# Patient Record
Sex: FEMALE | Race: WHITE | NOT HISPANIC OR LATINO | Employment: OTHER | URBAN - METROPOLITAN AREA
[De-identification: names, ages, dates, MRNs, and addresses within clinical notes are randomized per-mention and may not be internally consistent; named-entity substitution may affect disease eponyms.]

---

## 2017-01-09 ENCOUNTER — OFFICE VISIT (OUTPATIENT)
Dept: MEDICAL GROUP | Facility: MEDICAL CENTER | Age: 72
End: 2017-01-09
Payer: MEDICARE

## 2017-01-09 VITALS
HEIGHT: 62 IN | SYSTOLIC BLOOD PRESSURE: 158 MMHG | BODY MASS INDEX: 38.61 KG/M2 | OXYGEN SATURATION: 92 % | HEART RATE: 77 BPM | TEMPERATURE: 98.4 F | DIASTOLIC BLOOD PRESSURE: 98 MMHG | WEIGHT: 209.8 LBS

## 2017-01-09 DIAGNOSIS — R73.03 PREDIABETES: ICD-10-CM

## 2017-01-09 DIAGNOSIS — J30.1 SEASONAL ALLERGIC RHINITIS DUE TO POLLEN: ICD-10-CM

## 2017-01-09 DIAGNOSIS — R05.3 CHRONIC COUGH: ICD-10-CM

## 2017-01-09 DIAGNOSIS — I10 ESSENTIAL HYPERTENSION: ICD-10-CM

## 2017-01-09 LAB
INT CON NEG: NEGATIVE
INT CON POS: POSITIVE
S PYO AG THROAT QL: NORMAL

## 2017-01-09 PROCEDURE — 99214 OFFICE O/P EST MOD 30 MIN: CPT | Performed by: FAMILY MEDICINE

## 2017-01-09 PROCEDURE — 87880 STREP A ASSAY W/OPTIC: CPT | Performed by: FAMILY MEDICINE

## 2017-01-09 RX ORDER — HYDROCHLOROTHIAZIDE 25 MG/1
25 TABLET ORAL DAILY
Qty: 30 TAB | Refills: 0 | Status: SHIPPED | OUTPATIENT
Start: 2017-01-09 | End: 2017-12-06

## 2017-01-09 RX ORDER — DIPHENHYDRAMINE HCL 25 MG
50 CAPSULE ORAL EVERY 6 HOURS PRN
Qty: 30 CAP | COMMUNITY
Start: 2017-01-09 | End: 2017-11-20

## 2017-01-09 RX ORDER — HYDROCHLOROTHIAZIDE 25 MG/1
25 TABLET ORAL DAILY
Qty: 30 TAB | Refills: 0 | Status: SHIPPED | OUTPATIENT
Start: 2017-01-09 | End: 2017-01-09 | Stop reason: SDUPTHER

## 2017-01-09 NOTE — MR AVS SNAPSHOT
"Nakita Kinney [facility]   2017 10:00 AM   Office Visit   MRN: [id number]    Department:  [place] [address]   Dept Phone:  [phone]    Description:  Female : 1945   Provider:  Sravan Broderick M.D.           Reason for Visit     Cold Symptoms dry cough, nasal congestion, sore throat x2 months      Allergies as of 2017     Allergen Noted Reactions    Morphine 2014   Itching    Penicillins 2010   Hives, Swelling      You were diagnosed with     Chronic cough   [543390]       Essential hypertension   [1914470]       Seasonal allergic rhinitis due to pollen   [6418540]       Prediabetes   [486696]         Vital Signs     Blood Pressure Pulse Temperature Height Weight Body Mass Index    158/98 mmHg 77 36.9 °C (98.4 °F) 1.575 m (5' 2.01\") 95.165 kg (209 lb 12.8 oz) 38.36 kg/m2    Oxygen Saturation Smoking Status                92% Never Smoker           Basic Information     Date Of Birth Sex Race Ethnicity Preferred Language    1945 Female White Non- English      Your appointments     2017 10:00 AM   Established Patient with Sravan Broderick M.D.   [facility])    [address] [facility] [address]  [facility] [id number] [phone]           You will be receiving a confirmation call a few days before your appointment from our automated call confirmation system.            Feb 10, 2017  8:00 AM   Established Patient with Randa Elmore M.D.   [facility])    [address]  [facility] [address] [phone]           You will be receiving a confirmation call a few days before your appointment from our automated call confirmation system.              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   9/29/2015 - Present    Lightheadedness R42   9/28/2016 - Present    BMI 40.0-44.9, adult (HCC) Z68.41   9/28/2016 - Present    Immunization due Z23   9/28/2016 - Present    TIA (transient ischemic attack) G45.9   10/13/2016 - Present    History of CVA (cerebrovascular accident) Z86.73   10/17/2016 - Present    Inactivity Z72.3   10/17/2016 - Present    Hyperlipidemia LDL goal <70 E78.5   10/17/2016 - Present    Cramp of both lower extremities R25.2   11/23/2016 - Present    Chronic cough R05   1/9/2017 - Present    Prediabetes R73.03   1/9/2017 - Present    Seasonal allergic rhinitis due to pollen J30.1   1/9/2017 - Present      Health Maintenance        Date Due Completion Dates    IMM ZOSTER VACCINE 9/20/2006 ---    IMM INFLUENZA (1) 10/1/2017 (Originally 9/1/2016) 10/5/2015, 1/26/2015    IMM PNEUMOCOCCAL 65+ (ADULT) LOW/MEDIUM RISK SERIES (2 of 2 - PPSV23) 4/20/2017 4/20/2016    BONE DENSITY 1/1/2018 1/1/2013 (N/S)    Override on 1/1/2013: (N/S)    COLONOSCOPY 1/1/2021 1/1/2011 (N/S)    Override on 1/1/2011: (N/S)    IMM DTaP/Tdap/Td Vaccine (2 - Td) 4/20/2026 4/20/2016            Results     POCT Rapid Strep A      Component    Rapid Strep Screen    neg    Internal Control Positive    Positive    Internal Control Negative    Negative                        Current Immunizations     13-VALENT PCV PREVNAR 4/20/2016  2:10 PM    Influenza TIV (IM) 10/5/2015, 1/26/2015  3:55 PM    Tdap Vaccine 4/20/2016  2:10 PM      Below and/or attached are the medications your provider expects you to take. Review all of your home medications and newly ordered medications with your provider and/or pharmacist. Follow medication instructions as directed by your provider  and/or pharmacist. Please keep your medication list with you and share with your provider. Update the information when medications are discontinued, doses are changed, or new medications (including over-the-counter products) are added; and carry medication information at all times in the event of emergency situations     Allergies:  MORPHINE - Itching     PENICILLINS - Hives,Swelling               Medications  Valid as of: January 09, 2017 - 10:45 AM    Generic Name Brand Name Tablet Size Instructions for use    Aspirin (Tablet Delayed Response) Aspirin 325 MG Take 1 Tab by mouth every day.        DiphenhydrAMINE HCl (Cap) BENADRYL 25 MG Take 2 Caps by mouth every 6 hours as needed.        HydroCHLOROthiazide (Tab) HYDRODIURIL 25 MG Take 1 Tab by mouth every day.        Lidocaine (Patch) LIDODERM 5 % Apply 1 Patch to skin as directed every 24 hours.        Pravastatin Sodium (Tab) PRAVACHOL 10 MG Take 1 Tab by mouth every Monday, Wednesday, and Friday.        TiZANidine HCl (Tab) ZANAFLEX 4 MG Take 1 Tab by mouth every 6 hours as needed.        .                 Medicines prescribed today were sent to:     [facility] [address] [address]    Phone: [phone] Fax: [phone]    Open 24 Hours?: No    [facility] [address] [place] [address] - [address]    [address] [facility] [address] [facility] [address]    Phone: [phone] Fax: [phone]    Open 24 Hours?: No      Medication refill instructions:       If your prescription bottle indicates you have medication refills left, it is not necessary to call your provider’s office. Please contact your pharmacy and they will refill your medication.    If your prescription bottle indicates you do not have any refills left, you may request refills at any time through one of the following ways: The online Playroom system (except Urgent Care), by calling your provider’s office, or by asking your  pharmacy to contact your provider’s office with a refill request. Medication refills are processed only during regular business hours and may not be available until the next business day. Your provider may request additional information or to have a follow-up visit with you prior to refilling your medication.   *Please Note: Medication refills are assigned a new Rx number when refilled electronically. Your pharmacy may indicate that no refills were authorized even though a new prescription for the same medication is available at the pharmacy. Please request the medicine by name with the pharmacy before contacting your provider for a refill.           Touch-Writerhart Access Code: Activation code not generated  Current [email or web address] Status: Active

## 2017-01-10 NOTE — PROGRESS NOTES
Subjective:     Chief Complaint   Patient presents with   • Cold Symptoms     dry cough, nasal congestion, sore throat x2 months       History of Present Illness:  Nakita Sheppard is a 71 y.o. female established patient who presents today to discuss chronic cough:    Chronic cough  Patient reports 2 month history of dry cough associated at times with nasal congestion as well as sore throat. However, while patient has been ill over the last 2 months, she does admit that symptoms are not consistent.    Of note, patient has started ACE inhibitor within the last 2 months, lisinopril which was initially at a dose of 20 mg by mouth daily, then increase to 40 mg by mouth daily. However, patient cannot recall when her cough started.    ROS is positive for sneezing, rhinorrhea, and is otherwise negative for fevers, chills, chest congestion, wheezing/rhonchi/rales, throat closure, mucosal swelling of lips or oral mucosa, fatigue.    Hypertension  Patient does report taking her lisinopril 40 mg by mouth daily.    ROS is NEGATIVE for vision/hearing changes, jaw pain/paresthesias, BUE pain/paresthesias/numbness/weakness, chest pain/pressure, palpitations, dyspnea, RUQ abdominal pain, oliguria/anuria, BLE edema.    Seasonal allergic rhinitis due to pollen  Please see notes from [facility] service 01/09/2017 Re: Chronic cough.    Prediabetes  Patient with diagnosis of prediabetes with A1c at 6.1% on 10/14/2016. Patient is not taking any diabetic medication.    ROS is NEGATIVE for blurred vision, polydipsia, polyuria, diaphoresis, palpitations, fatigue, irritability, flank pain, BLE paresthesias.      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 30 Tab 0   • diphenhydrAMINE (BENADRYL) 25 MG capsule Take 2 Caps by mouth every 6 hours as needed. 30 Cap    • pravastatin (PRAVACHOL) 10 MG Tab Take 1 Tab by mouth every Monday, Wednesday, and Friday. 12 Tab 2   • tizanidine (ZANAFLEX) 4 MG Tab Take 1 Tab by mouth every 6 hours as needed. 130 Tab 0   • aspirin  MG Tablet Delayed Response Take 1 Tab by mouth every day. 60 Tab 3   • lidocaine (LIDODERM) 5 % Patch Apply 1 Patch to skin as directed every 24 hours.       No current facility-administered medications for this visit.        Social History:     Social History   Substance Use Topics   • Smoking status: Never Smoker    • Smokeless tobacco: Never Used   • Alcohol Use: No       ROS:     - NOTE: All other systems reviewed and are negative, except as in HPI.     Objective:   Physical Exam:    Vitals: Blood pressure 158/98, pulse 77, temperature 36.9 °C (98.4 °F), height 1.575 m (5' 2.01\"), weight 95.165 kg (209 lb 12.8 oz), SpO2 92 %.   BMI: Body mass index is 38.36 kg/(m^2).   General/Constitutional: Vitals as above, Well nourished, well developed female in no acute distress   Head/Eyes: Head is grossly normal & atraumatic, bilateral conjunctivae not injected, bilateral EOMI, bilateral PERRL   ENT: " Bilateral external ears grossly normal in appearance, Hearing grossly intact, bilateral external canals without cerumen, bilateral TMs are able to the visualized without erythema/exudates/bulging, External nares normal in appearance and without discharge/bleeding, bilateral frontal/maxillary sinuses mildly tender to palpation (maxillary>frontal), posterior oropharynx with erythema and somewhat demonstrative of cobblestoning but no exudates and without airway obstruction   Respiratory: No respiratory distress, bilateral lungs are clear to ausculation in all lung fields (anterior/lateral/posterior), no wheezing/rhonchi/rales   Cardiovascular: Regular rate and rhythm without murmur/gallops/rubs, distal pulses are intact and equal bilaterally (radial, posterior tibial), no bilateral lower extremity edema   MSK: Gait grossly normal & not antalgic   Integumentary: No apparent rashes   Psych: Judgment grossly appropriate, no apparent depression/anxiety    Imaging/Labs: POC Rapid Strep NEGATIVE    Assessment and Plan:   1. Chronic cough  Differential diagnosis is medication side effect from statin vs. Chronic cough from infectious source.  Patient not have discussed possible injection etiologies in detail (tuberculosis versus pertussis versus diphtheria(fungal infection). Low suspicion for fungal infection due to patient not being on systemic steroids nor inhaled corticosteroids. Patient agrees with the plan to proceed with medication change will (D/C lisinopril, start hydrochlorothiazide).   - POCT Rapid Strep A    2. Essential hypertension  Stable, but uncontrolled.  Patient to be started on HCTZ d/t possible medication side effect.  Patient to RTC in 1 week for BP check, and verbalizes understanding that she may need to start a second anti-hypertensive.  - hydrochlorothiazide (HYDRODIURIL) 25 MG Tab; Take 1 Tab by mouth every day.  Dispense: 30 Tab; Refill: 0    3. Seasonal allergic rhinitis due to pollen  Patient states  that she has failed OTC antihistamines such as Claritin, Allegra and Zyrtec.  Benadryl somewhat effective.  Patient to take this as needed for symptomatic control.   - diphenhydrAMINE (BENADRYL) 25 MG capsule; Take 2 Caps by mouth every 6 hours as needed.  Dispense: 30 Cap    4. Prediabetes  Patient apparently unaware of diagnosis.  We briefly discussed that lifestyle changes can help control this condition. Patient verbalizes understanding.      PLEASE NOTE: This dictation was created using voice recognition software. I have made every reasonable attempt to correct obvious errors, but I expect that there are errors of grammar and possibly content that I did not discover before finalizing the note.

## 2017-01-10 NOTE — ASSESSMENT & PLAN NOTE
Patient reports 2 month history of dry cough associated at times with nasal congestion as well as sore throat. However, while patient has been ill over the last 2 months, she does admit that symptoms are not consistent.    Of note, patient has started ACE inhibitor within the last 2 months, lisinopril which was initially at a dose of 20 mg by mouth daily, then increase to 40 mg by mouth daily. However, patient cannot recall when her cough started.    ROS is positive for sneezing, rhinorrhea, and is otherwise negative for fevers, chills, chest congestion, wheezing/rhonchi/rales, throat closure, mucosal swelling of lips or oral mucosa, fatigue.

## 2017-01-10 NOTE — ASSESSMENT & PLAN NOTE
Patient does report taking her lisinopril 40 mg by mouth daily.    ROS is NEGATIVE for vision/hearing changes, jaw pain/paresthesias, BUE pain/paresthesias/numbness/weakness, chest pain/pressure, palpitations, dyspnea, RUQ abdominal pain, oliguria/anuria, BLE edema.

## 2017-01-19 ENCOUNTER — APPOINTMENT (OUTPATIENT)
Dept: MEDICAL GROUP | Facility: MEDICAL CENTER | Age: 72
End: 2017-01-19
Payer: MEDICARE

## 2017-02-10 ENCOUNTER — APPOINTMENT (OUTPATIENT)
Dept: MEDICAL GROUP | Facility: MEDICAL CENTER | Age: 72
End: 2017-02-10
Payer: MEDICARE

## 2017-04-26 ENCOUNTER — OFFICE VISIT (OUTPATIENT)
Dept: MEDICAL GROUP | Facility: MEDICAL CENTER | Age: 72
End: 2017-04-26
Payer: MEDICARE

## 2017-04-26 VITALS
HEART RATE: 60 BPM | RESPIRATION RATE: 18 BRPM | HEIGHT: 62 IN | DIASTOLIC BLOOD PRESSURE: 70 MMHG | TEMPERATURE: 98.1 F | BODY MASS INDEX: 37.84 KG/M2 | WEIGHT: 205.6 LBS | SYSTOLIC BLOOD PRESSURE: 120 MMHG | OXYGEN SATURATION: 94 %

## 2017-04-26 DIAGNOSIS — G89.29 CHRONIC MIDLINE LOW BACK PAIN WITHOUT SCIATICA: ICD-10-CM

## 2017-04-26 DIAGNOSIS — M25.475 BILATERAL SWELLING OF FEET AND ANKLES: ICD-10-CM

## 2017-04-26 DIAGNOSIS — Z00.00 ROUTINE GENERAL MEDICAL EXAMINATION AT A HEALTH CARE FACILITY: ICD-10-CM

## 2017-04-26 DIAGNOSIS — R25.2 CRAMP OF BOTH LOWER EXTREMITIES: ICD-10-CM

## 2017-04-26 DIAGNOSIS — M25.472 BILATERAL SWELLING OF FEET AND ANKLES: ICD-10-CM

## 2017-04-26 DIAGNOSIS — M54.50 CHRONIC MIDLINE LOW BACK PAIN WITHOUT SCIATICA: ICD-10-CM

## 2017-04-26 DIAGNOSIS — M25.474 BILATERAL SWELLING OF FEET AND ANKLES: ICD-10-CM

## 2017-04-26 DIAGNOSIS — M25.471 BILATERAL SWELLING OF FEET AND ANKLES: ICD-10-CM

## 2017-04-26 DIAGNOSIS — Z13.6 ENCOUNTER FOR LIPID SCREENING FOR CARDIOVASCULAR DISEASE: ICD-10-CM

## 2017-04-26 DIAGNOSIS — R51.9 NONINTRACTABLE EPISODIC HEADACHE, UNSPECIFIED HEADACHE TYPE: ICD-10-CM

## 2017-04-26 DIAGNOSIS — Z13.220 ENCOUNTER FOR LIPID SCREENING FOR CARDIOVASCULAR DISEASE: ICD-10-CM

## 2017-04-26 PROBLEM — R05.3 CHRONIC COUGH: Status: RESOLVED | Noted: 2017-01-09 | Resolved: 2017-04-26

## 2017-04-26 PROCEDURE — 99214 OFFICE O/P EST MOD 30 MIN: CPT | Performed by: FAMILY MEDICINE

## 2017-04-26 RX ORDER — PSEUDOEPHEDRINE HCL 30 MG
30 TABLET ORAL EVERY 4 HOURS PRN
Qty: 30 TAB | Refills: 0 | Status: SHIPPED | OUTPATIENT
Start: 2017-04-26 | End: 2017-11-20

## 2017-04-26 RX ORDER — TIZANIDINE 4 MG/1
4 TABLET ORAL EVERY 6 HOURS PRN
Qty: 360 TAB | Refills: 1 | Status: SHIPPED | OUTPATIENT
Start: 2017-04-26 | End: 2017-12-06 | Stop reason: SDUPTHER

## 2017-04-26 RX ORDER — CETIRIZINE HYDROCHLORIDE 10 MG/1
10 TABLET ORAL DAILY
Qty: 30 TAB | Refills: 0 | Status: SHIPPED | OUTPATIENT
Start: 2017-04-26 | End: 2018-01-03

## 2017-04-26 ASSESSMENT — PATIENT HEALTH QUESTIONNAIRE - PHQ9: CLINICAL INTERPRETATION OF PHQ2 SCORE: 0

## 2017-04-26 NOTE — ASSESSMENT & PLAN NOTE
Patient is mildly concerning for bilateral ankle swelling. Patient states that this started approximately 2 and half weeks ago with her trip to [place]. Patient notes that she and her  were eating out frequently while on the trip, where normally she eats mainly at home with home-cooked meals.    ROS is negative for dyspnea on exertion, orthopnea, dyspnea, hand swelling

## 2017-04-26 NOTE — ASSESSMENT & PLAN NOTE
Patient has had chronic, but intermittent headaches for many years. Patient states that these headaches seem to be mainly affected by barometric pressure drops, particularly when clots come in, or when she is living at higher elevations. Of note, patient states headaches were somewhat better when she lived in [place] during her 's deployment in the [employer].    Patient states that headaches also partially respond/improve with use of Excedrin, but was unresponsive or insufficiently responsive to ibuprofen and naproxen.    Of note, patient was formerly evaluated by an ENT specialist--whom she does not remember which one she saw here in [place]--and was apparently tested by scoping of her sinuses as well as caloric testing of bilateral ears. Patient believes that all this testing was negative.    Patient has not been using any antihistamines, nasal saline rinses, Flonase.    ROS is NEGATIVE for fevers, chills, rigors, flank pain, dysuria, hematuria, pyuria, polyuria, increased frequency of urination, diarrhea, constipation.    ROS is NEGATIVE for increased lacrimation, b/l itchy eyes,  rhinorrhea, post-nasal drip, sneezing, wheezing, dyspnea, respiratory distress, palpitations, tachycardia, rash, generalized pruritis, rash/hives.

## 2017-04-26 NOTE — MR AVS SNAPSHOT
"        Nakita Kinney [facility]   2017 9:00 AM   Office Visit   MRN: [id number]    Department:  [place] [address]   Dept Phone:  [phone]    Description:  Female : 1945   Provider:  Sravan Broderick M.D.           Reason for Visit     Medication Refill TIZANIDINE    Leg Swelling     Headache           Allergies as of 2017     Allergen Noted Reactions    Morphine 2014   Itching    Penicillins 2010   Hives, Swelling      You were diagnosed with     Nonintractable episodic headache, unspecified headache type   [9430002]       Bilateral swelling of feet and ankles   [1867479]       Chronic midline low back pain without sciatica   [3304589]       Routine general medical examination at a health care facility   [V70.0.ICD-9-CM]       Encounter for lipid screening for cardiovascular disease   [2355090]         Vital Signs     Blood Pressure Pulse Temperature Respirations Height Weight    120/70 mmHg 60 36.7 °C (98.1 °F) 18 1.575 m (5' 2.01\") 93.26 kg (205 lb 9.6 oz)    Body Mass Index Oxygen Saturation Smoking Status             37.60 kg/m2 94% Never Smoker          Basic Information     Date Of Birth Sex Race Ethnicity Preferred Language    1945 Female White Non- English      Problem List              ICD-10-CM Priority Class Noted - Resolved    Hypertension I10   2013 - Present    Cervical neuralgia M54.12   2013 - Present    Peripheral neuropathy (HCC) G62.9   2013 - Present    Degenerative disk disease [id number]   2013 - Present    Spinal stenosis, lumbar region, without neurogenic claudication M48.06   10/6/2014 - Present    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   2015 - Present    BMI 40.0-44.9, adult (HCC) Z68.41   2016 - Present    TIA (transient ischemic attack) G45.9   10/13/2016 - Present    History of CVA (cerebrovascular accident) Z86.73   10/17/2016 - Present    Inactivity Z72.3   10/17/2016 - Present    Hyperlipidemia LDL goal <70 " E78.5   10/17/2016 - Present    Cramp of both lower extremities R25.2   11/23/2016 - Present    Prediabetes R73.03   1/9/2017 - Present    Seasonal allergic rhinitis due to pollen J30.1   1/9/2017 - Present    Nonintractable episodic headache R51   4/26/2017 - Present    Bilateral swelling of feet and ankles M25.473, M25.476   4/26/2017 - Present      Health Maintenance        Date Due Completion Dates    IMM ZOSTER VACCINE 9/20/2006 ---    IMM PNEUMOCOCCAL 65+ (ADULT) LOW/MEDIUM RISK SERIES (2 of 2 - PPSV23) 4/20/2017 4/20/2016    BONE DENSITY 1/1/2018 1/1/2013 (N/S)    Override on 1/1/2013: (N/S)    COLONOSCOPY 1/1/2021 1/1/2011 (N/S)    Override on 1/1/2011: (N/S)    IMM DTaP/Tdap/Td Vaccine (2 - Td) 4/20/2026 4/20/2016            Current Immunizations     13-VALENT PCV PREVNAR 4/20/2016  2:10 PM    Influenza TIV (IM) 10/5/2015, 1/26/2015  3:55 PM    Tdap Vaccine 4/20/2016  2:10 PM      Below and/or attached are the medications your provider expects you to take. Review all of your home medications and newly ordered medications with your provider and/or pharmacist. Follow medication instructions as directed by your provider and/or pharmacist. Please keep your medication list with you and share with your provider. Update the information when medications are discontinued, doses are changed, or new medications (including over-the-counter products) are added; and carry medication information at all times in the event of emergency situations     Allergies:  MORPHINE - Itching     PENICILLINS - Hives,Swelling               Medications  Valid as of: April 26, 2017 -  9:53 AM    Generic Name Brand Name Tablet Size Instructions for use    Aspirin (Tablet Delayed Response) Aspirin 325 MG Take 1 Tab by mouth every day.        Cetirizine HCl (Tab) ZYRTEC 10 MG Take 1 Tab by mouth every day.        DiphenhydrAMINE HCl (Cap) BENADRYL 25 MG Take 2 Caps by mouth every 6 hours as needed.        HydroCHLOROthiazide (Tab)  HYDRODIURIL 25 MG Take 1 Tab by mouth every day.        Lidocaine (Patch) LIDODERM 5 % Apply 1 Patch to skin as directed every 24 hours.        Pravastatin Sodium (Tab) PRAVACHOL 10 MG Take 1 Tab by mouth every Monday, Wednesday, and Friday.        Pseudoephedrine HCl (Tab) SUDAFED 30 MG Take 1 Tab by mouth every four hours as needed for Congestion.        TiZANidine HCl (Tab) ZANAFLEX 4 MG Take 1 Tab by mouth every 6 hours as needed.        .                 Medicines prescribed today were sent to:     [facility] [place], [place] - [address] [facility]    [address] [facility] [address]    Phone: [phone] Fax: [phone]    Open 24 Hours?: No    [facility] [address] [place] [address], [place] - [address]    [address] [place] [address]    Phone: [phone] Fax: [phone]    Open 24 Hours?: No      Medication refill instructions:       If your prescription bottle indicates you have medication refills left, it is not necessary to call your provider’s office. Please contact your pharmacy and they will refill your medication.    If your prescription bottle indicates you do not have any refills left, you may request refills at any time through one of the following ways: The online Entrisphere system (except Urgent Care), by calling your provider’s office, or by asking your pharmacy to contact your provider’s office with a refill request. Medication refills are processed only during regular business hours and may not be available until the next business day. Your provider may request additional information or to have a follow-up visit with you prior to refilling your medication.   *Please Note: Medication refills are assigned a new Rx number when refilled electronically. Your pharmacy may indicate that no refills were authorized even though a new prescription for the same medication is available at the pharmacy. Please request the medicine by name with the pharmacy before  contacting your provider for a refill.        Your To Do List     Future Labs/Procedures Complete By Expires    COMP METABOLIC PANEL  As directed 4/26/2018    LIPID PROFILE  As directed 4/26/2018    Comments:    Fasting at least 8hours      Instructions    Get records for your vaccinations          MyChart Access Code: Activation code not generated  Current MyChart Status: Active

## 2017-04-26 NOTE — PROGRESS NOTES
Subjective:     Chief Complaint   Patient presents with   • Medication Refill     TIZANIDINE   • Leg Swelling   • Headache       History of Present Illness:  Nakita Sheppard is a 71 y.o. female established patient who presents today to have evaluation for bilateral ankle swelling, headaches, as well as medication refills for her Tizanidine:    Nonintractable episodic headache  Patient has had chronic, but intermittent headaches for many years. Patient states that these headaches seem to be mainly affected by barometric pressure drops, particularly when clots come in, or when she is living at higher elevations. Of note, patient states headaches were somewhat better when she lived in [place] during her 's deployment in the [employer].    Patient states that headaches also partially respond/improve with use of Excedrin, but was unresponsive or insufficiently responsive to ibuprofen and naproxen.    Of note, patient was formerly evaluated by an ENT specialist--whom she does not remember which one she saw here in [place]--and was apparently tested by scoping of her sinuses as well as caloric testing of bilateral ears. Patient believes that all this testing was negative.    Patient has not been using any antihistamines, nasal saline rinses, Flonase.    ROS is NEGATIVE for fevers, chills, rigors, flank pain, dysuria, hematuria, pyuria, polyuria, increased frequency of urination, diarrhea, constipation.    ROS is NEGATIVE for increased lacrimation, b/l itchy eyes,  rhinorrhea, post-nasal drip, sneezing, wheezing, dyspnea, respiratory distress, palpitations, tachycardia, rash, generalized pruritis, rash/hives.    Bilateral swelling of feet and ankles  Patient is mildly concerning for bilateral ankle swelling. Patient states that this started approximately 2 and half weeks ago with her trip to [place]. Patient notes that she and her  were eating out frequently while on the trip, where normally she eats  mainly at home with home-cooked meals.    ROS is negative for dyspnea on exertion, orthopnea, dyspnea, hand swelling    Cramp of both lower extremities  Patient admits to having cramping of bilateral lower extremities from calves down at night only. Patient states that she sleeps on her back or on her side, that this pain is seems to be worsened when she is sleeping on her side. Patient tried sleeping with a pillow between her legs, but this has provided insufficient relief. Patient does not have cramping when she is walking on flat ground, doesn't have cramping during the day. Patient does admit to diagnosis of osteoarthritis of bilateral knees.    ROS is negative for bilateral lower extremity radicular pain/weakness.      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 30 Tab 0   • pseudoephedrine (SUDAFED) 30 MG Tab Take 1 Tab by mouth every four hours as needed for Congestion. 30 Tab 0   • tizanidine (ZANAFLEX) 4 MG Tab Take 1 Tab by mouth every 6 hours as needed. 360 Tab 1  "  • hydrochlorothiazide (HYDRODIURIL) 25 MG Tab Take 1 Tab by mouth every day. 30 Tab 0   • aspirin  MG Tablet Delayed Response Take 1 Tab by mouth every day. 60 Tab 3   • lidocaine (LIDODERM) 5 % Patch Apply 1 Patch to skin as directed every 24 hours.     • diphenhydrAMINE (BENADRYL) 25 MG capsule Take 2 Caps by mouth every 6 hours as needed. 30 Cap    • pravastatin (PRAVACHOL) 10 MG Tab Take 1 Tab by mouth every Monday, Wednesday, and Friday. 12 Tab 2     No current facility-administered medications for this visit.        Social History:     Social History   Substance Use Topics   • Smoking status: Never Smoker    • Smokeless tobacco: Never Used   • Alcohol Use: No       ROS:     - ROS is NEGATIVE for dizziness, generalized weakness/fatigue, vision/hearing changes, jaw pain/paresthesias, BUE pain/paresthesias/numbness/weakness, chest pain/pressure, palpitations, dyspnea, RUQ abdominal pain, oliguria/anuria    - NOTE: All other systems reviewed and are negative, except as in HPI.     Objective:   Physical Exam:    Vitals: Blood pressure 120/70, pulse 60, temperature 36.7 °C (98.1 °F), resp. rate 18, height 1.575 m (5' 2.01\"), weight 93.26 kg (205 lb 9.6 oz), SpO2 94 %.   BMI: Body mass index is 37.6 kg/(m^2).   General/Constitutional: Vitals as above, Well nourished, well developed female in no acute distress   Head/Eyes: Head is grossly normal & atraumatic, bilateral conjunctivae not injected, bilateral EOMI, bilateral PERRL   ENT: Bilateral external ears grossly normal in appearance, Hearing grossly intact, bilateral external canals without cerumen impaction, bilateral TMs able to the visualized without erythema/bulging/exudate, External nares normal in appearance and without discharge/bleeding, bilateral frontal/maxillary sinuses mildly tender to palpation, posterior oropharynx without erythema/exudates and with airway obstruction   Respiratory: No respiratory distress, bilateral lungs are clear to " ausculation in all lung fields (anterior/lateral/posterior), no wheezing/rhonchi/rales   Cardiovascular: Regular rate and rhythm without murmur/gallops/rubs, distal pulses are intact and equal bilaterally (radial, posterior tibial), no bilateral lower extremity edema   MSK: Gait grossly normal & not antalgic   Integumentary: Trace pitting edema in bilateral ankles, No apparent rashes   Psych: Judgment grossly appropriate, no apparent depression/anxiety    Assessment and Plan:   1. Nonintractable episodic headache, unspecified headache type  Uncontrolled. We will try conservative therapy with medications as below for symptomatic care. Patient discussed that we may have to refer her to an ENT specialist. Patient to find out what  ENT specialist she saw previously.   - cetirizine (ZYRTEC) 10 MG Tab; Take 1 Tab by mouth every day.  Dispense: 30 Tab; Refill: 0   - pseudoephedrine (SUDAFED) 30 MG Tab; Take 1 Tab by mouth every four hours as needed for Congestion.  Dispense: 30 Tab; Refill: 0    2. Bilateral swelling of feet and ankles  Uncontrolled, however improving. Patient and I discussed that this likely due to abnormally high salt intake during her vacation approximately 2-1/2 weeks ago. Patient encouraged to continue on a low-salt diet for now, until this fully resolves. Patient instructed to return to clinic if this worsens. This is not concerning for congestive heart failure at this time, as patient does not have symptoms suggestive of that.    3. Chronic midline low back pain without sciatica  Stable, well-controlled, however patient needs refill on Zanaflex. Patient was given refill as below.   - tizanidine (ZANAFLEX) 4 MG Tab; Take 1 Tab by mouth every 6 hours as needed.  Dispense: 360 Tab; Refill: 1    4. Routine general medical examination at a health care facility  5. Encounter for lipid screening for cardiovascular disease   - COMP METABOLIC PANEL; Future    - LIPID PROFILE; Future    6. Cramp of both lower  extremities  Uncontrolled, patient instructed to use a thicker body pillow. Patient also instructed that she can sleep on her back. Patient to return to clinic in the future for further re-evaluation.      RTC in: September 2017 for her annual Medicare exam.    PLEASE NOTE: This dictation was created using voice recognition software. I have made every reasonable attempt to correct obvious errors, but I expect that there are errors of grammar and possibly content that I did not discover before finalizing the note.

## 2017-04-26 NOTE — ASSESSMENT & PLAN NOTE
Patient admits to having cramping of bilateral lower extremities from calves down at night only. Patient states that she sleeps on her back or on her side, that this pain is seems to be worsened when she is sleeping on her side. Patient tried sleeping with a pillow between her legs, but this has provided insufficient relief. Patient does not have cramping when she is walking on flat ground, doesn't have cramping during the day. Patient does admit to diagnosis of osteoarthritis of bilateral knees.    ROS is negative for bilateral lower extremity radicular pain/weakness.

## 2017-07-25 ENCOUNTER — TELEPHONE (OUTPATIENT)
Dept: MEDICAL GROUP | Facility: MEDICAL CENTER | Age: 72
End: 2017-07-25

## 2017-07-25 NOTE — TELEPHONE ENCOUNTER
ESTABLISHED PATIENT PRE-VISIT PLANNING     Note: Patient will not be contacted if there is no indication to call.     1.  Reviewed notes from the last few office visits within the medical group: Yes 04/26/2017  2.  If any orders were placed at last visit or intended to be done for this visit (i.e. 6 mos follow-up), do we have Results/Consult Notes?        •  Labs - Labs ordered, NOT completed. Patient advised to complete prior to next appointment.       •  Imaging - Imaging was not ordered at last office visit.       •  Referrals - No referrals were ordered at last office visit.    3. Is this appointment scheduled as a Hospital Follow-Up? No    4.  Immunizations were updated in Epic using WebIZ?: No WebIZ record       •  Web Iz Recommendations:     5.  Patient is due for the following Health Maintenance Topics:   Health Maintenance Due   Topic Date Due   • IMM ZOSTER VACCINE  09/20/2006   • IMM PNEUMOCOCCAL 65+ (ADULT) LOW/MEDIUM RISK SERIES (2 of 2 - PPSV23) 04/20/2017   • Annual Wellness Visit  04/21/2017       6.  Patient was NOT informed to arrive 15 min prior to their scheduled appointment and bring in their medication bottles.

## 2017-07-26 ENCOUNTER — OFFICE VISIT (OUTPATIENT)
Dept: MEDICAL GROUP | Facility: MEDICAL CENTER | Age: 72
End: 2017-07-26
Payer: MEDICARE

## 2017-07-26 VITALS
HEART RATE: 95 BPM | WEIGHT: 209 LBS | TEMPERATURE: 99 F | OXYGEN SATURATION: 93 % | BODY MASS INDEX: 38.46 KG/M2 | DIASTOLIC BLOOD PRESSURE: 96 MMHG | HEIGHT: 62 IN | SYSTOLIC BLOOD PRESSURE: 158 MMHG

## 2017-07-26 DIAGNOSIS — M79.604 BILATERAL LOWER EXTREMITY PAIN: ICD-10-CM

## 2017-07-26 DIAGNOSIS — M79.605 BILATERAL LOWER EXTREMITY PAIN: ICD-10-CM

## 2017-07-26 PROCEDURE — 99214 OFFICE O/P EST MOD 30 MIN: CPT | Performed by: FAMILY MEDICINE

## 2017-07-26 RX ORDER — DULOXETIN HYDROCHLORIDE 30 MG/1
30 CAPSULE, DELAYED RELEASE ORAL DAILY
Qty: 30 CAP | Refills: 0 | Status: SHIPPED | OUTPATIENT
Start: 2017-07-26 | End: 2017-08-23 | Stop reason: SDUPTHER

## 2017-07-26 NOTE — ASSESSMENT & PLAN NOTE
"Patient has had right lateral lower extremity leg pain from knee to feet rated as 8.5 out of 10.  Patient states that she has been trialed on multiple medications in the past to try to address his pain. Patient has been trialed on gabapentin, which caused her to gain 30 pounds within a month. Patient has also been trialed on Lyrica, which was not effective. Patient does not like taking narcotics, as they \"knocked me on my ass.\"    Of note, patient has not had formal neurological evaluation. Therefore this is inconclusive at this is restless leg syndrome versus peripheral neuropathy, or if this is due to lumbar back radicular neuropathy. Please see review of systems as below.    ROS is NEGATIVE for BLE radicular pain, saddle paresthesias, bowel or bladder incontinence, bilateral knee pain, gait instability    ROS is NEGATIVE for respiratory depression, cognitive slowing, constipation, cyanotic skin changes.    "

## 2017-07-26 NOTE — MR AVS SNAPSHOT
"Nakita [place]   2017 1:40 PM   Office Visit   MRN: [id number]    Department:  [place] [address]   Dept Phone:  [phone]    Description:  Female : 1945   Provider:  Sravan Broderick M.D.           Reason for Visit     Pain bilateral feet >> 8.5 shooting pain/sore      Allergies as of 2017     Allergen Noted Reactions    Morphine 2014   Itching    Penicillins 2010   Hives, Swelling      You were diagnosed with     Bilateral lower extremity pain   [[id number]]         Vital Signs     Blood Pressure Pulse Temperature Height Weight Body Mass Index    158/96 mmHg 95 37.2 °C (99 °F) 1.575 m (5' 2\") 94.802 kg (209 lb) 38.22 kg/m2    Oxygen Saturation Breastfeeding? Smoking Status             93% No Never Smoker          Basic Information     Date Of Birth Sex Race Ethnicity Preferred Language    1945 Female White Non- English      Your appointments     Sep 27, 2017  9:00 AM   ANNUAL EXAM PREVENTATIVE with Sravan Broderick M.D.   [facility]    [address]  [address]  [place] [address]   [phone]              Problem List              ICD-10-CM Priority Class Noted - Resolved    Hypertension I10   2013 - Present    Cervical neuralgia M54.12   2013 - Present    Peripheral neuropathy (HCC) G62.9   2013 - Present    Degenerative disk disease [id number]   2013 - Present    Spinal stenosis, lumbar region, without neurogenic claudication M48.06   10/6/2014 - Present    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   2015 - Present    BMI 40.0-44.9, adult (HCC) Z68.41   2016 - Present    TIA (transient ischemic attack) G45.9   10/13/2016 - Present    History of CVA (cerebrovascular accident) Z86.73   10/17/2016 - Present    Inactivity Z72.3   10/17/2016 - Present    Hyperlipidemia LDL goal <70 E78.5   10/17/2016 - Present    Cramp of both lower extremities R25.2   2016 - Present   " Prediabetes R73.03   1/9/2017 - Present    Seasonal allergic rhinitis due to pollen J30.1   1/9/2017 - Present    Nonintractable episodic headache R51   4/26/2017 - Present    Bilateral swelling of feet and ankles M25.473, M25.476   4/26/2017 - Present    Bilateral lower extremity pain M79.604, M79.605   7/26/2017 - Present      Health Maintenance        Date Due Completion Dates    IMM ZOSTER VACCINE 9/20/2006 ---    IMM PNEUMOCOCCAL 65+ (ADULT) LOW/MEDIUM RISK SERIES (2 of 2 - PPSV23) 4/20/2017 4/20/2016    IMM INFLUENZA (1) 10/1/2017 (Originally 9/1/2017) 10/5/2015, 1/26/2015    BONE DENSITY 1/1/2018 1/1/2013 (N/S)    Override on 1/1/2013: (N/S)    COLONOSCOPY 1/1/2021 1/1/2011 (N/S)    Override on 1/1/2011: (N/S)    IMM DTaP/Tdap/Td Vaccine (2 - Td) 4/20/2026 4/20/2016            Current Immunizations     13-VALENT PCV PREVNAR 4/20/2016  2:10 PM    Influenza TIV (IM) 10/5/2015, 1/26/2015  3:55 PM    Tdap Vaccine 4/20/2016  2:10 PM      Below and/or attached are the medications your provider expects you to take. Review all of your home medications and newly ordered medications with your provider and/or pharmacist. Follow medication instructions as directed by your provider and/or pharmacist. Please keep your medication list with you and share with your provider. Update the information when medications are discontinued, doses are changed, or new medications (including over-the-counter products) are added; and carry medication information at all times in the event of emergency situations     Allergies:  MORPHINE - Itching     PENICILLINS - Hives,Swelling               Medications  Valid as of: July 26, 2017 -  2:02 PM    Generic Name Brand Name Tablet Size Instructions for use    Aspirin (Tablet Delayed Response) Aspirin 325 MG Take 1 Tab by mouth every day.        Cetirizine HCl (Tab) ZYRTEC 10 MG Take 1 Tab by mouth every day.        DiphenhydrAMINE HCl (Cap) BENADRYL 25 MG Take 2 Caps by mouth every 6 hours as  needed.        DULoxetine HCl (Cap DR Particles) CYMBALTA 30 MG Take 1 Cap by mouth every day. Start at 1 cap per day, can increase to 2 caps per day after 1-2weeks.        HydroCHLOROthiazide (Tab) HYDRODIURIL 25 MG Take 1 Tab by mouth every day.        Lidocaine (Patch) LIDODERM 5 % Apply 1 Patch to skin as directed every 24 hours.        Pravastatin Sodium (Tab) PRAVACHOL 10 MG Take 1 Tab by mouth every Monday, Wednesday, and Friday.        Pseudoephedrine HCl (Tab) SUDAFED 30 MG Take 1 Tab by mouth every four hours as needed for Congestion.        TiZANidine HCl (Tab) ZANAFLEX 4 MG Take 1 Tab by mouth every 6 hours as needed.        .                 Medicines prescribed today were sent to:     [facility] [address] [address]    Phone: [phone] Fax: [phone]    Open 24 Hours?: No    [facility] [address] [place] [address], [place] - [address]    [address] [facility] [address]    Phone: [phone] Fax: [phone]    Open 24 Hours?: No      Medication refill instructions:       If your prescription bottle indicates you have medication refills left, it is not necessary to call your provider’s office. Please contact your pharmacy and they will refill your medication.    If your prescription bottle indicates you do not have any refills left, you may request refills at any time through one of the following ways: The online Sprig system (except Urgent Care), by calling your provider’s office, or by asking your pharmacy to contact your provider’s office with a refill request. Medication refills are processed only during regular business hours and may not be available until the next business day. Your provider may request additional information or to have a follow-up visit with you prior to refilling your medication.   *Please Note: Medication refills are assigned a new Rx number when refilled electronically. Your pharmacy  may indicate that no refills were authorized even though a new prescription for the same medication is available at the pharmacy. Please request the medicine by name with the pharmacy before contacting your provider for a refill.        Referral     A referral request has been sent to our patient care coordination department. Please allow 3-5 business days for us to process this request and contact you either by phone or mail. If you do not hear from us by the 5th business day, please call us at [phone].           Kahuna Access Code: Activation code not generated  Current Kahuna Status: Active

## 2017-07-26 NOTE — PROGRESS NOTES
"Subjective:     Chief Complaint   Patient presents with   • Pain     bilateral feet >> 8.5 shooting pain/sore       History of Present Illness:  Nakita Sheppard is a 71 y.o. female established patient who presents today to have evaluation of bilateral lower extremity pain:    Bilateral lower extremity pain  Patient has had right lateral lower extremity leg pain from knee to feet rated as 8.5 out of 10.  Patient states that she has been trialed on multiple medications in the past to try to address his pain. Patient has been trialed on gabapentin, which caused her to gain 30 pounds within a month. Patient has also been trialed on Lyrica, which was not effective. Patient does not like taking narcotics, as they \"knocked me on my ass.\"    Of note, patient has not had formal neurological evaluation. Therefore this is inconclusive at this is restless leg syndrome versus peripheral neuropathy, or if this is due to lumbar back radicular neuropathy. Please see review of systems as below.    ROS is NEGATIVE for BLE radicular pain, saddle paresthesias, bowel or bladder incontinence, bilateral knee pain, gait instability    ROS is NEGATIVE for respiratory depression, cognitive slowing, constipation, cyanotic skin changes.        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 Start at 1 cap per day, can increase to 2 caps per day after 1-2weeks. 30 Cap 0   • cetirizine (ZYRTEC) 10 MG Tab Take 1 Tab by mouth every day. 30 Tab 0   • pseudoephedrine (SUDAFED) 30 MG Tab Take 1 Tab by mouth every four hours as needed for Congestion. 30 Tab 0   • tizanidine (ZANAFLEX) 4 MG Tab Take 1 Tab by mouth every 6 hours as needed. 360 Tab 1   • hydrochlorothiazide (HYDRODIURIL) 25 MG Tab Take 1 Tab by mouth every day. 30 Tab 0   • diphenhydrAMINE (BENADRYL) 25 MG capsule Take 2 Caps by mouth every 6 hours as needed. 30 Cap    • pravastatin (PRAVACHOL) 10 MG Tab Take 1 Tab by mouth every Monday, Wednesday, and Friday. 12 Tab 2   • aspirin  MG Tablet Delayed Response Take 1 Tab by mouth every day. 60 Tab 3   • lidocaine (LIDODERM) 5 % Patch Apply 1 Patch to skin as directed every 24 hours.       No current facility-administered medications for this visit.        Social History:     Social History   Substance Use Topics   • Smoking status: Never Smoker    • Smokeless tobacco: Never Used   • Alcohol Use: No       ROS:     - NOTE: All other systems reviewed and are negative, except as in HPI.     Objective:   Physical Exam:    Vitals: Blood pressure 158/96, pulse 95, temperature 37.2 °C (99 °F), height 1.575 m (5' 2\"), weight 94.802 kg (209 lb), SpO2 93 %, not currently breastfeeding.   BMI: Body mass index is 38.22 kg/(m^2).   General/Constitutional: Vitals as above, Well nourished, well developed female in no acute distress   Head/Eyes: Head is grossly normal & atraumatic, bilateral conjunctivae clear and not injected, bilateral EOMI, bilateral " PERRL   ENT: Bilateral external ears grossly normal in appearance, Hearing grossly intact, External nares normal in appearance and without discharge/bleeding   Respiratory: No respiratory distress, bilateral lungs are clear to ausculation in all lung fields (anterior/lateral/posterior), no wheezing/rhonchi/rales   Cardiovascular: Regular rate and rhythm without murmur/gallops/rubs, distal pulses are intact and equal bilaterally (radial, posterior tibial), no bilateral lower extremity edema   MSK: Gait grossly normal & mildly antalgic, bilateral knee exam positive for mild to moderate crepitus on passive range of motion   Integumentary: No apparent rashes   Neuro: Bilateral lower extremity sensation to soft touch as well as sharp sensation intact from upper quadriceps to knee, bilateral lower extremity sensation intact pressure only but not to sharp sensation or soft sensation, described as mild paresthesia/burning sensation   Psych: Judgment grossly appropriate, no apparent depression/anxiety    Assessment and Plan:   1. Bilateral lower extremity pain  Uncontrolled. Patient had discussed differential diagnosis which includes peripheral neuropathy secondary to mild to moderate bilateral knee osteoarthritis, versus lumbar radicular neuropathy, versus restless leg syndrome.    A) Medication Management: Patient and I discussed her treatment options, and we're electing to proceed with Cymbalta first. Patient can self increase after 1-2 weeks, and patient to notify me by either my chart or voicemail how she is proceeding.     - duloxetine (CYMBALTA) 30 MG Cap DR Particles; Take 1 Cap by mouth every day. Start at 1 cap per day, can increase to 2 caps per day after 1-2weeks.  Dispense: 30 Cap; Refill: 0   B) Referral: I'm also placing neurosurgery referral the patient can reestablish care with Dr. horne, in case medication therapy isn't sufficient to relieve her pain and she would like to have evaluation of possible lumbar  radiculopathy. Lastly, patient had discussed that she has other oral medication options such as ropinirole.    - REFERRAL TO NEUROSURGERY      RTC: in 2 months as scheduled.    PLEASE NOTE: This dictation was created using voice recognition software. I have made every reasonable attempt to correct obvious errors, but I expect that there are errors of grammar and possibly content that I did not discover before finalizing the note.

## 2017-08-14 PROBLEM — C44.319 BASAL CELL CARCINOMA OF SKIN OF OTHER PARTS OF FACE: Status: ACTIVE | Noted: 2017-08-14

## 2017-08-14 PROBLEM — C44.329 SQUAMOUS CELL CARCINOMA OF SKIN OF OTHER PARTS OF FACE: Status: ACTIVE | Noted: 2017-08-14

## 2017-08-18 RX ORDER — DULOXETIN HYDROCHLORIDE 30 MG/1
CAPSULE, DELAYED RELEASE ORAL
Qty: 60 CAP | Refills: 0 | OUTPATIENT
Start: 2017-08-18

## 2017-08-18 NOTE — TELEPHONE ENCOUNTER
Is patient taking this as one pill or two pills daily?  Also, is this medication helping with her leg pain?

## 2017-08-18 NOTE — TELEPHONE ENCOUNTER
Was the patient seen in the last year in this department? Yes     Does patient have an active prescription for medications requested? No     Received Request Via: Pharmacy     Last Visit: 7/26/17    Last Labs: 10/14/16

## 2017-08-23 DIAGNOSIS — M79.605 BILATERAL LOWER EXTREMITY PAIN: ICD-10-CM

## 2017-08-23 DIAGNOSIS — M79.604 BILATERAL LOWER EXTREMITY PAIN: ICD-10-CM

## 2017-08-23 RX ORDER — DULOXETIN HYDROCHLORIDE 60 MG/1
60 CAPSULE, DELAYED RELEASE ORAL DAILY
Qty: 90 CAP | Refills: 1 | Status: SHIPPED | OUTPATIENT
Start: 2017-08-23 | End: 2017-11-20

## 2017-08-23 NOTE — TELEPHONE ENCOUNTER
Was the patient seen in the last year in this department? Yes     Does patient have an active prescription for medications requested? Yes     Received Request Via: Pharmacy       Last visit: 07/26/17  Last labs:10/13/16      PS: Please send Rx to [facility] in [facility].

## 2017-08-28 PROBLEM — D49.2 NEOPLASM OF UNSPECIFIED BEHAVIOR OF BONE, SOFT TISSUE, AND SKIN: Status: RESOLVED | Noted: 2017-08-14 | Resolved: 2017-08-28

## 2017-09-18 ENCOUNTER — RX ONLY (OUTPATIENT)
Age: 72
Setting detail: RX ONLY
End: 2017-09-18

## 2017-09-18 PROBLEM — C44.329 SQUAMOUS CELL CARCINOMA OF SKIN OF OTHER PARTS OF FACE: Status: ACTIVE | Noted: 2017-09-18

## 2017-09-27 ENCOUNTER — APPOINTMENT (OUTPATIENT)
Dept: MEDICAL GROUP | Facility: MEDICAL CENTER | Age: 72
End: 2017-09-27
Payer: MEDICARE

## 2017-10-02 ENCOUNTER — APPOINTMENT (RX ONLY)
Dept: URBAN - METROPOLITAN AREA CLINIC 31 | Facility: CLINIC | Age: 72
Setting detail: DERMATOLOGY
End: 2017-10-02

## 2017-10-02 DIAGNOSIS — Z48.02 ENCOUNTER FOR REMOVAL OF SUTURES: ICD-10-CM

## 2017-10-02 PROCEDURE — ? SUTURE REMOVAL (GLOBAL PERIOD)

## 2017-10-02 PROCEDURE — 99024 POSTOP FOLLOW-UP VISIT: CPT

## 2017-10-02 PROCEDURE — ? COUNSELING

## 2017-10-02 ASSESSMENT — LOCATION DETAILED DESCRIPTION DERM
LOCATION DETAILED: RIGHT INFERIOR CENTRAL MALAR CHEEK
LOCATION DETAILED: RIGHT INFERIOR CENTRAL MALAR CHEEK

## 2017-10-02 ASSESSMENT — LOCATION ZONE DERM
LOCATION ZONE: FACE
LOCATION ZONE: FACE

## 2017-10-02 ASSESSMENT — LOCATION SIMPLE DESCRIPTION DERM
LOCATION SIMPLE: RIGHT CHEEK
LOCATION SIMPLE: RIGHT CHEEK

## 2017-10-02 NOTE — PROCEDURE: SUTURE REMOVAL (GLOBAL PERIOD)
Detail Level: Detailed
Add 58137 Cpt? (Important Note: In 2017 The Use Of 30185 Is Being Tracked By Cms To Determine Future Global Period Reimbursement For Global Periods): yes

## 2017-10-16 ENCOUNTER — APPOINTMENT (RX ONLY)
Dept: URBAN - METROPOLITAN AREA CLINIC 31 | Facility: CLINIC | Age: 72
Setting detail: DERMATOLOGY
End: 2017-10-16

## 2017-10-16 DIAGNOSIS — Z48.02 ENCOUNTER FOR REMOVAL OF SUTURES: ICD-10-CM

## 2017-10-16 PROCEDURE — ? COUNSELING

## 2017-10-16 PROCEDURE — 99024 POSTOP FOLLOW-UP VISIT: CPT

## 2017-10-16 PROCEDURE — ? SUTURE REMOVAL (GLOBAL PERIOD)

## 2017-10-16 ASSESSMENT — LOCATION DETAILED DESCRIPTION DERM
LOCATION DETAILED: RIGHT MEDIAL MALAR CHEEK
LOCATION DETAILED: RIGHT CENTRAL MALAR CHEEK

## 2017-10-16 ASSESSMENT — LOCATION SIMPLE DESCRIPTION DERM
LOCATION SIMPLE: RIGHT CHEEK
LOCATION SIMPLE: RIGHT CHEEK

## 2017-10-16 ASSESSMENT — LOCATION ZONE DERM
LOCATION ZONE: FACE
LOCATION ZONE: FACE

## 2017-10-16 NOTE — PROCEDURE: SUTURE REMOVAL (GLOBAL PERIOD)
Detail Level: Detailed
Add 19974 Cpt? (Important Note: In 2017 The Use Of 89998 Is Being Tracked By Cms To Determine Future Global Period Reimbursement For Global Periods): yes

## 2017-12-05 ENCOUNTER — APPOINTMENT (RX ONLY)
Dept: URBAN - METROPOLITAN AREA CLINIC 31 | Facility: CLINIC | Age: 72
Setting detail: DERMATOLOGY
End: 2017-12-05

## 2017-12-05 DIAGNOSIS — Z48.817 ENCOUNTER FOR SURGICAL AFTERCARE FOLLOWING SURGERY ON THE SKIN AND SUBCUTANEOUS TISSUE: ICD-10-CM

## 2017-12-05 PROCEDURE — ? COUNSELING

## 2017-12-05 PROCEDURE — ? POST-OP WOUND CHECK

## 2017-12-05 ASSESSMENT — LOCATION DETAILED DESCRIPTION DERM
LOCATION DETAILED: RIGHT CENTRAL MALAR CHEEK
LOCATION DETAILED: RIGHT CENTRAL MALAR CHEEK

## 2017-12-05 ASSESSMENT — LOCATION ZONE DERM
LOCATION ZONE: FACE
LOCATION ZONE: FACE

## 2017-12-05 ASSESSMENT — LOCATION SIMPLE DESCRIPTION DERM
LOCATION SIMPLE: RIGHT CHEEK
LOCATION SIMPLE: RIGHT CHEEK

## 2017-12-05 NOTE — PROCEDURE: POST-OP WOUND CHECK
Wound Assessment Override (Optional): disposable Suture
Add 35409 Cpt? (Important Note: In 2017 The Use Of 13344 Is Being Tracked By Cms To Determine Future Global Period Reimbursement For Global Periods): no
Detail Level: Detailed

## 2017-12-06 PROBLEM — E66.9 OBESITY (BMI 35.0-39.9 WITHOUT COMORBIDITY): Status: ACTIVE | Noted: 2017-12-06

## 2017-12-07 ENCOUNTER — APPOINTMENT (RX ONLY)
Dept: URBAN - METROPOLITAN AREA CLINIC 31 | Facility: CLINIC | Age: 72
Setting detail: DERMATOLOGY
End: 2017-12-07

## 2017-12-07 DIAGNOSIS — Z48.817 ENCOUNTER FOR SURGICAL AFTERCARE FOLLOWING SURGERY ON THE SKIN AND SUBCUTANEOUS TISSUE: ICD-10-CM

## 2017-12-07 PROCEDURE — ? POST-OP WOUND EVALUATION

## 2017-12-07 PROCEDURE — 99024 POSTOP FOLLOW-UP VISIT: CPT

## 2017-12-07 ASSESSMENT — LOCATION SIMPLE DESCRIPTION DERM: LOCATION SIMPLE: RIGHT CHEEK

## 2017-12-07 ASSESSMENT — LOCATION ZONE DERM: LOCATION ZONE: FACE

## 2017-12-07 ASSESSMENT — LOCATION DETAILED DESCRIPTION DERM: LOCATION DETAILED: RIGHT CENTRAL MALAR CHEEK

## 2017-12-07 NOTE — PROCEDURE: POST-OP WOUND EVALUATION
Wound Evaluated By (Optional): Gerard Light MD
Patient To Follow-Up With?: their primary dermatologist
Detail Level: Detailed
Wound Diameter In Cm(Optional): 0
Wound Crusting?: clean
Add 83045 Cpt? (Important Note: In 2017 The Use Of 76659 Is Being Tracked By Cms To Determine Future Global Period Reimbursement For Global Periods): yes

## 2021-03-18 ENCOUNTER — OFFICE VISIT (OUTPATIENT)
Dept: NEUROLOGY | Facility: MEDICAL CENTER | Age: 76
End: 2021-03-18
Attending: PSYCHIATRY & NEUROLOGY
Payer: MEDICARE

## 2021-03-18 VITALS
TEMPERATURE: 98.7 F | WEIGHT: 185.85 LBS | BODY MASS INDEX: 34.2 KG/M2 | HEIGHT: 62 IN | OXYGEN SATURATION: 94 % | RESPIRATION RATE: 14 BRPM | DIASTOLIC BLOOD PRESSURE: 78 MMHG | HEART RATE: 111 BPM | SYSTOLIC BLOOD PRESSURE: 130 MMHG

## 2021-03-18 DIAGNOSIS — Z86.69 HISTORY OF PARAPLEGIA: ICD-10-CM

## 2021-03-18 PROCEDURE — 99204 OFFICE O/P NEW MOD 45 MIN: CPT | Performed by: PSYCHIATRY & NEUROLOGY

## 2021-03-18 PROCEDURE — 99212 OFFICE O/P EST SF 10 MIN: CPT | Performed by: PSYCHIATRY & NEUROLOGY

## 2021-03-18 RX ORDER — CARBAMAZEPINE 200 MG/1
200 TABLET ORAL 2 TIMES DAILY
Status: ON HOLD | COMMUNITY
Start: 2021-02-18 | End: 2021-12-21

## 2021-03-18 RX ORDER — PANTOPRAZOLE SODIUM 40 MG/1
40 TABLET, DELAYED RELEASE ORAL
COMMUNITY
Start: 2021-01-22 | End: 2021-11-30

## 2021-03-18 RX ORDER — NIFEDIPINE 60 MG/1
60 TABLET, EXTENDED RELEASE ORAL
COMMUNITY
Start: 2020-10-13 | End: 2021-11-30

## 2021-03-18 NOTE — PROGRESS NOTES
Chief Complaint   Patient presents with   • New Patient     Weakness        History of present illness:  Nakita Kinney South 75 y.o. female with chronic low back pain, ED visit in 12/2020 for acute bilateral lower extremity weakness and loss of sensation from the waist down. She was found to have elevated ESR/CRP of 45/21.48 and was discharged with prednisone 50mg. She took this for 2 days but stopped due to hallucinations.     She described the symptoms that led to the ED visit:   She woke up and had bilateral lower extremity weakness severe enough where she was unable to stand up or walk. Her legs gave out under her when she tried to walk. She states that had loss of sensation from the waist down.   On arrival to the ED she describes that she was able to walk with a walker. She was discharged home but on attempted transfer from the car had recurrent weakness that led to a fall.      Her daughter did some research and decided to give her mother bananas and potassium supplement after the ED doctor informed her that her potassium was low. She started K+ supplements and eating bananas and after a few days she felt steadier on her feet. For 4 days prior to starting K+ she was not able to walk and needed help from her .     Currently she is able to walk with a cane. Previously she did not use a cane. She remains on K+ supplements.   It has been about 3 months since symptom onset and she is improved but not back to normal.     Past medical history:   Past Medical History:   Diagnosis Date   • Allergy    • Arthritis    • Chronic headaches 5/29/2012   • Chronic pain    • Dental disorder    • Heart burn    • History of chickenpox    • History of measles    • History of mumps    • Hypertension    • Indigestion    • Low back pain 9/22/14    8/10   • Migraine    • Peripheral neuropathy     BLE   • Rotator cuff tear    • Seasonal allergies    • TIA (transient ischemic attack)    • Tuberculosis 1979    had treatment  for 2 years       Past surgical history:   Past Surgical History:   Procedure Laterality Date   • LUMBAR FUSION POSTERIOR  10/6/2014    Performed by Rishabh Bhatia M.D. at SURGERY [facility] ORS   • LUMBAR LAMINECTOMY DISKECTOMY  10/6/2014    Performed by Rishabh Bhatia M.D. at SURGERY [facility]S   • LUMBAR EXPLORATION  10/6/2014    Performed by Rishabh Bhatia M.D. at SURGERY [facility] OR[facility]   • OTHER  2013    back   • SPINAL CORD STIMULATOR  11/4/2011    Performed by AUBREE PALACIO at [facility]   • OPEN REDUCTION  2008    re-broke foot   • OTHER  2000    right foot   • SHOULDER SURGERY  1998   • CARPAL TUNNEL ENDOSCOPIC  1990   • ARTHROSCOPY, KNEE  1987    bilateral   • ABDOMINAL HYSTERECTOMY TOTAL  1983   • APPENDECTOMY     • CHOLECYSTECTOMY     • LAMINOTOMY  02/2002 and 9/2002       Family history:   Family History   Problem Relation Age of Onset   • Diabetes Mother    • Hypertension Mother    • Arthritis Mother    • Stroke Mother    • Heart Disease Father    • Cancer Brother    • Leukemia Brother    • Stroke Brother        Social history:   Social History     Socioeconomic History   • Marital status:      Spouse name: Not on file   • Number of children: Not on file   • Years of education: Not on file   • Highest education level: Not on file   Occupational History   • Occupation: disability- customer service in past     Employer: OTHER   Tobacco Use   • Smoking status: Never Smoker   • Smokeless tobacco: Never Used   Substance and Sexual Activity   • Alcohol use: No     Alcohol/week: 0.0 oz   • Drug use: No   • Sexual activity: Yes     Partners: Male     Birth control/protection: Post-Menopausal   Other Topics Concern   • Not on file   Social History Narrative   • Not on file     Social Determinants of Health     Financial Resource Strain:    • Difficulty of Paying Living Expenses:    Food Insecurity:    • Worried About Running Out of Food in the Last Year:    • Ran Out of Food  "in the Last Year:    Transportation Needs:    • Lack of Transportation (Medical):    • Lack of Transportation (Non-Medical):    Physical Activity:    • Days of Exercise per Week:    • Minutes of Exercise per Session:    Stress:    • Feeling of Stress :    Social Connections:    • Frequency of Communication with Friends and Family:    • Frequency of Social Gatherings with Friends and Family:    • Attends Confucianism Services:    • Active Member of Clubs or Organizations:    • Attends Club or Organization Meetings:    • Marital Status:    Intimate Partner Violence:    • Fear of Current or Ex-Partner:    • Emotionally Abused:    • Physically Abused:    • Sexually Abused:        Current medications:   Current Outpatient Medications   Medication   • NIFEdipine SR (PROCARDIA-XL) 60 MG CR tablet   • vitamin D (VITAMIND D3) 1000 UNIT Tab   • pantoprazole (PROTONIX) 40 MG Tablet Delayed Response   • carBAMazepine (TEGRETOL) 200 MG Tab   • Coenzyme Q10 (COQ10) 400 MG Cap   • atorvastatin (LIPITOR) 40 MG Tab   • omeprazole (PRILOSEC) 20 MG delayed-release capsule   • metoprolol SR (TOPROL XL) 50 MG TABLET SR 24 HR   • losartan-hydrochlorothiazide (HYZAAR) 100-25 MG per tablet   • amLODIPine (NORVASC) 5 MG Tab     No current facility-administered medications for this visit.       Medication Allergy:  Allergies   Allergen Reactions   • Morphine Itching   • Penicillins Hives and Swelling       Review of systems:   ROS     Physical examination:   Vitals:    03/18/21 1030   BP: 130/78   BP Location: Left arm   Patient Position: Sitting   BP Cuff Size: Adult   Pulse: (!) 111   Resp: 14   Temp: 37.1 °C (98.7 °F)   TempSrc: Temporal   SpO2: 94%   Weight: 84.3 kg (185 lb 13.6 oz)   Height: 1.585 m (5' 2.4\")     Neurological Exam  Mental Status  Awake and alert. Speech is normal. Language is fluent with no aphasia.    Cranial Nerves  CN III, IV, VI: Extraocular movements intact bilaterally. No nystagmus. Normal smooth pursuit. Normal lids " and orbits bilaterally.    Motor                                               Right                     Left   Shoulder abduction               5                          5  Elbow flexion                         5                          5  Elbow extension                    5                          5  Wrist extension                      5                          5  Hip abduction                         5                          5  Hip adduction                         5                          5  Knee flexion                           4                          4  Knee extension                      4                          4  Plantarflexion                         5                          5  Dorsiflexion                            5                          5    Sensory  Pinprick is normal in upper and lower extremities. There is no posterior sensory level to pinprick.   There is normal pinprick in the lower extremities and no stocking distribution numbness.  .     Reflexes                                           Right                      Left  Brachioradialis                    2+                         2+  Biceps                                 2+                         2+  Patellar                                2+                         0  Achilles                                0                         0  Plantar                           Downgoing                Downgoing    Right pathological reflexes: Dario's absent.  Left pathological reflexes: Dario's absent.    Gait  Casual gait: Antalgic gait.  Requires a cane to ambulate. .      Labs:  I reviewed the following labs personally:  12/18/20 K+: 3.5    Imaging:   None     ASSESSMENT AND PLAN:  Problem List Items Addressed This Visit     None      Visit Diagnoses     History of paraplegia        Relevant Orders    REFERRAL TO PHYSICAL THERAPY    MR-CERVICAL SPINE-WITH & W/O    MR-THORACIC SPINE-WITH & W/O    REFERRAL TO  NEURODIAGNOSTICS (EEG,EP,EMG/NCS/DBS)          1. History of paraplegia  - REFERRAL TO PHYSICAL THERAPY  - MR-CERVICAL SPINE-WITH & W/O; Future  - MR-THORACIC SPINE-WITH & W/O; Future  - REFERRAL TO NEURODIAGNOSTICS (EEG,EP,EMG/NCS/DBS)    This is a 75 year old female with a Dec 2020 hospitalization for acute paraplegia and lower extremity sensory loss. This gradually recovered over the course of the next few weeks/months and she has mild residual lower extremity weakness without sensory loss on exam today. She was wondering if she may have hypokalemic periodic paralysis given that symptoms gradually improved after starting K+ supplementation however her K+ was only mildly reduced in the ED at 3.5 and this is a rare genetic disorder that I doubt she has.   I have counseled her on the possible causes of her weakness, including AIDP, transverse myelitis, or a spinal cord infarction. I have ordered MRI cervical/thoracic spine as well as a EMG/NCS to evaluate further. Since she has markedly improved, this workup is mainly important for diagnosis and prevention.     FOLLOW-UP:   Return in about 3 months (around 6/18/2021).    Total time spent for the day for this patient is: 51 minutes. I spent 36 minutes in face to face time and I spent 11 minutes pre-charting and 4 minutes in post-visit documentation.      Dr. Juan Manuel Parks D.O.  [facility] Neurology   Movement Disorders Specialist

## 2021-03-18 NOTE — PATIENT INSTRUCTIONS
I have ordered the following studies:   EMG/Nerve conduction study   MRI cervical spine under sedation   MRI thoracic spine under sedation     I have ordered a PT referral.

## 2021-04-02 ENCOUNTER — NON-PROVIDER VISIT (OUTPATIENT)
Dept: NEUROLOGY | Facility: MEDICAL CENTER | Age: 76
End: 2021-04-02
Attending: PSYCHIATRY & NEUROLOGY
Payer: MEDICARE

## 2021-04-02 DIAGNOSIS — Z86.69 HISTORY OF PARAPLEGIA: ICD-10-CM

## 2021-04-02 DIAGNOSIS — R53.1 WEAKNESS: ICD-10-CM

## 2021-04-02 PROCEDURE — 95908 NRV CNDJ TST 3-4 STUDIES: CPT | Performed by: SPECIALIST

## 2021-04-02 PROCEDURE — 95908 NRV CNDJ TST 3-4 STUDIES: CPT | Mod: 26 | Performed by: SPECIALIST

## 2021-04-02 PROCEDURE — 95886 MUSC TEST DONE W/N TEST COMP: CPT | Mod: 26,LT | Performed by: SPECIALIST

## 2021-04-02 NOTE — PROCEDURES
NERVE CONDUCTION STUDIES AND ELECTROMYOGRAPHY REPORT        04/02/21      Referring provider: Tracy Cartagena M.D.      SUMMARY OF PATIENT'S CLINICAL HISTORY,PHYSICAL EXAM, AND RATIONALE FOR TESTING:    Ms. Nakita Kinney South 75 y.o. presenting with onset of lower extremity weakness and numbness from the waist down in December of last year.  The patient has had a history of 4 lumbar surgeries.  She has had multiple surgeries to her right foot and ankle as well.    Past Medical History is significant for :   Past Medical History:   Diagnosis Date   • Allergy    • Arthritis    • Chronic headaches 5/29/2012   • Chronic pain    • Dental disorder    • Heart burn    • History of chickenpox    • History of measles    • History of mumps    • Hypertension    • Indigestion    • Low back pain 9/22/14    8/10   • Migraine    • Peripheral neuropathy     BLE   • Rotator cuff tear    • Seasonal allergies    • TIA (transient ischemic attack)    • Tuberculosis 1979    had treatment for 2 years       The electrodiagnostic studies were performed to evaluate for possible demyelinating neuropathy versus radiculopathy.      ELECTRODIAGNOSTIC EXAMINATION:  Nerve conduction studies (NCS) and electromyography (EMG) are utilized to evaluate direct or indirect damage to the peripheral nervous system. NCS are performed to measure the nerve(s) response(s) to electrostimulation across a given nerve segment. EMG evaluates the passive and active electrical activity of the muscle(s) in question.  Muscles are innervated by specific peripheral nerves and roots. Often times, several nerves the muscle to be examined in order to determine the presence or absence of the disease process. Furthermore, nerves and muscles may need to be tested in a [id number] comparison, as well as in additional extremities, as this may be crucial in characterizing the extent of the disease process, which may be diffuse or isolated and of varying degree of  severity. The extent of the neurodiagnostic exam is justified as it may help arrive to a proper diagnosis, which ultimately may contribute to better management of the patient. Therefore, the nerves to muscles examined during the study were medically necessary.    Unless otherwise noted, temperature of the extremity(s) study was monitored before and during the examination and remained between 32 and 36 degrees C for the upper extremities, and between 30 and 36 degrees C for the lower extremities.      NERVE CONDUCTION STUDIES:  Sensory nerves:   -Left sural nerve was tested. The response was within normal limits.    Motor nerves:     -Left tibial nerve was examined. Recording electrodes placed at the Abductor Hallucis muscles. The response was within normal limits.  -Left deep Peroneal motor nerve was examined. Recording electrodes were placed at the Extensor Digitorum Brevis muscles.The response was within normal limits.     No temporal dispersion or conduction block observed in any of the motor nerves examined.    LATE RESPONSES:  F waves were obtained and the following nerves: Left tibial.  The responses within normal limits for the patient's age.        ELECTROMYOGRAPHY:  The study was performed the concentric needle electrode. Fibrillation and fasciculation activity is graded by convention from none (0) to continuous (4+).  Needle electrode examination was performed in the following muscles: Left vastus lateralis, tibialis anterior, gastrocnemius, extensor hallucis longus, abductor pollicis brevis.  The muscles tested demonstrated normal inserctional activity, normal motor unit morphology and recruitment. There were no elements suggestive of active denervation.      Nerve Conduction Studies     Stim Site NR Onset (ms) Norm Onset (ms) O-P Amp (µV) Norm O-P Amp Site1 Site2 Delta-P (ms) Dist (cm) Nathaniel (m/s) Norm Nathaniel (m/s)   Left Sural Anti Sensory (Lat Mall)   Calf    2.3 <4.6 5.9 >3 Calf Lat Mall 3.3 14.0 42 >40         Stim Site NR Onset (ms) Norm Onset (ms) O-P Amp (mV) Norm O-P Amp Site1 Site2 Delta-0 (ms) Dist (cm) Nathaniel (m/s) Norm Nathaniel (m/s)   Left Peroneal EDB Motor (Ext Dig Brev)   Ankle    3.2 <6 4.9 >2.5 B Fib Ankle 6.3 31.5 50 >40   B Fib    9.5  4.3          Left Tibial Motor (Abd Sequeira Brev)   Ankle    4.8 <6 6.3 >4 Knee Ankle 7.2 35.0 49 >40   Knee    12.0  6.4            F Wave Studies     NR F-Lat (ms) Lat Norm (ms)   Left Tibial (Abd Hallucis)      46.97 <57                 Electromyography     Side Muscle Nerve Root Ins Act Fibs Psw Amp Dur Poly Recrt Int Pat Comment   Left VastusLat Femoral L2-4 Nml Nml Nml Nml Nml 0 Nml Nml    Left AntTibialis Dp Br Fibular L4-5 Nml Nml Nml Nml Nml 0 Nml Nml    Left Gastroc Tibial S1-2 Nml Nml Nml Nml Nml 0 Nml Nml    Left Ext Dig Brev Dp Br Fibular L5, S1 Nml Nml Nml Nml Nml 0 Nml Nml    Left ExtHallLong Dp Br Fibular L5, S1 Nml Nml Nml Nml Nml 0 Nml Nml          DIAGNOSTIC INTERPRETATION:   Extensive electrodiagnostic studies were performed to the left lower extremity.  The results are as follows:    1.  Normal EMG and nerve conduction study left lower extremity.  Patient had difficulty tolerating the study and a proximal peroneal response was not completed as she was unable to tolerate stimulation.    CLINICAL DISCUSSION:   This was a technically difficult study.  The patient was unable to tolerate proximal peroneal nerve stimulation.  Bilateral lower extremity study was ordered and the patient was unable to tolerate nerve conduction studies in her right lower extremity.  She has had surgery to her foot and leg and has extensive scarring over her distal right lower extremity.  This study did however show normal motor nerve conduction studies in the left lower extremity and there is no evidence of a demyelinating neuropathy.    LENNY Stephen M.D.

## 2021-05-04 ENCOUNTER — OFFICE VISIT (OUTPATIENT)
Dept: NEUROLOGY | Facility: MEDICAL CENTER | Age: 76
End: 2021-05-04
Attending: PSYCHIATRY & NEUROLOGY
Payer: MEDICARE

## 2021-05-04 VITALS
WEIGHT: 187.61 LBS | HEART RATE: 101 BPM | BODY MASS INDEX: 34.52 KG/M2 | OXYGEN SATURATION: 94 % | TEMPERATURE: 98.8 F | HEIGHT: 62 IN | SYSTOLIC BLOOD PRESSURE: 138 MMHG | RESPIRATION RATE: 14 BRPM | DIASTOLIC BLOOD PRESSURE: 84 MMHG

## 2021-05-04 DIAGNOSIS — Z86.69 HISTORY OF PARAPLEGIA: ICD-10-CM

## 2021-05-04 DIAGNOSIS — M79.2 NEUROPATHIC PAIN: ICD-10-CM

## 2021-05-04 PROCEDURE — 99214 OFFICE O/P EST MOD 30 MIN: CPT | Performed by: PSYCHIATRY & NEUROLOGY

## 2021-05-04 RX ORDER — TIZANIDINE HYDROCHLORIDE 6 MG/1
6 CAPSULE, GELATIN COATED ORAL 3 TIMES DAILY
Status: ON HOLD | COMMUNITY
End: 2021-12-21

## 2021-05-04 ASSESSMENT — PATIENT HEALTH QUESTIONNAIRE - PHQ9: CLINICAL INTERPRETATION OF PHQ2 SCORE: 0
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
## 2021-05-04 NOTE — PATIENT INSTRUCTIONS
Please call to schedule the MRI of the cervical and thoracic spine.   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

## 2021-06-11 ENCOUNTER — PRE-ADMISSION TESTING (OUTPATIENT)
Dept: ADMISSIONS | Facility: MEDICAL CENTER | Age: 76
End: 2021-06-11
Attending: PSYCHIATRY & NEUROLOGY
Payer: MEDICARE

## 2021-06-11 DIAGNOSIS — Z01.812 PRE-PROCEDURAL LABORATORY EXAMINATION: ICD-10-CM

## 2021-06-11 LAB — COVID ORDER STATUS COVID19: NORMAL

## 2021-06-11 PROCEDURE — U0005 INFEC AGEN DETEC AMPLI PROBE: HCPCS

## 2021-06-11 PROCEDURE — C9803 HOPD COVID-19 SPEC COLLECT: HCPCS

## 2021-06-11 PROCEDURE — U0003 INFECTIOUS AGENT DETECTION BY NUCLEIC ACID (DNA OR RNA); SEVERE ACUTE RESPIRATORY SYNDROME CORONAVIRUS 2 (SARS-COV-2) (CORONAVIRUS DISEASE [COVID-19]), AMPLIFIED PROBE TECHNIQUE, MAKING USE OF HIGH THROUGHPUT TECHNOLOGIES AS DESCRIBED BY CMS-2020-01-R: HCPCS

## 2021-06-12 LAB
SARS-COV-2 RNA RESP QL NAA+PROBE: NOTDETECTED
SPECIMEN SOURCE: NORMAL

## 2021-06-14 ENCOUNTER — HOSPITAL ENCOUNTER (OUTPATIENT)
Dept: RADIOLOGY | Facility: MEDICAL CENTER | Age: 76
End: 2021-06-14
Attending: PSYCHIATRY & NEUROLOGY
Payer: MEDICARE

## 2021-06-14 ENCOUNTER — ANESTHESIA (OUTPATIENT)
Dept: RADIOLOGY | Facility: MEDICAL CENTER | Age: 76
End: 2021-06-14
Payer: MEDICARE

## 2021-06-14 ENCOUNTER — ANESTHESIA EVENT (OUTPATIENT)
Dept: RADIOLOGY | Facility: MEDICAL CENTER | Age: 76
End: 2021-06-14
Payer: MEDICARE

## 2021-06-14 VITALS
DIASTOLIC BLOOD PRESSURE: 70 MMHG | TEMPERATURE: 97.5 F | BODY MASS INDEX: 34.37 KG/M2 | HEIGHT: 62 IN | SYSTOLIC BLOOD PRESSURE: 165 MMHG | HEART RATE: 64 BPM | OXYGEN SATURATION: 97 % | WEIGHT: 186.8 LBS | RESPIRATION RATE: 18 BRPM

## 2021-06-14 DIAGNOSIS — Z86.69 HISTORY OF PARAPLEGIA: ICD-10-CM

## 2021-06-14 LAB
BUN BLD-MCNC: 27 MG/DL (ref 8–22)
BUN BLD-MCNC: 28 MG/DL (ref 8–22)
CA-I BLD ISE-SCNC: 1.15 MMOL/L (ref 1.1–1.3)
CA-I BLD ISE-SCNC: 1.19 MMOL/L (ref 1.1–1.3)
CHLORIDE BLD-SCNC: 103 MMOL/L (ref 96–112)
CHLORIDE BLD-SCNC: 103 MMOL/L (ref 96–112)
CO2 BLD-SCNC: 29 MMOL/L (ref 20–33)
CO2 BLD-SCNC: 29 MMOL/L (ref 20–33)
CREAT BLD-MCNC: 1.2 MG/DL (ref 0.5–1.4)
CREAT BLD-MCNC: 1.3 MG/DL (ref 0.5–1.4)
EKG IMPRESSION: NORMAL
GLUCOSE BLD-MCNC: 86 MG/DL (ref 65–99)
GLUCOSE BLD-MCNC: 90 MG/DL (ref 65–99)
HCT VFR BLD CALC: 38 % (ref 37–47)
HCT VFR BLD CALC: 41 % (ref 37–47)
HGB BLD-MCNC: 12.9 G/DL (ref 12–16)
HGB BLD-MCNC: 13.9 G/DL (ref 12–16)
POTASSIUM BLD-SCNC: 5.5 MMOL/L (ref 3.6–5.5)
POTASSIUM BLD-SCNC: 6.2 MMOL/L (ref 3.6–5.5)
POTASSIUM SERPL-SCNC: 4.4 MMOL/L (ref 3.6–5.5)
SODIUM BLD-SCNC: 138 MMOL/L (ref 135–145)
SODIUM BLD-SCNC: 139 MMOL/L (ref 135–145)

## 2021-06-14 PROCEDURE — 84132 ASSAY OF SERUM POTASSIUM: CPT | Mod: XU

## 2021-06-14 PROCEDURE — 93005 ELECTROCARDIOGRAM TRACING: CPT | Performed by: ANESTHESIOLOGY

## 2021-06-14 PROCEDURE — 700101 HCHG RX REV CODE 250: Performed by: ANESTHESIOLOGY

## 2021-06-14 PROCEDURE — 93010 ELECTROCARDIOGRAM REPORT: CPT | Performed by: INTERNAL MEDICINE

## 2021-06-14 PROCEDURE — 4410588 MR-CERVICAL SPINE-WITH & W/O

## 2021-06-14 PROCEDURE — 700117 HCHG RX CONTRAST REV CODE 255: Performed by: PSYCHIATRY & NEUROLOGY

## 2021-06-14 PROCEDURE — 85014 HEMATOCRIT: CPT

## 2021-06-14 PROCEDURE — 72157 MRI CHEST SPINE W/O & W/DYE: CPT

## 2021-06-14 PROCEDURE — A9576 INJ PROHANCE MULTIPACK: HCPCS | Performed by: PSYCHIATRY & NEUROLOGY

## 2021-06-14 PROCEDURE — 80047 BASIC METABLC PNL IONIZED CA: CPT | Mod: XU

## 2021-06-14 PROCEDURE — 700111 HCHG RX REV CODE 636 W/ 250 OVERRIDE (IP): Performed by: ANESTHESIOLOGY

## 2021-06-14 PROCEDURE — 700105 HCHG RX REV CODE 258: Performed by: ANESTHESIOLOGY

## 2021-06-14 RX ORDER — DIPHENHYDRAMINE HYDROCHLORIDE 50 MG/ML
12.5 INJECTION INTRAMUSCULAR; INTRAVENOUS
Status: DISCONTINUED | OUTPATIENT
Start: 2021-06-14 | End: 2021-06-15 | Stop reason: HOSPADM

## 2021-06-14 RX ORDER — ONDANSETRON 2 MG/ML
4 INJECTION INTRAMUSCULAR; INTRAVENOUS
Status: DISCONTINUED | OUTPATIENT
Start: 2021-06-14 | End: 2021-06-15 | Stop reason: HOSPADM

## 2021-06-14 RX ORDER — ONDANSETRON 2 MG/ML
INJECTION INTRAMUSCULAR; INTRAVENOUS PRN
Status: DISCONTINUED | OUTPATIENT
Start: 2021-06-14 | End: 2021-06-14 | Stop reason: SURG

## 2021-06-14 RX ORDER — SODIUM CHLORIDE, SODIUM LACTATE, POTASSIUM CHLORIDE, CALCIUM CHLORIDE 600; 310; 30; 20 MG/100ML; MG/100ML; MG/100ML; MG/100ML
INJECTION, SOLUTION INTRAVENOUS
Status: DISCONTINUED | OUTPATIENT
Start: 2021-06-14 | End: 2021-06-14 | Stop reason: SURG

## 2021-06-14 RX ORDER — LIDOCAINE HYDROCHLORIDE 20 MG/ML
INJECTION, SOLUTION EPIDURAL; INFILTRATION; INTRACAUDAL; PERINEURAL PRN
Status: DISCONTINUED | OUTPATIENT
Start: 2021-06-14 | End: 2021-06-14 | Stop reason: SURG

## 2021-06-14 RX ORDER — HALOPERIDOL 5 MG/ML
1 INJECTION INTRAMUSCULAR
Status: DISCONTINUED | OUTPATIENT
Start: 2021-06-14 | End: 2021-06-15 | Stop reason: HOSPADM

## 2021-06-14 RX ADMIN — PROPOFOL 150 MG: 10 INJECTION, EMULSION INTRAVENOUS at 15:10

## 2021-06-14 RX ADMIN — SODIUM CHLORIDE, POTASSIUM CHLORIDE, SODIUM LACTATE AND CALCIUM CHLORIDE: 600; 310; 30; 20 INJECTION, SOLUTION INTRAVENOUS at 15:05

## 2021-06-14 RX ADMIN — GADOTERIDOL 17 ML: 279.3 INJECTION, SOLUTION INTRAVENOUS at 15:52

## 2021-06-14 RX ADMIN — ONDANSETRON 4 MG: 2 INJECTION INTRAMUSCULAR; INTRAVENOUS at 15:10

## 2021-06-14 RX ADMIN — LIDOCAINE HYDROCHLORIDE 50 MG: 20 INJECTION, SOLUTION EPIDURAL; INFILTRATION; INTRACAUDAL at 15:10

## 2021-06-14 ASSESSMENT — PAIN SCALES - GENERAL: PAIN_LEVEL: 0

## 2021-06-14 NOTE — ANESTHESIA PREPROCEDURE EVALUATION
Relevant Problems   ANESTHESIA (within normal limits)      PULMONARY (within normal limits)      NEURO   (positive) History of CVA (cerebrovascular accident)   (positive) History of paraplegia   (positive) Nonintractable episodic headache   (positive) TIA (transient ischemic attack)      CARDIAC   (positive) Hypertension      GI (within normal limits)       (within normal limits)      ENDO (within normal limits)   no residual neuro or cardiac issues since 2016/17 hospitalization  Drawing labs and checking EKG now    Physical Exam    Airway   Mallampati: II  TM distance: >3 FB  Neck ROM: full       Cardiovascular - normal exam  Rhythm: regular  Rate: normal  (-) murmur     Dental - normal exam           Pulmonary - normal exam  Breath sounds clear to auscultation     Abdominal    Neurological - normal exam                 Anesthesia Plan    ASA 3   ASA physical status 3 criteria: CVA or TIA - history (> 3 months)    Plan - general       Airway plan will be LMA          Induction: intravenous    Postoperative Plan: Postoperative administration of opioids is intended.    Pertinent diagnostic labs and testing reviewed    Informed Consent:    Anesthetic plan and risks discussed with patient.

## 2021-06-14 NOTE — ANESTHESIA TIME REPORT
Anesthesia Start and Stop Event Times     Date Time Event    6/14/2021 1317 Ready for Procedure     1505 Anesthesia Start     1609 Anesthesia Stop        Responsible Staff  06/14/21    Name Role Begin End    Sidney Braxton M.D. Anesth 1505 1609        Preop Diagnosis (Free Text):  Pre-op Diagnosis             Preop Diagnosis (Codes):    Post op Diagnosis  History of paraplegia      Premium Reason  A. 3PM - 7AM    Comments:

## 2021-06-14 NOTE — ANESTHESIA POSTPROCEDURE EVALUATION
Patient: Nakita Sheppard    Procedure Summary     Date: 06/14/21 Room / Location: [facility] - MRI - [address]    Anesthesia Start: 1505 Anesthesia Stop: 1609    Procedure: MR-CERVICAL SPINE-WITH & W/O Diagnosis: History of paraplegia    Scheduled Providers:  Responsible Provider: Sidney Braxton M.D.    Anesthesia Type: general ASA Status: 3          Final Anesthesia Type: general  Last vitals  BP   Blood Pressure : (!) 173/71    Temp   37.1 °C (98.7 °F)    Pulse   64   Resp   18    SpO2   98 %      Anesthesia Post Evaluation    Patient location during evaluation: PACU  Patient participation: complete - patient participated  Level of consciousness: awake and alert  Pain score: 0    Airway patency: patent  Anesthetic complications: no  Cardiovascular status: hemodynamically stable  Respiratory status: acceptable  Hydration status: euvolemic    PONV: none          No complications documented.     Nurse Pain Score: 0 (NPRS)

## 2021-06-14 NOTE — ANESTHESIA PROCEDURE NOTES
Airway    Date/Time: 6/14/2021 3:10 PM  Performed by: Sidney Braxton M.D.  Authorized by: Sidney Braxton M.D.     Location:  OR  Urgency:  Elective  Indications for Airway Management:  Anesthesia      Spontaneous Ventilation: absent    Sedation Level:  Deep  Preoxygenated: Yes    Final Airway Type:  Supraglottic airway  Final Supraglottic Airway:  Standard LMA    SGA Size:  4  Number of Attempts at Approach:  1

## 2021-06-14 NOTE — PROGRESS NOTES
Patient to MRI outpatient dept. Patient informed process and plan of care during this visit.  Anesthesiologist Irais spoke with Patient and discussed provider's plan of care.  Consent obtained.  MRI completed without incident.  Patient tolerated diet and activities once awake, alert, and appropriate.  DC instructions discussed with Patient and .  Questions encouraged and answered.  Defer to Provider for further instruction.  Patient discharged in stable condition to responsible adult ( Natan)

## 2021-06-14 NOTE — DISCHARGE INSTRUCTIONS
MRI ADULT DISCHARGE INSTRUCTIONS    You have been medicated today for your scan. Please follow the instructions below to ensure your safe recovery. If you have any questions or problems, feel free to call us at [phone] or [phone].     1.   Have someone stay with you to assist you as needed.    2.   Do not drive or operate any mechanical devices.    3.   Do not perform any activity that requires concentration. Make no major decisions over the next 24 hours.     4.   Be careful changing positions from laying down to sitting or standing, as you may become dizzy.     5.   Do not drink alcohol for 48 hours.    6.   There are no restrictions for eating your normal meals. Drink fluids.    7.   You may continue your usual medications for pain, tranquilizers, muscle relaxants or sedatives when awake.     8.   Tomorrow, you may continue your normal daily activities.     9.   Pressure dressing on 10 - 15 minutes. If swelling or bleeding occurs when removed, continue placing direct pressure on injection site for another 5 minutes, or until bleeding stops.     I have been informed of and understand the above discharge instructions.    62

## 2021-06-17 ENCOUNTER — PATIENT MESSAGE (OUTPATIENT)
Dept: NEUROLOGY | Facility: MEDICAL CENTER | Age: 76
End: 2021-06-17

## 2021-11-23 ENCOUNTER — OFFICE VISIT (OUTPATIENT)
Dept: NEUROLOGY | Facility: MEDICAL CENTER | Age: 76
End: 2021-11-23
Attending: PSYCHIATRY & NEUROLOGY
Payer: MEDICARE

## 2021-11-23 VITALS
HEART RATE: 60 BPM | HEIGHT: 62 IN | RESPIRATION RATE: 14 BRPM | DIASTOLIC BLOOD PRESSURE: 74 MMHG | BODY MASS INDEX: 34.89 KG/M2 | WEIGHT: 189.6 LBS | TEMPERATURE: 96.8 F | OXYGEN SATURATION: 96 % | SYSTOLIC BLOOD PRESSURE: 138 MMHG

## 2021-11-23 DIAGNOSIS — I63.9 ISCHEMIC STROKE OF FRONTAL LOBE (HCC): ICD-10-CM

## 2021-11-23 PROCEDURE — 99212 OFFICE O/P EST SF 10 MIN: CPT | Performed by: PSYCHIATRY & NEUROLOGY

## 2021-11-23 PROCEDURE — 99214 OFFICE O/P EST MOD 30 MIN: CPT | Performed by: PSYCHIATRY & NEUROLOGY

## 2021-11-23 RX ORDER — ASPIRIN 81 MG/1
81 TABLET ORAL DAILY
Status: ON HOLD | COMMUNITY
Start: 2021-11-23 | End: 2021-12-21 | Stop reason: SDUPTHER

## 2021-11-23 RX ORDER — IPRATROPIUM BROMIDE 21 UG/1
2 SPRAY, METERED NASAL
COMMUNITY
Start: 2021-11-17 | End: 2021-11-30

## 2021-11-23 RX ORDER — LABETALOL 200 MG/1
200 TABLET, FILM COATED ORAL EVERY 12 HOURS
Status: ON HOLD | COMMUNITY
Start: 2021-11-22 | End: 2021-12-21

## 2021-11-23 RX ORDER — CLOPIDOGREL BISULFATE 75 MG/1
75 TABLET ORAL DAILY
COMMUNITY
Start: 2021-11-23 | End: 2021-11-30

## 2021-11-23 NOTE — PATIENT INSTRUCTIONS
I have ordered a cardiac monitor. This will be a 1-month cardiac monitor to evaluate for atrial fibrillation.     Stop plavix and continue aspirin only.     Continue with atorvastatin 40mg.

## 2021-11-23 NOTE — PROGRESS NOTES
Chief Complaint   Patient presents with   • Follow-Up     Stroke       History of present illness:  Nakita Kinney South 76 y.o. female with HTN, DM2 presented 11/19 with acute left arm weakness s/p IV tPA.   She had a hospitalization in 12/2020 for acute bilateral lower extremity weakness and loss of sensation from the waist down.     3/18/21:  This is a 75 year old female with a Dec 2020 hospitalization for acute paraplegia and lower extremity sensory loss. This gradually recovered over the course of the next few weeks/months and she has mild residual lower extremity weakness without sensory loss on exam today. She was wondering if she may have hypokalemic periodic paralysis given that symptoms gradually improved after starting K+ supplementation however her K+ was only mildly reduced in the ED at 3.5 and this is a rare genetic disorder that I doubt she has.   I have counseled her on the possible causes of her weakness, including AIDP, transverse myelitis, or a spinal cord infarction. I have ordered MRI cervical/thoracic spine as well as a EMG/NCS to evaluate further. Since she has markedly improved, this workup is mainly important for diagnosis and prevention.      5/4/21:  The MRI of the cervical and thoracic spine ordered at the previous visit were not completed.  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 The rubbing of bed sheets against her legs is painful. She uses a scooter at home to get around.   She has history of low back surgery.     11/23/21:   On 11/19, she had a headache, was unable to speak, was brought to the hospital and a brain MRI was noted to have a posterior right frontal infarct.   She recovered completely from the stroke after being given IV tPA. When she went home she was back to normal.   Last night, her left hand went numb and she lost her speech again. She continues to feel that her left hand is numb and weak. Last night plavix was  added.     Past medical history:   Past Medical History:   Diagnosis Date   • Allergy    • Arthritis    • Chronic headaches 5/29/2012   • Chronic pain    • Dental disorder    • Heart burn    • History of chickenpox    • History of measles    • History of mumps    • Hypertension    • Indigestion    • Low back pain 9/22/14    8/10   • Migraine    • Peripheral neuropathy     BLE   • Rotator cuff tear    • Seasonal allergies    • TIA (transient ischemic attack)    • Tuberculosis 1979    had treatment for 2 years       Past surgical history:   Past Surgical History:   Procedure Laterality Date   • LUMBAR FUSION POSTERIOR  10/6/2014    Performed by Rishabh Bhatia M.D. at SURGERY [facility] ORS   • LUMBAR LAMINECTOMY DISKECTOMY  10/6/2014    Performed by Rishabh Bhatia M.D. at SURGERY [facility] ORS   • LUMBAR EXPLORATION  10/6/2014    Performed by Rishabh Bhatia M.D. at SURGERY [facility] ORS   • OTHER  2013    back   • SPINAL CORD STIMULATOR  11/4/2011    Performed by AUBREE PALACIO at SURGERY [facility]S   • OPEN REDUCTION  2008    re-broke foot   • OTHER  2000    right foot   • SHOULDER SURGERY  1998   • CARPAL TUNNEL ENDOSCOPIC  1990   • ARTHROSCOPY, KNEE  1987    bilateral   • ABDOMINAL HYSTERECTOMY TOTAL  1983   • APPENDECTOMY     • CHOLECYSTECTOMY     • LAMINOTOMY  02/2002 and 9/2002       Family history:   Family History   Problem Relation Age of Onset   • Diabetes Mother    • Hypertension Mother    • Arthritis Mother    • Stroke Mother    • Heart Disease Father    • Cancer Brother    • Leukemia Brother    • Stroke Brother        Social history:   Social History     Socioeconomic History   • Marital status:      Spouse name: Not on file   • Number of children: Not on file   • Years of education: Not on file   • Highest education level: Not on file   Occupational History   • Occupation: disability- customer service in past     Employer: OTHER   Tobacco Use   • Smoking status: Never Smoker   •  Smokeless tobacco: Never Used   Vaping Use   • Vaping Use: Never used   Substance and Sexual Activity   • Alcohol use: No     Alcohol/week: 0.0 oz   • Drug use: No   • Sexual activity: Yes     Partners: Male     Birth control/protection: Post-Menopausal   Other Topics Concern   • Not on file   Social History Narrative   • Not on file     Social Determinants of Health     Financial Resource Strain:    • Difficulty of Paying Living Expenses: Not on file   Food Insecurity:    • Worried About Running Out of Food in the Last Year: Not on file   • Ran Out of Food in the Last Year: Not on file   Transportation Needs:    • Lack of Transportation (Medical): Not on file   • Lack of Transportation (Non-Medical): Not on file   Physical Activity:    • Days of Exercise per Week: Not on file   • Minutes of Exercise per Session: Not on file   Stress:    • Feeling of Stress : Not on file   Social Connections:    • Frequency of Communication with Friends and Family: Not on file   • Frequency of Social Gatherings with Friends and Family: Not on file   • Attends Congregational Services: Not on file   • Active Member of Clubs or Organizations: Not on file   • Attends Club or Organization Meetings: Not on file   • Marital Status: Not on file   Intimate Partner Violence:    • Fear of Current or Ex-Partner: Not on file   • Emotionally Abused: Not on file   • Physically Abused: Not on file   • Sexually Abused: Not on file   Housing Stability:    • Unable to Pay for Housing in the Last Year: Not on file   • Number of Places Lived in the Last Year: Not on file   • Unstable Housing in the Last Year: Not on file       Current medications:   Current Outpatient Medications   Medication   • aspirin 81 MG EC tablet   • clopidogrel (PLAVIX) 75 MG Tab   • ipratropium (ATROVENT) 0.03 % Solution   • labetalol (NORMODYNE) 200 MG Tab   • tizanidine (ZANAFLEX) 6 MG capsule   • vitamin D (VITAMIND D3) 1000 UNIT Tab   • pantoprazole (PROTONIX) 40 MG Tablet  "Delayed Response   • Coenzyme Q10 (COQ10) 400 MG Cap   • atorvastatin (LIPITOR) 40 MG Tab   • NIFEdipine SR (PROCARDIA-XL) 60 MG CR tablet   • carBAMazepine (TEGRETOL) 200 MG Tab   • omeprazole (PRILOSEC) 20 MG delayed-release capsule   • metoprolol SR (TOPROL XL) 50 MG TABLET SR 24 HR   • losartan-hydrochlorothiazide (HYZAAR) 100-25 MG per tablet   • amLODIPine (NORVASC) 5 MG Tab     No current facility-administered medications for this visit.       Medication Allergy:  Allergies   Allergen Reactions   • Fentanyl Anxiety   • Morphine Itching   • Penicillins Hives and Swelling       Physical examination:   Vitals:    11/23/21 0851   BP: 138/74   BP Location: Left arm   Patient Position: Sitting   BP Cuff Size: Adult   Pulse: 60   Resp: 14   Temp: 36 °C (96.8 °F)   TempSrc: Temporal   SpO2: 96%   Weight: 86 kg (189 lb 9.5 oz)   Height: 1.575 m (5' 2\")     Neurological Exam  Mental Status  Awake and alert. Speech is normal. Language is fluent with no aphasia.    Cranial Nerves  CN II: Right normal visual field. Left normal visual field.  CN III, IV, VI: Extraocular movements intact bilaterally. No nystagmus. Normal smooth pursuit.  CN V:  Right: Facial sensation is normal.  Left: Facial sensation is normal on the left.  CN VII:  Right: There is no facial weakness.  Left: There is central facial weakness.    Motor                                               Right                     Left  Elbow flexion                         5                          5  Elbow extension                    5                          5  Wrist extension                      5                          5    Sensory  Light touch is normal in upper and lower extremities.     Coordination  Right: Finger-to-nose normal. Rapid alternating movement normal.  Left: Finger-to-nose normal. Rapid alternating movement normal.    Gait  Casual gait: Spastic gait.       Labs:  I reviewed the following labs personally:  LDL: 143    Imaging: "   TRANSTHORACIC ECHO   Interpretation Summary   The Ejection Fraction estimate is 65-70%   The right ventricular systolic function is normal.   The left atrium is mildly dilated.   There is trace mitral regurgitation     MRI BRAIN W/O   IMPRESSION:   Acute cerebrovascular infarct of the posterior right frontal lobe.      11/22/21  CTA NECK   IMPRESSION:       1.  No large vessel occlusion of the major arteries of the head and neck.     2.  Mild bilateral carotid bulb atherosclerosis without stenosis.     CTA HEAD   CTA head:       Anterior circulation: Heavy clinoid and supraclinoid ICA atherosclerosis. The   bilateral internal carotid, middle cerebral and anterior cerebral arteries are   patent without significant stenosis or occlusion. The anterior communicating   artery is visualized. There is no aneurysm.     Posterior circulation: The distal vertebral, basilar and bilateral posterior   cerebral arteries are patent without significant stenosis or occlusion. The   posterior communicating arteries are visualized. There is no aneurysm.     Other: Moderate periventricular and deep white matter hypoattenuation related to   chronic small vessel ischemic changes.. There is no intracranial mass,   hemorrhage, extra-axial fluid collection, abnormal enhancement, hydrocephalus,   or midline shift.      EKG: normal    ASSESSMENT AND PLAN:  Problem List Items Addressed This Visit     None      Visit Diagnoses     Ischemic stroke of frontal lobe (HCC)        Relevant Medications    labetalol (NORMODYNE) 200 MG Tab    Other Relevant Orders    Cardiac Event Monitor          1. Ischemic stroke of frontal lobe (HCC)  - Cardiac Event Monitor; Future    Other orders  - aspirin 81 MG EC tablet; Take 81 mg by mouth every day.  - clopidogrel (PLAVIX) 75 MG Tab; Take 75 mg by mouth every day.  - ipratropium (ATROVENT) 0.03 % Solution; Administer 2 Sprays into affected nostril(S).  - labetalol (NORMODYNE) 200 MG Tab; Take 200 mg by  mouth every 12 hours.    76-year-old female with recent hospitalization for right frontal stroke. MRI showed right frontal infarct. She received IV TPA with resolution of her initial symptoms. On discharge she was back to normal.   Her CT angiogram of the head and neck were unremarkable and an echocardiogram was normal.  EKG was normal.  She was discharged with aspirin however yesterday experienced fluctuating return of her initial stroke symptoms.  I have counseled the patient that her symptoms are due to re-expression and does not represent new ischemic event.  She does not need to be on dual antiplatelet therapy and I have discontinued Plavix.  I have ordered a 30-day event monitor to evaluate for atrial fibrillation.  Thus far her stroke is cryptogenic.  However given her age atrial fibrillation is the most likely etiology.    FOLLOW-UP:   Return in about 3 months (around 2/23/2022).    Total time spent for the day for this patient unrelated to procedure time is: 32 minutes. I spent 25 minutes in face to face time and I spent 4 minutes pre-charting and 3 minutes in post-visit documentation.      Dr. Juan Manuel Parks D.O.  [facility] Neurology   Movement Disorders Specialist

## 2021-11-30 ENCOUNTER — APPOINTMENT (OUTPATIENT)
Dept: RADIOLOGY | Facility: MEDICAL CENTER | Age: 76
DRG: 064 | End: 2021-11-30
Attending: EMERGENCY MEDICINE
Payer: MEDICARE

## 2021-11-30 ENCOUNTER — HOSPITAL ENCOUNTER (INPATIENT)
Facility: MEDICAL CENTER | Age: 76
LOS: 3 days | DRG: 064 | End: 2021-12-03
Attending: EMERGENCY MEDICINE | Admitting: STUDENT IN AN ORGANIZED HEALTH CARE EDUCATION/TRAINING PROGRAM
Payer: MEDICARE

## 2021-11-30 DIAGNOSIS — E78.5 HYPERLIPIDEMIA LDL GOAL <70: ICD-10-CM

## 2021-11-30 DIAGNOSIS — G93.6 CEREBRAL EDEMA (HCC): ICD-10-CM

## 2021-11-30 DIAGNOSIS — R53.1 LEFT-SIDED WEAKNESS: ICD-10-CM

## 2021-11-30 DIAGNOSIS — I63.9 ISCHEMIC STROKE OF FRONTAL LOBE (HCC): ICD-10-CM

## 2021-11-30 DIAGNOSIS — I63.9 ACUTE CVA (CEREBROVASCULAR ACCIDENT) (HCC): ICD-10-CM

## 2021-11-30 LAB
ABO GROUP BLD: NORMAL
ALBUMIN SERPL BCP-MCNC: 4.3 G/DL (ref 3.2–4.9)
ALBUMIN/GLOB SERPL: 1.4 G/DL
ALP SERPL-CCNC: 139 U/L (ref 30–99)
ALT SERPL-CCNC: 11 U/L (ref 2–50)
ANION GAP SERPL CALC-SCNC: 12 MMOL/L (ref 7–16)
APTT PPP: 26.7 SEC (ref 24.7–36)
AST SERPL-CCNC: 16 U/L (ref 12–45)
BASOPHILS # BLD AUTO: 1 % (ref 0–1.8)
BASOPHILS # BLD: 0.12 K/UL (ref 0–0.12)
BILIRUB SERPL-MCNC: 0.4 MG/DL (ref 0.1–1.5)
BLD GP AB SCN SERPL QL: NORMAL
BUN SERPL-MCNC: 16 MG/DL (ref 8–22)
CALCIUM SERPL-MCNC: 9.6 MG/DL (ref 8.5–10.5)
CHLORIDE SERPL-SCNC: 104 MMOL/L (ref 96–112)
CO2 SERPL-SCNC: 27 MMOL/L (ref 20–33)
CREAT SERPL-MCNC: 0.93 MG/DL (ref 0.5–1.4)
EKG IMPRESSION: NORMAL
EOSINOPHIL # BLD AUTO: 0.16 K/UL (ref 0–0.51)
EOSINOPHIL NFR BLD: 1.3 % (ref 0–6.9)
ERYTHROCYTE [DISTWIDTH] IN BLOOD BY AUTOMATED COUNT: 44.7 FL (ref 35.9–50)
GLOBULIN SER CALC-MCNC: 3.1 G/DL (ref 1.9–3.5)
GLUCOSE SERPL-MCNC: 118 MG/DL (ref 65–99)
HCT VFR BLD AUTO: 41.8 % (ref 37–47)
HGB BLD-MCNC: 13.4 G/DL (ref 12–16)
IMM GRANULOCYTES # BLD AUTO: 0.05 K/UL (ref 0–0.11)
IMM GRANULOCYTES NFR BLD AUTO: 0.4 % (ref 0–0.9)
INR PPP: 1.08 (ref 0.87–1.13)
LYMPHOCYTES # BLD AUTO: 2.77 K/UL (ref 1–4.8)
LYMPHOCYTES NFR BLD: 23.2 % (ref 22–41)
MAGNESIUM SERPL-MCNC: 2.1 MG/DL (ref 1.5–2.5)
MCH RBC QN AUTO: 31.2 PG (ref 27–33)
MCHC RBC AUTO-ENTMCNC: 32.1 G/DL (ref 33.6–35)
MCV RBC AUTO: 97.2 FL (ref 81.4–97.8)
MONOCYTES # BLD AUTO: 1.02 K/UL (ref 0–0.85)
MONOCYTES NFR BLD AUTO: 8.5 % (ref 0–13.4)
NEUTROPHILS # BLD AUTO: 7.82 K/UL (ref 2–7.15)
NEUTROPHILS NFR BLD: 65.6 % (ref 44–72)
NRBC # BLD AUTO: 0 K/UL
NRBC BLD-RTO: 0 /100 WBC
PHOSPHATE SERPL-MCNC: 3.7 MG/DL (ref 2.5–4.5)
PLATELET # BLD AUTO: 414 K/UL (ref 164–446)
PMV BLD AUTO: 9.1 FL (ref 9–12.9)
POTASSIUM SERPL-SCNC: 4.7 MMOL/L (ref 3.6–5.5)
PROT SERPL-MCNC: 7.4 G/DL (ref 6–8.2)
PROTHROMBIN TIME: 13.7 SEC (ref 12–14.6)
RBC # BLD AUTO: 4.3 M/UL (ref 4.2–5.4)
RH BLD: NORMAL
SODIUM SERPL-SCNC: 143 MMOL/L (ref 135–145)
TROPONIN T SERPL-MCNC: 9 NG/L (ref 6–19)
WBC # BLD AUTO: 11.9 K/UL (ref 4.8–10.8)

## 2021-11-30 PROCEDURE — 700105 HCHG RX REV CODE 258: Performed by: STUDENT IN AN ORGANIZED HEALTH CARE EDUCATION/TRAINING PROGRAM

## 2021-11-30 PROCEDURE — 84484 ASSAY OF TROPONIN QUANT: CPT

## 2021-11-30 PROCEDURE — 770020 HCHG ROOM/CARE - TELE (206)

## 2021-11-30 PROCEDURE — 85610 PROTHROMBIN TIME: CPT

## 2021-11-30 PROCEDURE — 96374 THER/PROPH/DIAG INJ IV PUSH: CPT

## 2021-11-30 PROCEDURE — 86901 BLOOD TYPING SEROLOGIC RH(D): CPT

## 2021-11-30 PROCEDURE — 84100 ASSAY OF PHOSPHORUS: CPT

## 2021-11-30 PROCEDURE — 93005 ELECTROCARDIOGRAM TRACING: CPT | Performed by: EMERGENCY MEDICINE

## 2021-11-30 PROCEDURE — 99285 EMERGENCY DEPT VISIT HI MDM: CPT

## 2021-11-30 PROCEDURE — 99223 1ST HOSP IP/OBS HIGH 75: CPT | Performed by: STUDENT IN AN ORGANIZED HEALTH CARE EDUCATION/TRAINING PROGRAM

## 2021-11-30 PROCEDURE — 87077 CULTURE AEROBIC IDENTIFY: CPT

## 2021-11-30 PROCEDURE — 71045 X-RAY EXAM CHEST 1 VIEW: CPT

## 2021-11-30 PROCEDURE — 87086 URINE CULTURE/COLONY COUNT: CPT

## 2021-11-30 PROCEDURE — 700111 HCHG RX REV CODE 636 W/ 250 OVERRIDE (IP): Performed by: EMERGENCY MEDICINE

## 2021-11-30 PROCEDURE — 87186 SC STD MICRODIL/AGAR DIL: CPT

## 2021-11-30 PROCEDURE — 86900 BLOOD TYPING SEROLOGIC ABO: CPT

## 2021-11-30 PROCEDURE — 85730 THROMBOPLASTIN TIME PARTIAL: CPT

## 2021-11-30 PROCEDURE — 70450 CT HEAD/BRAIN W/O DYE: CPT | Mod: MG

## 2021-11-30 PROCEDURE — 96375 TX/PRO/DX INJ NEW DRUG ADDON: CPT

## 2021-11-30 PROCEDURE — 700111 HCHG RX REV CODE 636 W/ 250 OVERRIDE (IP): Performed by: STUDENT IN AN ORGANIZED HEALTH CARE EDUCATION/TRAINING PROGRAM

## 2021-11-30 PROCEDURE — 80053 COMPREHEN METABOLIC PANEL: CPT

## 2021-11-30 PROCEDURE — 99223 1ST HOSP IP/OBS HIGH 75: CPT | Mod: AI | Performed by: STUDENT IN AN ORGANIZED HEALTH CARE EDUCATION/TRAINING PROGRAM

## 2021-11-30 PROCEDURE — 86850 RBC ANTIBODY SCREEN: CPT

## 2021-11-30 PROCEDURE — 83735 ASSAY OF MAGNESIUM: CPT

## 2021-11-30 PROCEDURE — 85025 COMPLETE CBC W/AUTO DIFF WBC: CPT

## 2021-11-30 PROCEDURE — 94760 N-INVAS EAR/PLS OXIMETRY 1: CPT

## 2021-11-30 PROCEDURE — 700101 HCHG RX REV CODE 250: Performed by: EMERGENCY MEDICINE

## 2021-11-30 RX ORDER — ACETAMINOPHEN/DIPHENHYDRAMINE 500MG-25MG
3 TABLET ORAL
COMMUNITY
End: 2022-01-25

## 2021-11-30 RX ORDER — PROCHLORPERAZINE EDISYLATE 5 MG/ML
10 INJECTION INTRAMUSCULAR; INTRAVENOUS ONCE
Status: COMPLETED | OUTPATIENT
Start: 2021-11-30 | End: 2021-11-30

## 2021-11-30 RX ORDER — POLYETHYLENE GLYCOL 3350 17 G/17G
1 POWDER, FOR SOLUTION ORAL
Status: DISCONTINUED | OUTPATIENT
Start: 2021-11-30 | End: 2021-12-03 | Stop reason: HOSPADM

## 2021-11-30 RX ORDER — LABETALOL HYDROCHLORIDE 5 MG/ML
10 INJECTION, SOLUTION INTRAVENOUS ONCE
Status: COMPLETED | OUTPATIENT
Start: 2021-11-30 | End: 2021-11-30

## 2021-11-30 RX ORDER — AMOXICILLIN 250 MG
2 CAPSULE ORAL 2 TIMES DAILY
Status: DISCONTINUED | OUTPATIENT
Start: 2021-11-30 | End: 2021-12-03 | Stop reason: HOSPADM

## 2021-11-30 RX ORDER — AMPICILLIN TRIHYDRATE 250 MG
200 CAPSULE ORAL DAILY
Status: ON HOLD | COMMUNITY
End: 2021-12-21

## 2021-11-30 RX ORDER — BISACODYL 10 MG
10 SUPPOSITORY, RECTAL RECTAL
Status: DISCONTINUED | OUTPATIENT
Start: 2021-11-30 | End: 2021-12-03 | Stop reason: HOSPADM

## 2021-11-30 RX ORDER — ATORVASTATIN CALCIUM 20 MG/1
40 TABLET, FILM COATED ORAL DAILY
Status: DISCONTINUED | OUTPATIENT
Start: 2021-12-01 | End: 2021-12-01

## 2021-11-30 RX ORDER — SODIUM CHLORIDE, SODIUM LACTATE, POTASSIUM CHLORIDE, CALCIUM CHLORIDE 600; 310; 30; 20 MG/100ML; MG/100ML; MG/100ML; MG/100ML
INJECTION, SOLUTION INTRAVENOUS CONTINUOUS
Status: ACTIVE | OUTPATIENT
Start: 2021-11-30 | End: 2021-12-01

## 2021-11-30 RX ORDER — ONDANSETRON 4 MG/1
4 TABLET, ORALLY DISINTEGRATING ORAL EVERY 4 HOURS PRN
Status: DISCONTINUED | OUTPATIENT
Start: 2021-11-30 | End: 2021-12-03 | Stop reason: HOSPADM

## 2021-11-30 RX ORDER — CARBAMAZEPINE 200 MG/1
200 TABLET ORAL 2 TIMES DAILY
Status: DISCONTINUED | OUTPATIENT
Start: 2021-11-30 | End: 2021-12-03 | Stop reason: HOSPADM

## 2021-11-30 RX ORDER — ENALAPRILAT 1.25 MG/ML
1.25 INJECTION INTRAVENOUS EVERY 6 HOURS PRN
Status: DISCONTINUED | OUTPATIENT
Start: 2021-11-30 | End: 2021-12-03 | Stop reason: HOSPADM

## 2021-11-30 RX ORDER — ACETAMINOPHEN 325 MG/1
650 TABLET ORAL EVERY 6 HOURS PRN
Status: DISCONTINUED | OUTPATIENT
Start: 2021-11-30 | End: 2021-12-03 | Stop reason: HOSPADM

## 2021-11-30 RX ORDER — LABETALOL HYDROCHLORIDE 5 MG/ML
10 INJECTION, SOLUTION INTRAVENOUS EVERY 4 HOURS PRN
Status: DISCONTINUED | OUTPATIENT
Start: 2021-11-30 | End: 2021-12-03 | Stop reason: HOSPADM

## 2021-11-30 RX ORDER — LABETALOL 200 MG/1
200 TABLET, FILM COATED ORAL EVERY 12 HOURS
Status: DISCONTINUED | OUTPATIENT
Start: 2021-11-30 | End: 2021-12-03 | Stop reason: HOSPADM

## 2021-11-30 RX ORDER — ONDANSETRON 2 MG/ML
4 INJECTION INTRAMUSCULAR; INTRAVENOUS EVERY 4 HOURS PRN
Status: DISCONTINUED | OUTPATIENT
Start: 2021-11-30 | End: 2021-12-03 | Stop reason: HOSPADM

## 2021-11-30 RX ORDER — KETOROLAC TROMETHAMINE 30 MG/ML
15 INJECTION, SOLUTION INTRAMUSCULAR; INTRAVENOUS ONCE
Status: COMPLETED | OUTPATIENT
Start: 2021-12-01 | End: 2021-12-01

## 2021-11-30 RX ADMIN — LABETALOL HYDROCHLORIDE 10 MG: 5 INJECTION, SOLUTION INTRAVENOUS at 20:56

## 2021-11-30 RX ADMIN — SODIUM CHLORIDE, POTASSIUM CHLORIDE, SODIUM LACTATE AND CALCIUM CHLORIDE: 600; 310; 30; 20 INJECTION, SOLUTION INTRAVENOUS at 23:11

## 2021-11-30 RX ADMIN — ENALAPRILAT 1.25 MG: 1.25 INJECTION INTRAVENOUS at 23:10

## 2021-11-30 RX ADMIN — PROCHLORPERAZINE EDISYLATE 10 MG: 5 INJECTION INTRAMUSCULAR; INTRAVENOUS at 20:01

## 2021-12-01 ENCOUNTER — HOSPITAL ENCOUNTER (OUTPATIENT)
Dept: RADIOLOGY | Facility: MEDICAL CENTER | Age: 76
End: 2021-12-01

## 2021-12-01 ENCOUNTER — NON-PROVIDER VISIT (OUTPATIENT)
Dept: CARDIOLOGY | Facility: MEDICAL CENTER | Age: 76
End: 2021-12-01
Payer: MEDICARE

## 2021-12-01 DIAGNOSIS — I47.10 SVT (SUPRAVENTRICULAR TACHYCARDIA) (HCC): ICD-10-CM

## 2021-12-01 PROBLEM — N30.00 ACUTE CYSTITIS WITHOUT HEMATURIA: Status: ACTIVE | Noted: 2021-12-01

## 2021-12-01 PROBLEM — G93.40 ENCEPHALOPATHY: Status: ACTIVE | Noted: 2021-12-01

## 2021-12-01 PROBLEM — J96.01 ACUTE RESPIRATORY FAILURE WITH HYPOXIA (HCC): Status: ACTIVE | Noted: 2021-12-01

## 2021-12-01 LAB
APPEARANCE UR: CLEAR
BACTERIA #/AREA URNS HPF: ABNORMAL /HPF
BASOPHILS # BLD AUTO: 0.7 % (ref 0–1.8)
BASOPHILS # BLD: 0.08 K/UL (ref 0–0.12)
BILIRUB UR QL STRIP.AUTO: NEGATIVE
COLOR UR: YELLOW
EKG IMPRESSION: NORMAL
EOSINOPHIL # BLD AUTO: 0.19 K/UL (ref 0–0.51)
EOSINOPHIL NFR BLD: 1.7 % (ref 0–6.9)
EPI CELLS #/AREA URNS HPF: NEGATIVE /HPF
ERYTHROCYTE [DISTWIDTH] IN BLOOD BY AUTOMATED COUNT: 45.1 FL (ref 35.9–50)
GLUCOSE UR STRIP.AUTO-MCNC: NEGATIVE MG/DL
HCT VFR BLD AUTO: 38.1 % (ref 37–47)
HGB BLD-MCNC: 12.3 G/DL (ref 12–16)
HYALINE CASTS #/AREA URNS LPF: ABNORMAL /LPF
IMM GRANULOCYTES # BLD AUTO: 0.05 K/UL (ref 0–0.11)
IMM GRANULOCYTES NFR BLD AUTO: 0.4 % (ref 0–0.9)
KETONES UR STRIP.AUTO-MCNC: 15 MG/DL
LEUKOCYTE ESTERASE UR QL STRIP.AUTO: ABNORMAL
LYMPHOCYTES # BLD AUTO: 2.54 K/UL (ref 1–4.8)
LYMPHOCYTES NFR BLD: 22.8 % (ref 22–41)
MCH RBC QN AUTO: 31.5 PG (ref 27–33)
MCHC RBC AUTO-ENTMCNC: 32.3 G/DL (ref 33.6–35)
MCV RBC AUTO: 97.7 FL (ref 81.4–97.8)
MICRO URNS: ABNORMAL
MONOCYTES # BLD AUTO: 1 K/UL (ref 0–0.85)
MONOCYTES NFR BLD AUTO: 9 % (ref 0–13.4)
NEUTROPHILS # BLD AUTO: 7.29 K/UL (ref 2–7.15)
NEUTROPHILS NFR BLD: 65.4 % (ref 44–72)
NITRITE UR QL STRIP.AUTO: POSITIVE
NRBC # BLD AUTO: 0 K/UL
NRBC BLD-RTO: 0 /100 WBC
PH UR STRIP.AUTO: 5 [PH] (ref 5–8)
PLATELET # BLD AUTO: 355 K/UL (ref 164–446)
PMV BLD AUTO: 9.4 FL (ref 9–12.9)
PROT UR QL STRIP: NEGATIVE MG/DL
RBC # BLD AUTO: 3.9 M/UL (ref 4.2–5.4)
RBC # URNS HPF: ABNORMAL /HPF
RBC UR QL AUTO: NEGATIVE
SP GR UR STRIP.AUTO: 1.02
UROBILINOGEN UR STRIP.AUTO-MCNC: 0.2 MG/DL
WBC # BLD AUTO: 11.2 K/UL (ref 4.8–10.8)
WBC #/AREA URNS HPF: ABNORMAL /HPF

## 2021-12-01 PROCEDURE — 81001 URINALYSIS AUTO W/SCOPE: CPT

## 2021-12-01 PROCEDURE — 700111 HCHG RX REV CODE 636 W/ 250 OVERRIDE (IP): Performed by: STUDENT IN AN ORGANIZED HEALTH CARE EDUCATION/TRAINING PROGRAM

## 2021-12-01 PROCEDURE — A9270 NON-COVERED ITEM OR SERVICE: HCPCS | Performed by: STUDENT IN AN ORGANIZED HEALTH CARE EDUCATION/TRAINING PROGRAM

## 2021-12-01 PROCEDURE — 700102 HCHG RX REV CODE 250 W/ 637 OVERRIDE(OP): Performed by: STUDENT IN AN ORGANIZED HEALTH CARE EDUCATION/TRAINING PROGRAM

## 2021-12-01 PROCEDURE — 96376 TX/PRO/DX INJ SAME DRUG ADON: CPT

## 2021-12-01 PROCEDURE — 36415 COLL VENOUS BLD VENIPUNCTURE: CPT

## 2021-12-01 PROCEDURE — 87040 BLOOD CULTURE FOR BACTERIA: CPT

## 2021-12-01 PROCEDURE — 99233 SBSQ HOSP IP/OBS HIGH 50: CPT | Mod: GC | Performed by: PSYCHIATRY & NEUROLOGY

## 2021-12-01 PROCEDURE — 92610 EVALUATE SWALLOWING FUNCTION: CPT

## 2021-12-01 PROCEDURE — 97535 SELF CARE MNGMENT TRAINING: CPT

## 2021-12-01 PROCEDURE — 770020 HCHG ROOM/CARE - TELE (206)

## 2021-12-01 PROCEDURE — 97166 OT EVAL MOD COMPLEX 45 MIN: CPT

## 2021-12-01 PROCEDURE — 93005 ELECTROCARDIOGRAM TRACING: CPT | Performed by: STUDENT IN AN ORGANIZED HEALTH CARE EDUCATION/TRAINING PROGRAM

## 2021-12-01 PROCEDURE — 96375 TX/PRO/DX INJ NEW DRUG ADDON: CPT

## 2021-12-01 PROCEDURE — 700101 HCHG RX REV CODE 250: Performed by: STUDENT IN AN ORGANIZED HEALTH CARE EDUCATION/TRAINING PROGRAM

## 2021-12-01 PROCEDURE — 99233 SBSQ HOSP IP/OBS HIGH 50: CPT | Performed by: INTERNAL MEDICINE

## 2021-12-01 PROCEDURE — 85025 COMPLETE CBC W/AUTO DIFF WBC: CPT

## 2021-12-01 PROCEDURE — 97162 PT EVAL MOD COMPLEX 30 MIN: CPT

## 2021-12-01 PROCEDURE — 700111 HCHG RX REV CODE 636 W/ 250 OVERRIDE (IP): Performed by: EMERGENCY MEDICINE

## 2021-12-01 PROCEDURE — 92612 ENDOSCOPY SWALLOW (FEES) VID: CPT

## 2021-12-01 RX ORDER — IPRATROPIUM BROMIDE AND ALBUTEROL SULFATE 2.5; .5 MG/3ML; MG/3ML
3 SOLUTION RESPIRATORY (INHALATION)
Status: DISCONTINUED | OUTPATIENT
Start: 2021-12-01 | End: 2021-12-03 | Stop reason: HOSPADM

## 2021-12-01 RX ORDER — CEFTRIAXONE 1 G/1
1 INJECTION, POWDER, FOR SOLUTION INTRAMUSCULAR; INTRAVENOUS ONCE
Status: COMPLETED | OUTPATIENT
Start: 2021-12-01 | End: 2021-12-01

## 2021-12-01 RX ORDER — CLOPIDOGREL BISULFATE 75 MG/1
75 TABLET ORAL DAILY
Status: DISCONTINUED | OUTPATIENT
Start: 2021-12-01 | End: 2021-12-03 | Stop reason: HOSPADM

## 2021-12-01 RX ORDER — LISINOPRIL 20 MG/1
40 TABLET ORAL DAILY
Status: ON HOLD | COMMUNITY
End: 2021-12-21

## 2021-12-01 RX ORDER — ROPINIROLE 2 MG/1
2 TABLET, FILM COATED ORAL 3 TIMES DAILY
Status: ON HOLD | COMMUNITY
End: 2021-12-21

## 2021-12-01 RX ORDER — TRIHEXYPHENIDYL HYDROCHLORIDE 2 MG/1
2 TABLET ORAL 3 TIMES DAILY
Status: ON HOLD | COMMUNITY
End: 2021-12-21

## 2021-12-01 RX ORDER — ATORVASTATIN CALCIUM 80 MG/1
80 TABLET, FILM COATED ORAL DAILY
Status: DISCONTINUED | OUTPATIENT
Start: 2021-12-02 | End: 2021-12-03 | Stop reason: HOSPADM

## 2021-12-01 RX ADMIN — KETOROLAC TROMETHAMINE 15 MG: 30 INJECTION, SOLUTION INTRAMUSCULAR; INTRAVENOUS at 00:03

## 2021-12-01 RX ADMIN — ENALAPRILAT 1.25 MG: 1.25 INJECTION INTRAVENOUS at 05:32

## 2021-12-01 RX ADMIN — CEFTRIAXONE SODIUM 1 G: 1 INJECTION, POWDER, FOR SOLUTION INTRAMUSCULAR; INTRAVENOUS at 01:53

## 2021-12-01 RX ADMIN — LABETALOL HYDROCHLORIDE 200 MG: 200 TABLET, FILM COATED ORAL at 18:15

## 2021-12-01 RX ADMIN — CLOPIDOGREL BISULFATE 75 MG: 75 TABLET ORAL at 18:15

## 2021-12-01 RX ADMIN — LABETALOL HYDROCHLORIDE 10 MG: 5 INJECTION, SOLUTION INTRAVENOUS at 09:23

## 2021-12-01 RX ADMIN — CARBAMAZEPINE 200 MG: 200 TABLET ORAL at 19:44

## 2021-12-01 ASSESSMENT — COGNITIVE AND FUNCTIONAL STATUS - GENERAL
DRESSING REGULAR UPPER BODY CLOTHING: A LITTLE
TOILETING: A LITTLE
STANDING UP FROM CHAIR USING ARMS: A LOT
MOVING FROM LYING ON BACK TO SITTING ON SIDE OF FLAT BED: UNABLE
MOBILITY SCORE: 8
DRESSING REGULAR LOWER BODY CLOTHING: A LOT
MOVING TO AND FROM BED TO CHAIR: UNABLE
WALKING IN HOSPITAL ROOM: TOTAL
DAILY ACTIVITIY SCORE: 16
HELP NEEDED FOR BATHING: A LOT
SUGGESTED CMS G CODE MODIFIER MOBILITY: CM
TURNING FROM BACK TO SIDE WHILE IN FLAT BAD: A LOT
PERSONAL GROOMING: A LITTLE
EATING MEALS: A LITTLE
SUGGESTED CMS G CODE MODIFIER DAILY ACTIVITY: CK
CLIMB 3 TO 5 STEPS WITH RAILING: TOTAL

## 2021-12-01 ASSESSMENT — LIFESTYLE VARIABLES
TOTAL SCORE: 0
EVER FELT BAD OR GUILTY ABOUT YOUR DRINKING: NO
AVERAGE NUMBER OF DAYS PER WEEK YOU HAVE A DRINK CONTAINING ALCOHOL: 0
HOW MANY TIMES IN THE PAST YEAR HAVE YOU HAD 5 OR MORE DRINKS IN A DAY: 0
SUBSTANCE_ABUSE: 0
ON A TYPICAL DAY WHEN YOU DRINK ALCOHOL HOW MANY DRINKS DO YOU HAVE: 0
HAVE PEOPLE ANNOYED YOU BY CRITICIZING YOUR DRINKING: NO
ALCOHOL_USE: NO
TOTAL SCORE: 0
EVER HAD A DRINK FIRST THING IN THE MORNING TO STEADY YOUR NERVES TO GET RID OF A HANGOVER: NO
HAVE YOU EVER FELT YOU SHOULD CUT DOWN ON YOUR DRINKING: NO
CONSUMPTION TOTAL: NEGATIVE
TOTAL SCORE: 0

## 2021-12-01 ASSESSMENT — ENCOUNTER SYMPTOMS
SPEECH CHANGE: 1
SINUS PAIN: 0
PHOTOPHOBIA: 0
CHILLS: 0
EYE PAIN: 0
NAUSEA: 0
DIARRHEA: 0
ABDOMINAL PAIN: 0
HEADACHES: 1
BACK PAIN: 1
WEAKNESS: 1
SHORTNESS OF BREATH: 0
NERVOUS/ANXIOUS: 0
VOMITING: 0
COUGH: 0
FEVER: 0
FOCAL WEAKNESS: 1
FALLS: 1
PALPITATIONS: 0

## 2021-12-01 ASSESSMENT — PATIENT HEALTH QUESTIONNAIRE - PHQ9
2. FEELING DOWN, DEPRESSED, IRRITABLE, OR HOPELESS: NOT AT ALL
1. LITTLE INTEREST OR PLEASURE IN DOING THINGS: NOT AT ALL
SUM OF ALL RESPONSES TO PHQ9 QUESTIONS 1 AND 2: 0

## 2021-12-01 ASSESSMENT — PAIN DESCRIPTION - PAIN TYPE
TYPE: CHRONIC PAIN

## 2021-12-01 ASSESSMENT — ACTIVITIES OF DAILY LIVING (ADL): TOILETING: INDEPENDENT

## 2021-12-01 ASSESSMENT — GAIT ASSESSMENTS: GAIT LEVEL OF ASSIST: UNABLE TO PARTICIPATE

## 2021-12-01 ASSESSMENT — FIBROSIS 4 INDEX: FIB4 SCORE: 1.02

## 2021-12-01 NOTE — H&P
Hospital Medicine History & Physical Note    Date of Service  11/30/21    Primary Care Physician  Tracy Cartagena M.D.    Consultants  neurology    Specialist Names: Sunday Dela Cruz D.O.    Code Status  DNAR/DNI    Chief Complaint  Chief Complaint   Patient presents with   • Possible Stroke     pt previosuly had a stroke on 11/19 and had TPA admistered and left hospital with no deficits. Pt was experience stroke like symptoms after TPA and was sent home by [facility]. Pt followed up with neurology and was told not to come to hospital unless symptoms last longer than an hour.        History of Presenting Illness  Nakita Sheppard is a 76 y.o. female recently received TPA on 11/19/2021 for concerns of CVA with left face and left arm weakness discharged to rehab and subsequently home 9/22/2021 with complete resolution of her symptoms who has been having mild recurrence of left-sided weakness since discharge home that has been waxing and waning who presented 11/30/2021 with ongoing left upper extremity weakness, facial drooping, dysarthria and dysphagia.  No aggravating or alleviating factors. She has been to the outside ED for these without finding an answer and has also followed-up with Dr. Parks in neurology clinic. She continues on baby aspirin.  She was told she may have atrial fibrillation and is to be set up for cardiac monitoring.  She has had multiple falls and is high risk for bleeding so has not been started on anticoagulation per family at bedside.  Patient symptoms have been persisting longer and more frequently so they presented for evaluation.  She was noted to have blood pressure as high as 224 systolic in the ED, she was hypoxic, she has mild leukocytosis, normal renal and liver function, INR normal.  UA shows infection.  CT head shows localized brain swelling and edema likely indicating subacute infarct in the right cerebral hemisphere.  Neurology was consulted recommend admission to  hospital service with every 4 hours neuro checks, control of blood pressure, will obtain prior MRI and compared to CT as patient requires anesthesia to obtain a new MRI.    I discussed the plan of care with patient, family, bedside RN, charge RN and ERP.    Review of Systems  Review of Systems   Constitutional: Positive for malaise/fatigue. Negative for chills and fever.   HENT: Negative for congestion and sinus pain.    Eyes: Negative for photophobia and pain.   Respiratory: Negative for cough and shortness of breath.    Cardiovascular: Negative for chest pain and palpitations.   Gastrointestinal: Negative for abdominal pain, diarrhea, nausea and vomiting.   Genitourinary: Negative for dysuria and urgency.   Musculoskeletal: Positive for back pain and falls.   Neurological: Positive for speech change, focal weakness, weakness and headaches.   Psychiatric/Behavioral: Negative for substance abuse. The patient is not nervous/anxious.        Past Medical History   has a past medical history of Allergy, Arthritis, Chronic headaches (5/29/2012), Chronic pain, Dental disorder, Heart burn, History of chickenpox, History of measles, History of mumps, Hypertension, Indigestion, Low back pain (9/22/14), Migraine, Peripheral neuropathy, Rotator cuff tear, Seasonal allergies, TIA (transient ischemic attack), and Tuberculosis (1979).    Surgical History   has a past surgical history that includes laminotomy (02/2002 and 9/2002); open reduction (2008); shoulder surgery (1998); abdominal hysterectomy total (1983); arthroscopy, knee (1987); appendectomy; cholecystectomy; spinal cord stimulator (11/4/2011); carpal tunnel endoscopic (1990); other (2013); other (2000); lumbar fusion posterior (10/6/2014); lumbar laminectomy diskectomy (10/6/2014); and lumbar exploration (10/6/2014).     Family History  family history includes Arthritis in her mother; Cancer in her brother; Diabetes in her mother; Heart Disease in her father;  Hypertension in her mother; Leukemia in her brother; Stroke in her brother and mother.        Social History   reports that she has never smoked. She has never used smokeless tobacco. She reports that she does not drink alcohol and does not use drugs.    Allergies  Allergies   Allergen Reactions   • Fentanyl Anxiety   • Morphine Itching   • Penicillins Hives and Swelling       Medications  Prior to Admission Medications   Prescriptions Last Dose Informant Patient Reported? Taking?   Coenzyme Q10 (COQ10) 200 MG Cap 11/30/2021 at 0900 Patient Yes Yes   Sig: Take 200 mg by mouth every day.   aspirin 81 MG EC tablet 11/30/2021 at 0830 Patient Yes No   Sig: Take 81 mg by mouth every day.   atorvastatin (LIPITOR) 40 MG Tab 11/29/2021 at 2000 Patient No No   Sig: Take 1 Tab by mouth every day.   carBAMazepine (TEGRETOL) 200 MG Tab 11/27/2021 at 2000 Patient Yes No   Sig: Take 200 mg by mouth 2 times a day.   diphenhydrAMINE-APAP, sleep, (TYLENOL PM EXTRA STRENGTH)  MG Tab 11/29/2021 at 2000 Patient Yes Yes   Sig: Take 3 Tablets by mouth at bedtime.   labetalol (NORMODYNE) 200 MG Tab 11/30/2021 at 0830 Patient Yes No   Sig: Take 200 mg by mouth every 12 hours.   tizanidine (ZANAFLEX) 6 MG capsule 11/29/2021 at 2000 Patient Yes No   Sig: Take 6 mg by mouth 3 times a day.   vitamin D (VITAMIND D3) 1000 UNIT Tab  Patient Yes No   Sig: Take 1,000 Units by mouth every day.      Facility-Administered Medications: None       Physical Exam  Temp:  [36.7 °C (98 °F)] 36.7 °C (98 °F)  Pulse:  [62-82] 62  Resp:  [15-23] 15  BP: (128-224)/() 163/70  SpO2:  [87 %-98 %] 97 %  Blood Pressure : (!) 176/77   Temperature: 36.7 °C (98 °F)   Pulse: 82   Respiration: 20   Pulse Oximetry: 95 %       Physical Exam  Vitals and nursing note reviewed. Exam conducted with a chaperone present.   Constitutional:       Appearance: She is obese. She is ill-appearing.      Comments: 76-year-old female appears older than stated age, appears  chronically ill, drowsy but awakens easily, slurred speech   HENT:      Head: Normocephalic and atraumatic.      Nose: Nose normal. No rhinorrhea.      Mouth/Throat:      Mouth: Mucous membranes are dry.      Pharynx: Oropharynx is clear.   Eyes:      Extraocular Movements: Extraocular movements intact.      Conjunctiva/sclera: Conjunctivae normal.      Pupils: Pupils are equal, round, and reactive to light.   Cardiovascular:      Rate and Rhythm: Normal rate and regular rhythm.      Pulses: Normal pulses.   Pulmonary:      Breath sounds: Normal breath sounds. No wheezing, rhonchi or rales.      Comments: Poor inspiratory effort  Abdominal:      General: Bowel sounds are normal.      Palpations: Abdomen is soft.      Tenderness: There is no abdominal tenderness. There is no guarding or rebound.   Musculoskeletal:         General: No tenderness.      Cervical back: Normal range of motion and neck supple.      Right lower leg: Edema present.      Left lower leg: Edema present.   Skin:     General: Skin is warm and dry.      Capillary Refill: Capillary refill takes less than 2 seconds.      Findings: No rash.   Neurological:      Comments: Left-sided facial droop, tongue midline, left upper extremity motor 0 out of 5, left lower extremity motor 1 out of 5, right upper and lower extremity 5 out of 5, sensation light touch appears intact throughout, no hemineglect, right upper extremity finger-to-nose normal         Laboratory:  Recent Labs     11/30/21 1858 12/01/21  0324   WBC 11.9* 11.2*   RBC 4.30 3.90*   HEMOGLOBIN 13.4 12.3   HEMATOCRIT 41.8 38.1   MCV 97.2 97.7   MCH 31.2 31.5   MCHC 32.1* 32.3*   RDW 44.7 45.1   PLATELETCT 414 355   MPV 9.1 9.4     Recent Labs     11/30/21 1858   SODIUM 143   POTASSIUM 4.7   CHLORIDE 104   CO2 27   GLUCOSE 118*   BUN 16   CREATININE 0.93   CALCIUM 9.6     Recent Labs     11/30/21 1858   ALTSGPT 11   ASTSGOT 16   ALKPHOSPHAT 139*   TBILIRUBIN 0.4   GLUCOSE 118*     Recent Labs      11/30/21 1858   APTT 26.7   INR 1.08     No results for input(s): NTPROBNP in the last 72 hours.      Recent Labs     11/30/21 1858   TROPONINT 9       Imaging:  DX-CHEST-PORTABLE (1 VIEW)   Final Result      No evidence of acute cardiopulmonary process.      CT-HEAD W/O   Final Result         1.  Localized brain swelling and edema likely indicating subacute infarct is identified in the right cerebral hemisphere involving a small portion of the temporal and parietal lobes.      2.  No intracranial hemorrhage is identified.      3.  Decreased attenuation in the periventricular white matter is noted which could indicate microvascular ischemic disease.                   X-Ray:  I have personally reviewed the images and compared with prior images. and My impression is: No acute cardiopulmonary process, no consolidation or pneumothorax,  EKG: Ordered by me and pending    Assessment/Plan:  I anticipate this patient will require at least two midnights for appropriate medical management, necessitating inpatient admission.    * Encephalopathy- (present on admission)  Assessment & Plan  Stroke recrudescence versus acute on subacute right MCA territory ischemic infarcts  Neurology consulted in the ED, reviewing MRI and CT and will determine if needs a new MRI as patient is severely claustrophobic and will require anesthesia to undergo new MRI.  Blood pressure goal  for the first 12 hours with a goal range of normotension.  Per neurology can defer echocardiogram, A1c, lipid panel for now.  SLP consultation with bedside swallow study.  PT OT.  Continue aspirin and atorvastatin.  Receiving ceftriaxone for UTI.    Acute respiratory failure with hypoxia (HCC)  Assessment & Plan  Likely secondary to encephalopathy.  Requiring 3 L nasal cannula to maintain oxygen saturations greater than 90%.  Chest x-ray without any evidence of edema or consolidation.  Oxygen per guidelines, wean as able.  Incentive spirometry.  Alfredo  as needed    Acute cystitis without hematuria- (present on admission)  Assessment & Plan  Continue ceftriaxone.  Follow-up blood and urine culture    Hypertension- (present on admission)  Assessment & Plan  SBP as high as 215, reduction of the patient's blood pressure by 25% with goal range of normotension.  Continue patient's home labetalol.  IV antihypertensives ordered with parameters.      VTE prophylaxis: SCDs/TEDs, Lovenox

## 2021-12-01 NOTE — PROGRESS NOTES
"Neurology Progress Note  Neurohospitalist Service, [facility]    Referring Physician: Carlos Harry M.D.    Chief Complaint   Patient presents with   • Possible Stroke     pt previosuly had a stroke on 11/19 and had TPA admistered and left hospital with no deficits. Pt was experience stroke like symptoms after TPA and was sent home by [facility]. Pt followed up with neurology and was told not to come to hospital unless symptoms last longer than an hour.        HPI: Per Dr. Dela Cruz Consult:  Nakita Sheppard is a 76 y.o. woman who was recently evaluated at [facility] for symptoms of left face and arm weakness on 11/19/2021.  She was given TPA at the time, and reportedly her symptoms resolved.  According to her family at bedside, she was discharged from the hospital sometime later with a diagnosis of a right-sided stroke confirmed on MRI, but at that time she had no symptoms.  On the night that she was discharged she began to have some mild recurrence of her left-sided weakness.  This fluctuated on and off for the following days.  She did follow-up with Dr. Parks in neurology clinic and was continued on a baby aspirin.     Her family states that her symptoms continued to intermittently recur until Thursday.  At that time the symptoms seemed more significant than ever and they did not go away. The family was unsure how to proceed, but they continue to observe her over the weekend, and her symptoms have progressed to the point that she is experiencing limited mobility due to her left-sided weakness and starting to have difficulty swallowing.    Interval History: No acute events overnight. Patient continues to have left arm weakness and dysarthria, facial droop, but overall states she feels \"good.\"    Review of systems: In addition to what is detailed in the HPI and/or updated in the interval history, all other systems reviewed and are negative.    Past Medical History:    has a past medical " history of Allergy, Arthritis, Chronic headaches (5/29/2012), Chronic pain, Dental disorder, Heart burn, History of chickenpox, History of measles, History of mumps, Hypertension, Indigestion, Low back pain (9/22/14), Migraine, Peripheral neuropathy, Rotator cuff tear, Seasonal allergies, TIA (transient ischemic attack), and Tuberculosis (1979).    FHx:  family history includes Arthritis in her mother; Cancer in her brother; Diabetes in her mother; Heart Disease in her father; Hypertension in her mother; Leukemia in her brother; Stroke in her brother and mother.    SHx:   reports that she has never smoked. She has never used smokeless tobacco. She reports that she does not drink alcohol and does not use drugs.    Medications:    Current Facility-Administered Medications:   •  [START ON 12/2/2021] cefTRIAXone (Rocephin) 2 g in  mL IVPB, 2 g, Intravenous, Q24HRS, Rashaad Garcia M.D.  •  ipratropium-albuterol (DUONEB) nebulizer solution, 3 mL, Nebulization, Q4H PRN (RT), Rashaad Garcia M.D.  •  [START ON 12/2/2021] atorvastatin (LIPITOR) tablet 80 mg, 80 mg, Oral, DAILY, Carlos Harry M.D.  •  aspirin EC (ECOTRIN) tablet 81 mg, 81 mg, Oral, DAILY, Rashaad Garcia M.D.  •  carBAMazepine (TEGRETOL) tablet 200 mg, 200 mg, Oral, BID, Rashaad Garcia M.D.  •  labetalol (NORMODYNE) tablet 200 mg, 200 mg, Oral, Q12HRS, Rashaad Garcia M.D.  •  senna-docusate (PERICOLACE or SENOKOT S) 8.6-50 MG per tablet 2 Tablet, 2 Tablet, Oral, BID **AND** polyethylene glycol/lytes (MIRALAX) PACKET 1 Packet, 1 Packet, Oral, QDAY PRN **AND** magnesium hydroxide (MILK OF MAGNESIA) suspension 30 mL, 30 mL, Oral, QDAY PRN **AND** bisacodyl (DULCOLAX) suppository 10 mg, 10 mg, Rectal, QDAY PRN, Rashaad M Cannone, M.D.  •  Respiratory Therapy Consult, , Nebulization, Continuous RT, Rashaad Garcia M.D.  •  lactated ringers infusion, , Intravenous, Continuous, Rashaad Garcia M.D., Last Rate: 100 mL/hr at 11/30/21 2581, New Bag  at 11/30/21 2311  •  enoxaparin (LOVENOX) inj 40 mg, 40 mg, Subcutaneous, DAILY, Rashaad Garcia M.D.  •  acetaminophen (Tylenol) tablet 650 mg, 650 mg, Oral, Q6HRS PRN, Rashaad Garcia M.D.  •  enalaprilat (Vasotec) injection 1.25 mg 1 mL, 1.25 mg, Intravenous, Q6HRS PRN, Rashaad Garcia M.D., 1.25 mg at 12/01/21 0532  •  labetalol (NORMODYNE/TRANDATE) injection 10 mg, 10 mg, Intravenous, Q4HRS PRN, Rashaad Garcia M.D.  •  ondansetron (ZOFRAN) syringe/vial injection 4 mg, 4 mg, Intravenous, Q4HRS PRN, Rashaad Garcia M.D.  •  ondansetron (ZOFRAN ODT) dispertab 4 mg, 4 mg, Oral, Q4HRS PRN, Rashaad Garcia M.D.    Current Outpatient Medications:   •  carbidopa-levodopa (SINEMET)  MG Tab, Take 2 Tablets by mouth 3 times a day. Indications: Parkinson's Disease, Disp: , Rfl:   •  ROPINIRole (REQUIP) 2 MG tablet, Take 2 mg by mouth 3 times a day., Disp: , Rfl:   •  lisinopril (PRINIVIL) 20 MG Tab, Take 40 mg by mouth every day., Disp: , Rfl:   •  trihexyphenidyl (ARTANE) 2 MG Tab, Take 2 mg by mouth 3 times a day. Indications: Parkinsonian-Like Syndrome, Disp: , Rfl:   •  Coenzyme Q10 (COQ10) 200 MG Cap, Take 200 mg by mouth every day., Disp: , Rfl:   •  diphenhydrAMINE-APAP, sleep, (TYLENOL PM EXTRA STRENGTH)  MG Tab, Take 3 Tablets by mouth at bedtime., Disp: , Rfl:   •  aspirin 81 MG EC tablet, Take 81 mg by mouth every day., Disp: , Rfl:   •  labetalol (NORMODYNE) 200 MG Tab, Take 200 mg by mouth every 12 hours., Disp: , Rfl:   •  tizanidine (ZANAFLEX) 6 MG capsule, Take 6 mg by mouth 3 times a day., Disp: , Rfl:   •  vitamin D (VITAMIND D3) 1000 UNIT Tab, Take 1,000 Units by mouth every day., Disp: , Rfl:   •  carBAMazepine (TEGRETOL) 200 MG Tab, Take 200 mg by mouth 2 times a day., Disp: , Rfl:   •  atorvastatin (LIPITOR) 40 MG Tab, Take 1 Tab by mouth every day., Disp: 90 Tab, Rfl: 1    Physical Examination:     Vitals:    12/01/21 0632 12/01/21 0701 12/01/21 0804 12/01/21 0904   BP: (!)  201/77 (!) 166/77 160/71 (!) 196/86   Pulse: 65 63 68 82   Resp: 20 19 19 (!) 31   Temp:       TempSrc:       SpO2: 99% 90% 95% 97%   Weight:       Height:           General: Patient is awake and in no acute distress    NEUROLOGICAL EXAM:     Mental status: Awake, alert and fully oriented, follows commands  Speech and language: no aphasia, is dysarthric. The patient is able to name and repeat.  Cranial nerve exam: Pupils are equal, round and reactive to light bilaterally. Visual fields are full. Extraocular muscles are intact. Sensation in the face is intact to light touch. Left sided facial droop. Hearing to finger rub equal. Palate elevates symmetrically. Shoulder shrug is full. Tongue is midline.  Motor exam: Strength is 3/5 in LUE, 5/5 in remaining extremities. Tone is normal. No abnormal movements were seen on exam.  Coordination: deferred  Gait: deferred     Objective Data:    Labs:  Lab Results   Component Value Date/Time    PROTHROMBTM 13.7 11/30/2021 06:58 PM    INR 1.08 11/30/2021 06:58 PM      Lab Results   Component Value Date/Time    WBC 11.2 (H) 12/01/2021 03:24 AM    RBC 3.90 (L) 12/01/2021 03:24 AM    HEMOGLOBIN 12.3 12/01/2021 03:24 AM    HEMATOCRIT 38.1 12/01/2021 03:24 AM    MCV 97.7 12/01/2021 03:24 AM    MCH 31.5 12/01/2021 03:24 AM    MCHC 32.3 (L) 12/01/2021 03:24 AM    MPV 9.4 12/01/2021 03:24 AM    NEUTSPOLYS 65.40 12/01/2021 03:24 AM    LYMPHOCYTES 22.80 12/01/2021 03:24 AM    MONOCYTES 9.00 12/01/2021 03:24 AM    EOSINOPHILS 1.70 12/01/2021 03:24 AM    BASOPHILS 0.70 12/01/2021 03:24 AM      Lab Results   Component Value Date/Time    SODIUM 143 11/30/2021 06:58 PM    POTASSIUM 4.7 11/30/2021 06:58 PM    CHLORIDE 104 11/30/2021 06:58 PM    CO2 27 11/30/2021 06:58 PM    GLUCOSE 118 (H) 11/30/2021 06:58 PM    BUN 16 11/30/2021 06:58 PM    CREATININE 0.93 11/30/2021 06:58 PM    BUNCREATRAT 16 12/13/2017 10:22 AM      Lab Results   Component Value Date/Time    CHOLSTRLTOT 245 (H) 12/13/2017  10:22 AM    CHOLSTRLTOT 219 (H) 10/14/2016 04:45 AM     (H) 12/13/2017 10:22 AM     (H) 10/14/2016 04:45 AM    HDL 68 12/13/2017 10:22 AM    HDL 60 10/14/2016 04:45 AM    TRIGLYCERIDE 150 (H) 12/13/2017 10:22 AM    TRIGLYCERIDE 141 10/14/2016 04:45 AM       Lab Results   Component Value Date/Time    ALKPHOSPHAT 139 (H) 11/30/2021 06:58 PM    ASTSGOT 16 11/30/2021 06:58 PM    ALTSGPT 11 11/30/2021 06:58 PM    TBILIRUBIN 0.4 11/30/2021 06:58 PM        Imaging/Testing:    I interpreted and/or reviewed the patient's neuroimaging    DX-CHEST-PORTABLE (1 VIEW)   Final Result      No evidence of acute cardiopulmonary process.      CT-HEAD W/O   Final Result         1.  Localized brain swelling and edema likely indicating subacute infarct is identified in the right cerebral hemisphere involving a small portion of the temporal and parietal lobes.      2.  No intracranial hemorrhage is identified.      3.  Decreased attenuation in the periventricular white matter is noted which could indicate microvascular ischemic disease.                   Assessment and Plan:    Nakita Sheppard is a 76 y.o. female presenting for whom neurology has been consulted for worsening stroke symptoms. Given her previous L MCA stroke on noncontrast CT, her current left sided symptoms, and her ICA stenosis the current suspected source is atheroembolic.    Plan:  -start daily plavix for further plaque stabilization and future stroke prevention, was started at [place] but discontinued at office visit  -continue ASA and statin  -BP control, currently reasonable to keep systolic BP in 120-160 range acutely with goal of normotension 110-130. Avoid hypotension  -film room contacted, awaiting upload of previous CTA and MRI from [facility], may not require repeat MR currently if imaging can be compared  -event monitor ordered outpatient, but should get ziopatch here before discharge  -PT/OT/SLP

## 2021-12-01 NOTE — CONSULTS
Neurology Initial Consult H&P  Neurohospitalist Service, [facility]    Referring Physician: Rashaad Garcia M.D.    Chief Complaint   Patient presents with   • Possible Stroke     pt previosuly had a stroke on 11/19 and had TPA admistered and left hospital with no deficits. Pt was experience stroke like symptoms after TPA and was sent home by [facility]. Pt followed up with neurology and was told not to come to hospital unless symptoms last longer than an hour.        HPI: Nakita Sheppard is a 76 y.o. woman who was recently evaluated at [facility] for symptoms of left face and arm weakness on 11/19/2021.  She was given TPA at the time, and reportedly her symptoms resolved.  According to her family at bedside, she was discharged from the hospital sometime later with a diagnosis of a right-sided stroke confirmed on MRI, but at that time she had no symptoms.  On the night that she was discharged she began to have some mild recurrence of her left-sided weakness.  This fluctuated on and off for the following days.  She did follow-up with Dr. Parks in neurology clinic and was continued on a baby aspirin.    Her family states that her symptoms continued to intermittently recur until 5 days ago on Thursday.  At that time the symptoms seemed more significant than ever and they did not go away.  The family was unsure how to proceed, but they continue to observe her over the weekend, and her symptoms have progressed to the point that she is experiencing limited mobility due to her left-sided weakness and starting to have difficulty swallowing.    Review of systems: In addition to what is detailed in the HPI above, all other systems reviewed and are negative.    Past Medical History:    has a past medical history of Allergy, Arthritis, Chronic headaches (5/29/2012), Chronic pain, Dental disorder, Heart burn, History of chickenpox, History of measles, History of mumps, Hypertension,  "Indigestion, Low back pain (9/22/14), Migraine, Peripheral neuropathy, Rotator cuff tear, Seasonal allergies, TIA (transient ischemic attack), and Tuberculosis (1979).    FHx:  family history includes Arthritis in her mother; Cancer in her brother; Diabetes in her mother; Heart Disease in her father; Hypertension in her mother; Leukemia in her brother; Stroke in her brother and mother.    SHx:   reports that she has never smoked. She has never used smokeless tobacco. She reports that she does not drink alcohol and does not use drugs.    Allergies:  Allergies   Allergen Reactions   • Fentanyl Anxiety   • Morphine Itching   • Penicillins Hives and Swelling       Medications:  No current facility-administered medications for this encounter.    Current Outpatient Medications:   •  Coenzyme Q10 (COQ10) 200 MG Cap, Take 200 mg by mouth every day., Disp: , Rfl:   •  diphenhydrAMINE-APAP, sleep, (TYLENOL PM EXTRA STRENGTH)  MG Tab, Take 3 Tablets by mouth at bedtime., Disp: , Rfl:   •  aspirin 81 MG EC tablet, Take 81 mg by mouth every day., Disp: , Rfl:   •  labetalol (NORMODYNE) 200 MG Tab, Take 200 mg by mouth every 12 hours., Disp: , Rfl:   •  tizanidine (ZANAFLEX) 6 MG capsule, Take 6 mg by mouth 3 times a day., Disp: , Rfl:   •  vitamin D (VITAMIND D3) 1000 UNIT Tab, Take 1,000 Units by mouth every day., Disp: , Rfl:   •  carBAMazepine (TEGRETOL) 200 MG Tab, Take 200 mg by mouth 2 times a day., Disp: , Rfl:   •  atorvastatin (LIPITOR) 40 MG Tab, Take 1 Tab by mouth every day., Disp: 90 Tab, Rfl: 1    Physical Examination:     General: Patient is awake and in no acute distress  Eye: Examination of optic disks not indicated at this time given acuity of consult  Neck: There is normal range of motion  CV: regular rate   Extremities:  clear, dry, intact, without peripheral edema    NEUROLOGICAL EXAM:     BP (!) 216/89   Pulse 77   Temp 36.7 °C (98 °F) (Temporal)   Resp 19   Ht 1.575 m (5' 2\")   Wt 83.9 kg (185 " lb)   SpO2 96%   BMI 33.84 kg/m²       Mental status: Awake, alert and fully oriented  Speech and language: Speech is severely dysarthric. The patient is able to name and repeat, and follow commands  Cranial nerve exam: Pupils are equal, round and reactive to light bilaterally. Visual fields are full. There is no nystagmus. Extraocular muscles are intact.  Left facial droop c. Sensation in the face is intact to light touch. Palate elevates symmetrically. Tongue is midline.  Motor exam: Power 0/5 in the left upper extremity and 4/5 in the left lower extremity.  Otherwise 5/5 in right arm and leg.  Tone is flaccid in the right upper extremity.  No abnormal movements were seen on exam.  Sensory exam: Reacts to tactile and pinprick in all 4 extremities.  There is neglect to double-sided simultaneous stimulation on the left.  Deep tendon reflexes:  2+ throughout. Toes down-going bilaterally.  Coordination: No ataxia on bilateral finger-to-nose testing  Gait: Deferred due to patient preference    NIHSS: National Institutes of Health Stroke Scale    [0] 1a:Level of Consciousness    0-alert 1-drowsy   2-stupor   3-coma  [0] 1b:LOC Questions                  0-both  1-one      2-neither  [0] 1c:LOC Commands                   0-both  1-one      2-neither  [0] 2: Best Gaze                     0-nl    1-partial  2-forced  [0] 3: Visual Fields                   0-nl    1-partial  2-complete 3-bilat  [1] 4: Facial Paresis                0-nl    1-minor    2-partial  3-full  MOTOR                       0-nl  [0] 5: Right Arm           1-drift  [4] 6: Left Arm             2-some effort vs gravity  [0] 7: Right Leg           3-no effort vs gravity  [1] 8: Left Leg             4-no movement                             x-untestable  [0] 9: Limb Ataxia                    0-abs   1-1_limb   2-2+_limbs       x-untestable  [0] 10:Sensory                        0-nl    1-partial  2-dense  [0] 11:Best Language/Aphasia         0-nl     1-mild/mod 2-severe   3-mute  [2] 12:Dysarthria                     0-nl    1-mild/mod 2-severe       x-untestable  [1] 13:Neglect/Inattention            0-none  1-partial  2-complete  [9] TOTAL      Objective Data:    Labs:  Lab Results   Component Value Date/Time    PROTHROMBTM 13.7 11/30/2021 06:58 PM    INR 1.08 11/30/2021 06:58 PM      Lab Results   Component Value Date/Time    WBC 11.9 (H) 11/30/2021 06:58 PM    RBC 4.30 11/30/2021 06:58 PM    HEMOGLOBIN 13.4 11/30/2021 06:58 PM    HEMATOCRIT 41.8 11/30/2021 06:58 PM    MCV 97.2 11/30/2021 06:58 PM    MCH 31.2 11/30/2021 06:58 PM    MCHC 32.1 (L) 11/30/2021 06:58 PM    MPV 9.1 11/30/2021 06:58 PM    NEUTSPOLYS 65.60 11/30/2021 06:58 PM    LYMPHOCYTES 23.20 11/30/2021 06:58 PM    MONOCYTES 8.50 11/30/2021 06:58 PM    EOSINOPHILS 1.30 11/30/2021 06:58 PM    BASOPHILS 1.00 11/30/2021 06:58 PM      Lab Results   Component Value Date/Time    SODIUM 143 11/30/2021 06:58 PM    POTASSIUM 4.7 11/30/2021 06:58 PM    CHLORIDE 104 11/30/2021 06:58 PM    CO2 27 11/30/2021 06:58 PM    GLUCOSE 118 (H) 11/30/2021 06:58 PM    BUN 16 11/30/2021 06:58 PM    CREATININE 0.93 11/30/2021 06:58 PM    BUNCREATRAT 16 12/13/2017 10:22 AM      Lab Results   Component Value Date/Time    CHOLSTRLTOT 245 (H) 12/13/2017 10:22 AM    CHOLSTRLTOT 219 (H) 10/14/2016 04:45 AM     (H) 12/13/2017 10:22 AM     (H) 10/14/2016 04:45 AM    HDL 68 12/13/2017 10:22 AM    HDL 60 10/14/2016 04:45 AM    TRIGLYCERIDE 150 (H) 12/13/2017 10:22 AM    TRIGLYCERIDE 141 10/14/2016 04:45 AM       Lab Results   Component Value Date/Time    ALKPHOSPHAT 139 (H) 11/30/2021 06:58 PM    ASTSGOT 16 11/30/2021 06:58 PM    ALTSGPT 11 11/30/2021 06:58 PM    TBILIRUBIN 0.4 11/30/2021 06:58 PM        Imaging/Testing:    I interpreted and/or reviewed the patient's neuroimaging    DX-CHEST-PORTABLE (1 VIEW)   Final Result      No evidence of acute cardiopulmonary process.      CT-HEAD W/O   Final Result         1.   Localized brain swelling and edema likely indicating subacute infarct is identified in the right cerebral hemisphere involving a small portion of the temporal and parietal lobes.      2.  No intracranial hemorrhage is identified.      3.  Decreased attenuation in the periventricular white matter is noted which could indicate microvascular ischemic disease.                   Assessment and Plan:    Nakita Sheppard is a 76 y.o. woman with recent right parietal stroke confirmed on MRI at [facility] presenting with worsening left-sided weakness since discharge from the hospital 2 weeks ago.  CT scan of the head confirms right parietal subacute ischemic infarct.  Unfortunately, I am not able to see images from [facility] at this time, so I cannot compare the stroke seen on CT today with the MRI 2 weeks ago.  I am not clear if the patient's stroke symptoms are simply progressing on a delayed scale or if there is new ischemic injury that has been occurring since her discharge home.  I also do not see any vessel imaging nor have any comment on vessel imaging from her previous admission.  According to outpatient neurology note, her previous stroke was categorized as cryptogenic and there is a plan for long-term cardiac monitoring.    Problem list:  1.  Stroke recrudescence versus acute on subacute right MCA territory ischemic infarcts  2.  Hypertension  3. Dysphagia    Plan:  -Admit to the hospitalist service with every 4 hour neurochecks.  -Patient's blood pressure was greater than 190 on admission.  Given that whatever is going on appears at least subacute, I recommend gradual reduction of the patient's blood pressure by about 25% with a goal range of normotension.  -I will call the transfer center to attempt to obtain previous MRI scan and vessel imaging to compare to tonight's CT scan.  This may help understand whether there are new strokes occurring or other unexplained worsening of her previous stroke  symptoms.      -The patient is severely claustrophobic and will need anesthesia to undergo a new MRI.  At this time I think it reasonable to attempt to compare her previous MRI from 2 weeks ago to the CT scan.  If it seems that there is new ischemic injury further investigation will be warranted.      -If the MRI is in line with tonight CT and the suspicion is for no new ischemic injury, can defer much of the stroke work-up including echocardiogram, A1c, FLP.  -There is a mild leukocytosis, but otherwise I do not see any obvious explanation for a profound stroke recrudescence.  -SLP consultation with bedside swallow study.  I suspect the patient will need at minimum a core track for her dysphagia.  -PT/OT  -Continue aspirin 81mg and atorvastatin 80mg for now.    The evaluation of the patient, and recommended management, was discussed with the resident staff.     Sunday Dela Cruz D.O.  Neurohospitalist, Acute Care Services

## 2021-12-01 NOTE — ED TRIAGE NOTES
"Chief Complaint   Patient presents with   • Possible Stroke     pt previosuly had a stroke on 11/19 and had TPA admistered and left hospital with no deficits. Pt was experience stroke like symptoms after TPA and was sent home by [facility]. Pt followed up with neurology and was told not to come to hospital unless symptoms last longer than an hour.          Pt wheeled to triage for above complaint with family members.  Pt is AO x 4, follows commands, and responds appropriately to questions. Patient's breathing is unlabored and pain is currently 8/10 on the 0-10 pain scale.  Pt placed in lobby. Patient educated on triage process and encouraged to alert staff for any changes.    BP (!) 199/97   Pulse 81   Temp 36.7 °C (98 °F) (Temporal)   Resp 16   Ht 1.575 m (5' 2\")   Wt 83.9 kg (185 lb)   SpO2 92%     "

## 2021-12-01 NOTE — PROGRESS NOTES
Hospital Medicine Daily Progress Note    Date of Service  12/1/2021    Chief Complaint  Nakita Sheppard is a 76 y.o. female admitted 11/30/2021 with possible stroke     Hospital Course  This is a 76 y.o. female recently received TPA on 11/19/2021 for concerns of CVA with left face and left arm weakness discharged to rehab and subsequently home 9/22/2021 with complete resolution of her symptoms who has been having mild recurrence of left-sided weakness since discharge home that has been waxing and waning who presented 11/30/2021 with ongoing left upper extremity weakness, facial drooping, dysarthria and dysphagia. She has been to the outside ED for these without finding an answer and has also followed-up with Dr. Parks in neurology clinic. She continues on baby aspirin.  She was told she may have atrial fibrillation and is to be set up for cardiac monitoring.  She has had multiple falls and is high risk for bleeding so has not been started on anticoagulation per family at bedside.  Patient symptoms have been persisting longer and more frequently so they presented for evaluation.  She was noted to have blood pressure as high as 224 systolic in the ED, she was hypoxic, she has mild leukocytosis, normal renal and liver function, INR normal.  UA shows infection.  CT head shows localized brain swelling and edema likely indicating subacute infarct in the right cerebral hemisphere.  Neurology was consulted     Interval Problem Update  Patient seen and examined, afebrile, no overnight events, with left side weakness facail drooping  Neurology following appreciate rec.     I have personally seen and examined the patient at bedside. I discussed the plan of care with patient.    Consultants/Specialty  neurology    Code Status  DNAR/DNI    Disposition  Patient is not medically cleared.   Anticipate discharge to TBD .  I have placed the appropriate orders for post-discharge needs.    Review of Systems  Review of Systems    Constitutional: Positive for malaise/fatigue. Negative for chills and fever.   HENT: Negative for congestion and sinus pain.    Eyes: Negative for photophobia and pain.   Respiratory: Negative for cough and shortness of breath.    Cardiovascular: Negative for chest pain and palpitations.   Gastrointestinal: Negative for abdominal pain, diarrhea, nausea and vomiting.   Genitourinary: Negative for dysuria and urgency.   Musculoskeletal: Positive for back pain and falls.   Neurological: Positive for speech change, focal weakness, weakness and headaches.   Psychiatric/Behavioral: Negative for substance abuse. The patient is not nervous/anxious.         Physical Exam  Temp:  [36.7 °C (98 °F)] 36.7 °C (98 °F)  Pulse:  [62-82] 79  Resp:  [13-31] 21  BP: (128-224)/() 165/109  SpO2:  [87 %-99 %] 94 %    Physical Exam  Vitals and nursing note reviewed. Exam conducted with a chaperone present.   Constitutional:       Appearance: She is obese. She is ill-appearing.      Comments: 76-year-old female appears older than stated age, appears chronically ill, drowsy but awakens easily, slurred speech   HENT:      Head: Normocephalic and atraumatic.      Nose: Nose normal. No rhinorrhea.      Mouth/Throat:      Mouth: Mucous membranes are dry.      Pharynx: Oropharynx is clear.   Eyes:      Extraocular Movements: Extraocular movements intact.      Conjunctiva/sclera: Conjunctivae normal.      Pupils: Pupils are equal, round, and reactive to light.   Cardiovascular:      Rate and Rhythm: Normal rate and regular rhythm.      Pulses: Normal pulses.   Pulmonary:      Breath sounds: Normal breath sounds. No wheezing, rhonchi or rales.      Comments: Poor inspiratory effort  Abdominal:      General: Bowel sounds are normal.      Palpations: Abdomen is soft.      Tenderness: There is no abdominal tenderness. There is no guarding or rebound.   Musculoskeletal:         General: No tenderness.      Cervical back: Normal range of motion  and neck supple.      Right lower leg: Edema present.      Left lower leg: Edema present.   Skin:     General: Skin is warm and dry.      Capillary Refill: Capillary refill takes less than 2 seconds.      Findings: No rash.   Neurological:      Comments: Left-sided facial droop, tongue midline, left upper extremity motor 0 out of 5, left lower extremity motor 1 out of 5, right upper and lower extremity 5 out of 5, sensation light touch appears intact throughout, no hemineglect, right upper extremity finger-to-nose normal         Fluids    Intake/Output Summary (Last 24 hours) at 12/1/2021 1218  Last data filed at 12/1/2021 1127  Gross per 24 hour   Intake 1226.67 ml   Output --   Net 1226.67 ml       Laboratory  Recent Labs     11/30/21  1858 12/01/21  0324   WBC 11.9* 11.2*   RBC 4.30 3.90*   HEMOGLOBIN 13.4 12.3   HEMATOCRIT 41.8 38.1   MCV 97.2 97.7   MCH 31.2 31.5   MCHC 32.1* 32.3*   RDW 44.7 45.1   PLATELETCT 414 355   MPV 9.1 9.4     Recent Labs     11/30/21  1858   SODIUM 143   POTASSIUM 4.7   CHLORIDE 104   CO2 27   GLUCOSE 118*   BUN 16   CREATININE 0.93   CALCIUM 9.6     Recent Labs     11/30/21  1858   APTT 26.7   INR 1.08               Imaging  CT-FOREIGN FILM CAT SCAN   Final Result      DX-CHEST-PORTABLE (1 VIEW)   Final Result      No evidence of acute cardiopulmonary process.      CT-HEAD W/O   Final Result         1.  Localized brain swelling and edema likely indicating subacute infarct is identified in the right cerebral hemisphere involving a small portion of the temporal and parietal lobes.      2.  No intracranial hemorrhage is identified.      3.  Decreased attenuation in the periventricular white matter is noted which could indicate microvascular ischemic disease.                    Assessment/Plan  * Encephalopathy- (present on admission)  Assessment & Plan  Stroke recrudescence versus acute on subacute right MCA territory ischemic infarcts  Neurology consulted in the ED, reviewing MRI and  CT and will determine if needs a new MRI as patient is severely claustrophobic and will require anesthesia to undergo new MRI.  Blood pressure goal  for the first 12 hours with a goal range of normotension.  Per neurology can defer echocardiogram, A1c, lipid panel for now.  SLP consultation with bedside swallow study.  PT OT.  Continue aspirin . plavix and atorvastatin.  Receiving ceftriaxone for UTI.    Acute respiratory failure with hypoxia (HCC)  Assessment & Plan  Likely secondary to encephalopathy.  Requiring 3 L nasal cannula to maintain oxygen saturations greater than 90%.  Chest x-ray without any evidence of edema or consolidation.  Oxygen per guidelines, wean as able.  Incentive spirometry.  DuoNebs as needed    Acute cystitis without hematuria- (present on admission)  Assessment & Plan  Continue ceftriaxone.  Follow-up blood and urine culture    Hypertension- (present on admission)  Assessment & Plan  SBP as high as 215, reduction of the patient's blood pressure by 25% with goal range of normotension.  Continue patient's home labetalol.  IV antihypertensives ordered with parameters.       VTE prophylaxis: enoxaparin ppx    I have performed a physical exam and reviewed and updated ROS and Plan today (12/1/2021). In review of yesterday's note (11/30/2021), there are no changes except as documented above.

## 2021-12-01 NOTE — ED NOTES
Notified Hospitalist of patient C/O neuropathy bilaterally in her legs. New orders placed. Discussed with patient and family, both verbalized understanding.

## 2021-12-01 NOTE — ED NOTES
Pt medicated for pain in her legs bilaterally with Toradol IV per MAR. Patient placed on a hospital bed and placed back on monitor. Pt's family left for the night. Discussed plan of care.

## 2021-12-01 NOTE — PROGRESS NOTES
Home enrollment complete in the [employer] [facility] 30 day Heart monitoring program per Sravan Parks MD.  Monitor to be shipped to patient by [employer].    >Pending Baseline.  >Pending EOS.

## 2021-12-01 NOTE — THERAPY
"Speech Language Pathology   Clinical Swallow Evaluation     Patient Name: Nakita Sheppard  AGE:  76 y.o., SEX:  female  Medical Record #: [id number]  Today's Date: 12/1/2021     Precautions  Precautions: (P) Fall Risk,Swallow Precautions ( See Comments)  Comments: (P) left-sided deficits    Assessment    Per neurology note, \"Patient is 76 y.o. female who was recently evaluated at [facility] for symptoms of left face and arm weakness on 11/19/2021.  She was given TPA at the time, and reportedly her symptoms resolved.  According to her family at bedside, she was discharged from the hospital sometime later with a diagnosis of a right-sided stroke confirmed on MRI, but at that time she had no symptoms.  On the night that she was discharged she began to have some mild recurrence of her left-sided weakness.  This fluctuated on and off for the following days.  Her family continued to observe her over the weekend, and her symptoms have progressed to the point that she is experiencing limited mobility due to her left-sided weakness and starting to have difficulty swallowing.\"    Head CT:   1.  Localized brain swelling and edema likely indicating subacute infarct is identified in the right cerebral hemisphere involving a small portion of the temporal and parietal lobes.  2.  No intracranial hemorrhage is identified.  3.  Decreased attenuation in the periventricular white matter is noted which could indicate microvascular ischemic disease.    CXR: Negative for acute cardiopulmonary process    Patient seen for a clinical swallow evaluation on this date.  Patient pleasant and agreeable.  Speech was moderately dysarthric and vocal quality strained at times, with reduced intensity.  She reported no dysphagia following stroke 11/19/21 but began to have globus sensation with saliva over the last few days.  Patient followed directives to the oral [facility] exam with left-sided asymmetry noted as well as jaw, lingual and labial " weakness, reduced coordination and reduced ROM.  AMRs and SMRs were imprecise, reduced in rate and patient unable to produce strong labial seal for plosive sounds (/p/).      Presentation of PO included ice chips, mildly thickened liquids, purees, and thin liquids.  Patient presented with left-sided anterior bolus loss, prolonged oral prep phase with suspected delayed A-P transit and oral holding of thin liquids.  Patient required cues to trigger a swallow for thins and initiation of swallow trigger with all other consistencies was delayed up to 5-7 seconds.  She demonstrated impaired sensation in which she nodded that she had cleared the oral bolus, though liquids remained in the oral cavity after the first swallow.  Subtle reflexive throat clearing and wet vocal quality noted with all consistencies, which is concerning for possible airway invasion.      Clinical impression  Clinical signs of oropharyngeal dysphagia, likely acute versus subacute related to recent stroke (11/19/21), possible new stroke, left-sided deficits impacting mobility.  An instrumental swallow study is recommended prior to initiation of a PO diet.      Recommendations:   1. Continue NPO pending FEES   2. Non-oral source for medications  3. SLP following with FEES planned for later this morning    Plan    Recommend Speech Therapy 5 times per week until therapy goals are met for the following treatments:  Dysphagia Training and Patient / Family / Caregiver Education.    Discharge Recommendations: (P) Recommend post-acute placement for additional speech therapy services prior to discharge home     Objective       12/01/21 0908   Oral Motor Eval    Is Patient Able to Complete Oral Motor Eval Yes but Impaired   Labial Function   Labial Structure At Rest Moderate  (left-sided asymmetry)   Labial Vowel Production / I /, / U / Moderate  (left-sided asymmetry)   Labial Sequence / I /, / U / Moderate  (left-sided asymmetry)   Frown, Pucker Moderate  "  Lingual Function   Lingual Structure At Rest Minimal  (xerostomia)   Lingual Protrude Minimal   Elevate Outside Mouth Moderate   Lateralization Minimal Right;Minimal Left   Lick Lips (Circular) Within Functional Limits   / Pa / 5X's Minimal   / Ta / 5X's Minimal   / Ka / 5X's Minimal   / [place] / 5X's Minimal   Jaw   Jaw Structure At Rest Within Functional Limits   Bite ([place]) Within Functional Limits   Jaw Open / Resist Within Functional Limits   Jaw Close / Resist Minimal   Velar Function   Velar Structure At Rest Within Functional Limits   / A / Prolonged Within Functional Limits   Laryngeal Function   Voice Quality Moderate  (reduced intensity, strained at times)   Volutional Cough Minimal   Excursion Upon Swallow Weak ;Complete   Oral Food Presentation   Ice Chips Within Functional Limits   Single Swallow Mildly Thick (2) - (Nectar Thick)  Minimal   Single Swallow Thin (0) Moderate   Serial Swallow Thin (0) Moderate   Pureed (4) Minimal   Self Feeding Needs Assistance   Tracheostomy   Tracheostomy  No   Dysphagia Strategies / Recommendations   Strategies / Interventions Recommended (Yes / No) Yes   Compensatory Strategies To Be Assessed   Diet / Liquid Recommendation NPO;Pre-Feeding Trials with SLP Only   Medication Administration    (non-oral source)   Therapy Interventions Dysphagia Therapy By Speech Language Pathologist   Dysphagia Rating   Nutritional Liquid Intake Rating Scale Nothing by mouth   Nutritional Food Intake Rating Scale Nothing by mouth   Patient / Family Goals   Patient / Family Goal #1 \"something to drink\"   Short Term Goals   Short Term Goal # 1 Pt will consume prefeeding trials with SLP only, given no overt s/sx of aspiration         "

## 2021-12-01 NOTE — PROGRESS NOTES
Pt arrived to unit from ER. NIHSS completed. Pt AAOx4, left facial droop, left sided weakness and dysarthria noted on exam. Tele monitor applied. Fall, aspiration and seizure precautions in place.

## 2021-12-01 NOTE — ASSESSMENT & PLAN NOTE
Stroke recrudescence versus acute on subacute right MCA territory ischemic infarcts  Neurology consulted.  Blood pressure goal  for the first 12 hours with a goal range of normotension.  Per neurology can defer echocardiogram, A1c, lipid panel for now.  SLP consultation with bedside swallow study.  PT/OT.  PM&R consulted.  Zio patch at discharge.

## 2021-12-01 NOTE — ASSESSMENT & PLAN NOTE
Likely secondary to encephalopathy.  Requiring 3 L nasal cannula to maintain oxygen saturations greater than 90%.  Chest x-ray without any evidence of edema or consolidation.  Oxygen per guidelines, wean as able.  Incentive spirometry.  DuoNebs as needed    Resolved.

## 2021-12-01 NOTE — ED NOTES
Med rec updated and complete. Allergies reviewed.  Per interview with family daughter at bedside.    Pt confirmed name and date of birth.    Family reports that pt has been taking 3 tablets of TYLENOL PM at night because   She is unable to sleep.  Family stated that the doctor dose not want the pt to take  Carbamazepine or tizanidine because of the side affects.        [facility] ( [facility] [phone]    [facility] [phone]

## 2021-12-01 NOTE — PROGRESS NOTES
4 Eyes Skin Assessment Completed by LORENA Estrada and LORENA Gandhi.    Head WDL  Ears WDL  Nose WDL  Mouth WDL  Neck WDL  Breast/Chest WDL  Shoulder Blades WDL  Spine WDL  (R) Arm/Elbow/Hand WDL  (L) Arm/Elbow/Hand WDL  Abdomen WDL  Groin WDL  Scrotum/Coccyx/Buttocks WDL  (R) Leg WDL  (L) Leg WDL  (R) Heel/Foot/Toe WDL  (L) Heel/Foot/Toe WDL          Devices In Places Tele Box, Pulse Ox, SCD's and Nasal Cannula      Interventions In Place Gray Ear Foams, TAP System, Pillows, Q2 Turns, Barrier Cream, Dri-Catrachito Pads, Heels Loaded W/Pillows and Pressure Redistribution Mattress    Possible Skin Injury No    Pictures Uploaded Into Epic N/A  Wound Consult Placed N/A  RN Wound Prevention Protocol Ordered No

## 2021-12-01 NOTE — ED PROVIDER NOTES
"ER Provider Note     Scribed for Jorge Wang M.D. by Irvin Jones. 11/30/2021, 6:45 PM.    Primary Care Provider: Tracy Cartagena M.D.  Means of Arrival: Walk in    History obtained from: Patient  History limited by: None     CHIEF COMPLAINT  Chief Complaint   Patient presents with   • Possible Stroke     pt previosuly had a stroke on 11/19 and had TPA admistered and left hospital with no deficits. Pt was experience stroke like symptoms after TPA and was sent home by [facility]. Pt followed up with neurology and was told not to come to hospital unless symptoms last longer than an hour.        HPI  Nakita Sheppard is a 76 y.o. female who presents to the Emergency Department for evaluation of possible stroke onset today. Patient reports she had a stroke on 11/19 and was seen in the ED. She had TPA administered and was discharged with no neural or musculoskeletal deficits. Today, patient reports she had \"stroke like symptoms\" and was seen by [facility]. In her visit by [facility], they found no significant issues and discharged patient with a follow up with neurology. In the ED, patient reports she has been having left sided pain and weakness onset 5 days ago which has not resolved. She describes her pain as 8/10  in severity. She presents associated symptoms of left arm weakness and left sided facial droop. Denies fevers, chills, dysuria, abdominal pain, trouble walking, or headache. History of hypertension.     REVIEW OF SYSTEMS  See HPI for further details. All other systems are negative.     PAST MEDICAL HISTORY   has a past medical history of Allergy, Arthritis, Chronic headaches (5/29/2012), Chronic pain, Dental disorder, Heart burn, History of chickenpox, History of measles, History of mumps, Hypertension, Indigestion, Low back pain (9/22/14), Migraine, Peripheral neuropathy, Rotator cuff tear, Seasonal allergies, TIA (transient ischemic attack), and Tuberculosis (1979).    SURGICAL " HISTORY   has a past surgical history that includes laminotomy (02/2002 and 9/2002); open reduction (2008); shoulder surgery (1998); abdominal hysterectomy total (1983); arthroscopy, knee (1987); appendectomy; cholecystectomy; spinal cord stimulator (11/4/2011); carpal tunnel endoscopic (1990); other (2013); other (2000); lumbar fusion posterior (10/6/2014); lumbar laminectomy diskectomy (10/6/2014); and lumbar exploration (10/6/2014).    SOCIAL HISTORY  Social History     Tobacco Use   • Smoking status: Never Smoker   • Smokeless tobacco: Never Used   Vaping Use   • Vaping Use: Never used   Substance Use Topics   • Alcohol use: No     Alcohol/week: 0.0 oz   • Drug use: No      Social History     Substance and Sexual Activity   Drug Use No       FAMILY HISTORY  Family History   Problem Relation Age of Onset   • Diabetes Mother    • Hypertension Mother    • Arthritis Mother    • Stroke Mother    • Heart Disease Father    • Cancer Brother    • Leukemia Brother    • Stroke Brother        CURRENT MEDICATIONS  Current Outpatient Medications   Medication Instructions   • amLODIPine (NORVASC) 5 mg, Oral, DAILY   • aspirin 81 mg, Oral, DAILY   • atorvastatin (LIPITOR) 40 mg, Oral, DAILY   • carBAMazepine (TEGRETOL) 200 mg   • clopidogrel (PLAVIX) 75 mg, Oral, DAILY   • Coenzyme Q10 (COQ10) 400 MG Cap Oral   • ipratropium (ATROVENT) 0.03 % Solution 2 Sprays, Nasal   • labetalol (NORMODYNE) 200 mg, Oral, EVERY 12 HOURS   • losartan-hydrochlorothiazide (HYZAAR) 100-25 MG per tablet 1 Tablet, Oral, DAILY   • metoprolol SR (TOPROL XL) 50 mg, Oral, DAILY   • NIFEdipine SR (PROCARDIA-XL) 60 mg, 2 Times Daily (BID)   • omeprazole (PRILOSEC) 20 mg, Oral, 2 TIMES DAILY   • pantoprazole (PROTONIX) 40 mg, Oral   • tizanidine (ZANAFLEX) 6 mg   • vitamin D (VITAMIND D3) 1,000 Units, Oral, DAILY        ALLERGIES  Allergies   Allergen Reactions   • Fentanyl Anxiety   • Morphine Itching   • Penicillins Hives and Swelling       PHYSICAL  "EXAM  VITAL SIGNS: BP (!) 199/97   Pulse 81   Temp 36.7 °C (98 °F) (Temporal)   Resp 16   Ht 1.575 m (5' 2\")   Wt 83.9 kg (185 lb)   SpO2 92%   BMI 33.84 kg/m²      Constitutional: Alert in mild distress   HENT: No signs of trauma, Bilateral external ears normal, Nose normal.   Eyes: Pupils are equal and reactive, Conjunctiva normal, Non-icteric.   Neck: Normal range of motion, No tenderness, Supple, No stridor.   Lymphatic: No lymphadenopathy noted.   Cardiovascular: Regular rate and rhythm, no palpable thrill  Thorax & Lungs: No respiratory distress,  No chest tenderness.  CTAB  Abdomen: Bowel sounds normal, Soft, No tenderness, No masses, No pulsatile masses. No peritoneal signs.  Skin: Warm, Dry, No erythema, No rash.   Back: No bony tenderness, No CVA tenderness.   Extremities: Intact distal pulses, No edema, No tenderness, No cyanosis.  Musculoskeletal: No tenderness to palpation or major deformities noted.   Neurologic: Profound left sided facial droop. Sensation intact throughout. Left  weak. Left sided weakness. Lift both legs off the bed for ten seconds.   Psychiatric: Affect normal, Judgment normal, Mood normal.     DIAGNOSTIC STUDIES / PROCEDURES    EKG Interpretation:  Interpreted by me    12 Lead EKG interpreted by me to show:  Normal sinus rhythm  Rate 78  Axis: Normal  Intervals: Normal  Normal T waves  Normal ST segments  No ST elevation or depression   No Q wave inversion   My impression of this EKG: Does not indicate ischemia or arrhythmia at this time.   No changes from prior EKG      LABS  Labs Reviewed   CBC WITH DIFFERENTIAL - Abnormal; Notable for the following components:       Result Value    WBC 11.9 (*)     MCHC 32.1 (*)     Neutrophils (Absolute) 7.82 (*)     Monos (Absolute) 1.02 (*)     All other components within normal limits    Narrative:     Indicate which anticoagulants the patient is on:->UNKNOWN   COMP METABOLIC PANEL - Abnormal; Notable for the following components: "    Glucose 118 (*)     Alkaline Phosphatase 139 (*)     All other components within normal limits    Narrative:     Indicate which anticoagulants the patient is on:->UNKNOWN   URINALYSIS,CULTURE IF INDICATED - Abnormal; Notable for the following components:    Ketones 15 (*)     Nitrite Positive (*)     Leukocyte Esterase Small (*)     All other components within normal limits    Narrative:     Indication for culture:->Patient WITHOUT an indwelling Gabriel  catheter in place with new onset of Dysuria, Frequency,  Urgency, and/or Suprapubic pain   ESTIMATED GFR - Abnormal; Notable for the following components:    GFR If Non  59 (*)     All other components within normal limits    Narrative:     Indicate which anticoagulants the patient is on:->UNKNOWN   URINE MICROSCOPIC (W/UA) - Abnormal; Notable for the following components:    WBC 20-50 (*)     Bacteria Many (*)     All other components within normal limits    Narrative:     Indication for culture:->Patient WITHOUT an indwelling Gabriel  catheter in place with new onset of Dysuria, Frequency,  Urgency, and/or Suprapubic pain   PROTHROMBIN TIME    Narrative:     Indicate which anticoagulants the patient is on:->UNKNOWN   APTT    Narrative:     Indicate which anticoagulants the patient is on:->UNKNOWN   COD (ADULT)   TROPONIN    Narrative:     Indicate which anticoagulants the patient is on:->UNKNOWN   MAGNESIUM    Narrative:     Indicate which anticoagulants the patient is on:->UNKNOWN   PHOSPHORUS    Narrative:     Indicate which anticoagulants the patient is on:->UNKNOWN   URINE CULTURE(NEW)    Narrative:     Indication for culture:->Patient WITHOUT an indwelling Gabriel  catheter in place with new onset of Dysuria, Frequency,  Urgency, and/or Suprapubic pain   CBC WITH DIFFERENTIAL     All labs reviewed by me.    RADIOLOGY  DX-CHEST-PORTABLE (1 VIEW)   Final Result      No evidence of acute cardiopulmonary process.      CT-HEAD W/O   Final Result          1.  Localized brain swelling and edema likely indicating subacute infarct is identified in the right cerebral hemisphere involving a small portion of the temporal and parietal lobes.      2.  No intracranial hemorrhage is identified.      3.  Decreased attenuation in the periventricular white matter is noted which could indicate microvascular ischemic disease.                  The radiologist's interpretation of all radiological studies have been reviewed by me.    COURSE & MEDICAL DECISION MAKING  Pertinent Labs & Imaging studies reviewed. (See chart for details)    This is a 76 y.o. female that presents with recurrent left-sided weakness.  I am concerned about potentially a urine infection versus a new stroke versus a subacute stroke.  Symptoms have been going on for almost 6 days therefore this is not an acute stroke code.  We will get a CT scan of the patient's head as well as stroke labs and then reassess.    NIHSS 4    6:45 PM - Patient seen and examined at bedside. Ordered DX-chest, CT-head, CBC with diff., CMP, INR, APTT, COD, Troponin, Urinalysis, GFR, and EKG.  Discussed with patient about administering basic labs and radiology. Patient verbalizes understanding and agreement to this plan of care.     7:57 PM Patient will be treated with Compazine 10 mg.     8:23 PM Paged Hospitalist and Neurology     8:31 PM - I discussed the patient's case and the above findings with Neurology who advised I continue giving patient Labetalol and see how she reacts to it.     9:05 PM - I discussed the patient's case and the above findings with Dr. Garcia (Hospitalist) who agreed to hospitalize the patient. Patient's care was transferred at this time.     Patient was found to have a negative chest x-ray.  The CT scan shows localized brain swelling and a subacute infarct.  I consulted the neurosurgeon neurology team who recommends admission and MRI.  The patient was found to be nitrate positive and will defer this  treatment to the hospitalist.  We will admit the patient in guarded condition.    CRITICAL CARE  The very real possibilty of a deterioration of this patient's condition required the highest level of my preparedness for sudden, emergent intervention.  I provided critical care services, which included medication orders, frequent reevaluations of the patient's condition and response to treatment, ordering and reviewing test results, and discussing the case with various consultants.  The critical care time associated with the care of the patient was 35 minutes. Review chart for interventions. This time is exclusive of any other billable procedures.      DISPOSITION:  Patient will be hospitalized by Dr. Garcia in guarded condition.     FINAL IMPRESSION  1. Cerebral edema (HCC)    2. Left-sided weakness      The critical care time associated with the care of the patient was 35 minutes.      Irvin HANNA (Scribe), am scribing for, and in the presence of, Jorge Wang M.D..    Electronically signed by: Irvin Jones (Marisibe), 11/30/2021    IJorge M.D. personally performed the services described in this documentation, as scribed by Irvin Jones in my presence, and it is both accurate and complete.    C     The note accurately reflects work and decisions made by me.  Jorge Wang M.D.  12/1/2021  1:24 AM

## 2021-12-01 NOTE — THERAPY
"Occupational Therapy   Initial Evaluation     Patient Name: Nakita Sheppard  Age:  76 y.o., Sex:  female  Medical Record #: [id number]  Today's Date: 12/1/2021     Precautions  Precautions: (P) Fall Risk,Swallow Precautions ( See Comments)  Comments: (P) L sided deficits    Assessment  Patient is 76 y.o. female admitted for recurring left sided weakness and slurred speech, pt had original R MCA CVA 11/19 given tPA then no symptoms, on again off again symptoms since then but now symptoms persistent. Pt normally independent prior to original admission with all ADLs and mobility using a 4WW living in a SL[facility] with spouse, adult dtrs at bedside from out of town and able to provide information. Pt required modA for bed mobility, maxA for transfer to [facility] and back, maxA for lower body ADLs, supervision for toileting. Will continue to see for skilled therapy while admitted as well as recommend post-acute placement, consider PMR consult as patient independent prior and seems to have good family support.    Plan    Recommend Occupational Therapy 5 times per week until therapy goals are met for the following treatments:  Adaptive Equipment, Neuro Re-Education / Balance, Self Care/Activities of Daily Living, Therapeutic Activities and Therapeutic Exercises.    DC Equipment Recommendations: (P) Unable to determine at this time  Discharge Recommendations: (P) Recommend post-acute placement for additional occupational therapy services prior to discharge home     Subjective    \"I have to pee\"     Objective       12/01/21 1452   Prior Living Situation   Prior Services Home-Independent   Housing / Facility 1 Story House   Steps Into Home   (ramp)   Bathroom Set up Walk In Shower   Equipment Owned 4-Wheel Walker   Lives with - Patient's Self Care Capacity Spouse   Prior Level of ADL Function   Self Feeding Independent   Grooming / Hygiene Independent   Bathing Independent   Dressing Independent   Toileting Independent   Prior Level " of IADL Function   Medication Management Independent   Laundry Independent   Kitchen Mobility Independent   Finances Independent   Home Management Independent   Shopping Independent   Prior Level Of Mobility Independent With Device in Community   Occupation (Pre-Hospital Vocational) Retired Due To Age   Precautions   Precautions Fall Risk;Swallow Precautions ( See Comments)   Comments L sided deficits   Cognition    Cognition / Consciousness X   Speech/ Communication Delayed Responses;Dysarthric   Level of Consciousness Alert   Attention Impaired   Sequencing Impaired   Passive ROM Upper Body   Passive ROM Upper Body WDL   Active ROM Upper Body   Active ROM Upper Body  X   Dominant Hand Right   Comments LUE inhibited by weakness   Strength Upper Body   Upper Body Strength  X   Comments LUE 3-/5, RUE WFL   Sensation Upper Body   Upper Extremity Sensation  WDL   Upper Body Muscle Tone   Upper Body Muscle Tone  X   Lt Upper Extremity Muscle Tone Hypotonic   Neurological Concerns   Neurological Concerns Yes   Lt Upper Extremity Gross Motor Control Ataxic   Lt Upper Extremity Fine Motor Control Impaired   Lt Upper Extremity Functional Use Impaired   Coordination Upper Body   Coordination X   Gross Motor Coordination LUE impaired, RUE WFL   Balance Assessment   Sitting Balance (Static) Fair   Sitting Balance (Dynamic) Fair -   Standing Balance (Static) Poor   Standing Balance (Dynamic) Poor -   Weight Shift Sitting Fair   Weight Shift Standing Poor   Comments w/ HHA x2   Bed Mobility    Supine to Sit Moderate Assist   Sit to Supine Minimal Assist   Scooting Minimal Assist   ADL Assessment   Grooming Minimal Assist;Seated   Upper Body Dressing Minimal Assist   Lower Body Dressing Maximal Assist   Toileting Supervision   How much help from another person does the patient currently need...   Putting on and taking off regular lower body clothing? 2   Bathing (including washing, rinsing, and drying)? 2   Toileting, which  includes using a toilet, bedpan, or urinal? 3   Putting on and taking off regular upper body clothing? 3   Taking care of personal grooming such as brushing teeth? 3   Eating meals? 3   6 Clicks Daily Activity Score 16   Modified Kaila (mRS)   Modified Pinal Score 4   Functional Mobility   Sit to Stand Moderate Assist   Bed, Chair, Wheelchair Transfer Maximal Assist   Toilet Transfers Maximal Assist   Transfer Method Stand Pivot   Mobility bed mobility, pivot to BSC, pivot back to bed   Comments w/ HHA x2   Visual Perception   Visual Perception  X   Neglect Mild Left   Activity Tolerance   Sitting in Chair 10 min  (BSC)   Sitting Edge of Bed 2 min   Standing 2 min   Patient / Family Goals   Patient / Family Goal #1 To get better   Short Term Goals   Short Term Goal # 1 Pt will complete ADL transfers with supervision   Short Term Goal # 2 Pt will complete LB dressing with supervision   Short Term Goal # 3 Pt will complete standing G/H with supervision   Education Group   Education Provided Role of Occupational Therapist;Stroke   Role of Occupational Therapist Patient Response Patient;Family;Acceptance;Explanation;Reinforcement Needed   Stroke Patient Response Patient;Family;Acceptance;Explanation;Reinforcement Needed   Problem List   Problem List Decreased Active Daily Living Skills;Decreased Homemaking Skills;Decreased Upper Extremity Strength Left;Decreased Upper Extremity AROM Left;Decreased Activity Tolerance;Decreased Functional Mobility;Impaired Coordination Left Upper Extremity;Impaired Upper Extremity Tone Left;Impaired Postural Control / Balance;Impaired Vision   Interdisciplinary Plan of Care Collaboration   IDT Collaboration with  Nursing;Physical Therapist   Patient Position at End of Therapy In Bed;Bed Alarm On;Call Light within Reach;Tray Table within Reach;Phone within Reach;Family / Friend in Room   Collaboration Comments RN updated

## 2021-12-01 NOTE — THERAPY
Speech Language Pathology   Fiberoptic Endoscopic Evaluation of Swallow     Patient Name: Nakita Sheppard  AGE:  76 y.o., SEX:  female  Medical Record #: [id number]  Today's Date: 12/1/2021     Precautions  Precautions: (P) Fall Risk,Swallow Precautions ( See Comments)  Comments: (P) L-sided deficits with inattention    Assessment    Please see initial SLP evaluation, completed earlier today, for details of H&P.      Discussed with the risks, benefits, and alternatives of the FEES procedure. Patient acknowledged and agreed to proceed with the procedure.  She tolerated the procedure well.  Upon insertion of the scope, vocal folds were symmetrical and complete vocal fold adduction achieved during breath hold and throat clear.  Presentation of PO included ice chips, mildly thickened liquids, applesauce, pudding, soft solids, and thin liquids.     Oral phase  Moderate oral dysphagia as seen by slowed lingual manipulation, delayed oral transit, and pocketing to the left lateral sulcus.  Patient was unable to masticate soft solids and expectorated the bolus. Impaired bolus control resulted in premature spillage.     Pharyngeal phase  Mild pharyngeal dysphagia marked by delayed pharyngeal onset, reduced sensation, impaired tongue base retraction, mistimed laryngeal vestibular closure, and weak pharyngeal constriction.  Resulting in:     1.  Pharyngeal onset of the swallow occurred at the pyriform sinuses across consistencies.   2.  Mild pharyngeal residue noted in the valleculae, pyriform sinuses and posterior pharyngeal wall with all consistencies consumed.   3.  High penetration with thin liquids noted before the swallow.  Penetration was suspected during the swallow with thin liquids as seen by blue residue in the laryngeal vestibule and at the anterior commissure.  Cues to cough/clear were unsuccessful in clearing laryngeal residue which resulted in aspiration.  Patient was not sensate to penetration or aspiration.       Penetration Aspiration Scale:    Consistency PAS Score Timing   Thin Liquid 8 Post swallow   Mildly Thick Liquid 1 N/A   Pudding 1 N/A   Solid N/A N/A     Clinical Impressions:    Mild pharyngeal and moderate oral dysphagia, likely subacute versus acute related to recent stroke on 11/19/2021 and new acute onset of stroke-like symptoms, impaired mobility, and weakness of the oral musculature. Swallow safety is preserved; swallow efficiency is impaired. Pt appears to be at low risk for aspiration PNA, and low for malnutrition/dehydration. Diet modification is indicated. Swallow prognosis is good given ability to follow directives, mild pharyngeal deficits but dependent upon staff for oral care and debility; patient appears to be a good candidate for behavioral swallow rehabilitation.    Recommendations:   1. Initiate a puree diet with mildly thickened liquids   - Reposition the patient upright in bed and sitting midline   - Direct supervision and feeding assistance as needed   - Check left lateral sulcus for pocketing   - Oral care after meals to clear residue  2. Crush pills in puree  3. SLP following for dysphagia management. Speech/languge evaluation to be compelted    Plan    Recommend Speech Therapy 5 times per week until therapy goals are met for the following treatments:  Dysphagia Training and Patient / Family / Caregiver Education.    Discharge Recommendations: (P) Recommend post-acute placement for additional speech therapy services prior to discharge home    Objective       12/01/21 1138   FEES Evaluation Prior To Procedure   Onset Date Of Dysphagia 12/01/21   Dysphagia Symptoms Warranting Video Swallow recent stroke, ?restroke   Procedure Performed While Patient Sitting In Bed   Seated at (Degrees) 90   A Flexible Endoscope Was Introduced Transnasally In The Right Nare   FEES  Anatomy Assessment   Anatomy For A Functional Swallow: Other (Comments)  (WFL)   FEES Laryngeal Adduction Assessment   Breath  Holding Adequate   Clearing Throat Adequate   Onset Of Swallow Adequate   FEES Velopharyngeal Assessment    Velopharyngeal Closure: Adequate   FEES Voice Assessment    Voice:   (strained)   FEES Sensation Assessment    Presence of Scope Adequate   Presence of Residue Inadequate   Presence of Penetration Absent Response   Presence of Aspiration Absent Response   FEES Swallow Function Assessment   Swallow Trigger Impaired;Level of the Valleculae;Level of the Pyriform Sinuses   Base of Tongue Retraction Impaired   Pharyngeal Constrictor Movement Impaired   White Out Phase Complete White Out   Epiglottic Movement Functional   Cricopharyngeal Function Functional   Compensatory Strategies Attempted   Compensatory Strategies Attempted Yes   Multiple Swallows Reduced but did not clear pharyngeal residue   Cough / Clear After Swallow Cough weak, did not clear aspirate   Liquid Wash After Swallow Cleared pudding and applesauce residue    Patient Ability To Protect Airway   Patient Ability To Protect Airway  Adequate   Penetration Aspiration Scale   Penetration Aspiration Scale 8 - Material passes glottis and is not ejected, visible subglottic stasis, absent patient response   Salma Pharyngeal Residue Severity   Vallecular Residue Mild, epiglottic ligament visable   Pyriform Sinus Residue  Mild, up wall to ¼ full   Dysphagia Rating   Nutritional Liquid Intake Rating Scale Thickened beverages (mildly thick unless otherwise specified)   Nutritional Food Intake Rating Scale Total oral diet of a single consistency   Problem List   Problem List Dysphagia;Dysarthia   Evaluation Recommendations   Swallow Evaluations Recommendations (Yes / No) Yes   Diet / Liquid Recommendation Puree (4);Mildly Thick (2) - (Nectar Thick)   Medication Administration  Crush all Medications in Puree   Recommended Supervision Direct   Evaluation Recommended Techniques   Swallow Techniques Yes   Techniques Oral Stage Range Of Motion Exercises For Tongue;Lip  "Closure / Buccal / Jaw Exercises;Chew Each Bite Thoroughly;Check For Pocketing   Techniques Pharyngeal Phase Thicken Liquids For Better Control Of Bolus To Consistency Of [place];Laryngeal Adduction Exercises To Improve Vocal Cord Closure;No Straw Drinking;Alternate Liquids / Solids Consistency To Wash Foods Through Pharynx   Patient / Family Goals   Patient / Family Goal #1 \"something to drink\"   Short Term Goals   Short Term Goal # 1 Pt will consume prefeeding trials with SLP only, given no overt s/sx of aspiration   Goal Outcome # 1 Goal met, new goal added   Short Term Goal # 1 B  Pt will consume PU4/MT2 diet with no overt s/sx of aspiration, given min cues to swallowing strategies   Short Term Goal # 2 Pt will complete oral motor and pharyneal dysphagia exercises 10x each with good accuracy, given min cues         "

## 2021-12-01 NOTE — ASSESSMENT & PLAN NOTE
SBP as high as 215, reduction of the patient's blood pressure by 25% with goal range of normotension.  Continue patient's home labetalol.  IV antihypertensives ordered with parameters.

## 2021-12-02 ENCOUNTER — APPOINTMENT (OUTPATIENT)
Dept: RADIOLOGY | Facility: MEDICAL CENTER | Age: 76
DRG: 064 | End: 2021-12-02
Attending: STUDENT IN AN ORGANIZED HEALTH CARE EDUCATION/TRAINING PROGRAM
Payer: MEDICARE

## 2021-12-02 ENCOUNTER — APPOINTMENT (OUTPATIENT)
Dept: RADIOLOGY | Facility: MEDICAL CENTER | Age: 76
DRG: 064 | End: 2021-12-02
Attending: INTERNAL MEDICINE
Payer: MEDICARE

## 2021-12-02 PROBLEM — I63.9 ACUTE CVA (CEREBROVASCULAR ACCIDENT) (HCC): Status: ACTIVE | Noted: 2021-12-01

## 2021-12-02 LAB
ANION GAP SERPL CALC-SCNC: 10 MMOL/L (ref 7–16)
BUN SERPL-MCNC: 18 MG/DL (ref 8–22)
CALCIUM SERPL-MCNC: 8.9 MG/DL (ref 8.5–10.5)
CHLORIDE SERPL-SCNC: 108 MMOL/L (ref 96–112)
CO2 SERPL-SCNC: 27 MMOL/L (ref 20–33)
CREAT SERPL-MCNC: 0.86 MG/DL (ref 0.5–1.4)
ERYTHROCYTE [DISTWIDTH] IN BLOOD BY AUTOMATED COUNT: 47.3 FL (ref 35.9–50)
GLUCOSE SERPL-MCNC: 98 MG/DL (ref 65–99)
HCT VFR BLD AUTO: 37.6 % (ref 37–47)
HGB BLD-MCNC: 11.9 G/DL (ref 12–16)
MCH RBC QN AUTO: 32.2 PG (ref 27–33)
MCHC RBC AUTO-ENTMCNC: 31.6 G/DL (ref 33.6–35)
MCV RBC AUTO: 101.6 FL (ref 81.4–97.8)
PLATELET # BLD AUTO: 320 K/UL (ref 164–446)
PMV BLD AUTO: 9.2 FL (ref 9–12.9)
POTASSIUM SERPL-SCNC: 4.1 MMOL/L (ref 3.6–5.5)
RBC # BLD AUTO: 3.7 M/UL (ref 4.2–5.4)
SODIUM SERPL-SCNC: 145 MMOL/L (ref 135–145)
WBC # BLD AUTO: 9.7 K/UL (ref 4.8–10.8)

## 2021-12-02 PROCEDURE — 36415 COLL VENOUS BLD VENIPUNCTURE: CPT

## 2021-12-02 PROCEDURE — A9270 NON-COVERED ITEM OR SERVICE: HCPCS | Performed by: STUDENT IN AN ORGANIZED HEALTH CARE EDUCATION/TRAINING PROGRAM

## 2021-12-02 PROCEDURE — 99233 SBSQ HOSP IP/OBS HIGH 50: CPT | Performed by: STUDENT IN AN ORGANIZED HEALTH CARE EDUCATION/TRAINING PROGRAM

## 2021-12-02 PROCEDURE — 700105 HCHG RX REV CODE 258: Performed by: STUDENT IN AN ORGANIZED HEALTH CARE EDUCATION/TRAINING PROGRAM

## 2021-12-02 PROCEDURE — 73110 X-RAY EXAM OF WRIST: CPT | Mod: LT

## 2021-12-02 PROCEDURE — 80048 BASIC METABOLIC PNL TOTAL CA: CPT

## 2021-12-02 PROCEDURE — 92526 ORAL FUNCTION THERAPY: CPT

## 2021-12-02 PROCEDURE — 94760 N-INVAS EAR/PLS OXIMETRY 1: CPT

## 2021-12-02 PROCEDURE — 99232 SBSQ HOSP IP/OBS MODERATE 35: CPT | Mod: GC | Performed by: PSYCHIATRY & NEUROLOGY

## 2021-12-02 PROCEDURE — 99223 1ST HOSP IP/OBS HIGH 75: CPT | Performed by: PHYSICAL MEDICINE & REHABILITATION

## 2021-12-02 PROCEDURE — 700111 HCHG RX REV CODE 636 W/ 250 OVERRIDE (IP): Performed by: STUDENT IN AN ORGANIZED HEALTH CARE EDUCATION/TRAINING PROGRAM

## 2021-12-02 PROCEDURE — 700102 HCHG RX REV CODE 250 W/ 637 OVERRIDE(OP): Performed by: STUDENT IN AN ORGANIZED HEALTH CARE EDUCATION/TRAINING PROGRAM

## 2021-12-02 PROCEDURE — A9270 NON-COVERED ITEM OR SERVICE: HCPCS | Performed by: INTERNAL MEDICINE

## 2021-12-02 PROCEDURE — 85027 COMPLETE CBC AUTOMATED: CPT

## 2021-12-02 PROCEDURE — 770020 HCHG ROOM/CARE - TELE (206)

## 2021-12-02 PROCEDURE — 700102 HCHG RX REV CODE 250 W/ 637 OVERRIDE(OP): Performed by: INTERNAL MEDICINE

## 2021-12-02 RX ADMIN — CEFTRIAXONE SODIUM 2 G: 2 INJECTION, POWDER, FOR SOLUTION INTRAMUSCULAR; INTRAVENOUS at 05:56

## 2021-12-02 RX ADMIN — LABETALOL HYDROCHLORIDE 200 MG: 200 TABLET, FILM COATED ORAL at 18:17

## 2021-12-02 RX ADMIN — DOCUSATE SODIUM 50 MG AND SENNOSIDES 8.6 MG 2 TABLET: 8.6; 5 TABLET, FILM COATED ORAL at 18:17

## 2021-12-02 RX ADMIN — DOCUSATE SODIUM 50 MG AND SENNOSIDES 8.6 MG 2 TABLET: 8.6; 5 TABLET, FILM COATED ORAL at 05:55

## 2021-12-02 RX ADMIN — CLOPIDOGREL BISULFATE 75 MG: 75 TABLET ORAL at 05:55

## 2021-12-02 RX ADMIN — CARBAMAZEPINE 200 MG: 200 TABLET ORAL at 05:54

## 2021-12-02 RX ADMIN — ASPIRIN 81 MG: 81 TABLET, COATED ORAL at 05:55

## 2021-12-02 RX ADMIN — LABETALOL HYDROCHLORIDE 200 MG: 200 TABLET, FILM COATED ORAL at 05:55

## 2021-12-02 RX ADMIN — ENOXAPARIN SODIUM 40 MG: 40 INJECTION SUBCUTANEOUS at 05:55

## 2021-12-02 RX ADMIN — CARBAMAZEPINE 200 MG: 200 TABLET ORAL at 18:17

## 2021-12-02 RX ADMIN — ATORVASTATIN CALCIUM 80 MG: 80 TABLET, FILM COATED ORAL at 05:55

## 2021-12-02 ASSESSMENT — ENCOUNTER SYMPTOMS
HEADACHES: 1
ABDOMINAL PAIN: 0
CHILLS: 0
FALLS: 1
BACK PAIN: 1
NERVOUS/ANXIOUS: 0
NAUSEA: 0
VOMITING: 0
EYE PAIN: 0
WEAKNESS: 1
SPEECH CHANGE: 1
FOCAL WEAKNESS: 1
PALPITATIONS: 0
SINUS PAIN: 0
COUGH: 0
FEVER: 0
DIARRHEA: 0
SHORTNESS OF BREATH: 0
PHOTOPHOBIA: 0

## 2021-12-02 ASSESSMENT — LIFESTYLE VARIABLES: SUBSTANCE_ABUSE: 0

## 2021-12-02 NOTE — DISCHARGE PLANNING
[facility] Transitional [facility] Coordination    Referral from:  RADHA Aleman      Insurance Provider on Facesheet: Medicare   Potential Rehab Diagnosis: Stroke protocol    Chart review indicates patient potential on going medical management and  therapy needs to possibly meet inpatient rehab facility criteria with the goal of returning to community.    D/C support: spouse/daughters      Physiatry consultation  per protocol.     Last Covid test:  None      Thank you for the referral.

## 2021-12-02 NOTE — THERAPY
Speech Language Pathology  Daily Treatment     Patient Name: Nakita Sheppard  Age:  76 y.o., Sex:  female  Medical Record #: [id number]  Today's Date: 12/2/2021     Precautions  Precautions: (P) Fall Risk,Swallow Precautions ( See Comments)  Comments: (P) L sided deficits    Assessment    Patient seen for dysphagia management for on this date with her puree breakfast tray and mildly thickened liquids.  Patient fed herself with min cues to reduce bolus size.  She demonstrated left-sided anterior bolus loss with purees x2 without sensation.  Residue noted in the left lateral sulcus which the patient cleared with cues to lingual and finger sweeps.  She had strong reflexive coughing x1 with a large gulp of thickened liquids but no further overt s/sx of aspiration at any other time during the session.  Patient asking for ice chips.  She consumed single ice chips without difficulty.  Oral care completed at the conclusion of the session to clear the oral cavity of any remaining residue.      Recommendations:   1. Continue PU4/MT2 diet with direct supervision and cues to clear pocketing from the left lateral sulcus intermittently throughout the meal.    - Alternate liquids and solids to help reduce oral residue  2. Crush pills in puree  3. SLP following for dysphagia management. Requested speech-language evaluation.     Plan    Continue current treatment plan.    Discharge Recommendations: (P) Recommend post-acute placement for additional speech therapy services prior to discharge home    Objective       12/02/21 1035   Dysphagia    Dysphagia X   Positioning / Behavior Modification Self Monitoring;Modulate Rate or Bite Size;Alternate Solids and Liquids;Other (see Comments)  (Clear pocketing in L lateral sulcus throughout the meal)   Diet / Liquid Recommendation Puree (4);Mildly Thick (2) - (Nectar Thick)   Nutritional Liquid Intake Rating Scale Thickened beverages (mildly thick unless otherwise specified)   Nutritional  "Food Intake Rating Scale Total oral diet of a single consistency   Nursing Communication Swallow Precaution Sign Posted at Head of Bed   Skilled Intervention Compensatory Strategies;Gestural Cueing;Verbal Cueing   Recommended Route of Medication Administration   Medication Administration  Crush all Medications in Puree   Patient / Family Goals   Patient / Family Goal #1 \"something to drink\"   Goal #1 Outcome Progressing as expected   Short Term Goals   Short Term Goal # 1 B  Pt will consume PU4/MT2 diet with no overt s/sx of aspiration, given min cues to swallowing strategies   Goal Outcome  # 1 B Progressing slower than expected         "

## 2021-12-02 NOTE — PROGRESS NOTES
Monitor summary: SB/SR 59-89, OR 0.15, QRS 0.08, QT 0.39, with rare PVCs per strip from monitor room.

## 2021-12-02 NOTE — THERAPY
Physical Therapy   Initial Evaluation     Patient Name: Nakita Sheppard  Age:  76 y.o., Sex:  female  Medical Record #: [id number]  Today's Date: 12/1/2021     Precautions  Precautions: Fall Risk;Swallow Precautions ( See Comments)  Comments: L sided deficits    Assessment  Patient is 76 y.o. female admitted for recurring L sided weakness and slurred speech. Pt originally found to have R MCA CVA 11/19 given tPA and discharged without symptoms. Prior to initial CVA, pt was independent with mobility using 4WW. Pt now presenting with L sided deficits UE>LE and L neglect. Pt required mod assist for bed mobility and max assist for pivot transfer to and from commode. PT will cont while in acute to address strength, balance, activity tolerance, safety, and compensatory strategies.      Plan    Recommend Physical Therapy 4 times per week until therapy goals are met for the following treatments:  Bed Mobility, Gait Training, Neuro Re-Education / Balance, Self Care/Home Evaluation, Therapeutic Activities and Therapeutic Exercises    DC Equipment Recommendations: Unable to determine at this time  Discharge Recommendations: Recommend post-acute placement for additional physical therapy services prior to discharge home          12/01/21 1427   Vitals   O2 (LPM) 4   O2 Delivery Device Silicone Nasal Cannula   Prior Living Situation   Prior Services Home-Independent   Housing / Facility 1 Story House   Steps Into Home   (ramp)   Steps In Home 0   Bathroom Set up Walk In Shower   Equipment Owned 4-Wheel Walker   Lives with - Patient's Self Care Capacity Spouse   Comments 2 daughters visiting, son plans to come as well    Prior Level of Functional Mobility   Bed Mobility Independent   Transfer Status Independent   Ambulation Independent   Distance Ambulation (Feet)   (household)   Assistive Devices Used 4-Wheel Walker   Comments decline in mobility since thursday with increased assistance needed from family   Cognition     Cognition / Consciousness X   Speech/ Communication Delayed Responses;Dysarthric   Level of Consciousness Alert   Attention Impaired   Sequencing Impaired   Active ROM Lower Body    Active ROM Lower Body  WDL   Strength Lower Body   Lower Body Strength  X   Gross Strength Generalized Weakness, Equal Bilaterally   Sensation Lower Body   Lower Extremity Sensation   WDL   Neurological Concerns   Neurological Concerns Yes   Comments L sided deficits and neglect   Vision   Vision Comments L neglect but able to cross midline   Balance Assessment   Sitting Balance (Static) Fair   Sitting Balance (Dynamic) Fair -   Standing Balance (Static) Poor   Standing Balance (Dynamic) Poor -   Weight Shift Sitting Fair   Weight Shift Standing Poor   Comments HHA x2   Gait Analysis   Gait Level Of Assist Unable to Participate   Bed Mobility    Supine to Sit Moderate Assist   Sit to Supine Minimal Assist   Scooting Minimal Assist   Functional Mobility   Sit to Stand Moderate Assist   Bed, Chair, Wheelchair Transfer Maximal Assist   Toilet Transfers Maximal Assist   Transfer Method Stand Pivot   Mobility bed mob, transfer to and from [facility]   Short Term Goals    Short Term Goal # 1 pt will be able to complete supine<>sitting with min assist in 6tx in order to progress   Short Term Goal # 2 pt will be able to compelte functional transfers with min assist in 6tx in order to progress with therapy   Short Term Goal # 3 pt will be able to ambulate 15ft with mod assist and hemiwalker in 6tx in order to progress with therapy   Education Group   Education Provided Role of Physical Therapist   Role of Physical Therapist Patient Response Patient;Family;Acceptance;Explanation;Verbal Demonstration   Additional Comments family educated on L neglect   Anticipated Discharge Equipment and Recommendations   DC Equipment Recommendations Unable to determine at this time   Discharge Recommendations Recommend post-acute placement for additional physical  therapy services prior to discharge home

## 2021-12-02 NOTE — CARE PLAN
The patient is Stable - Low risk of patient condition declining or worsening    Shift Goals  Clinical Goals: pain control, safety, stable neuros  Patient Goals: sleep, help arm pain  Family Goals: safety, comfort    Progress made toward(s) clinical / shift goals:  pt educated on use of call light, bed locked and in lowest position, Q2 turns in place, pt ambulated to commode, pt remaining free from injury    Patient is not progressing towards the following goals:

## 2021-12-02 NOTE — PROGRESS NOTES
Hospital Medicine Daily Progress Note    Date of Service  12/2/2021    Chief Complaint  Nakita Sheppard is a 76 y.o. female admitted 11/30/2021 with possible stroke     Hospital Course  This is a 76 y.o. female recently received TPA on 11/19/2021 for concerns of CVA with left face and left arm weakness discharged to rehab and subsequently home 9/22/2021 with complete resolution of her symptoms who has been having mild recurrence of left-sided weakness since discharge home that has been waxing and waning who presented 11/30/2021 with ongoing left upper extremity weakness, facial drooping, dysarthria and dysphagia. She has been to the outside ED for these without finding an answer and has also followed-up with Dr. Parks in neurology clinic. She continues on baby aspirin.  She was told she may have atrial fibrillation and is to be set up for cardiac monitoring.  She has had multiple falls and is high risk for bleeding so has not been started on anticoagulation per family at bedside.  Patient symptoms have been persisting longer and more frequently so they presented for evaluation.  She was noted to have blood pressure as high as 224 systolic in the ED, she was hypoxic, she has mild leukocytosis, normal renal and liver function, INR normal.  UA shows infection.  CT head shows localized brain swelling and edema likely indicating subacute infarct in the right cerebral hemisphere.  Neurology was consulted     Interval Problem Update  Patient seen and examined, afebrile, no overnight events, with left side weakness facail drooping  Neurology following, started on DAPT at this time.  Awaiting PT/OT evaluation, will likely need postacute placement at discharge. IPR consult placed.    I have personally seen and examined the patient at bedside. I discussed the plan of care with patient.    Consultants/Specialty  neurology    Code Status  DNAR/DNI    Disposition  Patient is not medically cleared.   Anticipate discharge to  TBD .  I have placed the appropriate orders for post-discharge needs.    Review of Systems  Review of Systems   Constitutional: Positive for malaise/fatigue. Negative for chills and fever.   HENT: Negative for congestion and sinus pain.    Eyes: Negative for photophobia and pain.   Respiratory: Negative for cough and shortness of breath.    Cardiovascular: Negative for chest pain and palpitations.   Gastrointestinal: Negative for abdominal pain, diarrhea, nausea and vomiting.   Genitourinary: Negative for dysuria and urgency.   Musculoskeletal: Positive for back pain and falls.   Neurological: Positive for speech change, focal weakness, weakness and headaches.   Psychiatric/Behavioral: Negative for substance abuse. The patient is not nervous/anxious.         Physical Exam  Temp:  [36.2 °C (97.1 °F)-36.8 °C (98.2 °F)] 36.6 °C (97.8 °F)  Pulse:  [68-81] 77  Resp:  [17-20] 18  BP: (116-195)/(50-87) 154/65  SpO2:  [95 %-97 %] 95 %    Physical Exam  Vitals and nursing note reviewed. Exam conducted with a chaperone present.   Constitutional:       Appearance: She is obese. She is ill-appearing.      Comments: 76-year-old female appears older than stated age, appears chronically ill, drowsy but awakens easily, slurred speech   HENT:      Head: Normocephalic and atraumatic.      Nose: Nose normal. No rhinorrhea.      Mouth/Throat:      Mouth: Mucous membranes are dry.      Pharynx: Oropharynx is clear.   Eyes:      Extraocular Movements: Extraocular movements intact.      Conjunctiva/sclera: Conjunctivae normal.      Pupils: Pupils are equal, round, and reactive to light.   Cardiovascular:      Rate and Rhythm: Normal rate and regular rhythm.      Pulses: Normal pulses.   Pulmonary:      Breath sounds: Normal breath sounds. No wheezing, rhonchi or rales.      Comments: Poor inspiratory effort  Abdominal:      General: Bowel sounds are normal.      Palpations: Abdomen is soft.      Tenderness: There is no abdominal  tenderness. There is no guarding or rebound.   Musculoskeletal:         General: No tenderness.      Cervical back: Normal range of motion and neck supple.      Right lower leg: Edema present.      Left lower leg: Edema present.   Skin:     General: Skin is warm and dry.      Capillary Refill: Capillary refill takes less than 2 seconds.      Findings: No rash.   Neurological:      Comments: Left-sided facial droop, tongue midline, left upper extremity motor 0 out of 5, left lower extremity motor 1 out of 5, right upper and lower extremity 5 out of 5, sensation light touch appears intact throughout, no hemineglect, right upper extremity finger-to-nose normal         Fluids    Intake/Output Summary (Last 24 hours) at 12/2/2021 1305  Last data filed at 12/1/2021 1600  Gross per 24 hour   Intake 100 ml   Output --   Net 100 ml       Laboratory  Recent Labs     11/30/21  1858 12/01/21  0324 12/02/21  0404   WBC 11.9* 11.2* 9.7   RBC 4.30 3.90* 3.70*   HEMOGLOBIN 13.4 12.3 11.9*   HEMATOCRIT 41.8 38.1 37.6   MCV 97.2 97.7 101.6*   MCH 31.2 31.5 32.2   MCHC 32.1* 32.3* 31.6*   RDW 44.7 45.1 47.3   PLATELETCT 414 355 320   MPV 9.1 9.4 9.2     Recent Labs     11/30/21  1858 12/02/21  0404   SODIUM 143 145   POTASSIUM 4.7 4.1   CHLORIDE 104 108   CO2 27 27   GLUCOSE 118* 98   BUN 16 18   CREATININE 0.93 0.86   CALCIUM 9.6 8.9     Recent Labs     11/30/21 1858   APTT 26.7   INR 1.08               Imaging  OUTSIDE IMAGES-MR BRAIN   Final Result      CT-FOREIGN FILM CAT SCAN   Final Result      DX-CHEST-PORTABLE (1 VIEW)   Final Result      No evidence of acute cardiopulmonary process.      CT-HEAD W/O   Final Result         1.  Localized brain swelling and edema likely indicating subacute infarct is identified in the right cerebral hemisphere involving a small portion of the temporal and parietal lobes.      2.  No intracranial hemorrhage is identified.      3.  Decreased attenuation in the periventricular white matter is  noted which could indicate microvascular ischemic disease.                    Assessment/Plan  * Acute CVA (cerebrovascular accident) (HCC)- (present on admission)  Assessment & Plan  Stroke recrudescence versus acute on subacute right MCA territory ischemic infarcts  Neurology consulted.  Blood pressure goal  for the first 12 hours with a goal range of normotension.  Per neurology can defer echocardiogram, A1c, lipid panel for now.  SLP consultation with bedside swallow study.  PT/OT.  PM&R consulted.  Zio patch at discharge.    Acute respiratory failure with hypoxia (HCC)  Assessment & Plan  Likely secondary to encephalopathy.  Requiring 3 L nasal cannula to maintain oxygen saturations greater than 90%.  Chest x-ray without any evidence of edema or consolidation.  Oxygen per guidelines, wean as able.  Incentive spirometry.  DuoNebs as needed    Resolved.    Acute cystitis without hematuria- (present on admission)  Assessment & Plan  Continue ceftriaxone.  Follow-up blood and urine culture    Treated.    Hypertension- (present on admission)  Assessment & Plan  SBP as high as 215, reduction of the patient's blood pressure by 25% with goal range of normotension.  Continue patient's home labetalol.  IV antihypertensives ordered with parameters.       VTE prophylaxis: enoxaparin ppx    I have performed a physical exam and reviewed and updated ROS and Plan today (12/2/2021). In review of yesterday's note (12/1/2021), there are no changes except as documented above.    Patient is has a high medical complexity and is at high risk for complication, morbidity, and mortality.    Case discussed with patient, nurse staff and during multidisciplinary rounds.

## 2021-12-02 NOTE — CONSULTS
Physical Medicine and Rehabilitation Consultation              Date of initial consultation: 12/2/2021  Requesting provider: Aaron Aleman DO   Consulting provider: Ileana Phillips D.O.  Reason for consultation: assess for acute inpatient rehab appropriateness  LOS: 2 Day(s)    Chief complaint: Left sided weakness     HPI: The patient is a 76 y.o. right hand dominant female with a past medical history of arthritis, hypertension, history of TIAs, and peripheral neuropathy;  who presented on 11/30/2021  6:14 PM with left-sided weakness.  Per documentation, patient was evaluated at [facility] with symptoms of left-sided arm weakness on 11/19/2021.  During that hospitalization patient was given TPA at that time and reportedly her symptoms resolved.  Patient was discharged from the hospital sometime later and reportedly had done some rehab (chart review indicates that patient went to rehab however patient tells her that she is rehab at home).  However on the night that she was discharged home she began to have some mild recurrence of her left-sided weakness which fluctuated on and off for following days.  Patient did not follow-up with neurology and was continued on baby aspirin.  Reportedly, patient's left-sided weakness had intermittently recurred own for 5 days until she presented to the hospital on 11/30.  At that time symptoms seem more significant and that they did not go away so patient was brought to the emergency room.  A CT scan of the head was obtained at evaluation emergency department at [facility] which confirms a right parietal subacute ischemic infarct.  Neurology was consulted, given the patient's time course of symptoms patient was not deemed to be a candidate for interventions involving TPA.  MRI from [facility] was able to be reviewed and the outside images revealed a minimal right MCA stroke burden which comparatively was far less burden that was revealed on the CT head  on admission to [facility].  Based on neurology's review of images from [facility] and comparing them to [facility] it was determined that the patient had recurrent embolic events to her right MCA territory, given that this was a recurrent embolic event to the same vascular territory and their recommendations for short-term DAPT, high intensity statin and completion of embolic work-up   Including cardiac monitoring.  Additionally, patient's hospital course has been complicated by the evidence of a positive UTI, patient has been started on ceftriaxone for treatment of her UTI.    Functionally patient has been willing to participate with therapy, she is functioning at a max assist for lower body dressing, mod assist for bed mobility, mod assist for sit to stand however patient has not been able to participate in functional gait due to impaired balance.    Patient seen and examined at bedside.  Patient has dysarthria as well as left-sided weakness.  However patient is able to tell me that she feels okay.  She denies headache, chest pain, shortness of breath, abdominal pain or any new numbness tingling or weakness.    Social Hx:  Patient lives with her  in a one-story home with ramp access, has 2 adult daughters and a son who planned to come to assist.  No stairs to enter, has ramp access [facility]  At prior level of function patient was mod I with a 4 wheeled walker mostly in the community, patient tells me she did not use an assist device to ambulate at home prior to her onset of left-sided weakness earlier in November.      Tobacco: Denies  Alcohol: Denies  Drugs: Denies    THERAPY:  Restrictions: Fall risk  PT: Functional mobility   12/1 PT note: Mod assist sit to stand, max assist toilet transfers with stand pivot mobility, patient has been available to participate in gait due to poor weight shifting and balance.    OT: ADLs  12/1 OT note: Mod assist/min assist bed mobility, max assist lower body  dressing    SLP:   12/2 SLP note: Currently on puréed with mildly thickened liquids    IMAGING:  MRI has not been completed, patient is severely claustrophobic and will need to be sedated for MRI    11/30 CT head  FINDINGS:     There is no evidence of intra-axial hemorrhage. No extra-axial hemorrhage is identified. Localized brain swelling and edema is noted in the cortex and subcortical white matter in the mid right cerebral hemisphere at the border of the temporal and   parietal lobes extending into the posterior frontal lobe. No other localized brain swelling or edema is noted. There is decreased attenuation in periventricular white matter. Evidence of old left-sided lacunar infarct in the basal ganglia is again noted.        IMPRESSION:        1.  Localized brain swelling and edema likely indicating subacute infarct is identified in the right cerebral hemisphere involving a small portion of the temporal and parietal lobes.     2.  No intracranial hemorrhage is identified.     3.  Decreased attenuation in the periventricular white matter is noted which could indicate microvascular ischemic disease.    PROCEDURES:  None    PMH:  Past Medical History:   Diagnosis Date   • Allergy    • Arthritis    • Chronic headaches 5/29/2012   • Chronic pain    • Dental disorder    • Heart burn    • History of chickenpox    • History of measles    • History of mumps    • Hypertension    • Indigestion    • Low back pain 9/22/14    8/10   • Migraine    • Peripheral neuropathy     BLE   • Rotator cuff tear    • Seasonal allergies    • TIA (transient ischemic attack)    • Tuberculosis 1979    had treatment for 2 years       PSH:  Past Surgical History:   Procedure Laterality Date   • LUMBAR FUSION POSTERIOR  10/6/2014    Performed by Rishabh Bhatia M.D. at SURGERY [facility]   • LUMBAR LAMINECTOMY DISKECTOMY  10/6/2014    Performed by Rishabh Bhatia M.D. at SURGERY [facility]   • LUMBAR EXPLORATION  10/6/2014    Performed  by Rishabh Bhatia M.D. at SURGERY [facility]   • OTHER  2013    back   • SPINAL CORD STIMULATOR  11/4/2011    Performed by AUBREE PALACIO at SURGERY [facility]   • OPEN REDUCTION  2008    re-broke foot   • OTHER  2000    right foot   • SHOULDER SURGERY  1998   • CARPAL TUNNEL ENDOSCOPIC  1990   • ARTHROSCOPY, KNEE  1987    bilateral   • ABDOMINAL HYSTERECTOMY TOTAL  1983   • APPENDECTOMY     • CHOLECYSTECTOMY     • LAMINOTOMY  02/2002 and 9/2002       FHX:  Family History   Problem Relation Age of Onset   • Diabetes Mother    • Hypertension Mother    • Arthritis Mother    • Stroke Mother    • Heart Disease Father    • Cancer Brother    • Leukemia Brother    • Stroke Brother        Medications:  Current Facility-Administered Medications   Medication Dose   • cefTRIAXone (Rocephin) 2 g in  mL IVPB  2 g   • ipratropium-albuterol (DUONEB) nebulizer solution  3 mL   • atorvastatin (LIPITOR) tablet 80 mg  80 mg   • clopidogrel (PLAVIX) tablet 75 mg  75 mg   • aspirin EC (ECOTRIN) tablet 81 mg  81 mg   • carBAMazepine (TEGRETOL) tablet 200 mg  200 mg   • labetalol (NORMODYNE) tablet 200 mg  200 mg   • senna-docusate (PERICOLACE or SENOKOT S) 8.6-50 MG per tablet 2 Tablet  2 Tablet    And   • polyethylene glycol/lytes (MIRALAX) PACKET 1 Packet  1 Packet    And   • magnesium hydroxide (MILK OF MAGNESIA) suspension 30 mL  30 mL    And   • bisacodyl (DULCOLAX) suppository 10 mg  10 mg   • Respiratory Therapy Consult     • enoxaparin (LOVENOX) inj 40 mg  40 mg   • acetaminophen (Tylenol) tablet 650 mg  650 mg   • enalaprilat (Vasotec) injection 1.25 mg 1 mL  1.25 mg   • labetalol (NORMODYNE/TRANDATE) injection 10 mg  10 mg   • ondansetron (ZOFRAN) syringe/vial injection 4 mg  4 mg   • ondansetron (ZOFRAN ODT) dispertab 4 mg  4 mg       Allergies:  Allergies   Allergen Reactions   • Fentanyl Anxiety   • Morphine Itching   • Penicillins Hives and Swelling       Physical Exam:  Vitals: BP (!) 169/97   Pulse  "86   Temp 36.6 °C (97.9 °F) (Temporal)   Resp 17   Ht 1.575 m (5' 2\")   Wt 82.2 kg (181 lb 3.5 oz)   SpO2 97%   Gen: NAD, lying comfortably in bed, no family at side  Head:  NC/AT  Eyes/ Nose/ Mouth: PERRLA, moist mucous membranes  Cardio: RRR, good distal perfusion, warm extremities  Pulm: normal respiratory effort, no cyanosis no witnessed wheezes or coughing  Abd: Soft NTND, negative borborygmi   Ext: No peripheral edema. No calf tenderness. No clubbing.    Mental status:  A&Ox4 (person, place, date, situation) answers questions appropriately follows commands  Speech: Fluent,  however significant dysarthria, no word finding difficulty    CRANIAL NERVES:  2,3: visual acuity grossly intact, PERRL  3,4,6: EOMI bilaterally, no nystagmus or diplopia  5: sensation intact to light touch bilaterally and symmetric  7: No obvious facial droop at rest  8: hearing grossly intact      Motor:      Upper Extremity  Myotome R L   Shoulder flexion C5 5/5 3/5   Elbow flexion C5 5/5 3/5   Wrist extension C6 5/5 3/5   Elbow extension C7 5/5 3/5   Finger flexion C8 5/5 1/5   Finger abduction T1 5/5 1/5     Lower Extremity Myotome R L   Hip flexion L2 5/5 4/5   Knee extension L3 5/5 4/5   Ankle dorsiflexion L4 5/5 5/5   Toe extension L5 5/5 5/5   Ankle plantarflexion S1 5/5 5/5       Sensory:   intact to light touch through out  intact to proprioception at bilateral great toes    DTRs: 2+ in bilateral  biceps, 21 in bilateral patellar tendons  No clonus at bilateral ankles  Negative babinski b/l  Negative Nye b/l     Tone: no spasticity noted, no cogwheeling noted    Coordination:   intact finger to nose RUE, impaired motor planning unable to perform LUE  intact fine motor with fingers RUE, unable to perform fine motor tasks LUE    Labs: Reviewed and significant for   Recent Labs     11/30/21  1858 12/01/21  0324 12/02/21  0404   RBC 4.30 3.90* 3.70*   HEMOGLOBIN 13.4 12.3 11.9*   HEMATOCRIT 41.8 38.1 37.6   PLATELETCT 414 " 355 320   PROTHROMBTM 13.7  --   --    APTT 26.7  --   --    INR 1.08  --   --      Recent Labs     11/30/21  1858 12/02/21  0404   SODIUM 143 145   POTASSIUM 4.7 4.1   CHLORIDE 104 108   CO2 27 27   GLUCOSE 118* 98   BUN 16 18   CREATININE 0.93 0.86   CALCIUM 9.6 8.9     Recent Results (from the past 24 hour(s))   CBC WITHOUT DIFFERENTIAL    Collection Time: 12/02/21  4:04 AM   Result Value Ref Range    WBC 9.7 4.8 - 10.8 K/uL    RBC 3.70 (L) 4.20 - 5.40 M/uL    Hemoglobin 11.9 (L) 12.0 - 16.0 g/dL    Hematocrit 37.6 37.0 - 47.0 %    .6 (H) 81.4 - 97.8 fL    MCH 32.2 27.0 - 33.0 pg    MCHC 31.6 (L) 33.6 - 35.0 g/dL    RDW 47.3 35.9 - 50.0 fL    Platelet Count 320 164 - 446 K/uL    MPV 9.2 9.0 - 12.9 fL   Basic Metabolic Panel    Collection Time: 12/02/21  4:04 AM   Result Value Ref Range    Sodium 145 135 - 145 mmol/L    Potassium 4.1 3.6 - 5.5 mmol/L    Chloride 108 96 - 112 mmol/L    Co2 27 20 - 33 mmol/L    Glucose 98 65 - 99 mg/dL    Bun 18 8 - 22 mg/dL    Creatinine 0.86 0.50 - 1.40 mg/dL    Calcium 8.9 8.5 - 10.5 mg/dL    Anion Gap 10.0 7.0 - 16.0   ESTIMATED GFR    Collection Time: 12/02/21  4:04 AM   Result Value Ref Range    GFR If African American >60 >60 mL/min/1.73 m 2    GFR If Non African American >60 >60 mL/min/1.73 m 2         ASSESSMENT:  Patient is a 76 y.o. female admitted with left-sided weakness found to have a right MCA CVA    [facility] Code / Diagnosis to Support: 0001.1 - Stroke: Left Body Involvement (Right Brain)    Rehabilitation: Impaired ADLs and mobility  Patient is a good candidate for inpatient rehab based on PT/OT and SLP needs however will need confirmation of family's ability to provide care prior to acceptance.  See disposition details below.    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  -Secondary stroke prophylaxis initiated with aspirin, statin, Plavix  -Patient will need Zio patch on [facility] from [facility]  -Patient's last echocardiogram in our system is from 2016, carotid Dopplers from 2016 as well will need confirmation of no further plans for work-up for CVA etiology  -Continue with SLP, PT, OT    UTI  -Patient with positive UA at time of admission to [facility], has been started on Rocephin    Disposition  -Patient is currently functioning below her level of baseline and unfortunately has continued to decline status post her previous CVA discovered in November 2021 at [facility]  -Given patient's diagnosis and tolerance for therapy she is appropriate for an IRF level therapy stay for ischemic CVA which would require 3 hours of therapy 5 days a week for approximately 10 to 14 days  -Prior to acceptance start level therapy will need confirmation of patient's ability to have family help her at home and confirmation of no further images planned such as getting a CTA of her head and neck  -PM&R to follow peripherally      Medical Complexity:  Right MCA CVA  Left-sided weakness  Dysarthria  UTI  Hypertension      DVT PPX: Lovenox      Thank you for allowing us to participate in the care of this patient.     Patient was seen for 93 minutes on unit/floor of which > 50% of time was spent on counseling and coordination of care regarding the above, including prognosis, risk reduction, benefits of treatment, and options for next stage of care.    Ileana Phillips D.O.   Physical Medicine and  Rehabilitation     Please note that this dictation was created using voice recognition software. I have made every reasonable attempt to correct obvious errors, but there may be errors of grammar and possibly content that I did not discover before finalizing the note.

## 2021-12-02 NOTE — PROGRESS NOTES
Neurology Progress Note  Neurohospitalist Service, [facility]    Referring Physician: Carlos Harry M.D.    Chief Complaint   Patient presents with   • Possible Stroke     pt previosuly had a stroke on 11/19 and had TPA admistered and left hospital with no deficits. Pt was experience stroke like symptoms after TPA and was sent home by [facility]. Pt followed up with neurology and was told not to come to hospital unless symptoms last longer than an hour.        HPI: Please refer to initial consult note.    Interval History: No acute events overnight. Exam slightly improved in terms of L extremity proximal strength. God-daughter Dominick at bedside explained recurrent stroke and prevention plan with DAPT, and rehab.    Review of systems: In addition to what is detailed in the HPI and/or updated in the interval history, all other systems reviewed and are negative.    Past Medical History:    has a past medical history of Allergy, Arthritis, Chronic headaches (5/29/2012), Chronic pain, Dental disorder, Heart burn, History of chickenpox, History of measles, History of mumps, Hypertension, Indigestion, Low back pain (9/22/14), Migraine, Peripheral neuropathy, Rotator cuff tear, Seasonal allergies, TIA (transient ischemic attack), and Tuberculosis (1979).    FHx:  family history includes Arthritis in her mother; Cancer in her brother; Diabetes in her mother; Heart Disease in her father; Hypertension in her mother; Leukemia in her brother; Stroke in her brother and mother.    SHx:   reports that she has never smoked. She has never used smokeless tobacco. She reports that she does not drink alcohol and does not use drugs.    Medications:    Current Facility-Administered Medications:   •  cefTRIAXone (Rocephin) 2 g in  mL IVPB, 2 g, Intravenous, Q24HRS, Rashaad Garcia M.D., Infusion Ended Prior to Shift at 12/02/21 0626  •  ipratropium-albuterol (DUONEB) nebulizer solution, 3 mL, Nebulization, Q4H  PRN (RT), Rashaad Garcia M.D.  •  atorvastatin (LIPITOR) tablet 80 mg, 80 mg, Oral, DAILY, Carlos Harry M.D., 80 mg at 12/02/21 0555  •  clopidogrel (PLAVIX) tablet 75 mg, 75 mg, Oral, DAILY, Justin Gaines M.D., 75 mg at 12/02/21 0555  •  aspirin EC (ECOTRIN) tablet 81 mg, 81 mg, Oral, DAILY, Rashaad Garcia M.D., 81 mg at 12/02/21 0555  •  carBAMazepine (TEGRETOL) tablet 200 mg, 200 mg, Oral, BID, Rashaad Garcia M.D., 200 mg at 12/02/21 0554  •  labetalol (NORMODYNE) tablet 200 mg, 200 mg, Oral, Q12HRS, Rashaad Garcia M.D., 200 mg at 12/02/21 0555  •  senna-docusate (PERICOLACE or SENOKOT S) 8.6-50 MG per tablet 2 Tablet, 2 Tablet, Oral, BID, 2 Tablet at 12/02/21 0555 **AND** polyethylene glycol/lytes (MIRALAX) PACKET 1 Packet, 1 Packet, Oral, QDAY PRN **AND** magnesium hydroxide (MILK OF MAGNESIA) suspension 30 mL, 30 mL, Oral, QDAY PRN **AND** bisacodyl (DULCOLAX) suppository 10 mg, 10 mg, Rectal, QDAY PRN, Rashaad Garcia M.D.  •  Respiratory Therapy Consult, , Nebulization, Continuous RT, Rashaad Garcia M.D.  •  enoxaparin (LOVENOX) inj 40 mg, 40 mg, Subcutaneous, DAILY, Rashaad Garcia M.D., 40 mg at 12/02/21 0555  •  acetaminophen (Tylenol) tablet 650 mg, 650 mg, Oral, Q6HRS PRN, Rashaad Garcia M.D.  •  enalaprilat (Vasotec) injection 1.25 mg 1 mL, 1.25 mg, Intravenous, Q6HRS PRN, Rashaad Garcia M.D., 1.25 mg at 12/01/21 0532  •  labetalol (NORMODYNE/TRANDATE) injection 10 mg, 10 mg, Intravenous, Q4HRS PRN, Rashaad Garcia M.D., 10 mg at 12/01/21 0923  •  ondansetron (ZOFRAN) syringe/vial injection 4 mg, 4 mg, Intravenous, Q4HRS PRN, Rashaad Garcia M.D.  •  ondansetron (ZOFRAN ODT) dispertab 4 mg, 4 mg, Oral, Q4HRS PRN, Rashaad Garcia M.D.    Physical Examination:     Vitals:    12/01/21 1814 12/01/21 1927 12/02/21 0013 12/02/21 0408   BP: (!) 195/74 116/50 159/61 146/61   Pulse: 81 73 75 72   Resp: 18 18 20 20   Temp:  36.8 °C (98.2 °F) 36.6 °C (97.9 °F) 36.2 °C (97.1 °F)    TempSrc:  Temporal Temporal Temporal   SpO2:  96% 95% 96%   Weight:       Height:           General: Patient is awake and in no acute distress    NEUROLOGICAL EXAM:     Mental status: Awake, alert and fully oriented, follows commands  Speech and language: no aphasia, is dysarthric. The patient is able to name and repeat.  Cranial nerve exam: Pupils are equal, round and reactive to light bilaterally. Visual fields are full. Extraocular muscles are intact. Sensation in the face is intact to light touch. Left sided facial droop. Hearing to finger rub equal. Palate elevates symmetrically. Shoulder shrug is full. Tongue is midline.  Motor exam: Strength is 3/5 in LUE (distal weakness greater than proximal), 5/5 in remaining extremities. Tone is normal. No abnormal movements were seen on exam.  Coordination: deferred  Gait: deferred     Objective Data:    Labs:  Lab Results   Component Value Date/Time    PROTHROMBTM 13.7 11/30/2021 06:58 PM    INR 1.08 11/30/2021 06:58 PM      Lab Results   Component Value Date/Time    WBC 9.7 12/02/2021 04:04 AM    RBC 3.70 (L) 12/02/2021 04:04 AM    HEMOGLOBIN 11.9 (L) 12/02/2021 04:04 AM    HEMATOCRIT 37.6 12/02/2021 04:04 AM    .6 (H) 12/02/2021 04:04 AM    MCH 32.2 12/02/2021 04:04 AM    MCHC 31.6 (L) 12/02/2021 04:04 AM    MPV 9.2 12/02/2021 04:04 AM    NEUTSPOLYS 65.40 12/01/2021 03:24 AM    LYMPHOCYTES 22.80 12/01/2021 03:24 AM    MONOCYTES 9.00 12/01/2021 03:24 AM    EOSINOPHILS 1.70 12/01/2021 03:24 AM    BASOPHILS 0.70 12/01/2021 03:24 AM      Lab Results   Component Value Date/Time    SODIUM 145 12/02/2021 04:04 AM    POTASSIUM 4.1 12/02/2021 04:04 AM    CHLORIDE 108 12/02/2021 04:04 AM    CO2 27 12/02/2021 04:04 AM    GLUCOSE 98 12/02/2021 04:04 AM    BUN 18 12/02/2021 04:04 AM    CREATININE 0.86 12/02/2021 04:04 AM    BUNCREATRAT 16 12/13/2017 10:22 AM      Lab Results   Component Value Date/Time    CHOLSTRLTOT 245 (H) 12/13/2017 10:22 AM    CHOLSTRLTOT 219 (H)  10/14/2016 04:45 AM     (H) 12/13/2017 10:22 AM     (H) 10/14/2016 04:45 AM    HDL 68 12/13/2017 10:22 AM    HDL 60 10/14/2016 04:45 AM    TRIGLYCERIDE 150 (H) 12/13/2017 10:22 AM    TRIGLYCERIDE 141 10/14/2016 04:45 AM       Lab Results   Component Value Date/Time    ALKPHOSPHAT 139 (H) 11/30/2021 06:58 PM    ASTSGOT 16 11/30/2021 06:58 PM    ALTSGPT 11 11/30/2021 06:58 PM    TBILIRUBIN 0.4 11/30/2021 06:58 PM        Imaging/Testing:    I interpreted and/or reviewed the patient's neuroimaging    OUTSIDE IMAGES-MR BRAIN   Final Result      CT-FOREIGN FILM CAT SCAN   Final Result      DX-CHEST-PORTABLE (1 VIEW)   Final Result      No evidence of acute cardiopulmonary process.      CT-HEAD W/O   Final Result         1.  Localized brain swelling and edema likely indicating subacute infarct is identified in the right cerebral hemisphere involving a small portion of the temporal and parietal lobes.      2.  No intracranial hemorrhage is identified.      3.  Decreased attenuation in the periventricular white matter is noted which could indicate microvascular ischemic disease.               MR-BRAIN-W/O    (Results Pending)       Assessment and Plan:    Nakita Sheppard is a 76 y.o. female presenting for whom neurology has been consulted for worsening stroke symptoms. Given her previous L MCA stroke seen on noncontrast CT, her current left sided symptoms, and her ICA stenosis the current suspected source is atheroembolic.    Team reviewed MRI from [facility] 11/20/21 showing prior frontal infarct, given we now see CT evidence of temporal and parietal lesions likely from recurrent infarct there is no need for repeat MRI.    Plan:  -continue daily plavix for further plaque stabilization and future stroke prevention  -BP control, goal of normotension 110-130. Avoid hypotension  -ziopatch here before discharge  -PT/OT/SLP  -Please contact neurology with any further questions.    Plan of care discussed  with Dr. Arroyo and patient and family at bedside.  All questions were answered.    Justin Gaines M.D.  PGY3 Internal Medicine

## 2021-12-03 ENCOUNTER — HOSPITAL ENCOUNTER (INPATIENT)
Facility: REHABILITATION | Age: 76
LOS: 19 days | DRG: 057 | End: 2021-12-22
Attending: PHYSICAL MEDICINE & REHABILITATION | Admitting: PHYSICAL MEDICINE & REHABILITATION
Payer: MEDICARE

## 2021-12-03 ENCOUNTER — NON-PROVIDER VISIT (OUTPATIENT)
Dept: CARDIOLOGY | Facility: MEDICAL CENTER | Age: 76
End: 2021-12-03
Payer: MEDICARE

## 2021-12-03 VITALS
HEART RATE: 68 BPM | BODY MASS INDEX: 33.35 KG/M2 | DIASTOLIC BLOOD PRESSURE: 59 MMHG | HEIGHT: 62 IN | WEIGHT: 181.22 LBS | TEMPERATURE: 97.8 F | SYSTOLIC BLOOD PRESSURE: 149 MMHG | OXYGEN SATURATION: 92 % | RESPIRATION RATE: 18 BRPM

## 2021-12-03 DIAGNOSIS — M54.50 CHRONIC MIDLINE LOW BACK PAIN WITHOUT SCIATICA: ICD-10-CM

## 2021-12-03 DIAGNOSIS — R25.2 SPASTICITY: ICD-10-CM

## 2021-12-03 DIAGNOSIS — G89.29 CHRONIC MIDLINE LOW BACK PAIN WITHOUT SCIATICA: ICD-10-CM

## 2021-12-03 DIAGNOSIS — G47.09 OTHER INSOMNIA: ICD-10-CM

## 2021-12-03 DIAGNOSIS — G62.89 OTHER POLYNEUROPATHY: ICD-10-CM

## 2021-12-03 DIAGNOSIS — I63.9 ACUTE CVA (CEREBROVASCULAR ACCIDENT) (HCC): ICD-10-CM

## 2021-12-03 DIAGNOSIS — I47.10 SVT (SUPRAVENTRICULAR TACHYCARDIA) (HCC): ICD-10-CM

## 2021-12-03 DIAGNOSIS — E78.5 HYPERLIPIDEMIA LDL GOAL <70: ICD-10-CM

## 2021-12-03 PROBLEM — Z78.9 IMPAIRED MOBILITY AND ADLS: Status: ACTIVE | Noted: 2021-12-03

## 2021-12-03 PROBLEM — Z74.09 IMPAIRED MOBILITY AND ADLS: Status: ACTIVE | Noted: 2021-12-03

## 2021-12-03 PROBLEM — E66.9 CLASS 1 OBESITY WITHOUT SERIOUS COMORBIDITY IN ADULT: Status: ACTIVE | Noted: 2021-12-03

## 2021-12-03 PROBLEM — R41.4 LEFT-SIDED NEGLECT: Status: ACTIVE | Noted: 2021-12-03

## 2021-12-03 PROBLEM — R13.19 OTHER DYSPHAGIA: Status: ACTIVE | Noted: 2021-12-03

## 2021-12-03 LAB
BACTERIA UR CULT: ABNORMAL
BACTERIA UR CULT: ABNORMAL
SIGNIFICANT IND 70042: ABNORMAL
SITE SITE: ABNORMAL
SOURCE SOURCE: ABNORMAL

## 2021-12-03 PROCEDURE — U0005 INFEC AGEN DETEC AMPLI PROBE: HCPCS

## 2021-12-03 PROCEDURE — 770010 HCHG ROOM/CARE - REHAB SEMI PRIVAT*

## 2021-12-03 PROCEDURE — 700111 HCHG RX REV CODE 636 W/ 250 OVERRIDE (IP): Performed by: STUDENT IN AN ORGANIZED HEALTH CARE EDUCATION/TRAINING PROGRAM

## 2021-12-03 PROCEDURE — 700105 HCHG RX REV CODE 258: Performed by: STUDENT IN AN ORGANIZED HEALTH CARE EDUCATION/TRAINING PROGRAM

## 2021-12-03 PROCEDURE — 92526 ORAL FUNCTION THERAPY: CPT

## 2021-12-03 PROCEDURE — A9270 NON-COVERED ITEM OR SERVICE: HCPCS | Performed by: STUDENT IN AN ORGANIZED HEALTH CARE EDUCATION/TRAINING PROGRAM

## 2021-12-03 PROCEDURE — 700102 HCHG RX REV CODE 250 W/ 637 OVERRIDE(OP): Performed by: STUDENT IN AN ORGANIZED HEALTH CARE EDUCATION/TRAINING PROGRAM

## 2021-12-03 PROCEDURE — A9270 NON-COVERED ITEM OR SERVICE: HCPCS | Performed by: PHYSICAL MEDICINE & REHABILITATION

## 2021-12-03 PROCEDURE — 99239 HOSP IP/OBS DSCHRG MGMT >30: CPT | Performed by: STUDENT IN AN ORGANIZED HEALTH CARE EDUCATION/TRAINING PROGRAM

## 2021-12-03 PROCEDURE — U0003 INFECTIOUS AGENT DETECTION BY NUCLEIC ACID (DNA OR RNA); SEVERE ACUTE RESPIRATORY SYNDROME CORONAVIRUS 2 (SARS-COV-2) (CORONAVIRUS DISEASE [COVID-19]), AMPLIFIED PROBE TECHNIQUE, MAKING USE OF HIGH THROUGHPUT TECHNOLOGIES AS DESCRIBED BY CMS-2020-01-R: HCPCS

## 2021-12-03 PROCEDURE — 99223 1ST HOSP IP/OBS HIGH 75: CPT | Mod: AI | Performed by: PHYSICAL MEDICINE & REHABILITATION

## 2021-12-03 PROCEDURE — A9270 NON-COVERED ITEM OR SERVICE: HCPCS | Performed by: INTERNAL MEDICINE

## 2021-12-03 PROCEDURE — 700102 HCHG RX REV CODE 250 W/ 637 OVERRIDE(OP): Performed by: PHYSICAL MEDICINE & REHABILITATION

## 2021-12-03 PROCEDURE — 94760 N-INVAS EAR/PLS OXIMETRY 1: CPT

## 2021-12-03 PROCEDURE — 700102 HCHG RX REV CODE 250 W/ 637 OVERRIDE(OP): Performed by: INTERNAL MEDICINE

## 2021-12-03 RX ORDER — ATORVASTATIN CALCIUM 40 MG/1
80 TABLET, FILM COATED ORAL DAILY
Status: DISCONTINUED | OUTPATIENT
Start: 2021-12-04 | End: 2021-12-22 | Stop reason: HOSPADM

## 2021-12-03 RX ORDER — LISINOPRIL 20 MG/1
40 TABLET ORAL DAILY
Status: DISCONTINUED | OUTPATIENT
Start: 2021-12-03 | End: 2021-12-03 | Stop reason: HOSPADM

## 2021-12-03 RX ORDER — LACTULOSE 20 G/30ML
30 SOLUTION ORAL
Status: DISCONTINUED | OUTPATIENT
Start: 2021-12-03 | End: 2021-12-22 | Stop reason: HOSPADM

## 2021-12-03 RX ORDER — CARBAMAZEPINE 100 MG/1
200 TABLET, CHEWABLE ORAL 2 TIMES DAILY
Status: DISCONTINUED | OUTPATIENT
Start: 2021-12-03 | End: 2021-12-22 | Stop reason: HOSPADM

## 2021-12-03 RX ORDER — VITAMIN B COMPLEX
1000 TABLET ORAL DAILY
Status: DISCONTINUED | OUTPATIENT
Start: 2021-12-03 | End: 2021-12-03 | Stop reason: HOSPADM

## 2021-12-03 RX ORDER — ONDANSETRON 4 MG/1
4 TABLET, ORALLY DISINTEGRATING ORAL 4 TIMES DAILY PRN
Status: DISCONTINUED | OUTPATIENT
Start: 2021-12-03 | End: 2021-12-22 | Stop reason: HOSPADM

## 2021-12-03 RX ORDER — MIDAZOLAM HYDROCHLORIDE 5 MG/ML
5 INJECTION INTRAMUSCULAR; INTRAVENOUS PRN
Status: DISCONTINUED | OUTPATIENT
Start: 2021-12-03 | End: 2021-12-22 | Stop reason: HOSPADM

## 2021-12-03 RX ORDER — OMEPRAZOLE 20 MG/1
20 CAPSULE, DELAYED RELEASE ORAL DAILY
Status: DISCONTINUED | OUTPATIENT
Start: 2021-12-03 | End: 2021-12-06

## 2021-12-03 RX ORDER — ENEMA 19; 7 G/133ML; G/133ML
1 ENEMA RECTAL
Status: DISCONTINUED | OUTPATIENT
Start: 2021-12-03 | End: 2021-12-22 | Stop reason: HOSPADM

## 2021-12-03 RX ORDER — CARBAMAZEPINE 200 MG/1
200 TABLET ORAL 2 TIMES DAILY
Status: CANCELLED | OUTPATIENT
Start: 2021-12-03

## 2021-12-03 RX ORDER — ATORVASTATIN CALCIUM 80 MG/1
80 TABLET, FILM COATED ORAL DAILY
Qty: 30 TABLET | Refills: 3 | Status: ON HOLD
Start: 2021-12-04 | End: 2021-12-21

## 2021-12-03 RX ORDER — LISINOPRIL 20 MG/1
40 TABLET ORAL DAILY
Status: CANCELLED | OUTPATIENT
Start: 2021-12-04

## 2021-12-03 RX ORDER — BISACODYL 10 MG
10 SUPPOSITORY, RECTAL RECTAL
Status: CANCELLED | OUTPATIENT
Start: 2021-12-03

## 2021-12-03 RX ORDER — LISINOPRIL 20 MG/1
40 TABLET ORAL DAILY
Status: DISCONTINUED | OUTPATIENT
Start: 2021-12-04 | End: 2021-12-22 | Stop reason: HOSPADM

## 2021-12-03 RX ORDER — POLYETHYLENE GLYCOL 3350 17 G/17G
1 POWDER, FOR SOLUTION ORAL
Status: CANCELLED | OUTPATIENT
Start: 2021-12-03

## 2021-12-03 RX ORDER — HYDRALAZINE HYDROCHLORIDE 10 MG/1
10 TABLET, FILM COATED ORAL EVERY 8 HOURS PRN
Status: DISCONTINUED | OUTPATIENT
Start: 2021-12-03 | End: 2021-12-22 | Stop reason: HOSPADM

## 2021-12-03 RX ORDER — AMOXICILLIN 250 MG
2 CAPSULE ORAL 2 TIMES DAILY
Status: DISCONTINUED | OUTPATIENT
Start: 2021-12-03 | End: 2021-12-14

## 2021-12-03 RX ORDER — POLYVINYL ALCOHOL 14 MG/ML
1 SOLUTION/ DROPS OPHTHALMIC PRN
Status: DISCONTINUED | OUTPATIENT
Start: 2021-12-03 | End: 2021-12-22 | Stop reason: HOSPADM

## 2021-12-03 RX ORDER — ECHINACEA PURPUREA EXTRACT 125 MG
2 TABLET ORAL PRN
Status: DISCONTINUED | OUTPATIENT
Start: 2021-12-03 | End: 2021-12-22 | Stop reason: HOSPADM

## 2021-12-03 RX ORDER — BISACODYL 10 MG
10 SUPPOSITORY, RECTAL RECTAL
Status: DISCONTINUED | OUTPATIENT
Start: 2021-12-03 | End: 2021-12-14

## 2021-12-03 RX ORDER — CLOPIDOGREL BISULFATE 75 MG/1
75 TABLET ORAL DAILY
Qty: 30 TABLET | Refills: 3 | Status: ON HOLD
Start: 2021-12-04 | End: 2021-12-21 | Stop reason: SDUPTHER

## 2021-12-03 RX ORDER — LANOLIN ALCOHOL/MO/W.PET/CERES
3 CREAM (GRAM) TOPICAL NIGHTLY PRN
Status: DISCONTINUED | OUTPATIENT
Start: 2021-12-03 | End: 2021-12-07

## 2021-12-03 RX ORDER — LABETALOL 200 MG/1
200 TABLET, FILM COATED ORAL EVERY 12 HOURS
Status: CANCELLED | OUTPATIENT
Start: 2021-12-03

## 2021-12-03 RX ORDER — TRAZODONE HYDROCHLORIDE 50 MG/1
50 TABLET ORAL
Status: DISCONTINUED | OUTPATIENT
Start: 2021-12-03 | End: 2021-12-07

## 2021-12-03 RX ORDER — POLYETHYLENE GLYCOL 3350 17 G/17G
1 POWDER, FOR SOLUTION ORAL
Status: DISCONTINUED | OUTPATIENT
Start: 2021-12-03 | End: 2021-12-14

## 2021-12-03 RX ORDER — ONDANSETRON 2 MG/ML
4 INJECTION INTRAMUSCULAR; INTRAVENOUS 4 TIMES DAILY PRN
Status: DISCONTINUED | OUTPATIENT
Start: 2021-12-03 | End: 2021-12-22 | Stop reason: HOSPADM

## 2021-12-03 RX ORDER — CLOPIDOGREL BISULFATE 75 MG/1
75 TABLET ORAL DAILY
Status: DISCONTINUED | OUTPATIENT
Start: 2021-12-04 | End: 2021-12-22 | Stop reason: HOSPADM

## 2021-12-03 RX ORDER — ACETAMINOPHEN 325 MG/1
650 TABLET ORAL EVERY 4 HOURS PRN
Status: DISCONTINUED | OUTPATIENT
Start: 2021-12-03 | End: 2021-12-22 | Stop reason: HOSPADM

## 2021-12-03 RX ORDER — AMOXICILLIN 250 MG
2 CAPSULE ORAL 2 TIMES DAILY
Status: CANCELLED | OUTPATIENT
Start: 2021-12-03

## 2021-12-03 RX ORDER — ATORVASTATIN CALCIUM 80 MG/1
80 TABLET, FILM COATED ORAL DAILY
Status: CANCELLED | OUTPATIENT
Start: 2021-12-03

## 2021-12-03 RX ORDER — LABETALOL 100 MG/1
200 TABLET, FILM COATED ORAL EVERY 12 HOURS
Status: DISCONTINUED | OUTPATIENT
Start: 2021-12-03 | End: 2021-12-22 | Stop reason: HOSPADM

## 2021-12-03 RX ORDER — ALUMINA, MAGNESIA, AND SIMETHICONE 2400; 2400; 240 MG/30ML; MG/30ML; MG/30ML
20 SUSPENSION ORAL
Status: DISCONTINUED | OUTPATIENT
Start: 2021-12-03 | End: 2021-12-22 | Stop reason: HOSPADM

## 2021-12-03 RX ORDER — VITAMIN B COMPLEX
1000 TABLET ORAL DAILY
Status: DISCONTINUED | OUTPATIENT
Start: 2021-12-04 | End: 2021-12-22 | Stop reason: HOSPADM

## 2021-12-03 RX ORDER — CLOPIDOGREL BISULFATE 75 MG/1
75 TABLET ORAL DAILY
Status: CANCELLED | OUTPATIENT
Start: 2021-12-03

## 2021-12-03 RX ORDER — VITAMIN B COMPLEX
1000 TABLET ORAL DAILY
Status: CANCELLED | OUTPATIENT
Start: 2021-12-04

## 2021-12-03 RX ORDER — HYDROXYZINE HYDROCHLORIDE 25 MG/1
50 TABLET, FILM COATED ORAL EVERY 6 HOURS PRN
Status: DISCONTINUED | OUTPATIENT
Start: 2021-12-03 | End: 2021-12-22 | Stop reason: HOSPADM

## 2021-12-03 RX ADMIN — CARBAMAZEPINE 200 MG: 200 TABLET ORAL at 05:02

## 2021-12-03 RX ADMIN — SENNOSIDES AND DOCUSATE SODIUM 2 TABLET: 50; 8.6 TABLET ORAL at 20:54

## 2021-12-03 RX ADMIN — POLYETHYLENE GLYCOL 3350 1 PACKET: 17 POWDER, FOR SOLUTION ORAL at 20:54

## 2021-12-03 RX ADMIN — CEFTRIAXONE SODIUM 2 G: 2 INJECTION, POWDER, FOR SOLUTION INTRAMUSCULAR; INTRAVENOUS at 05:03

## 2021-12-03 RX ADMIN — LABETALOL HYDROCHLORIDE 200 MG: 200 TABLET, FILM COATED ORAL at 05:03

## 2021-12-03 RX ADMIN — CARBAMAZEPINE 200 MG: 100 TABLET, CHEWABLE ORAL at 20:54

## 2021-12-03 RX ADMIN — CLOPIDOGREL BISULFATE 75 MG: 75 TABLET ORAL at 05:02

## 2021-12-03 RX ADMIN — ACETAMINOPHEN 650 MG: 325 TABLET ORAL at 16:15

## 2021-12-03 RX ADMIN — Medication 1000 UNITS: at 09:33

## 2021-12-03 RX ADMIN — HYDRALAZINE HYDROCHLORIDE 10 MG: 10 TABLET, FILM COATED ORAL at 16:15

## 2021-12-03 RX ADMIN — ASPIRIN 81 MG: 81 TABLET, COATED ORAL at 05:02

## 2021-12-03 RX ADMIN — LABETALOL HYDROCHLORIDE 200 MG: 100 TABLET, FILM COATED ORAL at 20:53

## 2021-12-03 RX ADMIN — OMEPRAZOLE 20 MG: 20 CAPSULE, DELAYED RELEASE ORAL at 16:15

## 2021-12-03 RX ADMIN — ATORVASTATIN CALCIUM 80 MG: 80 TABLET, FILM COATED ORAL at 05:02

## 2021-12-03 RX ADMIN — DOCUSATE SODIUM 50 MG AND SENNOSIDES 8.6 MG 2 TABLET: 8.6; 5 TABLET, FILM COATED ORAL at 05:02

## 2021-12-03 RX ADMIN — ENOXAPARIN SODIUM 40 MG: 40 INJECTION SUBCUTANEOUS at 05:03

## 2021-12-03 RX ADMIN — LISINOPRIL 40 MG: 20 TABLET ORAL at 09:33

## 2021-12-03 ASSESSMENT — PAIN DESCRIPTION - PAIN TYPE
TYPE: ACUTE PAIN
TYPE: ACUTE PAIN

## 2021-12-03 ASSESSMENT — LIFESTYLE VARIABLES: EVER_SMOKED: NEVER

## 2021-12-03 ASSESSMENT — FIBROSIS 4 INDEX: FIB4 SCORE: 1.130667542166613585

## 2021-12-03 NOTE — DISCHARGE PLANNING
Dr. Phillips is recommending [facility] pending D/C resources/support.  Msg placed to Natan, spouse to look into D/C resources/support.  Spoke with John DELVALLE and he will notify me if Natan comes in to visit.  Dr. Aleman has medically cleared.     1011-Spoke with Natan, spouse regarding [facility] and D/C resources/support.  He is agreeable with an admission.  They live in a 1LV home that is ramped.  He works as a  and has friends/family to assist him to provide 24/7 supervision/assistance. He also in contact with Mechelle CLEARY.  Case is under review by Dr. Alva.     1121-Dr. Alva has accepted.  Transport has been arranged via [facility] @ 4520.  Dr. Aleman.  Ross DELVALLE & Natan spouse are aware. Msg placed to Arleth GARCIA.

## 2021-12-03 NOTE — PREADMISSION SCREENING NOTE
Pre-Admission Screening Form    Patient Information:   Name: Nakita Sheppard     MRN: [id number]       : 1945      Age: 76 y.o.   Gender: female      Race: White [7]       Marital Status:  [2]  Family Contact: Silver,Natan COHN, JEFFREY  TODD, MAGDALENO MERA        Relationship: Spouse [17]  Daughter [2]  Daughter [2]  Daughter [2]  Home Phone:                  Cell Phone: [phone]  Advanced Directives: None  Code Status:  DNAR/DNI  Current Attending Provider: Aaron Aleman D.O.  Referring Physician: Dr. Aleman  Physiatrist Consult: Dr. Phillips   Referral Date: 21  Primary Payor Source:  MEDICARE  Secondary Payor Source:      Medical Information:   Date of Admission to Acute Care [facility]  Room Number: 92/02  Rehabilitation Diagnosis: 0001.1 - Stroke: Left Body Involvement (Right Brain)  Immunization History   Administered Date(s) Administered   • INFLUENZA TIV (IM) 2015, 10/05/2015   • Influenza Seasonal Injectable - Historical Data 2011, 2012, 2013, 2015, 10/05/2015   • Influenza Vaccine Adult HD 2017   • Moderna SARS-CoV-2 Vaccine 2021, 2021   • Pneumococcal Conjugate Vaccine (Prevnar/PCV-13) 2016   • Pneumococcal polysaccharide vaccine (PPSV-23) 2017   • Tdap Vaccine 2016     Allergies   Allergen Reactions   • Fentanyl Anxiety   • Morphine Itching   • Penicillins Hives and Swelling     Past Medical History:   Diagnosis Date   • Allergy    • Arthritis    • Chronic headaches 2012   • Chronic pain    • Dental disorder    • Heart burn    • History of chickenpox    • History of measles    • History of mumps    • Hypertension    • Indigestion    • Low back pain 9/22/14    8/10   • Migraine    • Peripheral neuropathy     BLE   • Rotator cuff tear    • Seasonal allergies    • TIA (transient ischemic attack)    • Tuberculosis     had  treatment for 2 years     Past Surgical History:   Procedure Laterality Date   • LUMBAR FUSION POSTERIOR  10/6/2014    Performed by Rishabh Bhatia M.D. at SURGERY [facility] ORS   • LUMBAR LAMINECTOMY DISKECTOMY  10/6/2014    Performed by Rishabh Bhatia M.D. at SURGERY [facility] ORS   • LUMBAR EXPLORATION  10/6/2014    Performed by Rishabh Bhatia M.D. at SURGERY [facility] ORS   • OTHER  2013    back   • SPINAL CORD STIMULATOR  11/4/2011    Performed by AUBREE PALACIO at SURGERY [facility] ORS   • OPEN REDUCTION  2008    re-broke foot   • OTHER  2000    right foot   • SHOULDER SURGERY  1998   • CARPAL TUNNEL ENDOSCOPIC  1990   • ARTHROSCOPY, KNEE  1987    bilateral   • ABDOMINAL HYSTERECTOMY TOTAL  1983   • APPENDECTOMY     • CHOLECYSTECTOMY     • LAMINOTOMY  02/2002 and 9/2002       History Leading to Admission, Conditions that Caused the Need for Rehab (CMS):     Dr. Garcia H&P:  Chief Complaint   Patient presents with   • Possible Stroke       pt previosuly had a stroke on 11/19 and had TPA admistered and left hospital with no deficits. Pt was experience stroke like symptoms after TPA and was sent home by [facility]. Pt followed up with neurology and was told not to come to hospital unless symptoms last longer than an hour.          History of Presenting Illness  Nakita Sheppard is a 76 y.o. female recently received TPA on 11/19/2021 for concerns of CVA with left face and left arm weakness discharged to rehab and subsequently home 9/22/2021 with complete resolution of her symptoms who has been having mild recurrence of left-sided weakness since discharge home that has been waxing and waning who presented 11/30/2021 with ongoing left upper extremity weakness, facial drooping, dysarthria and dysphagia.  No aggravating or alleviating factors. She has been to the outside ED for these without finding an answer and has also followed-up with Dr. Parks in neurology clinic. She continues on baby  aspirin.  She was told she may have atrial fibrillation and is to be set up for cardiac monitoring.  She has had multiple falls and is high risk for bleeding so has not been started on anticoagulation per family at bedside.  Patient symptoms have been persisting longer and more frequently so they presented for evaluation.  She was noted to have blood pressure as high as 224 systolic in the ED, she was hypoxic, she has mild leukocytosis, normal renal and liver function, INR normal.  UA shows infection.  CT head shows localized brain swelling and edema likely indicating subacute infarct in the right cerebral hemisphere.  Neurology was consulted recommend admission to hospital service with every 4 hours neuro checks, control of blood pressure, will obtain prior MRI and compared to CT as patient requires anesthesia to obtain a new MRI.  Assessment/Plan:  I anticipate this patient will require at least two midnights for appropriate medical management, necessitating inpatient admission.     * Encephalopathy- (present on admission)  Assessment & Plan  Stroke recrudescence versus acute on subacute right MCA territory ischemic infarcts  Neurology consulted in the ED, reviewing MRI and CT and will determine if needs a new MRI as patient is severely claustrophobic and will require anesthesia to undergo new MRI.  Blood pressure goal  for the first 12 hours with a goal range of normotension.  Per neurology can defer echocardiogram, A1c, lipid panel for now.  SLP consultation with bedside swallow study.  PT OT.  Continue aspirin and atorvastatin.  Receiving ceftriaxone for UTI.     Acute respiratory failure with hypoxia (HCC)  Assessment & Plan  Likely secondary to encephalopathy.  Requiring 3 L nasal cannula to maintain oxygen saturations greater than 90%.  Chest x-ray without any evidence of edema or consolidation.  Oxygen per guidelines, wean as able.  Incentive spirometry.  DuoNebs as needed     Acute cystitis without  hematuria- (present on admission)  Assessment & Plan  Continue ceftriaxone.  Follow-up blood and urine culture     Hypertension- (present on admission)  Assessment & Plan  SBP as high as 215, reduction of the patient's blood pressure by 25% with goal range of normotension.  Continue patient's home labetalol.  IV antihypertensives ordered with parameters.        VTE prophylaxis: SCDs/TEDs, Lovenox     Dr. Phillips (Physiatry) recommendations:  ASSESSMENT:  Patient is a 76 y.o. female admitted with left-sided weakness found to have a right MCA CVA     [facility] Code / Diagnosis to Support: 0001.1 - Stroke: Left Body Involvement (Right Brain)     Rehabilitation: Impaired ADLs and mobility  Patient is a good candidate for inpatient rehab based on PT/OT and SLP needs however will need confirmation of family's ability to provide care prior to acceptance.  See disposition details below.     Barriers to transfer include: Insurance authorization, TCCs to verify disposition, medical clearance and bed availability      Additional Recommendations:  Right MCA CVA  -Greatest deficits at this time are left upper extremity weakness positive dysarthria, dysphagia and impaired mobility and ADLs  -MRI brain not completed patient is severely claustrophobic and would need to be sedated  -Neurology consulted, images reviewed [facility] MRI showed evidence of minimal right MCA stroke burden in November 2021, and evidence of repeat embolic events  -Etiology of CVA suspected to be atheroembolic disease referable to her right ICA  -Secondary stroke prophylaxis initiated with aspirin, statin, Plavix  -Patient will need Zio patch on discharge from [facility]  -Patient's last echocardiogram in our system is from 2016, carotid Dopplers from 2016 as well will need confirmation of no further plans for work-up for CVA etiology  -Continue with SLP, PT, OT     UTI  -Patient with positive UA at time of admission to [facility], has been started on  Rocephin     Disposition  -Patient is currently functioning below her level of baseline and unfortunately has continued to decline status post her previous CVA discovered in November 2021 at [facility]  -Given patient's diagnosis and tolerance for therapy she is appropriate for an IRF level therapy stay for ischemic CVA which would require 3 hours of therapy 5 days a week for approximately 10 to 14 days  -Prior to acceptance start level therapy will need confirmation of patient's ability to have family help her at home and confirmation of no further images planned such as getting a CTA of her head and neck  -PM&R to follow peripherally        Medical Complexity:  Right MCA CVA  Left-sided weakness  Dysarthria  UTI  Hypertension        DVT PPX: Lovenox    Dr. Dela Cruz (Neurology) recommendations:  Assessment and Plan:     Nakita Sheppard is a 76 y.o. woman with recent right parietal stroke confirmed on MRI at [facility] presenting with worsening left-sided weakness since discharge from the hospital 2 weeks ago.  CT scan of the head confirms right parietal subacute ischemic infarct.  Unfortunately, I am not able to see images from [facility] at this time, so I cannot compare the stroke seen on CT today with the MRI 2 weeks ago.  I am not clear if the patient's stroke symptoms are simply progressing on a delayed scale or if there is new ischemic injury that has been occurring since her discharge home.  I also do not see any vessel imaging nor have any comment on vessel imaging from her previous admission.  According to outpatient neurology note, her previous stroke was categorized as cryptogenic and there is a plan for long-term cardiac monitoring.     Problem list:  1.  Stroke recrudescence versus acute on subacute right MCA territory ischemic infarcts  2.  Hypertension  3. Dysphagia     Plan:  -Admit to the hospitalist service with every 4 hour neurochecks.  -Patient's blood pressure was greater  than 190 on admission.  Given that whatever is going on appears at least subacute, I recommend gradual reduction of the patient's blood pressure by about 25% with a goal range of normotension.  -I will call the transfer center to attempt to obtain previous MRI scan and vessel imaging to compare to tonight's CT scan.  This may help understand whether there are new strokes occurring or other unexplained worsening of her previous stroke symptoms.      -The patient is severely claustrophobic and will need anesthesia to undergo a new MRI.  At this time I think it reasonable to attempt to compare her previous MRI from 2 weeks ago to the CT scan.  If it seems that there is new ischemic injury further investigation will be warranted.      -If the MRI is in line with tonight CT and the suspicion is for no new ischemic injury, can defer much of the stroke work-up including echocardiogram, A1c, FLP.  -There is a mild leukocytosis, but otherwise I do not see any obvious explanation for a profound stroke recrudescence.  -SLP consultation with bedside swallow study.  I suspect the patient will need at minimum a core track for her dysphagia.  -PT/OT  -Continue aspirin 81mg and atorvastatin 80mg for now.    Head CT 11-30-21:  1.  Localized brain swelling and edema likely indicating subacute infarct is identified in the right cerebral hemisphere involving a small portion of the temporal and parietal lobes.     2.  No intracranial hemorrhage is identified.     3.  Decreased attenuation in the periventricular white matter is noted which could indicate microvascular ischemic disease.    Co-morbidities: See PMH  Potential Risk - Complications: Aphasia, Cognitive Impairment, Contractures, Deep Vein Thrombosis, Dysphagia, Incontinence, Malnutrition, Pain, Perceptual Impairment, Pneumonia, Pressure Ulcer and Urinary Tract Infection  Level of Risk: High    Ongoing Medical Management Needed (Medical/Nursing Needs):   Patient Active Problem  "List    Diagnosis Date Noted   • Acute CVA (cerebrovascular accident) ([facility]) 12/01/2021   • Acute cystitis without hematuria 12/01/2021   • Acute respiratory failure with hypoxia ([facility]) 12/01/2021   • Ischemic stroke of frontal lobe ([facility]) 11/23/2021   • History of paraplegia 03/18/2021   • Obesity (BMI 35.0-39.9 without comorbidity) ([facility]) 12/06/2017   • Bilateral lower extremity pain 07/26/2017   • Nonintractable episodic headache 04/26/2017   • Bilateral swelling of feet and ankles 04/26/2017   • Prediabetes 01/09/2017   • Seasonal allergic rhinitis due to pollen 01/09/2017   • Cramp of both lower extremities 11/23/2016   • Inactivity 10/17/2016   • Hyperlipidemia LDL goal <70 10/17/2016   • BMI 40.0-44.9, adult ([facility]) 09/28/2016   • 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    Current Vital Signs:   Temperature: 36.6 °C (97.8 °F) Pulse: 68 Respiration: 18 Blood Pressure : 149/59  Weight: 82.2 kg (181 lb 3.5 oz) Height: 157.5 cm (5' 2\")  Pulse Oximetry: 92 % O2 (LPM): 4      Completed Laboratory Reports:  Recent Labs     11/30/21  1858 12/01/21  0324 12/02/21  0404   WBC 11.9* 11.2* 9.7   HEMOGLOBIN 13.4 12.3 11.9*   HEMATOCRIT 41.8 38.1 37.6   PLATELETCT 414 355 320   SODIUM 143  --  145   POTASSIUM 4.7  --  4.1   BUN 16  --  18   CREATININE 0.93  --  0.86   ALBUMIN 4.3  --   --    GLUCOSE 118*  --  98   INR 1.08  --   --      Additional Labs: Not Applicable    Prior Living Situation:   Housing / Facility: 1 Story House  Steps Into Home:  (ramp)  Steps In Home: 0  Lives with - Patient's Self Care Capacity: Spouse  Equipment Owned: 4-Wheel Walker    Prior Level of Function / Living Situation:   Physical " Therapy: Prior Services: Home-Independent  Housing / Facility: [address] [facility]  Steps Into Home:  (ramp)  Steps In Home: 0  Bathroom Set up: Walk In Shower  Equipment Owned: 4-Wheel Walker  Lives with - Patient's Self Care Capacity: Spouse  Bed Mobility: Independent  Transfer Status: Independent  Ambulation: Independent  Distance Ambulation (Feet):  (household)  Assistive Devices Used: 4-Wheel Walker  Current Level of Function:   Gait Level Of Assist: Unable to Participate  Supine to Sit: Moderate Assist  Sit to Supine: Minimal Assist  Scooting: Minimal Assist  Sit to Stand: Moderate Assist  Bed, Chair, Wheelchair Transfer: Maximal Assist  Toilet Transfers: Maximal Assist  Transfer Method: Stand Pivot  Sitting in Chair: 10 min (BSC)  Sitting Edge of Bed: 2 min  Standin min  Occupational Therapy:   Self Feeding: Independent  Grooming / Hygiene: Independent  Bathing: Independent  Dressing: Independent  Toileting: Independent  Medication Management: Independent  Laundry: Independent  Kitchen Mobility: Independent  Finances: Independent  Home Management: Independent  Shopping: Independent  Prior Level Of Mobility: Independent With Device in Community  Prior Services: Home-Independent  Housing / Facility: [address] [facility]  Occupation (Pre-Hospital Vocational): Retired Due To Age  Current Level of Function:   Upper Body Dressing: Minimal Assist  Lower Body Dressing: Maximal Assist  Toileting: Supervision  Speech Language Pathology:   Problem List: Dysphagia,Dysarthia  Diet / Liquid Recommendation: Puree (4),Mildly Thick (2) - (Nectar Thick)  Rehabilitation Prognosis/Potential: Good  Estimated Length of Stay: 14-21 days    Nursing:      Continent    Scope/Intensity of Services Recommended:  Physical Therapy: 1 hr / day  5 days / week. Therapeutic Interventions Required: Maximize Endurance, Mobility, Strength and Safety  Occupational Therapy: 1 hr / day 5 days / week. Therapeutic Interventions Required: Maximize Self Care,  ADLs, IADLs and Energy Conservation  Speech & Language Pathology: 1 hr / day 5 days / week. Therapeutic Interventions Required: Maximize Cognition, Swallowing and Safety  Rehabilitation Nursin. Therapeutic Interventions Required: Monitor Pain, Skin, Vital Signs, Intake and Output, Labs, Safety, Aspiration Risk and Family Training  Rehabilitation Physician: 3 - 5 days / week. Therapeutic Interventions Required: Medical Management  Respiratory Care: Pulmonary Toileting. Therapeutic Interventions Required: Pulmonary Toileting, O2 Weaning and Aspiration Risk    She requires 24-hour rehabilitation nursing to manage skin care, nutrition and fluid intake, pulmonary hygiene, pain control, safety, medication management and patient/family goals. In addition, rehabilitation nursing will reiterate and reinforce therapy skills and equipment use, including ADLs, as well as provide education to the patient and family. Nakita Sheppard is willing to participate in and is able to tolerate the proposed plan of care.    Rehabilitation Goals and Plan (Expected frequency & duration of treatment in the IRF):   Return to the Community, Modified Independent Level of Care and Family Able to Provide  Assistance  Anticipated Date of Rehabilitation Admission: 21  Patient/Family oriented IRF level of care/facility/plan: Yes  Patient/Family willing to participate in IRF care/facility/plan: Yes  Patient able to tolerate IRF level of care proposed: Yes  Patient has potential to benefit IRF level of care proposed: Yes  Comments: Not Applicable    Special Needs or Precautions - Medical Necessity:  Safety Concerns/Precautions:  Fall Risk / High Risk for Falls, Balance, Cognition and Bed / Chair Alarm  Pain Management  IV Site: Peripheral  Requires Oxygen  Current Medications:    Current Facility-Administered Medications Ordered in Epic   Medication Dose Route Frequency Provider Last Rate Last Admin   • vitamin D3  (cholecalciferol) tablet 1,000 Units  1,000 Units Oral DAILY Fortune Aig-Ojeanor, D.O.   1,000 Units at 12/03/21 0933   • lisinopril (PRINIVIL) tablet 40 mg  40 mg Oral DAILY Fortune Aig-Ojeanor, D.O.   40 mg at 12/03/21 0933   • ipratropium-albuterol (DUONEB) nebulizer solution  3 mL Nebulization Q4H PRN (RT) Rashaad Garcia M.D.       • atorvastatin (LIPITOR) tablet 80 mg  80 mg Oral DAILY Carlos Harry M.D.   80 mg at 12/03/21 0502   • clopidogrel (PLAVIX) tablet 75 mg  75 mg Oral DAILY Justin Gaines M.D.   75 mg at 12/03/21 0502   • aspirin EC (ECOTRIN) tablet 81 mg  81 mg Oral DAILY Rashaad Garcia M.D.   81 mg at 12/03/21 0502   • carBAMazepine (TEGRETOL) tablet 200 mg  200 mg Oral BID Rashaad Garcia M.D.   200 mg at 12/03/21 0502   • labetalol (NORMODYNE) tablet 200 mg  200 mg Oral Q12HRS Rashaad Garcia M.D.   200 mg at 12/03/21 0503   • senna-docusate (PERICOLACE or SENOKOT S) 8.6-50 MG per tablet 2 Tablet  2 Tablet Oral BID Rashaad Garcia M.D.   2 Tablet at 12/03/21 0502    And   • polyethylene glycol/lytes (MIRALAX) PACKET 1 Packet  1 Packet Oral QDAY PRN Rashaad Garcia M.D.        And   • magnesium hydroxide (MILK OF MAGNESIA) suspension 30 mL  30 mL Oral QDAY PRN Rashaad Garcia M.D.        And   • bisacodyl (DULCOLAX) suppository 10 mg  10 mg Rectal QDAY PRN Rashaad Garcia M.D.       • Respiratory Therapy Consult   Nebulization Continuous RT Rashaad Garcia M.D.       • enoxaparin (LOVENOX) inj 40 mg  40 mg Subcutaneous DAILY Rashaad Garcia M.D.   40 mg at 12/03/21 0503   • acetaminophen (Tylenol) tablet 650 mg  650 mg Oral Q6HRS PRN Rashaad Garcia M.D.       • enalaprilat (Vasotec) injection 1.25 mg 1 mL  1.25 mg Intravenous Q6HRS PRN Rashaad Garcia M.D.   1.25 mg at 12/01/21 0532   • labetalol (NORMODYNE/TRANDATE) injection 10 mg  10 mg Intravenous Q4HRS PRN Rashaad Garcia M.D.   10 mg at 12/01/21 0923   • ondansetron (ZOFRAN) syringe/vial injection 4 mg  4 mg  Intravenous Q4HRS PRN Rashaad Garcia M.D.       • ondansetron (ZOFRAN ODT) dispertab 4 mg  4 mg Oral Q4HRS PRN Rashaad Garcia M.D.         No current Epic-ordered outpatient medications on file.     Diet:   DIET ORDERS (From admission to next 24h)     Start     Ordered    12/01/21 1143  Diet Order Diet: Level 4 - Pureed; Liquid level: Level 2 - Mildly Thick; Tray Modifications (optional): No Straws, SLP - 1:1 Supervision by Nursing, SLP - Deliver to Nursing Station  ALL MEALS        Question Answer Comment   Diet: Level 4 - Pureed    Liquid level Level 2 - Mildly Thick    Tray Modifications (optional) No Straws    Tray Modifications (optional) SLP - 1:1 Supervision by Nursing    Tray Modifications (optional) SLP - Deliver to Nursing Station        12/01/21 1142                Anticipated Discharge Destination / Patient/Family Goal:  Destination: Home with Assistance Support System: Spouse, Family  and Friends  Anticipated home health services: OT, PT, SLP, Nursing, Social Work and Aide  Previously used  service/ provider: Not Applicable  Anticipated DME Needs: Oxygen, Walker, Wheelchair, Hospital Bed and Life Line  Outpatient Services: OT, PT and SLP  Alternative resources to address additional identified needs:     Pre-Screen Completed: 12/3/2021 10:14 AM Alfie Wong L.P.N.

## 2021-12-03 NOTE — DISCHARGE SUMMARY
Discharge Summary    CHIEF COMPLAINT ON ADMISSION  Chief Complaint   Patient presents with   • Possible Stroke     pt previosuly had a stroke on 11/19 and had TPA admistered and left hospital with no deficits. Pt was experience stroke like symptoms after TPA and was sent home by [facility]. Pt followed up with neurology and was told not to come to hospital unless symptoms last longer than an hour.        Reason for Admission  Sent by MD     Admission Date  11/30/2021    CODE STATUS  DNAR/DNI    HPI & HOSPITAL COURSE  This is a 76 y.o. female recently received TPA on 11/19/2021 for concerns of CVA with left face and left arm weakness discharged to rehab and subsequently home 9/22/2021 with complete resolution of her symptoms who has been having mild recurrence of left-sided weakness since discharge home that has been waxing and waning who presented 11/30/2021 with ongoing left upper extremity weakness, facial drooping, dysarthria and dysphagia.   Patient symptoms have been persisting longer and more frequently so they presented for evaluation.  She was noted to have blood pressure as high as 224 systolic in the ED, she was hypoxic, she has mild leukocytosis, normal renal and liver function, INR normal.  UA shows infection. CT head shows localized brain swelling and edema likely indicating subacute infarct in the right cerebral hemisphere.   She was seen by neurologist and her symptoms were suggestive of new ischemic event within the same vascular territory.  She was started on DAPT.   She was seen by PT/OT on them appropriate with discharge to inpatient rehab.  Prior to discharge, a Zio patch was placed and she will follow-up outpatient with the [facility].    Therefore, she is discharged in fair and stable condition to an inpatient rehabilitation hospital.    The patient met 2-midnight criteria for an inpatient stay at the time of discharge.    Discharge Date  12/3/2021    FOLLOW UP ITEMS POST  DISCHARGE  Neurology stroke clinic    DISCHARGE DIAGNOSES  Principal Problem:    Acute CVA (cerebrovascular accident) (HCC) POA: Yes  Active Problems:    Hypertension POA: Yes    Acute cystitis without hematuria POA: Yes    Acute respiratory failure with hypoxia (HCC) POA: Unknown  Resolved Problems:    * No resolved hospital problems. *      FOLLOW UP  Future Appointments   Date Time Provider Department Center   2/16/2022  8:40 AM RAJESH Almanza None     No follow-up provider specified.    MEDICATIONS ON DISCHARGE     Medication List      START taking these medications      Instructions   clopidogrel 75 MG Tabs  Start taking on: December 4, 2021  Commonly known as: PLAVIX   Take 1 Tablet by mouth every day.  Dose: 75 mg        CHANGE how you take these medications      Instructions   atorvastatin 80 MG tablet  Start taking on: December 4, 2021  What changed:   · medication strength  · how much to take  Commonly known as: LIPITOR   Take 1 Tablet by mouth every day.  Dose: 80 mg        CONTINUE taking these medications      Instructions   aspirin 81 MG EC tablet   Take 81 mg by mouth every day.  Dose: 81 mg     carBAMazepine 200 MG Tabs  Commonly known as: TEGRETOL   Take 200 mg by mouth 2 times a day.  Dose: 200 mg     carbidopa-levodopa  MG Tabs  Commonly known as: SINEMET   Take 2 Tablets by mouth 3 times a day. Indications: Parkinson's Disease  Dose: 2 Tablet     CoQ10 200 MG Caps   Take 200 mg by mouth every day.  Dose: 200 mg     labetalol 200 MG Tabs  Commonly known as: NORMODYNE   Take 200 mg by mouth every 12 hours.  Dose: 200 mg     lisinopril 20 MG Tabs  Commonly known as: PRINIVIL   Take 40 mg by mouth every day.  Dose: 40 mg     ROPINIRole 2 MG tablet  Commonly known as: REQUIP   Take 2 mg by mouth 3 times a day.  Dose: 2 mg     tizanidine 6 MG capsule  Commonly known as: ZANAFLEX   Take 6 mg by mouth 3 times a day.  Dose: 6 mg     trihexyphenidyl 2 MG Tabs  Commonly known as:  ARTANE   Take 2 mg by mouth 3 times a day. Indications: Parkinsonian-Like Syndrome  Dose: 2 mg     Tylenol PM Extra Strength  MG Tabs  Generic drug: diphenhydrAMINE-APAP (sleep)   Take 3 Tablets by mouth at bedtime.  Dose: 3 Tablet     vitamin D 1000 UNIT Tabs  Commonly known as: VITAMIND D3   Take 1,000 Units by mouth every day.  Dose: 1,000 Units            Allergies  Allergies   Allergen Reactions   • Fentanyl Anxiety   • Morphine Itching   • Penicillins Hives and Swelling       DIET  Orders Placed This Encounter   Procedures   • Diet Order Diet: Level 4 - Pureed; Liquid level: Level 2 - Mildly Thick; Tray Modifications (optional): No Straws, SLP - 1:1 Supervision by Nursing, SLP - Deliver to Nursing Station     Standing Status:   Standing     Number of Occurrences:   1     Order Specific Question:   Diet:     Answer:   Level 4 - Pureed [25]     Order Specific Question:   Liquid level     Answer:   Level 2 - Mildly Thick     Order Specific Question:   Tray Modifications (optional)     Answer:   No Straws     Order Specific Question:   Tray Modifications (optional)     Answer:   SLP - 1:1 Supervision by Nursing     Order Specific Question:   Tray Modifications (optional)     Answer:   SLP - Deliver to Nursing Station       ACTIVITY  As tolerated.  Weight bearing as tolerated    CONSULTATIONS  Neurology    PROCEDURES  None    LABORATORY  Lab Results   Component Value Date    SODIUM 145 12/02/2021    POTASSIUM 4.1 12/02/2021    CHLORIDE 108 12/02/2021    CO2 27 12/02/2021    GLUCOSE 98 12/02/2021    BUN 18 12/02/2021    CREATININE 0.86 12/02/2021        Lab Results   Component Value Date    WBC 9.7 12/02/2021    HEMOGLOBIN 11.9 (L) 12/02/2021    HEMATOCRIT 37.6 12/02/2021    PLATELETCT 320 12/02/2021        Total time of the discharge process exceeds 37 minutes.

## 2021-12-03 NOTE — CARE PLAN
The patient is Stable - Low risk of patient condition declining or worsening    Shift Goals  Clinical Goals: stable neuro checks  Patient Goals: sleep, ice chips  Family Goals: safety    Progress made toward(s) clinical / shift goals:  pt up to commode, Q2 turns in place, bed locked and in lowest position, call light within reach    Patient is not progressing towards the following goals:

## 2021-12-03 NOTE — PROGRESS NOTES
Monitor summary: SR 63-78, MA .14, QRS .08, QT .35, with rare PVCs per strip from monitor room.

## 2021-12-03 NOTE — DISCHARGE INSTRUCTIONS
Discharge Instructions    Discharged to other by [facility] with escort. Discharged via wheelchair, hospital escort: Yes.  Special equipment needed: Wheelchair    Be sure to schedule a follow-up appointment with your primary care doctor or any specialists as instructed.     Discharge Plan:   Diet Plan: Discussed  Activity Level: Discussed  Confirmed Follow up Appointment: Patient to Call and Schedule Appointment  Confirmed Symptoms Management: Discussed  Medication Reconciliation Updated: Yes  Influenza Vaccine Indication: Patient Refuses    I understand that a diet low in cholesterol, fat, and sodium is recommended for good health. Unless I have been given specific instructions below for another diet, I accept this instruction as my diet prescription.   Other diet: Pureed    Special Instructions: None    · Is patient discharged on Warfarin / Coumadin?   No     Depression / Suicide Risk    As you are discharged from this Critical access hospital facility, it is important to learn how to keep safe from harming yourself.    Recognize the warning signs:  · Abrupt changes in personality, positive or negative- including increase in energy   · Giving away possessions  · Change in eating patterns- significant weight changes-  positive or negative  · Change in sleeping patterns- unable to sleep or sleeping all the time   · Unwillingness or inability to communicate  · Depression  · Unusual sadness, discouragement and loneliness  · Talk of wanting to die  · Neglect of personal appearance   · Rebelliousness- reckless behavior  · Withdrawal from people/activities they love  · Confusion- inability to concentrate     If you or a loved one observes any of these behaviors or has concerns about self-harm, here's what you can do:  · Talk about it- your feelings and reasons for harming yourself  · Remove any means that you might use to hurt yourself (examples: pills, rope, extension cords, firearm)  · Get professional help from the community  (Mental Health, Substance Abuse, psychological counseling)  · Do not be alone:Call your Safe Contact- someone whom you trust who will be there for you.  · Call your local CRISIS HOTLINE 254-1519 or 245-808-4539  · Call your local Children's Mobile Crisis Response Team [place] (417) 322-8595 or www.CRV  · Call the toll free National Suicide Prevention Hotlines   · National Suicide Prevention Lifeline 346-917-UTNK (0838)  · [facility] 800-SUICIDE (260-9070)      Urinary Tract Infection, Adult  A urinary tract infection (UTI) is an infection of any part of the urinary tract. The urinary tract includes:  · The kidneys.  · The ureters.  · The bladder.  · The urethra.  These organs make, store, and get rid of pee (urine) in the body.  What are the causes?  This is caused by germs (bacteria) in your genital area. These germs grow and cause swelling (inflammation) of your urinary tract.  What increases the risk?  You are more likely to develop this condition if:  · You have a small, thin tube (catheter) to drain pee.  · You cannot control when you pee or poop (incontinence).  · You are female, and:  ? You use these methods to prevent pregnancy:  ? A medicine that kills sperm (spermicide).  ? A device that blocks sperm (diaphragm).  ? You have low levels of a female hormone (estrogen).  ? You are pregnant.  · You have genes that add to your risk.  · You are sexually active.  · You take antibiotic medicines.  · You have trouble peeing because of:  ? A prostate that is bigger than normal, if you are male.  ? A blockage in the part of your body that drains pee from the bladder (urethra).  ? A kidney stone.  ? A nerve condition that affects your bladder (neurogenic bladder).  ? Not getting enough to drink.  ? Not peeing often enough.  · You have other conditions, such as:  ? Diabetes.  ? A weak disease-fighting system (immune system).  ? Sickle cell disease.  ? Gout.  ? Injury of the  spine.  What are the signs or symptoms?  Symptoms of this condition include:  · Needing to pee right away (urgently).  · Peeing often.  · Peeing small amounts often.  · Pain or burning when peeing.  · Blood in the pee.  · Pee that smells bad or not like normal.  · Trouble peeing.  · Pee that is cloudy.  · Fluid coming from the vagina, if you are female.  · Pain in the belly or lower back.  Other symptoms include:  · Throwing up (vomiting).  · No urge to eat.  · Feeling mixed up (confused).  · Being tired and grouchy (irritable).  · A fever.  · Watery poop (diarrhea).  How is this treated?  This condition may be treated with:  · Antibiotic medicine.  · Other medicines.  · Drinking enough water.  Follow these instructions at home:    Medicines  · Take over-the-counter and prescription medicines only as told by your doctor.  · If you were prescribed an antibiotic medicine, take it as told by your doctor. Do not stop taking it even if you start to feel better.  General instructions  · Make sure you:  ? Pee until your bladder is empty.  ? Do not hold pee for a long time.  ? Empty your bladder after sex.  ? Wipe from front to back after pooping if you are a female. Use each tissue one time when you wipe.  · Drink enough fluid to keep your pee pale yellow.  · Keep all follow-up visits as told by your doctor. This is important.  Contact a doctor if:  · You do not get better after 1-2 days.  · Your symptoms go away and then come back.  Get help right away if:  · You have very bad back pain.  · You have very bad pain in your lower belly.  · You have a fever.  · You are sick to your stomach (nauseous).  · You are throwing up.  Summary  · A urinary tract infection (UTI) is an infection of any part of the urinary tract.  · This condition is caused by germs in your genital area.  · There are many risk factors for a UTI. These include having a small, thin tube to drain pee and not being able to control when you pee or  poop.  · Treatment includes antibiotic medicines for germs.  · Drink enough fluid to keep your pee pale yellow.  This information is not intended to replace advice given to you by your health care provider. Make sure you discuss any questions you have with your health care provider.  Document Released: 06/05/2009 Document Revised: 12/05/2019 Document Reviewed: 06/27/2019  Elsevier Patient Education © 2020 [employer]

## 2021-12-03 NOTE — CARE PLAN
Problem: Pain - Standard  Goal: Alleviation of pain or a reduction in pain to the patient’s comfort goal  Outcome: Progressing     Problem: Knowledge Deficit - Standard  Goal: Patient and family/care givers will demonstrate understanding of plan of care, disease process/condition, diagnostic tests and medications  Outcome: Progressing     Problem: Skin Integrity  Goal: Skin integrity is maintained or improved  Outcome: Progressing     Problem: Fall Risk  Goal: Patient will remain free from falls  Outcome: Progressing   The patient is Stable - Low risk of patient condition declining or worsening    Shift Goals  Clinical Goals: stable neuro status, mobility  Patient Goals: rest  Family Goals: n/a    Progress made toward(s) clinical / shift goals:  denies pain. Understands poc    Patient is not progressing towards the following goals:

## 2021-12-03 NOTE — H&P
"REHABILITATION HISTORY AND PHYSICAL/POST ADMISSION EVALUATION    12/3/2021  3:15 PM  Nakita Sheppard  RH21/01  Admission  12/3/2021  3:09 PM  [facility] Code/Reason for admission: 0001.1 - Stroke: Left Body Involvement (Right Brain)   Etiologic diagnosis/problem: Ischemic stroke (HCC)  Chief Complaint: Left arm weakness    HPI:  Per Dr. Phillips consult \"The patient is a 76 y.o. right hand dominant female with a past medical history of arthritis, hypertension, history of TIAs, and peripheral neuropathy;  who presented on 11/30/2021  6:14 PM with left-sided weakness.  Per documentation, patient was evaluated at [facility] with symptoms of left-sided arm weakness on 11/19/2021.  During that hospitalization patient was given TPA at that time and reportedly her symptoms resolved.  Patient was discharged from the hospital sometime later and reportedly had done some rehab (chart review indicates that patient went to rehab however patient tells her that she did rehab at home).  However on the night that she was discharged home she began to have some mild recurrence of her left-sided weakness which fluctuated on and off for following days. Patient did not follow-up with neurology and was continued on baby aspirin.  Reportedly, patient's left-sided weakness had intermittently recurred for 5 days until she presented to the hospital on 11/30.  At that time symptoms seem more significant and that they did not go away so patient was brought to the emergency room.      A CT scan of the head was obtained at evaluation emergency department at [facility] which confirms a right parietal subacute ischemic infarct.  Neurology was consulted, given the patient's time course of symptoms patient was not deemed to be a candidate for interventions involving TPA.  MRI from [facility] was able to be reviewed and the outside images revealed a minimal right MCA stroke burden which comparatively was far less burden that was " "revealed on the CT head on admission to [facility].      Based on neurology's review of images from [facility] and comparing them to [place] it was determined that the patient had recurrent embolic events to her right MCA territory, given that this was a recurrent embolic event to the same vascular territory and their recommendations for short-term DAPT, high intensity statin and completion of embolic work-up   Including cardiac monitoring.  Additionally, patient's hospital course has been complicated by the evidence of a positive UTI, patient has been started on ceftriaxone for treatment of her UTI.\"    Patient current reports left arm weakness.  She is right-hand dominant.  She has chronic neuropathy in her feet but denies any pain.  She moved her bowels yesterday denies any urinary incontinence.    Patient was evaluated by Rehab Medicine physician and Physical Therapy, Occupational Therapy and Speech Therapy and determined to be appropriate for acute inpatient rehab and was transferred to [facility] on 12/3/2021  3:09 PM.    With this acute therapeutic intervention, this patient hopes to improve her functional status, and return to independent living with the supportive care of spouse.    REVIEW OF SYSTEMS:     A complete review of systems was performed and was negative in detail with the exception of items mentioned elsewhere in this document.    PMH:  Past Medical History:   Diagnosis Date   • Allergy    • Arthritis    • Chronic headaches 5/29/2012   • Chronic pain    • Dental disorder    • Heart burn    • History of chickenpox    • History of measles    • History of mumps    • Hypertension    • Indigestion    • Low back pain 9/22/14    8/10   • Migraine    • Peripheral neuropathy     BLE   • Rotator cuff tear    • Seasonal allergies    • TIA (transient ischemic attack)    • Tuberculosis 1979    had treatment for 2 years       PSH:  Past Surgical History:   Procedure " Laterality Date   • LUMBAR FUSION POSTERIOR  10/6/2014    Performed by Rishabh Bhatia M.D. at SURGERY [facility] ORS   • LUMBAR LAMINECTOMY DISKECTOMY  10/6/2014    Performed by Rishabh Bhatia M.D. at SURGERY [facility] ORS   • LUMBAR EXPLORATION  10/6/2014    Performed by Rishabh Bhatia M.D. at SURGERY [facility] ORS   • OTHER  2013    back   • SPINAL CORD STIMULATOR  11/4/2011    Performed by AUBREE PALACIO at SURGERY [facility]   • OPEN REDUCTION  2008    re-broke foot   • OTHER  2000    right foot   • SHOULDER SURGERY  1998   • CARPAL TUNNEL ENDOSCOPIC  1990   • ARTHROSCOPY, KNEE  1987    bilateral   • ABDOMINAL HYSTERECTOMY TOTAL  1983   • APPENDECTOMY     • CHOLECYSTECTOMY     • LAMINOTOMY  02/2002 and 9/2002       Family History   Problem Relation Age of Onset   • Diabetes Mother    • Hypertension Mother    • Arthritis Mother    • Stroke Mother    • Heart Disease Father    • Cancer Brother    • Leukemia Brother    • Stroke Brother         MEDICATIONS:  Current Facility-Administered Medications   Medication Dose   • hydrOXYzine HCl (ATARAX) tablet 50 mg  50 mg   • melatonin tablet 3 mg  3 mg   • Respiratory Therapy Consult     • Pharmacy Consult Request ...Pain Management Review 1 Each  1 Each   • hydrALAZINE (APRESOLINE) tablet 10 mg  10 mg   • acetaminophen (Tylenol) tablet 650 mg  650 mg   • lactulose 20 GM/30ML solution 30 mL  30 mL   • docusate sodium (ENEMEEZ) enema 283 mg  283 mg   • sodium phosphate (Fleet) enema 133 mL  1 Each   • omeprazole (PRILOSEC) capsule 20 mg  20 mg   • artificial tears ophthalmic solution 1 Drop  1 Drop   • benzocaine-menthol (CEPACOL) lozenge 1 Lozenge  1 Lozenge   • mag hydrox-al hydrox-simeth (MAALOX PLUS ES or MYLANTA DS) suspension 20 mL  20 mL   • ondansetron (ZOFRAN ODT) dispertab 4 mg  4 mg    Or   • ondansetron (ZOFRAN) syringe/vial injection 4 mg  4 mg   • traZODone (DESYREL) tablet 50 mg  50 mg   • sodium chloride (OCEAN) 0.65 % nasal spray 2 Spray   2 Spray   • midazolam (VERSED) 5 mg/mL (1 mL vial)  5 mg   • enoxaparin (LOVENOX) inj 40 mg  40 mg   • senna-docusate (PERICOLACE or SENOKOT S) 8.6-50 MG per tablet 2 Tablet  2 Tablet    And   • polyethylene glycol/lytes (MIRALAX) PACKET 1 Packet  1 Packet    And   • magnesium hydroxide (MILK OF MAGNESIA) suspension 30 mL  30 mL    And   • bisacodyl (DULCOLAX) suppository 10 mg  10 mg   • aspirin EC (ECOTRIN) tablet 81 mg  81 mg   • atorvastatin (LIPITOR) tablet 80 mg  80 mg   • carBAMazepine (TEGRETOL) tablet 200 mg  200 mg   • clopidogrel (PLAVIX) tablet 75 mg  75 mg   • labetalol (NORMODYNE) tablet 200 mg  200 mg   • [START ON 2021] lisinopril (PRINIVIL) tablet 40 mg  40 mg   • [START ON 2021] vitamin D3 (cholecalciferol) tablet 1,000 Units  1,000 Units       ALLERGIES:  Fentanyl, Morphine, and Penicillins    PSYCHOSOCIAL HISTORY:  Patient lives with her  in a one-story home with ramp access, has 2 adult daughters and a son who planned to come to assist.  No stairs to enter, has ramp access [place]    Patient's been  for 50 years.  She has 7 children, 28 grandchildren, and 23 great-grandchildren.   Tobacco: Denies  Alcohol: Denies  Drugs: Denies    LEVEL OF FUNCTION PRIOR TO DISABILTY:  At prior level of function patient was mod I with a 4 wheeled walker mostly in the community, patient tells me she did not use an assist device to ambulate at home prior to her onset of left-sided weakness earlier in November.       LEVEL OF FUNCTION PRIOR TO ADMISSION to [facility]:  PT:    Gait Level Of Assist: Unable to Participate  Supine to Sit: Moderate Assist  Sit to Supine: Minimal Assist  Scooting: Minimal Assist  Sit to Stand: Moderate Assist  Bed, Chair, Wheelchair Transfer: Maximal Assist  Toilet Transfers: Maximal Assist  Transfer Method: Stand Pivot  Sitting in Chair: 10 [place]  Sitting Edge of Bed: 2 min  Standin min    OT:    Upper Body Dressing: Minimal Assist  Lower  "Body Dressing: Maximal Assist  Toileting: Supervision    SLP:    Problem List: Dysphagia,Dysarthia  Diet / Liquid Recommendation: Puree (4),Mildly Thick (2) - (Nectar Thick)    CURRENT LEVEL OF FUNCTION:   Same as level of function prior to admission to [facility]    PHYSICAL EXAM:     VITAL SIGNS:   height is 1.575 m (5' 2\") and weight is 81 kg (178 lb 9.2 oz). Her temporal temperature is 36.7 °C (98.1 °F). Her blood pressure is 176/83 (abnormal) and her pulse is 70. Her respiration is 18 and oxygen saturation is 93%.     GENERAL: No apparent distress, obese  HEENT: Normocephalic/atraumatic, EOMI and No nystagmus, moist mucous membranes  CARDIAC: Regular rate and rhythm, normal S1, S2, no murmurs, mild peripheral edema, cardiac monitor in place  LUNGS: Clear to auscultation, normal respiratory effort  ABDOMINAL: soft and nontender    EXTREMITIES: 2+ bilateral DP/PT pulses  MSK: Right foot and toe deformities    NEURO:    Mental status: Alert  Speech: fluent, no aphasia, moderate dysarthria    CRANIAL NERVES:  2,3: visual acuity grossly intact, PERRL  3,4,6: EOMI bilaterally, no nystagmus or diplopia  5: intact in all branches  7: left facial droop  8: hearing grossly intact  9,10: symmetric palate elevation  11: SCM/Trapezius strength 5/5 on the right, 0 out of 5 on the left  12: tongue protrudes midline    Motor:  Shoulder flexors:  Right -  5/5, Left -  0/5  Elbow flexors:  Right -  5/5, Left -  0/5  Elbow extensors:  Right -  5/5, Left -  0/5  Hip flexors:  Right -  5/5, Left -  4/5  Knee ext:  Right -  5/5, Left -  4/5  Dorsiflexors:  Right -  5/5, Left -  4/5  Plantar flexors:  Right -  5/5, Left -  4/5     Sensory:   intact to light touch through out    Left neglect    Tone: Increased at the left elbow and wrist              Radiology    11/30/2021 7:03 PM     HISTORY/REASON FOR EXAM:  Loss of consciousness.        TECHNIQUE/EXAM DESCRIPTION AND NUMBER OF VIEWS:  CT of the head without " contrast.     Contiguous 5 mm axial sections were obtained from the skull base through the vertex.     Up to date radiation dose reduction adjustments have been utilized to meet ALARA standards for radiation dose reduction.     COMPARISON:  10/13/2016     FINDINGS:     There is no evidence of intra-axial hemorrhage. No extra-axial hemorrhage is identified. Localized brain swelling and edema is noted in the cortex and subcortical white matter in the mid right cerebral hemisphere at the border of the temporal and   parietal lobes extending into the posterior frontal lobe. No other localized brain swelling or edema is noted. There is decreased attenuation in periventricular white matter. Evidence of old left-sided lacunar infarct in the basal ganglia is again noted.        IMPRESSION:        1.  Localized brain swelling and edema likely indicating subacute infarct is identified in the right cerebral hemisphere involving a small portion of the temporal and parietal lobes.     2.  No intracranial hemorrhage is identified.     3.  Decreased attenuation in the periventricular white matter is noted which could indicate microvascular ischemic disease.        LABS:  Recent Labs     11/30/21  1858 12/02/21  0404   SODIUM 143 145   POTASSIUM 4.7 4.1   CHLORIDE 104 108   CO2 27 27   GLUCOSE 118* 98   BUN 16 18   CREATININE 0.93 0.86   CALCIUM 9.6 8.9     Recent Labs     11/30/21  1858 12/01/21  0324 12/02/21  0404   WBC 11.9* 11.2* 9.7   RBC 4.30 3.90* 3.70*   HEMOGLOBIN 13.4 12.3 11.9*   HEMATOCRIT 41.8 38.1 37.6   MCV 97.2 97.7 101.6*   MCH 31.2 31.5 32.2   MCHC 32.1* 32.3* 31.6*   RDW 44.7 45.1 47.3   PLATELETCT 414 355 320   MPV 9.1 9.4 9.2     Recent Labs     11/30/21  1858   APTT 26.7   INR 1.08       PRIMARY REHAB DIAGNOSIS:    This patient is a 76 y.o. female admitted for acute inpatient rehabilitation with Ischemic stroke (HCC).    IMPAIRMENTS:   Cognitive  ADLs/IADLs  Mobility  Speech  Swallow    SECONDARY  DIAGNOSIS/MEDICAL CO-MORBIDITIES AFFECTING FUNCTION:    Hypertension  Obesity  Peripheral neuropathy  Normocytic anemia      RELEVANT CHANGES SINCE PREADMISSION EVALUATION:    Status unchanged    The patient's rehabilitation potential is good  The patient's medical prognosis is good    PLAN:   Discussion and Recommendations, discussed with the patient and/or family:   1. The patient requires an acute inpatient rehabilitation program with a coordinated program of care at an intensity and frequency not available at a lower level of care. This recommendation is substantiated by the patient's medical physicians who recommend that the patient's intervention and assessment of medical issues needs to be done at an acute level of care for patient's safety and maximum outcome.     2. A coordinated program of care will be supplied by an interdisciplinary team of physical therapy, occupational therapy, rehab physician, rehab nursing, and, if needed, speech therapy and rehab psychology. Rehab team presents a patient-specific rehabilitation and education program concentrating on prevention of future problems related to accessibility, mobility, skin, bowel, bladder, sexuality, and psychosocial and medical/surgical problems.     3. Need for Rehabilitation Physician: The rehab physician will be evaluating the patient on a multi-weekly basis to help coordinate the program of care. The rehab physician communicates between medical physicians, therapists, and nurses to maximize the patient's potential outcome. Specific areas in which the rehab physician will be providing daily assessment include the following:   A. Assessing the patient's heart rate and blood pressure response (vitals monitoring) to activity and making adjustments in medications or conservative measures as needed.   B. The rehab physician will be assessing the frequency at which the program can be increased to allow the patient to reach optimal functional outcome.   C.  The rehab physician will also provide assessments in daily skin care, especially in light of patient's impairments in mobility.   D. The rehab physician will provide special expertise in understanding how to work with functional impairment and recommend appropriate interventions, compensatory techniques, and education that will facilitate the patient's outcome.     4. Rehab R.N.   The rehab RN will be working with patient to carry over in room mobility and activities of daily living when the patient is not in 3 hours of skilled therapy. Rehab nursing will be working in conjunction with rehab physician to address all the medical issues above and continue to assess laboratory work and discuss abnormalities with the treating physicians, assess vitals, and response to activity, and discuss and report abnormalities with the rehab physician. Rehab RN will also continue daily skin care, supervise bladder/bowel program, instruct in medication administration, and ensure patient safety.     5. Therapies to treat at intensity and frequency of (may change after completion of evaluation by all therapeutic disciplines):       PT:  Physical therapy to address mobility, transfer, gait training and evaluation for adaptive equipment needs 1hour/day at least 5 days/week for the duration of the ELOS (see below)       OT:  Occupational therapy to address ADLs, self-care, home management training, functional mobility/transfers and assistive device evaluation, and community re-integration 1hour/day at least 5 days/week for the duration of the ELOS (see below).        ST/Dysphagia:  Speech therapy to address speech, language, and cognitive deficits as well as swallowing difficulties with retraining/dysphagia management and community re-integration with comprehension, expression, cognitive training 1hour/day at least 5 days/week for the duration of the ELOS (see below).     6. Medical management / Rehabilitation Issues/Adverse Potential  affecting function as part of rehabilitation plan.    Right MCA stroke  Left hemiparesis  Nondominant  Left neglect  Dysphagia  Cognitive impairments  Start full rehab program    Aspirin  Plavix  Statin    Patient with Zio patch cardiac monitor    Hypertension  Lisinopril  Labetalol  Consult hospitalist    Peripheral neuropathy  Continue home medication carbamazepine  Was also on Zanaflex, will hold for now    Obesity  BMI 32.6  Outpatient nutritional counseling    Normocytic anemia  Check morning labs    I performed a complete drug regimen review and did not identify any potential clinically significant medication issues.    The patient's CODE STATUS was confirmed as DNR/DNI on admission, with the patient and/or family at bedside.    REHABILITATION ISSUES/ADVERSE POTENTIAL:  1.  CVA (Cerebrovascular Accident): Continue aspirin for secondary prophylaxis as well as lipid and blood pressure management. Patient demonstrates functional deficits in strength, balance, coordination, and ADL's. Patient is admitted to [facility] for comprehensive rehabilitation therapy as described below.   Rehabilitation nursing monitors bowel and bladder control, educates on medication administration, co-morbidities and monitors patient safety.    2.  DVT prophylaxis:  Patient is on Lovenox for anticoagulation upon transfer. Encourage OOB. Monitor daily for signs and symptoms of DVT including but not limited to swelling and pain to prevent the development of DVT that may interfere with therapies.    3.  Pain: No issues with pain currently / Controlled with as needed oral analgesics.    4.  Nutrition/Dysphagia: Dietician monitors nutrient intake, recommend supplements prn and provide nutrition education to pt/family to promote optimal nutrition for wound healing/recovery.     5.  Bladder/bowel:  Start bowel and bladder program, to prevent constipation, urinary retention (which may lead to UTI), and urinary incontinence  (which will impact upon pt's functional independence).   - TV Q3h while awake with post void bladder scans, I&O cath for PVRs >400  - up to commode after meal     6.  Skin/dermal ulcer prophylaxis: Monitor for new skin conditions with q.2 h. turns as required to prevent the development of skin breakdown.     7.  GI prophylaxis: Omeprazole to prevent gastritis or GI bleed that would interfere with therapies.    8. Respiratory therapy: RT performs O2 management prn, breathing retraining, pulmonary hygiene and bronchospasm management prn to optimize participation in therapies.    Pt was seen today for 72 min, and entire time spent in face-to-face contact was >50% in counseling and coordination of care as detailed in A/P above.        GOALS/EXPECTED LEVEL OF FUNCTION BASED ON CURRENT MEDICAL AND FUNCTIONAL STATUS (may change based on patient's medical status and rate of impairment recovery):  Transfers:   Minimal Assistance  Mobility/Gait:   Minimal Assistance  ADL's:   Moderate Assistance  Cognition:  Least Verbal Cues  Swallowing:  Least Restrictive Diet      DISPOSITION: Discharge to pre-morbid independent living setting with the supportive care of patient's family.      ELOS: 21 days    Hawa Alva M.D.  Physical Medicine and Rehabilitation

## 2021-12-03 NOTE — THERAPY
"Speech Language Pathology  Daily Treatment     Patient Name: Nakita Sheppard  Age:  76 y.o., Sex:  female  Medical Record #: [id number]  Today's Date: 12/3/2021     Precautions  Precautions: Fall Risk,Swallow Precautions ( See Comments)  Comments: L sided deficits    Assessment    As SLP entering room, family member attempting to give pt thinner lemonade with straw and pt less than 40 degrees and laying near the middle of the bed. Pt moved up in bed with RN. Educ provided regarding sitting at 90 degrees, no use of straws currently, and MT2 liquids. Pt's family verbalized understanding. Pt with no coughing and no pocketing post swallows of PU/MT2 during lunch meal. She required one cue to avoid talking with oral bolus. Pt to transfer to rehab this afternoon.     Plan  PU4/MT2 with superv and tray set up with recommended swallow strategies. Consider cognitive/linguistic eval orders.   Continue current treatment plan.    Discharge Recommendations: Recommend post-acute placement for additional speech therapy services prior to discharge home       12/03/21 1343   Speech / Dysarthria   Comments moderate to severe dysarthria.    Voice   Comments low intensity   Dysphagia    Dysphagia X   Positioning / Behavior Modification Modulate Rate or Bite Size;Multiple Swallows;Other (see Comments)  (sitting at 90 degrees, 1-1 feeding)   Diet / Liquid Recommendation Puree (4);Mildly Thick (2) - (Nectar Thick)   Nutritional Liquid Intake Rating Scale Thickened beverages (mildly thick unless otherwise specified)   Nutritional Food Intake Rating Scale Total oral diet of a single consistency   Nursing Communication Swallow Precaution Sign Posted at Head of Bed   Skilled Intervention Compensatory Strategies;Verbal Cueing   Recommended Route of Medication Administration   Medication Administration  Crush all Medications in Puree   Patient / Family Goals   Patient / Family Goal #1 \"something to drink\"   Goal #1 Outcome Progressing as " expected   Short Term Goals   Short Term Goal # 1 B  Pt will consume PU4/MT2 diet with no overt s/sx of aspiration, given min cues to swallowing strategies   Goal Outcome  # 1 B Progressing as expected

## 2021-12-03 NOTE — PROGRESS NOTES
Monitor summary: SR 61-81, WA .15, QRS .07, QT .40 rare PACs, rare PVCs per strip from monitor room.

## 2021-12-04 PROBLEM — E87.6 HYPOKALEMIA: Status: ACTIVE | Noted: 2021-12-04

## 2021-12-04 LAB
ALBUMIN SERPL BCP-MCNC: 3.9 G/DL (ref 3.2–4.9)
ALBUMIN/GLOB SERPL: 1.4 G/DL
ALP SERPL-CCNC: 121 U/L (ref 30–99)
ALT SERPL-CCNC: <5 U/L (ref 2–50)
ANION GAP SERPL CALC-SCNC: 14 MMOL/L (ref 7–16)
AST SERPL-CCNC: 14 U/L (ref 12–45)
BASOPHILS # BLD AUTO: 1.1 % (ref 0–1.8)
BASOPHILS # BLD: 0.09 K/UL (ref 0–0.12)
BILIRUB SERPL-MCNC: 0.4 MG/DL (ref 0.1–1.5)
BUN SERPL-MCNC: 17 MG/DL (ref 8–22)
CALCIUM SERPL-MCNC: 9 MG/DL (ref 8.5–10.5)
CHLORIDE SERPL-SCNC: 106 MMOL/L (ref 96–112)
CHOLEST SERPL-MCNC: 157 MG/DL (ref 100–199)
CO2 SERPL-SCNC: 24 MMOL/L (ref 20–33)
CREAT SERPL-MCNC: 0.81 MG/DL (ref 0.5–1.4)
EOSINOPHIL # BLD AUTO: 0.21 K/UL (ref 0–0.51)
EOSINOPHIL NFR BLD: 2.6 % (ref 0–6.9)
ERYTHROCYTE [DISTWIDTH] IN BLOOD BY AUTOMATED COUNT: 45.1 FL (ref 35.9–50)
EST. AVERAGE GLUCOSE BLD GHB EST-MCNC: 114 MG/DL
GLOBULIN SER CALC-MCNC: 2.8 G/DL (ref 1.9–3.5)
GLUCOSE SERPL-MCNC: 108 MG/DL (ref 65–99)
HBA1C MFR BLD: 5.6 % (ref 4–5.6)
HCT VFR BLD AUTO: 37.4 % (ref 37–47)
HDLC SERPL-MCNC: 62 MG/DL
HGB BLD-MCNC: 12 G/DL (ref 12–16)
IMM GRANULOCYTES # BLD AUTO: 0.04 K/UL (ref 0–0.11)
IMM GRANULOCYTES NFR BLD AUTO: 0.5 % (ref 0–0.9)
LDLC SERPL CALC-MCNC: 74 MG/DL
LYMPHOCYTES # BLD AUTO: 1.74 K/UL (ref 1–4.8)
LYMPHOCYTES NFR BLD: 21.6 % (ref 22–41)
MAGNESIUM SERPL-MCNC: 2 MG/DL (ref 1.5–2.5)
MCH RBC QN AUTO: 31.7 PG (ref 27–33)
MCHC RBC AUTO-ENTMCNC: 32.1 G/DL (ref 33.6–35)
MCV RBC AUTO: 98.9 FL (ref 81.4–97.8)
MONOCYTES # BLD AUTO: 0.64 K/UL (ref 0–0.85)
MONOCYTES NFR BLD AUTO: 7.9 % (ref 0–13.4)
NEUTROPHILS # BLD AUTO: 5.35 K/UL (ref 2–7.15)
NEUTROPHILS NFR BLD: 66.3 % (ref 44–72)
NRBC # BLD AUTO: 0 K/UL
NRBC BLD-RTO: 0 /100 WBC
PLATELET # BLD AUTO: 336 K/UL (ref 164–446)
PMV BLD AUTO: 9.5 FL (ref 9–12.9)
POTASSIUM SERPL-SCNC: 3.4 MMOL/L (ref 3.6–5.5)
PROT SERPL-MCNC: 6.7 G/DL (ref 6–8.2)
RBC # BLD AUTO: 3.78 M/UL (ref 4.2–5.4)
SARS-COV-2 RNA RESP QL NAA+PROBE: NOTDETECTED
SODIUM SERPL-SCNC: 144 MMOL/L (ref 135–145)
SPECIMEN SOURCE: NORMAL
TRIGL SERPL-MCNC: 103 MG/DL (ref 0–149)
WBC # BLD AUTO: 8.1 K/UL (ref 4.8–10.8)

## 2021-12-04 PROCEDURE — 700102 HCHG RX REV CODE 250 W/ 637 OVERRIDE(OP): Performed by: HOSPITALIST

## 2021-12-04 PROCEDURE — 82306 VITAMIN D 25 HYDROXY: CPT

## 2021-12-04 PROCEDURE — A9270 NON-COVERED ITEM OR SERVICE: HCPCS | Performed by: PHYSICAL MEDICINE & REHABILITATION

## 2021-12-04 PROCEDURE — 80061 LIPID PANEL: CPT

## 2021-12-04 PROCEDURE — 83036 HEMOGLOBIN GLYCOSYLATED A1C: CPT

## 2021-12-04 PROCEDURE — 700102 HCHG RX REV CODE 250 W/ 637 OVERRIDE(OP): Performed by: PHYSICAL MEDICINE & REHABILITATION

## 2021-12-04 PROCEDURE — 97166 OT EVAL MOD COMPLEX 45 MIN: CPT

## 2021-12-04 PROCEDURE — 92522 EVALUATE SPEECH PRODUCTION: CPT

## 2021-12-04 PROCEDURE — 97535 SELF CARE MNGMENT TRAINING: CPT

## 2021-12-04 PROCEDURE — 700111 HCHG RX REV CODE 636 W/ 250 OVERRIDE (IP): Performed by: PHYSICAL MEDICINE & REHABILITATION

## 2021-12-04 PROCEDURE — 770010 HCHG ROOM/CARE - REHAB SEMI PRIVAT*

## 2021-12-04 PROCEDURE — A9270 NON-COVERED ITEM OR SERVICE: HCPCS | Performed by: HOSPITALIST

## 2021-12-04 PROCEDURE — 85025 COMPLETE CBC W/AUTO DIFF WBC: CPT

## 2021-12-04 PROCEDURE — 97530 THERAPEUTIC ACTIVITIES: CPT

## 2021-12-04 PROCEDURE — 83735 ASSAY OF MAGNESIUM: CPT

## 2021-12-04 PROCEDURE — 99223 1ST HOSP IP/OBS HIGH 75: CPT | Performed by: HOSPITALIST

## 2021-12-04 PROCEDURE — 36415 COLL VENOUS BLD VENIPUNCTURE: CPT

## 2021-12-04 PROCEDURE — 97162 PT EVAL MOD COMPLEX 30 MIN: CPT

## 2021-12-04 PROCEDURE — 80053 COMPREHEN METABOLIC PANEL: CPT

## 2021-12-04 PROCEDURE — 97116 GAIT TRAINING THERAPY: CPT

## 2021-12-04 PROCEDURE — 92610 EVALUATE SWALLOWING FUNCTION: CPT

## 2021-12-04 RX ORDER — AMLODIPINE BESYLATE 5 MG/1
5 TABLET ORAL
Status: DISCONTINUED | OUTPATIENT
Start: 2021-12-04 | End: 2021-12-05

## 2021-12-04 RX ORDER — POTASSIUM CHLORIDE 20 MEQ/1
20 TABLET, EXTENDED RELEASE ORAL DAILY
Status: DISCONTINUED | OUTPATIENT
Start: 2021-12-04 | End: 2021-12-07

## 2021-12-04 RX ORDER — POTASSIUM CHLORIDE 20 MEQ/1
40 TABLET, EXTENDED RELEASE ORAL ONCE
Status: COMPLETED | OUTPATIENT
Start: 2021-12-04 | End: 2021-12-04

## 2021-12-04 RX ADMIN — ENOXAPARIN SODIUM 40 MG: 40 INJECTION SUBCUTANEOUS at 09:49

## 2021-12-04 RX ADMIN — POTASSIUM CHLORIDE 20 MEQ: 1500 TABLET, EXTENDED RELEASE ORAL at 09:49

## 2021-12-04 RX ADMIN — OMEPRAZOLE 20 MG: 20 CAPSULE, DELAYED RELEASE ORAL at 09:51

## 2021-12-04 RX ADMIN — SENNOSIDES AND DOCUSATE SODIUM 2 TABLET: 50; 8.6 TABLET ORAL at 10:00

## 2021-12-04 RX ADMIN — Medication 1000 UNITS: at 10:00

## 2021-12-04 RX ADMIN — ASPIRIN 81 MG: 81 TABLET, COATED ORAL at 09:52

## 2021-12-04 RX ADMIN — AMLODIPINE BESYLATE 5 MG: 5 TABLET ORAL at 14:17

## 2021-12-04 RX ADMIN — POTASSIUM CHLORIDE 40 MEQ: 1500 TABLET, EXTENDED RELEASE ORAL at 14:16

## 2021-12-04 RX ADMIN — CARBAMAZEPINE 200 MG: 100 TABLET, CHEWABLE ORAL at 09:53

## 2021-12-04 RX ADMIN — LABETALOL HYDROCHLORIDE 200 MG: 100 TABLET, FILM COATED ORAL at 20:33

## 2021-12-04 RX ADMIN — HYDRALAZINE HYDROCHLORIDE 10 MG: 10 TABLET, FILM COATED ORAL at 19:20

## 2021-12-04 RX ADMIN — CARBAMAZEPINE 200 MG: 100 TABLET, CHEWABLE ORAL at 20:32

## 2021-12-04 RX ADMIN — ATORVASTATIN CALCIUM 80 MG: 40 TABLET, FILM COATED ORAL at 09:52

## 2021-12-04 RX ADMIN — LISINOPRIL 40 MG: 20 TABLET ORAL at 09:59

## 2021-12-04 RX ADMIN — LABETALOL HYDROCHLORIDE 200 MG: 100 TABLET, FILM COATED ORAL at 09:53

## 2021-12-04 RX ADMIN — CLOPIDOGREL BISULFATE 75 MG: 75 TABLET ORAL at 09:52

## 2021-12-04 ASSESSMENT — GAIT ASSESSMENTS
DISTANCE (FEET): 4
GAIT LEVEL OF ASSIST: MAXIMAL ASSIST
ASSISTIVE DEVICE: SINGLE POINT CANE
DEVIATION: STEP TO;DECREASED HEEL STRIKE;DECREASED TOE OFF;SHUFFLED GAIT

## 2021-12-04 ASSESSMENT — BRIEF INTERVIEW FOR MENTAL STATUS (BIMS)
ASKED TO RECALL SOCK: YES, NO CUE REQUIRED
BIMS SUMMARY SCORE: 13
ASKED TO RECALL BED: NO, COULD NOT RECALL
INITIAL REPETITION OF BED BLUE SOCK - FIRST ATTEMPT: 3
BIMS SUMMARY SCORE: 13
WHAT YEAR IS IT: CORRECT
ASKED TO RECALL BLUE: YES, NO CUE REQUIRED
INITIAL REPETITION OF BED BLUE SOCK - FIRST ATTEMPT: 3
ASKED TO RECALL BLUE: YES, NO CUE REQUIRED
WHAT DAY OF THE WEEK IS IT: CORRECT
WHAT DAY OF THE WEEK IS IT: CORRECT
ASKED TO RECALL SOCK: YES, NO CUE REQUIRED
WHAT MONTH IS IT: ACCURATE WITHIN 5 DAYS
WHAT MONTH IS IT: ACCURATE WITHIN 5 DAYS
ASKED TO RECALL BED: NO, COULD NOT RECALL
WHAT YEAR IS IT: CORRECT

## 2021-12-04 ASSESSMENT — ACTIVITIES OF DAILY LIVING (ADL)
TUB_SHOWER_TRANSFER_DESCRIPTION: GRAB BAR;SHOWER BENCH;INCREASED TIME;INITIAL PREPARATION FOR TASK;REQUIRES LIFT;SET-UP OF EQUIPMENT;SUPERVISION FOR SAFETY;VERBAL CUEING
BED_CHAIR_WHEELCHAIR_TRANSFER_DESCRIPTION: INCREASED TIME;SET-UP OF EQUIPMENT;SUPERVISION FOR SAFETY;VERBAL CUEING
TOILETING_LEVEL_OF_ASSIST_DESCRIPTION: ASSIST FOR HYGIENE;ASSIST TO PULL PANTS UP;ASSIST TO PULL PANTS DOWN;ASSIST FOR STANDING BALANCE;GRAB BAR;INCREASED TIME;SET-UP OF EQUIPMENT;SUPERVISION FOR SAFETY;VERBAL CUEING
TOILETING: INDEPENDENT
TOILET_TRANSFER_DESCRIPTION: GRAB BAR;INCREASED TIME;INITIAL PREPARATION FOR TASK;REQUIRES LIFT;SET-UP OF EQUIPMENT;SUPERVISION FOR SAFETY;VERBAL CUEING

## 2021-12-04 ASSESSMENT — PAIN DESCRIPTION - PAIN TYPE: TYPE: ACUTE PAIN

## 2021-12-04 NOTE — THERAPY
"Physical Therapy   Initial Evaluation     Patient Name: Nakita Sheppard  Age:  76 y.o., Sex:  female  Medical Record #: [id number]  Today's Date: 12/4/2021     Subjective    Pt received in wc in room. [facility]. RN administering meds. Pt goes by \"Midge.\" She notes her L arm \"is broken. It got crushed against the bathroom wall. Not here.\" (notes this happened at home).     Objective       12/04/21 1001   Prior Living Situation   Prior Services None   Housing / Facility 1 Story House   Steps Into Home   (ramp to enter)   Steps In Home 0   Rail Unable To Determine At This Time   Elevator No   Bathroom Set up Walk In Shower;Shower Chair   Equipment Owned 4-Wheel Walker;Single Point Cane;Ramp;Tub / Shower Seat   Lives with - Patient's Self Care Capacity Spouse   Prior Level of Functional Mobility   Bed Mobility Independent   Transfer Status Independent   Ambulation Independent   Distance Ambulation (Feet)   (community distances)   Assistive Devices Used Single Point Cane   Wheelchair Other (Comments)  (no wc use at baseline)   Stairs Unable To Determine At This Time  (pt denies having to negotiate any stairs at baseline)   Comments PLOF[place]   Prior Functioning: Everyday Activities   Self Care Independent   Indoor Mobility (Ambulation) Independent   Stairs Unknown   Functional Cognition Independent   Prior Device Use   (SPC)   Vitals   Blood Pressure  146/75   O2 Delivery Device None - Room Air   Pain   Intervention   (denies pain)   Pain 0 - 10 Group   Therapist Pain Assessment Prior to Activity;During Activity;0   Cognition    Speech/ Communication Incomprehensible Words;Slurred   Orientation Level Oriented x 4   Level of Consciousness Alert   Ability To Follow Commands 2 Step   Attention Impaired  (L inattention, able to look to L with cues)   Passive ROM Lower Body   Passive ROM Lower Body WDL   Active ROM Lower Body    Active ROM Lower Body  WDL   Strength Lower Body   Rt Hip " Flexion Strength 4 (G)   Rt Knee Flexion Strength 5 (N)   Rt Knee Extension Strength 5 (N)   Rt Ankle Dorsiflexion Strength 5 (N)   Rt Ankle Plantar Flexion Strength 4 (G)   Lt Hip Flexion Strength 4 (G)   Lt Knee Flexion Strength 4 (G)   Lt Knee Extension Strength 5 (N)   Lt Ankle Dorsiflexion Strength 4 (G)   Lt Ankle Plantar Flexion Strength 4 (G)   Sensation Lower Body   Comments intact to light touch B L3-S1 dermatomes, and proprioception B ankles intact   Lower Body Muscle Tone   Lower Body Muscle Tone  WDL   Balance Assessment   Sitting Balance (Static) Fair   Sitting Balance (Dynamic) Fair -   Standing Balance (Static) Fair -   Standing Balance (Dynamic) Fair -   Weight Shift Sitting Fair   Weight Shift Standing Poor   Bed Mobility    Supine to Sit Moderate Assist   Sit to Supine Moderate Assist   Sit to Stand Moderate Assist   Scooting Maximal Assist   Rolling Supervised;Moderate Assist to Rt.   Coordination Lower Body    Coordination Lower Body  X   Comments L LE impaired   Roll Left and Right   Assistance Needed Physical assistance   Physical Assistance Level 51%-75%  (impaired to pt's R)   CARE Score - Roll Left and Right 2   Roll Left and Right Discharge Goal   Discharge Goal 6   Sit to Lying   Assistance Needed Physical assistance   Physical Assistance Level 51%-75%   CARE Score - Sit to Lying 2   Sit to Lying Discharge Goal   Discharge Goal 6   Lying to Sitting on Side of Bed   Assistance Needed Physical assistance   Physical Assistance Level 26%-50%   CARE Score - Lying to Sitting on Side of Bed 3   Lying to Sitting on Side of Bed Discharge Goal   Discharge Goal 6   Sit to Stand   Assistance Needed Physical assistance   Physical Assistance Level 26%-50%   CARE Score - Sit to Stand 3   Sit to Stand Discharge Goal   Discharge Goal 6   Chair/Bed-to-Chair Transfer   Assistance Needed Physical assistance   Physical Assistance Level 51%-75%   CARE Score - Chair/Bed-to-Chair Transfer 2   Chair/Bed-to-Chair  Transfer Discharge Goal   Discharge Goal 4   Toilet Transfer   Reason if not Attempted Refused to perform  (did not need to use the bathroom)   CARE Score - Toilet Transfer 7   Toilet Transfer Discharge Goal   Discharge Goal 4   Car Transfer   Reason if not Attempted Environmental limitations  (admit iso prec)   CARE Score - Car Transfer 10   Car Transfer Discharge Goal   Discharge Goal 4   Walk 10 Feet   Assistance Needed Physical assistance   Physical Assistance Level Total assistance   CARE Score - Walk 10 Feet 1   Walk 10 Feet Discharge Goal   Discharge Goal 4   Walk 50 Feet with Two Turns   Reason if not Attempted Refused to perform   CARE Score - Walk 50 Feet with Two Turns 7   Walk 50 Feet with Two Turns Discharge Goal   Discharge Goal 4   Walk 150 Feet   Reason if not Attempted Refused to perform   CARE Score - Walk 150 Feet 7   Walk 150 Feet Discharge Goal   Discharge Goal 4   Walking 10 Feet on Uneven Surfaces   Reason if not Attempted Refused to perform   CARE Score - Walking 10 Feet on Uneven Surfaces 7   Walking 10 Feet on Uneven Surfaces Discharge Goal   Discharge Goal 4   1 Step (Curb)   Reason if not Attempted Refused to perform   CARE Score - 1 Step (Curb) 7   1 Step (Curb) Discharge Goal   Discharge Goal 9   4 Steps   Reason if not Attempted Refused to perform   CARE Score - 4 Steps 7   4 Steps Discharge Goal   Discharge Goal 4   12 Steps   Reason if not Attempted Refused to perform   CARE Score - 12 Steps 7   12 Steps Discharge Goal   Discharge Goal 4   Picking Up Object   Assistance Needed Physical assistance   Physical Assistance Level 76% or more   CARE Score - Picking Up Object 2   Picking Up Object Discharge Goal   Discharge Goal 4   Wheel 50 Feet with Two Turns   Reason if not Attempted Activity not applicable   CARE Score - Wheel 50 Feet with Two Turns 9   Wheel 50 Feet with Two Turns Discharge Goal   Discharge Goal 9   Wheel 150 Feet   Reason if not Attempted Activity not applicable  (no  wc use at baseline)   CARE Score - Wheel 150 Feet 9   Wheel 150 Feet Discharge Goal   Discharge Goal 9  (no wc use at baseline)   Gait Functional Level of Assist    Gait Level Of Assist Maximal Assist   Assistive Device Single Point Cane   Distance (Feet) 4   # of Times Distance was Traveled 1   Deviation Step To;Decreased Heel Strike;Decreased Toe Off;Shuffled Gait  (impaired ability to advance L LE)   Wheelchair Functional Level of Assist   Wheelchair Assist Total Assist   Stairs Functional Level of Assist   Level of Assist with Stairs Refused   Transfer Functional Level of Assist   Bed, Chair, Wheelchair Transfer Maximal Assist   Bed Chair Wheelchair Transfer Description Set-up of equipment;Verbal cueing;Increased time;Requires lift   Problem List    Problems Impaired Bed Mobility;Impaired Transfers;Impaired Ambulation;Impaired Balance;Functional Strength Deficit;Impaired Coordination;Decreased Activity Tolerance;Motor Planning / Sequencing   Precautions   Precautions Fall Risk;Other (See Comments);Swallow Precautions ( See Comments)   Comments check L wrist WB status, L inattention, slow to mobilize   Current Discharge Plan   Current Discharge Plan Return to Prior Living Situation   Interdisciplinary Plan of Care Collaboration   IDT Collaboration with  Nursing   Patient Position at End of Therapy Seated;Chair Alarm On;Call Light within Reach;Tray Table within Reach;Phone within Reach   Collaboration Comments RN administered meds start of session   Benefit   Therapy Benefit Patient Would Benefit from Inpatient Rehabilitation Physical Therapy to Maximize Functional [place] with ADLs, IADLs and Mobility.   Strengths & Barriers   Strengths Able to follow instructions;Alert and oriented;Adequate strength;Independent prior level of function;Motivated for self care and independence;Pleasant and cooperative;Supportive family;Willingly participates in therapeutic activities   Barriers Decreased endurance;Generalized  "weakness;Impaired balance;Hemiplegia   PT Total Time Spent   PT Individual Total Time Spent (Mins) 60   PT Charge Group   PT Gait Training 1   PT Therapeutic Activities 1   PT Evaluation PT Evaluation Mod     With cues pt able to attend to L side. PT encouraged frequent cervical rotation throughout the day.    Initiated gait training with SPC and Mod/Max A. Gait characterized by inc time in DLS, short L step length, decreased stability.    Assessment  Patient is 76 y.o. female with a diagnosis of R parietal subacute ischemic infarct.  Additional factors influencing patient status / progress (ie: cognitive factors, co-morbidities, social support, etc): previous independent with SPC, resides with supportive spouse in [facility] with ramp to enter, L wrist pain. Pt presents with gait abnormality, L hemiparesis, impaired gross motor control, L inattention, with resultant increased risk of falls, and significant decline in functional mobility from baseline. Pt's goal for PT is \"to walk.\" She ambulates with SPC at baseline.     Plan  Recommend Physical Therapy  minutes 1-2x per day 5-7 days per week for 2 weeks for the following treatments:  PT Group Therapy, PT Orthotics Training, PT Gait Training, PT Self Care/Home Eval, PT Therapeutic Exercises, PT Neuro Re-Ed/Balance, PT Aquatic Therapy, PT Therapeutic Activity and PT Evaluation.      Passport items to be completed:  Get in/out of bed safely, in/out of a vehicle, safely use mobility device, walk or wheel around home/community, navigate up and down stairs, show how to get up/down from the ground, ensure home is accessible, demonstrate HEP, complete caregiver training    Goals:  Long term and short term goals have been discussed with patient and she is in agreement.    Physical Therapy Problems (Active)     Problem: Mobility     Dates: Start: 12/04/21       Goal: STG-Within one week, patient will ambulate household distance x10' with SPC and Mod A     Dates: Start: " 12/04/21             Problem: Mobility Transfers     Dates: Start: 12/04/21       Goal: STG-Within one week, patient will perform bed mobility Min A without bedrails     Dates: Start: 12/04/21          Goal: STG-Within one week, patient will sit to stand CGA 5/5 trials with SPC     Dates: Start: 12/04/21          Goal: STG-Within one week, patient will transfer bed to chair Min A     Dates: Start: 12/04/21             Problem: PT-Long Term Goals     Dates: Start: 12/04/21       Goal: LTG-By discharge, patient will maintain balance at least 45/56 on the [place] to DC to home safely with support of spouse     Dates: Start: 12/04/21          Goal: LTG-By discharge, patient will ambulate at least 500' with SPC and SPV inside and outside surfaces     Dates: Start: 12/04/21          Goal: LTG-By discharge, patient will transfer one surface to another SPV with SPC     Dates: Start: 12/04/21          Goal: LTG-By discharge, patient will transfer in/out of a car SBA from ambulatory level with SPC     Dates: Start: 12/04/21          Goal: LTG-By discharge, patient will ascend/descend ADA ramp with SPC and SBA to enter/exit her home safely     Dates: Start: 12/04/21

## 2021-12-04 NOTE — PROGRESS NOTES
2 RN skin check done with admitting RN and LORENA Minaya. Face photo and skin photos documented in media. Appropriate LDAs opened.   Skin all clear except very minor reddness, blancheable, at sacrum.  Mepilex applied.

## 2021-12-04 NOTE — ASSESSMENT & PLAN NOTE
F/U CT 12/7/21 evolving ischemia, negative acute  On ASA, Plavix, and Lipitor  Also on Provigil per Physiatry

## 2021-12-04 NOTE — THERAPY
"Speech Language Pathology   Initial Assessment     Patient Name: Nakita Sheppard  AGE:  76 y.o., SEX:  female  Medical Record #: [id number]  Today's Date: 12/4/2021     Subjective    Per H&P:  \"Per Dr. Phillips consult \"The patient is a 76 y.o. right hand dominant female with a past medical history of arthritis, hypertension, history of TIAs, and peripheral neuropathy;  who presented on 11/30/2021  6:14 PM with left-sided weakness.  Per documentation, patient was evaluated at [facility] with symptoms of left-sided arm weakness on 11/19/2021.  During that hospitalization patient was given TPA at that time and reportedly her symptoms resolved.  Patient was discharged from the hospital sometime later and reportedly had done some rehab (chart review indicates that patient went to rehab however patient tells her that she did rehab at home).  However on the night that she was discharged home she began to have some mild recurrence of her left-sided weakness which fluctuated on and off for following days. Patient did not follow-up with neurology and was continued on baby aspirin.  Reportedly, patient's left-sided weakness had intermittently recurred for 5 days until she presented to the hospital on 11/30.  At that time symptoms seem more significant and that they did not go away so patient was brought to the emergency room.       A CT scan of the head was obtained at evaluation emergency department at [facility] which confirms a right parietal subacute ischemic infarct.  Neurology was consulted, given the patient's time course of symptoms patient was not deemed to be a candidate for interventions involving TPA.  MRI from [facility] was able to be reviewed and the outside images revealed a minimal right MCA stroke burden which comparatively was far less burden that was revealed on the CT head on admission to [facility].       Based on neurology's review of images from [facility] and comparing " "them to [place] it was determined that the patient had recurrent embolic events to her right MCA territory, given that this was a recurrent embolic event to the same vascular territory and their recommendations for short-term DAPT, high intensity statin and completion of embolic work-up   Including cardiac monitoring.  Additionally, patient's hospital course has been complicated by the evidence of a positive UTI, patient has been started on ceftriaxone for treatment of her UTI.\"     Patient current reports left arm weakness.  She is right-hand dominant.  She has chronic neuropathy in her feet but denies any pain.  She moved her bowels yesterday denies any urinary incontinence.     Patient was evaluated by Rehab Medicine physician and Physical Therapy, Occupational Therapy and Speech Therapy and determined to be appropriate for acute inpatient rehab and was transferred to [facility] on 12/3/2021  3:09 PM.     With this acute therapeutic intervention, this patient hopes to improve her functional status, and return to independent living with the supportive care of spouse.\"      Pt was admitted to this facility on mildly thick liquids and pureed diet textures. Current evaluation ordered to determine swallow, speech/language, and cog-ling CLOF and implement individualized POC if indicated.      Objective       12/04/21 1302   Precautions   Precautions Fall Risk;Swallow Precautions ( See Comments)   Comments Aspiration precautions- 1:1 superivsion, M&M/thin   Prior Living Situation   Prior Services None   Housing / Facility 1 Story House   Lives with - Patient's Self Care Capacity Spouse   Prior Level Of Function   Communication Within Functional Limits   Swallow Within Functional Limits   Dentition Other (See Comments)  (dentures)   Dentures Uppers;Lowers   Hearing Within Functional Limits for Evaluation   Patient's Primary Language English   Receptive Language / Auditory Comprehension " "  Receptive Language / Auditory Comprehension X   Answers Yes / No Personal Questions Within Functional Limits (6-7)   Follows One Unit Commands Within Functional Limits (6-7)   Follows Two Unit Commands Minimal (4)   Understands Simple, Structured Conversation  Within Functional Limits (6-7)   Understands Complex Conversation Minimal (4)   Expressive Language   Expressive Language (WDL) X   Naming Moderate (3)  (intermittent semantic paraphasias in conversation)   Verbalizes Wants / Needs Supervision (5)  (delayed response time)   Sustain Dialogue Within Given Topic Supervision (5)   Reading Comprehension    Reading Comprehension (WDL) X   Reading Words Within Functional Limits (6-7)   Reading Short Paragraphs  Moderate (3)  (further testing needed; difficulty reading texts on her phon)   Written Language Expression   Written Language Expression (WDL) X   Legibility Severe (2)  (Cued to write her name- \"M\" followed by inlegible marks)   Cognition   Cognitive-Linguistic (WDL) X   Moderate Attention Moderate (3)   Orientation  Within Functional Limits (6-7)   Visual Scanning / Cancellation Skills Supervision (5)   Topic Maintenance  Within Functional Limits (6-7)   Verbal Short Term Memory   (Recalled 2/3 on BIMS; 1/3 meds on SCCAN)   Visual Short Term Memory Within Functional Limits (6-7)   Prospective Memory Within Functional Limits (6-7)   Insight into Deficits Within Functional Limits (6-7)   Functional Math / Financial Management Moderate (3)   Clock Drawing Omissions;Impaired Hand Placement;Perseveration ;Disorganization   Cognitive Pattern Assessment   Cognitive Pattern Assessment Used BIMS   Brief Interview for Mental Status (BIMS)   Repetition of Three Words (First Attempt) 3   Temporal Orientation: Year Correct   Temporal Orientation: Month Accurate within 5 days   Temporal Orientation: Day Correct   Recall: \"Sock\" Yes, no cue required   Recall: \"Blue\" Yes, no cue required   Recall: \"Bed\" No, could not " recall   BIMS Summary Score 13   Social / Pragmatic Communication   Social / Pragmatic Communication WDL   Tracheostomy   Tracheostomy No   Speech Mechanisms / Voice Production   Speech Mechanisms / Voice Production (WDL) X   Voice Quality Hoarse  (strangled)   Facial Weakness Left Moderate (3)   Conversational Intelligibility Moderate (3)   Uses Adequate Breath Support No   Rate: Slow   Loudness: Soft   Labial Function   Labial Function (WDL) X   Labial Structure At Rest Moderate   Frown, Pucker Moderate   Lingual Function   Lingual Function (WDL) X   Lingual Structure At Rest Moderate  (deviates left)   Lingual Protrude Moderate  (deviates left)   Lateralization Moderate Left   / Pa / 5X's Minimal   / Ta / 5X's Minimal   / Ka / 5X's Moderate   / [place] / 5X's Moderate   Jaw Function   Jaw Function (WDL) WDL   Swallowing   Swallowing (WDL) X   Thick Nectar / Honey / Liquid Minimal  (same response as with thin- cleared with cue to 2x swallow)   Thin Liquid Minimal  (via cup sip- cleared with cue to 2x swallow)   Dysphagia Strategies / Recommendations   Strategies / Interventions Recommended (Yes / No) Yes   Compensatory Strategies Strict 1:1 Feeding;No Straws;Liquids Via Cup;Finger / Tongue Sweep To Clear Pocketing Left;Cough / Clear Wet Vocal Quality Post Swallow;Single Sips / Bites;Multiple Swallows;No Talking During Eating / Drinking   Diet / Liquid Recommendation Thin (0);Minced & Moist (5) - (Dysphagia II)   Medication Administration  Crush all Medications in Puree   Therapy Interventions MBSS Evaluation;Pharyngeal / Laryngeal Exercises;One To One Supervision With SLP;One To One Supervision With Nursing;Therapeutic Dining For Meals;Dysphagia Therapy By Speech Language Pathologist;Oral Motor Exercises   Barriers To Oral Feeding   Barriers To Oral Feeding Generalized Weakness;Dysarthria (Min / Mod / Severe)   Swallowing/Nutritional Status   Swallowing/Nutritional Status Modified food consistency   Functional  Level of Assist   Eating Moderate Assist   Eating Description Checking for pocketing of food;Increased time;Modified diet   Comprehension Minimal Assist   Comprehension Description Verbal cues;Other (comment)  (increased time; 1 step directives/repeat directives)   Expression Moderate Assist   Expression Description Verbal cueing;Other (comment)  (increased time)   Social Interaction Independent   Problem Solving Minimal Assist   Memory Supervision   Outcome Measures   Outcome Measures Utilized SCCAN   SCCAN (Scales of Cognitive and Communicative Ability for Neurorehabilitation)   Oral Expression - Raw Score 17   Oral Expression - Scale Performance Score 89   Orientation - Raw Score 12   Orientation - Scale Performance Score 100   Memory - Raw Score 13   Memory - Scale Performance Score 68   Speech Comprehension - Raw Score 12   Speech Comprehension - Scale Performance Score 92   Problem List   Problem List Cognitive-Linguistic Deficits;Dysarthia;Voice Quality Deficit;Expressive Language Deficit;Receptive Language Deficit;Written Language Deficit;Dysphagia   Current Discharge Plan   Current Discharge Plan Return to Prior Living Situation   Interdisciplinary Plan of Care Collaboration   IDT Collaboration with  Certified Nursing Assistant;Nursing;Other (See Comments)  (kitchen staff (pt is lactose intolerant))   Patient Position at End of Therapy Seated;Other (Comments)  (with CNA)   Collaboration Comments dysphagia needs and diet rec's   Strengths & Barriers   Strengths Alert and oriented;Independent prior level of function;Good insight into deficits/needs;Willingly participates in therapeutic activities;Supportive family;Pleasant and cooperative;Motivated for self care and independence   Barriers Impaired functional cognition;Difficulty following instructions;Decreased endurance;Impaired activity tolerance   Speech Language Pathologist Assigned   Assigned SLP / Extension 60+, NO SPLIT   SLP Total Time Spent   SLP  "Individual Total Time Spent (Mins) 90   Evaluation Charges   Charges Yes   SLP Speech Language Evaluation Evaluate Speech Production   SLP Oral Pharyngeal Evaluation Oral Pharyngeal Evaluation       Assessment    Patient is 76 y.o. female with a diagnosis of ischemic stroke.  Additional factors influencing patient status/progress (ie: cognitive factors, co-morbidities, social support, etc): strong family support, independent PLOF, strong motivation to return to PLOF.      Pt presents with overt oral motor weakness which negatively impacts her speech clarity and swallow safety. She presents with moderate dysarthria characterized by decreased rate of speech, imprecise articulatory contacts, and hoarse/strangled vocal quality with reduced breath support (MPT= 5 seconds). She also presents with moderate oral pharyngeal dysphagia characterized by oral weakness, delayed swallow initiation, and delayed cough response. When presented with mildly thick liquids and thin liquids, she presented with delayed productive cough in 5/5 trials. Marked improvement noted when cued to swallow x2 per sip. She consumed puree and soft solid textures with adequate mastication however persistently demonstrated left buccal cavity pocketing with limited awareness. She adequately utilized lingual sweep to clear with consistent verbal cues. Recommend 1:1 assist with all meals with minced/moist diet textures (with cues to \"check your cheek\") and thin liquid (with cues to swallow x2). She would also benefit from participating in MBSS to better visualize A&P of swallow and better guide treatment.     SLP administered the Scales of Cognitive and Communicative Ability for Neuro rehabilitation (SCCAN). Testing was not completed in its entirety due to time constraints. Of the subtests completed, pt demonstrated moderate impairment in attention, min short term memory impairment, and difficulty with multi-step directives with need for repetition. During " "testing pt also presented with intermittent semantic paraphasias (e.g. \"apple juice\" for target \"apple sauce\"). Pt reports that she was independent with all IADL tasks at PLOF and her personal goal is to be \"independent\" upon d/c.     Plan  Recommend Speech Therapy  minutes per day 5-7 days per week for 8 weeks for the following treatments:  SLP Aphasia Evaluation, SLP Speech Language Treatment, SLP Swallowing Dysfunction Treatment, SLP Oral Pharyngeal Evaluation, SLP Video Swallow Evaluation, SLP Cognitive Skill Development and SLP Group Treatment.    Passport items to be completed:  Express basic needs, understand food/liquid recommendations, consistently follow swallow precautions, manage finances, manage medications, arrive to therapy appointments on time, complete daily memory log entries, solve problems related to safety situations, review education related to hospitalization, complete caregiver training     Goals:  Long term and short term goals have been discussed with patient and they are in agreement.    Speech Therapy Problems (Active)     Problem: Expression STGs     Dates: Start: 12/04/21       Goal: STG-Within one week, patient will     Dates: Start: 12/04/21       Description: Utilize clear speech strategies with mod cues in 70% of opportunities to increase overall speech intelligibility.        Goal: STG-Within one week, patient will     Dates: Start: 12/04/21       Description: Participate in a standardized language assessment to further identify expressive/receptive language needs.          Problem: Memory STGs     Dates: Start: 12/04/21       Goal: STG-Within one week, patient will     Dates: Start: 12/04/21       Description: Complete functional IADL tasks (e.g. med and financial management) with 70% acc and mod cues to ensure safe return to her PLOF.           Problem: Speech/Swallowing LTGs     Dates: Start: 12/04/21       Goal: LTG-By discharge, patient will safely swallow     Dates: " Start: 12/04/21       Description: Safest/least restrictive diet with no overt s/sx of aspiration for 100% of intake to maintain adequate nutrition and hydration.        Goal: LTG-By discharge, patient will     Dates: Start: 12/04/21       Description: Complete cog-ling tasks at modified independence level to ensure safe return to PLOF.        Goal: LTG-By discharge, patient will     Dates: Start: 12/04/21       Description: Communicate complex ideas at the conversation level with 90% intelligibility to a naiive listener.           Problem: Swallowing STGs     Dates: Start: 12/04/21       Goal: STG-Within one week, patient will     Dates: Start: 12/04/21       Description: Participate in instrumental swallow examination to better visualize anatomy and physiology of pt's swallow and guide individualized POC targeting dysphagia management.

## 2021-12-04 NOTE — CONSULTS
DATE OF SERVICE:  12/4/2021    REQUESTING PHYSICIAN:  Hawa Alva MD    CHIEF COMPLAINT / REASON FOR CONSULTATION:   Hypertension    HISTORY OF PRESENT ILLNESS:  This is a 75 y/o female with a PMH significant for hypertension, hx of TIA's, arthritis, and peripheral neuropathy  who originally presented to [facility] with symptoms of left-sided arm weakness on 11/19/2021.  During that hospitalization patient was given TPA at that time and reportedly her symptoms resolved.  Patient was discharged from the hospital sometime later and reportedly had done some rehab (but it is uncertain).  However on the night that she was discharged home she began to have some mild recurrence of her left-sided weakness which fluctuated on and off for following days.  Patient did not follow-up with neurology and was continued on baby aspirin.  Reportedly, patient's left-sided weakness had intermittently recurred for 5 days until she presented to the hospital on 11/30.  At that time symptoms seem more significant and that they did not go away so patient was brought to the emergency room.  A CT head was obtained at evaluation emergency department at [facility] which confirms a right parietal subacute ischemic infarct.  Neurology was consulted, given the patient's time course of symptoms patient was not deemed to be a candidate for interventions involving TPA.  MRI from [facility] was able to be reviewed and the outside images revealed a minimal right MCA stroke burden which comparatively was far less burden that was revealed on the CT head on admission to [facility].  Based on neurology's review of images from [facility] and comparing them to [facility] it was determined that the patient had recurrent embolic events to her right MCA territory.  Given that this was a recurrent embolic event to the same vascular territory, the recommendations were for for short-term DAPT, high intensity statin and  completion of embolic work-up, including cardiac monitoring.  Additionally, patient's hospital course has been complicated by the evidence of a positive UTI and was given Rocephin.    Because of the patient's weakness and debility, Rehab was consulted, evaluated the patient, and was deemed a good Rehab candidate.  The patient was transferred over to the Rehab facility on 12/3/2021.      The patient denies fever, chills, nausea, vomiting, headaches, blurry vision, or chest pain.    REVIEW OF SYSTEMS: All review of systems are negative pre AMA and CMS criteria except for that stated in the HPI.    PAST MEDICAL HISTORY:  Past Medical History:   Diagnosis Date   • Allergy    • Arthritis    • Chronic headaches 5/29/2012   • Chronic pain    • Dental disorder    • Heart burn    • History of chickenpox    • History of measles    • History of mumps    • Hypertension    • Indigestion    • Low back pain 9/22/14    8/10   • Migraine    • Peripheral neuropathy     BLE   • Rotator cuff tear    • Seasonal allergies    • TIA (transient ischemic attack)    • Tuberculosis 1979    had treatment for 2 years       PAST SURGICAL HISTORY:  Past Surgical History:   Procedure Laterality Date   • LUMBAR FUSION POSTERIOR  10/6/2014    Performed by Rishabh Bhatia M.D. at SURGERY [facility]S   • LUMBAR LAMINECTOMY DISKECTOMY  10/6/2014    Performed by Rishabh Bhatia M.D. at [facility]   • LUMBAR EXPLORATION  10/6/2014    Performed by Rishabh Bhatia M.D. at [facility] ORS   • OTHER  2013    back   • SPINAL CORD STIMULATOR  11/4/2011    Performed by AUBREE PALACIO at SURGERY [facility]   • OPEN REDUCTION  2008    re-broke foot   • OTHER  2000    right foot   • SHOULDER SURGERY  1998   • CARPAL TUNNEL ENDOSCOPIC  1990   • ARTHROSCOPY, KNEE  1987    bilateral   • ABDOMINAL HYSTERECTOMY TOTAL  1983   • APPENDECTOMY     • CHOLECYSTECTOMY     • LAMINOTOMY  02/2002 and 9/2002       Allergies   Allergen Reactions    • Fentanyl Anxiety   • Morphine Itching   • Penicillins Hives and Swelling       CURRENT MEDICATIONS:    Current Facility-Administered Medications:   •  potassium chloride SA  •  potassium chloride SA  •  amLODIPine  •  hydrOXYzine HCl  •  melatonin  •  Respiratory Therapy Consult  •  Pharmacy Consult Request  •  hydrALAZINE  •  acetaminophen  •  lactulose  •  docusate sodium  •  sodium phosphate  •  omeprazole  •  artificial tears  •  benzocaine-menthol  •  mag hydrox-al hydrox-simeth  •  ondansetron **OR** ondansetron  •  traZODone  •  sodium chloride  •  midazolam  •  enoxaparin  •  senna-docusate **AND** polyethylene glycol/lytes **AND** magnesium hydroxide **AND** bisacodyl  •  aspirin  •  atorvastatin  •  carBAMazepine  •  clopidogrel  •  labetalol  •  lisinopril  •  vitamin D3    Social History     Socioeconomic History   • Marital status:      Spouse name: Not on file   • Number of children: Not on file   • Years of education: Not on file   • Highest education level: Not on file   Occupational History   • Occupation: disability- customer service in past     Employer: OTHER   Tobacco Use   • Smoking status: Never Smoker   • Smokeless tobacco: Never Used   Vaping Use   • Vaping Use: Never used   Substance and Sexual Activity   • Alcohol use: No     Alcohol/week: 0.0 oz   • Drug use: No   • Sexual activity: Yes     Partners: Male     Birth control/protection: Post-Menopausal   Other Topics Concern   • Not on file   Social History Narrative   • Not on file     Social Determinants of Health     Financial Resource Strain:    • Difficulty of Paying Living Expenses: Not on file   Food Insecurity:    • Worried About Running Out of Food in the Last Year: Not on file   • Ran Out of Food in the Last Year: Not on file   Transportation Needs:    • Lack of Transportation (Medical): Not on file   • Lack of Transportation (Non-Medical): Not on file   Physical Activity:    • Days of Exercise per Week: Not on file   •  Minutes of Exercise per Session: Not on file   Stress:    • Feeling of Stress : Not on file   Social Connections:    • Frequency of Communication with Friends and Family: Not on file   • Frequency of Social Gatherings with Friends and Family: Not on file   • Attends Taoist Services: Not on file   • Active Member of Clubs or Organizations: Not on file   • Attends Club or Organization Meetings: Not on file   • Marital Status: Not on file   Intimate Partner Violence:    • Fear of Current or Ex-Partner: Not on file   • Emotionally Abused: Not on file   • Physically Abused: Not on file   • Sexually Abused: Not on file   Housing Stability:    • Unable to Pay for Housing in the Last Year: Not on file   • Number of Places Lived in the Last Year: Not on file   • Unstable Housing in the Last Year: Not on file       FAMILY HISTORY:  was reviewed and is not pertinent to this consultation.    PHYSICAL EXAMINATION:  VITAL SIGNS:  Temp is 98.6, blood pressure is 146//71, heart rate is 75, respiratory rate is 18.  GENERAL:  Patient was lying in bed in no distress.  HEENT:  Pupils were equal, round and reactive to light and accomodation.  Oral mucosa was pink and moist.  NECK:  Soft.  Supple.  No JVD.  HEART:  Regular rate and rhythm.  Normal S1 and S2.  No murmurs were appreciated.  LUNGS:  Are clear to auscultation bilaterally.  ABDOMEN:  Soft, non tender, non distended.  Bowels sound were positive in all four quadrants.  EXTREMITIES:  No clubbing, cyanosis.  There was no lower extremity edema.  NEUROLOGIC:  Cranial nerves two through twelve were grossly intact.    LABS:  Lab Results   Component Value Date/Time    SODIUM 144 12/04/2021 05:53 AM    POTASSIUM 3.4 (L) 12/04/2021 05:53 AM    CHLORIDE 106 12/04/2021 05:53 AM    CO2 24 12/04/2021 05:53 AM    GLUCOSE 108 (H) 12/04/2021 05:53 AM    BUN 17 12/04/2021 05:53 AM    CREATININE 0.81 12/04/2021 05:53 AM    BUNCREATRAT 16 12/13/2017 10:22 AM      Lab Results   Component  Value Date/Time    WBC 8.1 12/04/2021 05:53 AM    RBC 3.78 (L) 12/04/2021 05:53 AM    HEMOGLOBIN 12.0 12/04/2021 05:53 AM    HEMATOCRIT 37.4 12/04/2021 05:53 AM    MCV 98.9 (H) 12/04/2021 05:53 AM    MCH 31.7 12/04/2021 05:53 AM    MCHC 32.1 (L) 12/04/2021 05:53 AM    MPV 9.5 12/04/2021 05:53 AM    NEUTSPOLYS 66.30 12/04/2021 05:53 AM    LYMPHOCYTES 21.60 (L) 12/04/2021 05:53 AM    MONOCYTES 7.90 12/04/2021 05:53 AM    EOSINOPHILS 2.60 12/04/2021 05:53 AM    BASOPHILS 1.10 12/04/2021 05:53 AM      Lab Results   Component Value Date/Time    PROTHROMBTM 13.7 11/30/2021 06:58 PM    INR 1.08 11/30/2021 06:58 PM        Hypertension  BP elevated recently: 146//71  On Labetalol  On Lisinopril  Will start Norvasc  Cont to monitor    Ischemic stroke (HCC)  Had right MCA CVA  On ASA & Plavix  On Lipitor    Hypokalemia  K+ 3.4  On K+ supplements  Will give KCL 40 meq x 1 (12/4)  Monitor      This case has been discussed with the attending Physiatrist.    Thank you for the consultation.  Will follow the patient with you.

## 2021-12-04 NOTE — FLOWSHEET NOTE
12/03/21 1652   Patient History   Pulmonary Diagnosis none   Procedures Relevant to Respiratory Status none   Home O2 No   Nocturnal CPAP No   Home Treatments/Frequency No

## 2021-12-04 NOTE — THERAPY
"Occupational Therapy   Initial Evaluation     Patient Name: Nakita Sheppard  Age:  76 y.o., Sex:  female  Medical Record #: [id number]  Today's Date: 12/4/2021     Subjective    Pt resting in bed, agreeable to OT evaluation. A+O x4. Pt goes by \"Midge.\" She reports L wrist pain from getting \"crushed against bathroom wall\" at home.      Objective       12/04/21 1301   Prior Living Situation   Prior Services None;Home-Independent   Housing / Facility 1 Story House  (in [place])   Steps Into Home   (ramp entry)   Steps In Home 0   Rail Unable To Determine At This Time   Elevator No   Bathroom Set up Walk In Shower;Grab Bars;Shower Chair   Equipment Owned 4-Wheel Walker;Single Point Cane;Grab Bar(s) In Tub / Shower;Tub / Shower Seat;Ramp   Lives with - Patient's Self Care Capacity Spouse   Prior Level of ADL Function   Self Feeding Independent   Grooming / Hygiene Independent   Bathing Independent   Dressing Independent   Toileting Independent   Prior Level of IADL Function   Medication Management Independent   Laundry Independent   Kitchen Mobility Independent   Finances Independent   Home Management Independent   Shopping Independent   Prior Level Of Mobility Independent With Device in Community;Independent Without Device in Home   Driving / Transportation Driving Independent   Occupation (Pre-Hospital Vocational) Retired Due To Age   Leisure Interests Other (Comments)  (painting)   Prior Functioning: Everyday Activities   Self Care Independent   Indoor Mobility (Ambulation) Independent   Stairs Unknown   Functional Cognition Independent   Prior Device Use   (4WW, SPC)   Cognition    Cognition / Consciousness X   Speech/ Communication Slurred;Incomprehensible Words   Orientation Level Oriented x 4   Level of Consciousness Alert   Ability To Follow Commands 2 Step   Attention Impaired   Comments L inattention w/ R gaze preference; requires cues to look left and to attend to L side during ADLs.   Cognitive Pattern " "Assessment   Cognitive Pattern Assessment Used BIMS   Brief Interview for Mental Status (BIMS)   Repetition of Three Words (First Attempt) 3   Temporal Orientation: Year Correct   Temporal Orientation: Month Accurate within 5 days   Temporal Orientation: Day Correct   Recall: \"Sock\" Yes, no cue required   Recall: \"Blue\" Yes, no cue required   Recall: \"Bed\" No, could not recall   BIMS Summary Score 13   Vision Screen   Vision Not tested  (reports no changes/difficulty with vision)   Passive ROM Upper Body   Passive ROM Upper Body WDL   Comments testing deferred at L wrist d/t pain   Active ROM Upper Body   Active ROM Upper Body  X   Dominant Hand Right   Comments RUE WFL; LUE impaired   Strength Upper Body   Upper Body Strength  X   Lt Shoulder Flexion Strength 1 (T)   Lt Shoulder Extension Strength 1 (T)   Lt Shoulder Abduction Strength 1 (T)   Lt Shoulder Adduction Strength 1 (T)   Lt Elbow Flexion Strength 2- (P-)   Lt Elbow Extension Strength 2 (P)   Lt Wrist Flexion Strength   (deferred testing d/t pain)   Lt Wrist Extension Strength   (deferred testing d/t pain)   Left  Impaired   Sensation Upper Body   Upper Extremity Sensation  WDL   Upper Body Muscle Tone   Upper Body Muscle Tone  Not Tested   Balance Assessment   Sitting Balance (Static) Fair   Sitting Balance (Dynamic) Fair -   Standing Balance (Static) Fair -   Standing Balance (Dynamic) Fair -   Weight Shift Sitting Poor   Weight Shift Standing Poor   Bed Mobility    Supine to Sit Moderate Assist   Sit to Supine Moderate Assist   Sit to Stand Moderate Assist   Scooting Maximal Assist   Rolling Moderate Assist to Rt.   Coordination Upper Body   Coordination X   Fine Motor Coordination RUE WFL; LUE Impaired   Gross Motor Coordination RUE WFL; LUE impaired   Eating   Assistance Needed Set-up / clean-up;Supervision;Physical assistance   Physical Assistance Level 26%-50%   CARE Score - Eating 3   Eating Discharge Goal   Discharge Goal 6   Oral Hygiene "   Assistance Needed Set-up / clean-up;Supervision;Physical assistance   Physical Assistance Level 26%-50%   CARE Score - Oral Hygiene 3   Oral Hygiene Discharge Goal   Discharge Goal 6   Shower/Bathe Self   Assistance Needed Physical assistance   Physical Assistance Level 51%-75%   CARE Score - Shower/Bathe Self 2   Shower/Bathe Self Discharge Goal   Discharge Goal 4   Upper Body Dressing   Assistance Needed Physical assistance   Physical Assistance Level 76% or more   CARE Score - Upper Body Dressing 2   Upper Body Dressing Discharge Goal   Discharge Goal 6   Lower Body Dressing   Assistance Needed Physical assistance   Physical Assistance Level 76% or more   CARE Score - Lower Body Dressing 2   Lower Body Dressing Discharge Goal   Discharge Goal 4   Putting On/Taking Off Footwear   Assistance Needed Physical assistance   Physical Assistance Level Total assistance   CARE Score - Putting On/Taking Off Footwear 1   Putting On/Taking Off Footwear Discharge Goal   Discharge Goal 4   Toileting Hygiene   Assistance Needed Physical assistance   Physical Assistance Level Total assistance   CARE Score - Toileting Hygiene 1   Toileting Hygiene Discharge Goal   Discharge Goal 4   Toilet Transfer   Assistance Needed Physical assistance   Physical Assistance Level 51%-75%   CARE Score - Toilet Transfer 2   Toilet Transfer Discharge Goal   Discharge Goal 4   Hearing, Speech, and Vision   Expression of Ideas and Wants Frequent difficulty   Understanding Verbal and Non-Verbal Content Usually understands   Functional Level of Assist   Eating Moderate Assist   Eating Description Checking for pocketing of food;Increased time;Modified diet;Set-up of equipment or meal/tube feeding;Supervision for safety   Grooming Moderate Assist   Grooming Description Increased time;Initial preparation for task;Set-up of equipment;Supervision for safety;Verbal cueing;Seated in wheelchair at sink  (assistance with oral care/dentures;SBA to brush hair)    Bathing Maximal Assist   Bathing Description Grab bar;Hand held shower;Tub bench;Assit with back;Assit wtih lower extremities;Assit with perineal;Increased time;Initial preparation for task;Set-up of equipment;Set up for wound protection;Supervision for safety;Verbal cueing   Upper Body Dressing Maximal Assist   Upper Body Dressing Description Assit with threading arms through sleeves;Assist with pulling shirt over head;Increased time;Initial preparation for task;Set-up of equipment;Supervision for safety;Verbal cueing   Lower Body Dressing Maximal Assist   Lower Body Dressing Description Assist with threading into pant leg;Increased time;Initial preparation for task;Set-up of equipment;Supervision for safety;Verbal cueing   Toileting Total Assist   Toileting Description Assist for hygiene;Assist to pull pants up;Assist to pull pants down;Assist for standing balance;Grab bar;Increased time;Set-up of equipment;Supervision for safety;Verbal cueing   Bed, Chair, Wheelchair Transfer Maximal Assist   Bed Chair Wheelchair Transfer Description Increased time;Set-up of equipment;Supervision for safety;Verbal cueing   Toilet Transfers Maximal Assist   Toilet Transfer Description Grab bar;Increased time;Initial preparation for task;Requires lift;Set-up of equipment;Supervision for safety;Verbal cueing  (w/c<>toilet SPT via GB, min-mod lift/lower assist)   Tub / Shower Transfers Maximal Assist   Tub Shower Transfer Description Grab bar;Shower bench;Increased time;Initial preparation for task;Requires lift;Set-up of equipment;Supervision for safety;Verbal cueing  (w/c<>shower bench SPT via GB, min-mod lift/lower assist)   Problem List   Problem List Decreased Active Daily Living Skills;Decreased Homemaking Skills;Decreased Upper Extremity Strength Left;Decreased Upper Extremity AROM Left;Decreased Upper Extremity PROM Left;Decreased Functional Mobility;Decreased Activity Tolerance;Safety Awareness Deficits /  Cognition;Impaired Coordination Left Upper Extremity;Impaired Cognitive Function;Impaired Postural Control / Balance;Other (Comments)  (L wrist pain)   Precautions   Precautions Fall Risk;Swallow Precautions ( See Comments)   Comments check L wrist WB status, L inattention, slow to mobilize, ZIO patch, droplet prec   Current Discharge Plan   Current Discharge Plan Return to Prior Living Situation   Benefit    Therapy Benefit Patient Would Benefit from Inpatient Rehab Occupational Therapy to Maximize [place] with ADLs, IADLs and Functional Mobility.   Interdisciplinary Plan of Care Collaboration   IDT Collaboration with  Certified Nursing Assistant;Nursing;Physical Therapist   Patient Position at End of Therapy In Bed;Bed Alarm On;Call Light within Reach;Tray Table within Reach;Phone within Reach   Collaboration Comments CLOF, POC   Equipment Needs   Assistive Device / DME Ho Walker;Parallel Bars;Wheelchair;Shower Chair;Grab Bars In Shower / Tub;Grab Bars By Toilet   Adaptive Equipment Other (Comments)  (TBD)   Strengths & Barriers   Strengths Independent prior level of function;Motivated for self care and independence;Pleasant and cooperative;Supportive family;Willingly participates in therapeutic activities   Barriers Aspiration risk;Decreased endurance;Fatigue;Hemiparesis;Impaired activity tolerance;Impaired balance;Impaired functional cognition;Pain   OT Total Time Spent   OT Individual Total Time Spent (Mins) 60   OT Charge Group   Charges Yes   OT Self Care / ADL 1   OT Evaluation OT Evaluation Mod       Assessment  Patient is 76 y.o. female with a diagnosis of R parietal subacute ischemic infarct.  Additional factors influencing patient status / progress (ie: cognitive factors, co-morbidities, social support, etc): Per H&P pt has PMHx significant for arthritis, hypertension, history of TIAs, and peripheral neuropathy.    Pt lives in a [facility] with ramp entry in [place] with her spouse. She reports  independent PLOF for ADLs, IADLs, driving, household mobility without AD and community mobility with SPC or 4WW. She has a walk in shower with GB and chair. She has children who can provide some assistance at d/c, though they do not live in the area.     During OT evaluation pt required max A to SBA with ADLs and max A for bathroom transfers from w/c level. She is functioning below her baseline and presents with L hemiparesis, L inattention, L wrist pain, impaired standing tolerance/balance, decreased endurance and impaired functional cog. Pt is motivated to increase her independence with ADLs and mobility.       Plan  Recommend Occupational Therapy  minutes per day 5-7 days per week for 2-3 weeks for the following treatments:  OT Orthotics Training, OT E Stim Attended, OT Group Therapy, OT Self Care/ADL, OT Cognitive Skill Dev, OT Community Reintegration, OT Manual Ther Technique, OT Neuro Re-Ed/Balance, OT Sensory Int Techniques, OT Therapeutic Activity, OT Evaluation and OT Therapeutic Exercise.      Passport items to be completed:  Perform bathroom transfers, complete dressing, complete feeding, get ready for the day, prepare a simple meal, participate in household tasks, adapt home for safety needs, demonstrate home exercise program, complete caregiver training     Goals:  Long term and short term goals have been discussed with patient and they are in agreement.    Occupational Therapy Goals (Active)     Problem: Bathing     Dates: Start: 12/04/21       Goal: STG-Within one week, patient will bathe with min A using AE as needed.     Dates: Start: 12/04/21             Problem: Dressing     Dates: Start: 12/04/21       Goal: STG-Within one week, patient will dress UB with min A using alexx technique.     Dates: Start: 12/04/21          Goal: STG-Within one week, patient will dress LB with mod A using alexx technique.      Dates: Start: 12/04/21             Problem: Functional Transfers     Dates: Start:  12/04/21       Goal: STG-Within one week, patient will transfer to toilet with min A using AE/DME as needed.      Dates: Start: 12/04/21             Problem: OT Long Term Goals     Dates: Start: 12/04/21       Goal: LTG-By discharge, patient will complete basic self care tasks at supervision to mod I level using AE as needed.      Dates: Start: 12/04/21          Goal: LTG-By discharge, patient will perform bathroom transfers at supervision level using AE/DME as needed.      Dates: Start: 12/04/21             Problem: Toileting     Dates: Start: 12/04/21       Goal: STG-Within one week, patient will complete toileting tasks with mod A using AE as needed.      Dates: Start: 12/04/21

## 2021-12-04 NOTE — PROGRESS NOTES
Patient admitted from [facility], neurosci floor, by patient transport. Assumed care.  Patient has no SOB, and c/o 4/10 pain, h/a.  A & O x 4 but slow to respond, dysarthria. Patient educated on call light, bed alarm, fall risk, oriented to unit. Verbalized understanding.  Assesment completed.  VSS except blood pressure high at 178 systolic.  Nida aware.  Gave hydralazine. Doctor Nida attending.  Diagnosis of L body stroke.  Needs met. Covid sample collected.

## 2021-12-04 NOTE — FLOWSHEET NOTE
12/03/21 1659   Events/Summary/Plan   Events/Summary/Plan RT assessment   Vital Signs   Pulse 73   Respiration 18   Pulse Oximetry 95 %   $ Pulse Oximetry (Spot Check) Yes   Respiratory Assessment   Level of Consciousness Alert   Chest Exam   Work Of Breathing / Effort Within Normal Limits   Oxygen   O2 Delivery Device None - Room Air   Smoking History   Have you ever smoked Never

## 2021-12-04 NOTE — CARE PLAN
"  Problem: Knowledge Deficit - Standard  Goal: Patient and family/care givers will demonstrate understanding of plan of care, disease process/condition, diagnostic tests and medications  Note: Droplet isolation for new admit pending covid test result. PRN Miralax with mildly thickened liquid given for last BM 11/30/21. No c/o HA at this time. Repositioned up in bed. Will monitor.     Problem: Fall Risk - Rehab  Goal: Patient will remain free from falls  Note: Erin Irizarry Fall risk Assessment Score: 18    High fall risk Interventions   - Alarming seatbelt  - Wander guard  - Bed and strip alarm   - Yellow sign by the door   - Yellow wrist band \"Fall risk\"  - Room near to the nurse station  - Do not leave patient unattended in the bathroom  - Fall risk education provided           The patient is Watcher - Medium risk of patient condition declining or worsening             "

## 2021-12-05 PROCEDURE — 99232 SBSQ HOSP IP/OBS MODERATE 35: CPT | Performed by: HOSPITALIST

## 2021-12-05 PROCEDURE — A9270 NON-COVERED ITEM OR SERVICE: HCPCS | Performed by: HOSPITALIST

## 2021-12-05 PROCEDURE — 700102 HCHG RX REV CODE 250 W/ 637 OVERRIDE(OP): Performed by: PHYSICAL MEDICINE & REHABILITATION

## 2021-12-05 PROCEDURE — 700102 HCHG RX REV CODE 250 W/ 637 OVERRIDE(OP): Performed by: HOSPITALIST

## 2021-12-05 PROCEDURE — A9270 NON-COVERED ITEM OR SERVICE: HCPCS | Performed by: PHYSICAL MEDICINE & REHABILITATION

## 2021-12-05 PROCEDURE — 97129 THER IVNTJ 1ST 15 MIN: CPT

## 2021-12-05 PROCEDURE — 97130 THER IVNTJ EA ADDL 15 MIN: CPT

## 2021-12-05 PROCEDURE — 770010 HCHG ROOM/CARE - REHAB SEMI PRIVAT*

## 2021-12-05 PROCEDURE — 700111 HCHG RX REV CODE 636 W/ 250 OVERRIDE (IP): Performed by: PHYSICAL MEDICINE & REHABILITATION

## 2021-12-05 RX ORDER — AMLODIPINE BESYLATE 5 MG/1
2.5 TABLET ORAL ONCE
Status: COMPLETED | OUTPATIENT
Start: 2021-12-05 | End: 2021-12-05

## 2021-12-05 RX ORDER — AMLODIPINE BESYLATE 5 MG/1
7.5 TABLET ORAL
Status: DISCONTINUED | OUTPATIENT
Start: 2021-12-06 | End: 2021-12-07

## 2021-12-05 RX ADMIN — ATORVASTATIN CALCIUM 80 MG: 40 TABLET, FILM COATED ORAL at 08:40

## 2021-12-05 RX ADMIN — ENOXAPARIN SODIUM 40 MG: 40 INJECTION SUBCUTANEOUS at 08:42

## 2021-12-05 RX ADMIN — CARBAMAZEPINE 200 MG: 100 TABLET, CHEWABLE ORAL at 19:53

## 2021-12-05 RX ADMIN — LABETALOL HYDROCHLORIDE 200 MG: 100 TABLET, FILM COATED ORAL at 19:53

## 2021-12-05 RX ADMIN — LABETALOL HYDROCHLORIDE 200 MG: 100 TABLET, FILM COATED ORAL at 08:41

## 2021-12-05 RX ADMIN — ASPIRIN 81 MG: 81 TABLET, COATED ORAL at 08:40

## 2021-12-05 RX ADMIN — POTASSIUM CHLORIDE 20 MEQ: 1500 TABLET, EXTENDED RELEASE ORAL at 08:41

## 2021-12-05 RX ADMIN — ACETAMINOPHEN 650 MG: 325 TABLET ORAL at 19:53

## 2021-12-05 RX ADMIN — LISINOPRIL 40 MG: 20 TABLET ORAL at 08:41

## 2021-12-05 RX ADMIN — AMLODIPINE BESYLATE 5 MG: 5 TABLET ORAL at 05:17

## 2021-12-05 RX ADMIN — SENNOSIDES AND DOCUSATE SODIUM 2 TABLET: 50; 8.6 TABLET ORAL at 08:40

## 2021-12-05 RX ADMIN — OMEPRAZOLE 20 MG: 20 CAPSULE, DELAYED RELEASE ORAL at 08:40

## 2021-12-05 RX ADMIN — CARBAMAZEPINE 200 MG: 100 TABLET, CHEWABLE ORAL at 08:40

## 2021-12-05 RX ADMIN — AMLODIPINE BESYLATE 2.5 MG: 5 TABLET ORAL at 14:26

## 2021-12-05 RX ADMIN — HYDRALAZINE HYDROCHLORIDE 10 MG: 10 TABLET, FILM COATED ORAL at 20:26

## 2021-12-05 RX ADMIN — Medication 1000 UNITS: at 08:39

## 2021-12-05 RX ADMIN — CLOPIDOGREL BISULFATE 75 MG: 75 TABLET ORAL at 08:41

## 2021-12-05 ASSESSMENT — ENCOUNTER SYMPTOMS
BLURRED VISION: 0
DIARRHEA: 0
DIZZINESS: 0
COUGH: 0
FEVER: 0
NERVOUS/ANXIOUS: 0

## 2021-12-05 ASSESSMENT — PATIENT HEALTH QUESTIONNAIRE - PHQ9
SUM OF ALL RESPONSES TO PHQ9 QUESTIONS 1 AND 2: 0
2. FEELING DOWN, DEPRESSED, IRRITABLE, OR HOPELESS: NOT AT ALL
1. LITTLE INTEREST OR PLEASURE IN DOING THINGS: NOT AT ALL

## 2021-12-05 ASSESSMENT — PAIN DESCRIPTION - PAIN TYPE: TYPE: ACUTE PAIN

## 2021-12-05 ASSESSMENT — FIBROSIS 4 INDEX: FIB4 SCORE: 1.473139127471974009

## 2021-12-05 NOTE — CARE PLAN
Problem: Knowledge Deficit - Standard  Goal: Patient and family/care givers will demonstrate understanding of plan of care, disease process/condition, diagnostic tests and medications  Outcome: Progressing     Problem: Skin Integrity  Goal: Skin integrity is maintained or improved  Outcome: Progressing   The patient is Stable - Low risk of patient condition declining or worsening    Shift Goals  Clinical Goals: sefety  Patient Goals: progress with swallow thick to thin    Progress made toward(s) clinical / shift goals:      Patient is not progressing towards the following goals:

## 2021-12-05 NOTE — THERAPY
Speech Language Pathology  Daily Treatment     Patient Name: Nakita Sheppard  Age:  76 y.o., Sex:  female  Medical Record #: [id number]  Today's Date: 12/5/2021     Precautions  Precautions: Fall Risk,Swallow Precautions ( See Comments)  Comments: Aspiration precautions- 1:1 superivsion, M&M/thin    Subjective    Pt seen at bedside; utilizing call light indep to communicate needs with CNA; pt pleasant and cooperative during ST     Objective       12/05/21 1031   SCCAN (Scales of Cognitive and Communicative Ability for Neurorehabilitation)   Oral Expression - Raw Score 17   Oral Expression - Scale Performance Score 89   Orientation - Raw Score 12   Orientation - Scale Performance Score 100   Memory - Raw Score 13   Memory - Scale Performance Score 68   Speech Comprehension - Raw Score 12   Speech Comprehension - Scale Performance Score 92   Reading Comprehension - Raw Score 6   Reading Comprehension - Scale Performance Score 50   Writing - Raw Score 4   Writing - Scale Performance Score 57   Attention - Raw Score 9   Attention - Scale Performance Score 56   Problem Solving - Raw Score 15   Problem Solving - Scale Performance Score 65   SCCAN Total Raw Score 68   SCCAN Degree of Severity Moderate Impairment   Interdisciplinary Plan of Care Collaboration   IDT Collaboration with  Therapy Tech;Certified Nursing Assistant   Patient Position at End of Therapy In Bed;Bed Alarm On;Call Light within Reach;Phone within Reach;Tray Table within Reach   Collaboration Comments Re: Adding pt to T-Dine   SLP Total Time Spent   SLP Individual Total Time Spent (Mins) 60   Treatment Charges   SLP Cognitive Skill Development First 15 Minutes 1   SLP Cognitive Skill Development Additional 15 Minutes 3       Assessment    Completed SCCAN assessment. Pt scored 68/94 (67/94 indicates mild impairment). Pt demonstrated the most difficulty in the areas of Memory (68%), Reading Comprehension (50%), Writing (57%), Attention (56%), and  "Problem Solving (65%). Pt reported her goals at [facility] are to \"walk and talk\". Discussed clear speech strategies and provided pt with a handout. Pt reported increased difficulty with swallowing. Based on pt report and chart review- pt placed in T-Dine. Created cue card based off of evaluation and wristband with current diet and medication administration. Recommend pt be assessed during meal time in upcoming ST session to further analyze swallow safety and compensatory strategies. MBSS to be scheduled as recommended from CSE.     Strengths: Alert and oriented,Independent prior level of function,Good insight into deficits/needs,Willingly participates in therapeutic activities,Supportive family,Pleasant and cooperative,Motivated for self care and independence  Barriers: Impaired functional cognition,Difficulty following instructions,Decreased endurance,Impaired activity tolerance    Plan    Schedule MBSS, assess during meal time and reinforce safe swallow strategies, target clear speech and cognitive- linguistic tasks    Passport items to be completed:  Express basic needs, understand food/liquid recommendations, consistently follow swallow precautions, manage finances, manage medications, arrive to therapy appointments on time, complete daily memory log entries, solve problems related to safety situations, review education related to hospitalization, complete caregiver training     Speech Therapy Problems (Active)     Problem: Expression STGs     Dates: Start: 12/04/21       Goal: STG-Within one week, patient will     Dates: Start: 12/04/21       Description: Utilize clear speech strategies with mod cues in 70% of opportunities to increase overall speech intelligibility.        Goal: STG-Within one week, patient will     Dates: Start: 12/04/21       Description: Participate in a standardized language assessment to further identify expressive/receptive language needs.          Problem: Memory STGs     Dates: Start: " 12/04/21       Goal: STG-Within one week, patient will     Dates: Start: 12/04/21       Description: Complete functional IADL tasks (e.g. med and financial management) with 70% acc and mod cues to ensure safe return to her PLOF.           Problem: Speech/Swallowing LTGs     Dates: Start: 12/04/21       Goal: LTG-By discharge, patient will safely swallow     Dates: Start: 12/04/21       Description: Safest/least restrictive diet with no overt s/sx of aspiration for 100% of intake to maintain adequate nutrition and hydration.        Goal: LTG-By discharge, patient will     Dates: Start: 12/04/21       Description: Complete cog-ling tasks at modified independence level to ensure safe return to PLOF.        Goal: LTG-By discharge, patient will     Dates: Start: 12/04/21       Description: Communicate complex ideas at the conversation level with 90% intelligibility to a naiive listener.           Problem: Swallowing STGs     Dates: Start: 12/04/21       Goal: STG-Within one week, patient will     Dates: Start: 12/04/21       Description: Participate in instrumental swallow examination to better visualize anatomy and physiology of pt's swallow and guide individualized POC targeting dysphagia management.

## 2021-12-05 NOTE — PROGRESS NOTES
[facility] Medicine Daily Progress Note    Date of Service  12/5/2021    Chief Complaint:  Hypertension    Interval History:  Pt asked if IV access can be remover -- not needing it -- will remove.    Review of Systems  Review of Systems   Constitutional: Negative for fever.   Eyes: Negative for blurred vision.   Respiratory: Negative for cough.    Cardiovascular: Negative for chest pain.   Gastrointestinal: Negative for diarrhea.   Musculoskeletal: Negative for joint pain.   Neurological: Negative for dizziness.   Psychiatric/Behavioral: The patient is not nervous/anxious.         Physical Exam  Temp:  [36.6 °C (97.8 °F)-36.9 °C (98.5 °F)] 36.9 °C (98.5 °F)  Pulse:  [68-80] 72  Resp:  [14-18] 18  BP: (120-170)/(55-90) 162/79  SpO2:  [88 %-96 %] 92 %    Physical Exam  Vitals and nursing note reviewed.   Constitutional:       General: She is not in acute distress.  HENT:      Mouth/Throat:      Mouth: Mucous membranes are moist.      Pharynx: Oropharynx is clear.   Eyes:      General: No scleral icterus.  Neck:      Vascular: No JVD.   Cardiovascular:      Rate and Rhythm: Normal rate and regular rhythm.      Heart sounds: Normal heart sounds.   Pulmonary:      Effort: Pulmonary effort is normal.      Breath sounds: No wheezing or rales.   Abdominal:      General: Bowel sounds are normal.   Musculoskeletal:      Cervical back: No rigidity.      Right lower leg: No edema.      Left lower leg: No edema.   Skin:     General: Skin is warm and dry.   Neurological:      Mental Status: She is alert and oriented to person, place, and time.   Psychiatric:         Mood and Affect: Mood normal.         Behavior: Behavior normal.         Fluids    Intake/Output Summary (Last 24 hours) at 12/5/2021 1045  Last data filed at 12/5/2021 0900  Gross per 24 hour   Intake 360 ml   Output --   Net 360 ml       Laboratory  Recent Labs     12/04/21  0553   WBC 8.1   RBC 3.78*   HEMOGLOBIN 12.0   HEMATOCRIT 37.4   MCV 98.9*   MCH 31.7   MCHC  32.1*   RDW 45.1   PLATELETCT 336   MPV 9.5     Recent Labs     12/04/21  0553   SODIUM 144   POTASSIUM 3.4*   CHLORIDE 106   CO2 24   GLUCOSE 108*   BUN 17   CREATININE 0.81   CALCIUM 9.0             Recent Labs     12/04/21  0553   TRIGLYCERIDE 103   HDL 62   LDL 74       Imaging    Assessment/Plan  * Ischemic stroke (HCC)- (present on admission)  Assessment & Plan  Had right MCA CVA  On ASA & Plavix  On Lipitor    Hypokalemia  Assessment & Plan  K+ 3.4  On K+ supplements  S/P KCL 40 meq x 1 (12/4)  Monitor    Hypertension- (present on admission)  Assessment & Plan  BP better but still a little elevated  On Labetalol  On Lisinopril  On Norvasc --> will increase dose a little  Cont to monitor

## 2021-12-05 NOTE — DISCHARGE PLANNING
CASE MANAGEMENT INITIAL ASSESSMENT    Admit Date:  12/3/2021     Patient is a  76 y.o. female transferred from [facility] where she was admitted from 11/30-12/03.     Admitting physician: Dr. Alva   PCP: Tracy Cartagena     has reviewed medical chart and will meet with patient and family to discuss role of case management / discharge planning / team conference.     Diagnosis: Ischemic stroke (Formerly [facility]) [I63.9]    Co-morbidities:   Patient Active Problem List    Diagnosis Date Noted   • Hypokalemia 12/04/2021   • Ischemic stroke (Formerly [facility]) 12/03/2021   • Class 1 obesity without serious comorbidity in adult 12/03/2021   • Spasticity 12/03/2021   • Impaired mobility and ADLs 12/03/2021   • Other dysphagia 12/03/2021   • Left-sided neglect 12/03/2021   • Acute CVA (cerebrovascular accident) (Formerly [facility]) 12/01/2021   • Acute cystitis without hematuria 12/01/2021   • Acute respiratory failure with hypoxia (Formerly [facility]) 12/01/2021   • Ischemic stroke of frontal lobe (Formerly [facility]) 11/23/2021   • History of paraplegia 03/18/2021   • Obesity (BMI 35.0-39.9 without comorbidity) (Formerly [facility]) 12/06/2017   • Bilateral lower extremity pain 07/26/2017   • Nonintractable episodic headache 04/26/2017   • Bilateral swelling of feet and ankles 04/26/2017   • Prediabetes 01/09/2017   • Seasonal allergic rhinitis due to pollen 01/09/2017   • Cramp of both lower extremities 11/23/2016   • Inactivity 10/17/2016   • Hyperlipidemia LDL goal <70 10/17/2016   • BMI 40.0-44.9, adult (Formerly [facility]) 09/28/2016   • 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   • Spinal stenosis, lumbar region, without neurogenic claudication 10/06/2014   • Peripheral neuropathy 08/13/2013   • Degenerative disk disease 08/13/2013   • Hypertension 04/09/2013   • Cervical neuralgia 04/09/2013   • Chronic midline low back pain without sciatica 12/13/2010     Prior Living Situation:  Housing / Facility: 1 Story House  Lives with - Patient's Self Care Capacity: Spouse    Prior Level of  Function:  Medication Management: Independent  Finances: Independent  Home Management: Independent  Shopping: Independent  Prior Level Of Mobility: Independent With Device in Community,Independent Without Device in Home  Driving / Transportation: Driving Independent    Support Systems:  Primary : Natan Sheppard (Spouse) [phone], Alpa Marin (daughter) [phone]    Previous Services Utilized:   Equipment Owned: 4-Wheel Walker,Single Point Cane,Grab Bar(s) In Tub / Shower,Ramp  Prior Services: None,Home-Independent    Other Information:  Occupation (Pre-Hospital Vocational): Retired Due To Age  Primary Payor Source: Medicare A,Medicare B  Primary Care Practitioner : Tracy Cartagena     Patient / Family Goal:  Patient / Family Goal:  will discuss goals with patient     Plan:  1. Continue to follow patient through hospitalization and provide discharge planning in collaboration with patient, family, physicians and ancillary services.     2. Utilize community resources to ensure a safe discharge.

## 2021-12-05 NOTE — PROGRESS NOTES
Received patient with BP-164/75, prn hydralazine 10mg given 1920. Re-check /90 at 2032, scheduled Tegretol and Labetolol given. Patient with no c/o pain, no HA. Responsive. DNAR/DNI. CN notified and intervene, CN re- check V/S BP-159/75 with O2 saturation 88% room air. No c/o SOB.  O2 at 2 lpm given with O2 saturation 94% at 2200, BP-124/55 (machine) and 120/60 manually. On call Dr. Castorena notified by CN. Will continue to monitor.

## 2021-12-05 NOTE — CARE PLAN
"  Problem: Fall Risk - Rehab  Goal: Patient will remain free from falls  Note: Erin Irizarry Fall risk Assessment Score: 18    High fall risk Interventions   - Alarming seatbelt  - Wander guard  - Bed and strip alarm   - Yellow sign by the door   - Yellow wrist band \"Fall risk\"  - Room near to the nurse station  - Do not leave patient unattended in the bathroom  - Fall risk education provided           The patient is Watcher - Medium risk of patient condition declining or worsening    Shift Goals  Clinical Goals: sefety  Patient Goals: progress with swallow thick to thin        "

## 2021-12-06 ENCOUNTER — APPOINTMENT (OUTPATIENT)
Dept: PAIN MANAGEMENT | Facility: REHABILITATION | Age: 76
DRG: 057 | End: 2021-12-06
Attending: PHYSICAL MEDICINE & REHABILITATION
Payer: MEDICARE

## 2021-12-06 ENCOUNTER — APPOINTMENT (OUTPATIENT)
Dept: RADIOLOGY | Facility: REHABILITATION | Age: 76
DRG: 057 | End: 2021-12-06
Attending: PHYSICAL MEDICINE & REHABILITATION
Payer: MEDICARE

## 2021-12-06 DIAGNOSIS — I63.9 ACUTE CVA (CEREBROVASCULAR ACCIDENT) (HCC): ICD-10-CM

## 2021-12-06 PROBLEM — G47.09 OTHER INSOMNIA: Status: ACTIVE | Noted: 2021-12-06

## 2021-12-06 PROBLEM — S62.102S WRIST FRACTURE, CLOSED, LEFT, SEQUELA: Status: ACTIVE | Noted: 2021-12-06

## 2021-12-06 PROBLEM — E55.9 VITAMIN D DEFICIENCY: Status: ACTIVE | Noted: 2021-12-06

## 2021-12-06 PROBLEM — R35.89 POLYURIA: Status: ACTIVE | Noted: 2021-12-06

## 2021-12-06 LAB
ANION GAP SERPL CALC-SCNC: 13 MMOL/L (ref 7–16)
BACTERIA BLD CULT: NORMAL
BACTERIA BLD CULT: NORMAL
BUN SERPL-MCNC: 16 MG/DL (ref 8–22)
CALCIUM SERPL-MCNC: 9 MG/DL (ref 8.5–10.5)
CHLORIDE SERPL-SCNC: 107 MMOL/L (ref 96–112)
CO2 SERPL-SCNC: 25 MMOL/L (ref 20–33)
CREAT SERPL-MCNC: 0.74 MG/DL (ref 0.5–1.4)
GLUCOSE SERPL-MCNC: 84 MG/DL (ref 65–99)
POTASSIUM SERPL-SCNC: 4.1 MMOL/L (ref 3.6–5.5)
SIGNIFICANT IND 70042: NORMAL
SIGNIFICANT IND 70042: NORMAL
SITE SITE: NORMAL
SITE SITE: NORMAL
SODIUM SERPL-SCNC: 145 MMOL/L (ref 135–145)
SOURCE SOURCE: NORMAL
SOURCE SOURCE: NORMAL

## 2021-12-06 PROCEDURE — 700102 HCHG RX REV CODE 250 W/ 637 OVERRIDE(OP): Performed by: PHYSICAL MEDICINE & REHABILITATION

## 2021-12-06 PROCEDURE — 770010 HCHG ROOM/CARE - REHAB SEMI PRIVAT*

## 2021-12-06 PROCEDURE — A9270 NON-COVERED ITEM OR SERVICE: HCPCS | Performed by: HOSPITALIST

## 2021-12-06 PROCEDURE — 92526 ORAL FUNCTION THERAPY: CPT

## 2021-12-06 PROCEDURE — 99233 SBSQ HOSP IP/OBS HIGH 50: CPT | Performed by: PHYSICAL MEDICINE & REHABILITATION

## 2021-12-06 PROCEDURE — 97112 NEUROMUSCULAR REEDUCATION: CPT

## 2021-12-06 PROCEDURE — 97535 SELF CARE MNGMENT TRAINING: CPT

## 2021-12-06 PROCEDURE — 92611 MOTION FLUOROSCOPY/SWALLOW: CPT

## 2021-12-06 PROCEDURE — 80048 BASIC METABOLIC PNL TOTAL CA: CPT

## 2021-12-06 PROCEDURE — 700102 HCHG RX REV CODE 250 W/ 637 OVERRIDE(OP): Performed by: HOSPITALIST

## 2021-12-06 PROCEDURE — 700111 HCHG RX REV CODE 636 W/ 250 OVERRIDE (IP): Performed by: PHYSICAL MEDICINE & REHABILITATION

## 2021-12-06 PROCEDURE — 99232 SBSQ HOSP IP/OBS MODERATE 35: CPT | Performed by: HOSPITALIST

## 2021-12-06 PROCEDURE — A9270 NON-COVERED ITEM OR SERVICE: HCPCS | Performed by: PHYSICAL MEDICINE & REHABILITATION

## 2021-12-06 PROCEDURE — 36415 COLL VENOUS BLD VENIPUNCTURE: CPT

## 2021-12-06 PROCEDURE — 74230 X-RAY XM SWLNG FUNCJ C+: CPT

## 2021-12-06 PROCEDURE — 97530 THERAPEUTIC ACTIVITIES: CPT

## 2021-12-06 RX ORDER — TIZANIDINE 4 MG/1
2 TABLET ORAL
Status: DISCONTINUED | OUTPATIENT
Start: 2021-12-06 | End: 2021-12-22 | Stop reason: HOSPADM

## 2021-12-06 RX ORDER — OMEPRAZOLE 20 MG/1
20 CAPSULE, DELAYED RELEASE ORAL
Status: DISCONTINUED | OUTPATIENT
Start: 2021-12-07 | End: 2021-12-22 | Stop reason: HOSPADM

## 2021-12-06 RX ADMIN — ENOXAPARIN SODIUM 40 MG: 40 INJECTION SUBCUTANEOUS at 08:53

## 2021-12-06 RX ADMIN — POTASSIUM CHLORIDE 20 MEQ: 1500 TABLET, EXTENDED RELEASE ORAL at 08:53

## 2021-12-06 RX ADMIN — CLOPIDOGREL BISULFATE 75 MG: 75 TABLET ORAL at 08:55

## 2021-12-06 RX ADMIN — TIZANIDINE 2 MG: 4 TABLET ORAL at 21:12

## 2021-12-06 RX ADMIN — ATORVASTATIN CALCIUM 80 MG: 40 TABLET, FILM COATED ORAL at 08:55

## 2021-12-06 RX ADMIN — ASPIRIN 81 MG: 81 TABLET, COATED ORAL at 08:55

## 2021-12-06 RX ADMIN — SENNOSIDES AND DOCUSATE SODIUM 2 TABLET: 50; 8.6 TABLET ORAL at 21:12

## 2021-12-06 RX ADMIN — SENNOSIDES AND DOCUSATE SODIUM 2 TABLET: 50; 8.6 TABLET ORAL at 08:55

## 2021-12-06 RX ADMIN — CARBAMAZEPINE 200 MG: 100 TABLET, CHEWABLE ORAL at 08:54

## 2021-12-06 RX ADMIN — AMLODIPINE BESYLATE 7.5 MG: 5 TABLET ORAL at 06:31

## 2021-12-06 RX ADMIN — CARBAMAZEPINE 200 MG: 100 TABLET, CHEWABLE ORAL at 21:12

## 2021-12-06 RX ADMIN — Medication 1000 UNITS: at 08:55

## 2021-12-06 RX ADMIN — LABETALOL HYDROCHLORIDE 200 MG: 100 TABLET, FILM COATED ORAL at 21:11

## 2021-12-06 RX ADMIN — OMEPRAZOLE 20 MG: 20 CAPSULE, DELAYED RELEASE ORAL at 08:54

## 2021-12-06 RX ADMIN — LABETALOL HYDROCHLORIDE 200 MG: 100 TABLET, FILM COATED ORAL at 08:54

## 2021-12-06 RX ADMIN — LISINOPRIL 40 MG: 20 TABLET ORAL at 08:53

## 2021-12-06 ASSESSMENT — ENCOUNTER SYMPTOMS
NERVOUS/ANXIOUS: 0
NAUSEA: 0
ABDOMINAL PAIN: 0
FEVER: 0
CHILLS: 0
DIARRHEA: 0
VOMITING: 0
SHORTNESS OF BREATH: 0

## 2021-12-06 ASSESSMENT — ACTIVITIES OF DAILY LIVING (ADL)
TOILET_TRANSFER_DESCRIPTION: GRAB BAR;INCREASED TIME;SET-UP OF EQUIPMENT;SUPERVISION FOR SAFETY;VERBAL CUEING
TOILETING_LEVEL_OF_ASSIST_DESCRIPTION: ASSIST TO PULL PANTS UP;ASSIST TO PULL PANTS DOWN;ASSIST FOR STANDING BALANCE;GRAB BAR;INCREASED TIME;SET-UP OF EQUIPMENT;SUPERVISION FOR SAFETY;VERBAL CUEING

## 2021-12-06 ASSESSMENT — PAIN DESCRIPTION - PAIN TYPE: TYPE: ACUTE PAIN

## 2021-12-06 NOTE — THERAPY
"Occupational Therapy  Daily Treatment     Patient Name: Nakita Sheppard  Age:  76 y.o., Sex:  female  Medical Record #: [id number]  Today's Date: 12/6/2021     Precautions  Precautions: Fall Risk,Swallow Precautions ( See Comments)  Comments: check L wrist WB status, L inattention, slow to mobilize, ZIO patch    Safety   ADL Safety : (P) Requires Supervision for Safety,Requires Physical Assist for Safety,Requires Cueing for Safety  Bathroom Safety: (P) Requires Supervision for Safety,Requires Physical Assist for Safety,Requires Cuing for Safety  Comments: (P) see functional levels below for ADL performance details.    Subjective    \"Can I use the bathroom?\"     Objective       12/06/21 0701   Precautions   Precautions Fall Risk;Swallow Precautions ( See Comments)   Comments check L wrist WB status, L inattention, slow to mobilize, ZIO patch   Safety    ADL Safety  Requires Supervision for Safety;Requires Physical Assist for Safety;Requires Cueing for Safety   Bathroom Safety Requires Supervision for Safety;Requires Physical Assist for Safety;Requires Cuing for Safety   Comments see functional levels below for ADL performance details.   Cognition    Speech/ Communication Slurred   Orientation Level Oriented x 4   Level of Consciousness Alert   Ability To Follow Commands 2 Step   Attention Impaired   Comments L inattention   Functional Level of Assist   Grooming Stand by Assist;Seated   Grooming Description Increased time;Set-up of equipment;Supervision for safety;Verbal cueing  (brusing hair and oral care seated at sink)   Upper Body Dressing Maximal Assist   Upper Body Dressing Description Assit with threading arms through sleeves;Assist with pulling shirt over head;Increased time;Initial preparation for task;Set-up of equipment;Supervision for safety;Verbal cueing  (max A to don bra and pullover shirt)   Lower Body Dressing Maximal Assist   Lower Body Dressing Description Increased time;Assist with threading " into pant leg;Set-up of equipment;Initial preparation for task;Supervision for safety;Verbal cueing  (max A to don brief, pants and tread socks)   Toileting Maximal Assist   Toileting Description Assist to pull pants up;Assist to pull pants down;Assist for standing balance;Grab bar;Increased time;Set-up of equipment;Supervision for safety;Verbal cueing   Toilet Transfers Moderate Assist   Toilet Transfer Description Grab bar;Increased time;Set-up of equipment;Supervision for safety;Verbal cueing  (w/c<>toilet SPT via GB)   Bed Mobility    Supine to Sit Minimal Assist   Scooting Moderate Assist   Rolling Minimum Assist to Lt.   Skilled Intervention Sequencing;Verbal Cuing   Interdisciplinary Plan of Care Collaboration   IDT Collaboration with  Nursing   Patient Position at End of Therapy Seated;Chair Alarm On;Self Releasing Lap Belt Applied;Other (Comments)  (seated in t-dine for breakfast)   Collaboration Comments CLOF, POC   OT Total Time Spent   OT Individual Total Time Spent (Mins) 60   OT Charge Group   OT Self Care / ADL 4       Assessment    Pt tolerated OT session well with focus on progression with ADLs. She demonstrated increased independence with bed mobility, toileting and toilet txfr from eval. She con't to require max A for UB/LB dressing with cues for alexx technique. Pt con't to report L wrist pain.   Strengths: Independent prior level of function,Motivated for self care and independence,Pleasant and cooperative,Supportive family,Willingly participates in therapeutic activities  Barriers: Aspiration risk,Decreased endurance,Fatigue,Hemiparesis,Impaired activity tolerance,Impaired balance,Impaired functional cognition,Pain    Plan    ADLs, functional transfers, LUE ROM/positioning (get L arm tray/trough), LUE neuro re-ed, activities to improve L side attention.    Passport items to be completed:  Perform bathroom transfers, complete dressing, complete feeding, get ready for the day, prepare a simple  meal, participate in household tasks, adapt home for safety needs, demonstrate home exercise program, complete caregiver training     Occupational Therapy Goals (Active)     Problem: Bathing     Dates: Start: 12/04/21       Goal: STG-Within one week, patient will bathe with min A using AE as needed.     Dates: Start: 12/04/21             Problem: Dressing     Dates: Start: 12/04/21       Goal: STG-Within one week, patient will dress UB with min A using alexx technique.     Dates: Start: 12/04/21          Goal: STG-Within one week, patient will dress LB with mod A using alexx technique.      Dates: Start: 12/04/21             Problem: Functional Transfers     Dates: Start: 12/04/21       Goal: STG-Within one week, patient will transfer to toilet with min A using AE/DME as needed.      Dates: Start: 12/04/21             Problem: OT Long Term Goals     Dates: Start: 12/04/21       Goal: LTG-By discharge, patient will complete basic self care tasks at supervision to mod I level using AE as needed.      Dates: Start: 12/04/21          Goal: LTG-By discharge, patient will perform bathroom transfers at supervision level using AE/DME as needed.      Dates: Start: 12/04/21             Problem: Toileting     Dates: Start: 12/04/21       Goal: STG-Within one week, patient will complete toileting tasks with mod A using AE as needed.      Dates: Start: 12/04/21

## 2021-12-06 NOTE — THERAPY
Speech Language Pathology  Video Swallow     Patient Name:  Nakita Sheppard  AGE:  76 y.o., SEX:  female  Medical Record #:  [id number]  Today's Date: 12/6/2021     Objective       12/06/21 0931   History / Background Information   Prior Level of Function for Eating / Swallowing WFL   Diagnosis ischemic stroke    Onset Date Of Dysphagia 11/30/21   Dysphagia Symptoms Warranting Video Swallow Coughing with liquids   Procedure   Patient Seated in  wheelchair    Seated at (Degrees) 90   Views Completed Lateral   Consistencies / Presentation Method   Consistencies / Presentation Method Tested   Mildly Thick (2) - (Nectar Thick) Teaspoon;Cup   Thin (0) Teaspoon;Cup   Pureed (4) Teaspoon   Soft & Bite-Sized (6) - (Dysphagia III) Teaspoon   Regular (7) Teaspoon   Barium Tablet Teaspoon  (floated in applesauce )   Oral Phase   Oral Phase X   Mildly Thick (2) - (Nectar Thick) Delayed Oral Transit;Oral Holding   Thin (0) Impaired Labial Closure;Anterior Spillage;Oral Residue After the Swallow;Delayed Oral Transit;Oral Holding   Pureed (4) Oral Residue After the Swallow;Oral Holding   Soft & Bite-Sized (6) - (Dysphagia III) Premature Spillage Into Valleculae;Oral Residue After the Swallow   Regular (7) Premature Spillage Into Valleculae;Oral Residue After the Swallow   Pharyngeal Phase   Pharyngeal Phase X   Thin (0) Residue In Valleculae;Penetration During Swallow;Aspiration During Swallow;Delayed Cough Response to Penetration / Aspiration    Pureed (4) Residue On Posterior Pharyngeal Wall   Esophageal Phase   Esophageal Phase WDL   Compensatory Strategies Attempted   Compensatory Strategies Attempted Yes   Multiple Swallows effective in clearing oral residue    Impression   Dysphagia Present Yes   Oral - Pharyngeal Mild - Moderate Impairment   Prognosis   Prognosis for Improvement Good   Barriers to Improvement aspiration of thin liquids, oral weakness   Positive Indicators for Improvement able to follow strategies    Recommendations   Diet / Liquid Recommendation Minced & Moist (5) - (Dysphagia II);Mildly Thick (2) - (Nectar Thick)   Medication Administration  Float Whole with Puree   Strategies / Precautions Small Bites;Small Sips;Cues to Slow Rate of Eating;No Straws;No Talking while Eating;Multiple Swallows;Clear Mouth Before Next Bite;Place Food on RIGHT Side of Mouth;Finger / Tongue Sweep to Clear Pocketing on the LEFT;Sitting Upright at 90 Degrees while Eating;Stay Upright at Least 30 Minutes After Meals;Oral Care After Meals;Monitor Temperature and Lung Sounds   Interventions Dysphagia Therapy by SLP;Compensatory Safe Swallow Strategy Training;Therapeutic Dining Program for Meals;General / Supervised Dining;Oral Strengthening Exercises;Pharyngeal - Laryngeal Strengthening Exercises;Follow Up Video Swallow;Patient / Caregiver Education / Training   SLP Contact Information (Name / Extension) Dav Knight MS, [facility]-SLP/ ex [phone]   Video Tape Number NA   Interdisciplinary Plan of Care Collaboration   IDT Collaboration with  Nursing   Collaboration Comments Meds ok to be floated    SLP Total Time Spent   SLP Individual Total Time Spent (Mins) 30   Charge Group   SLP Video Swallow / FEES Videofluoroscopic Evaluation       Assessment    MBSS completed.  Pt currently consuming a 5- Minced & Moist diet with 0-Thin liquids; however has demonstrated consistent coughing with thin liquids during meals.  Pt presented with overall moderate oral dysphagia and mild pharyngeal dysphagia with the following characteristics noted: mild oral holding with all liquids; mild-moderate oral residue (thin, SBS, regular textures); impaired labial closure and anterior spillage on the left side with thin liquids; mild vallecular residue (thin, SBS, and regular textures); mild PPW residue with puree; penetration during the swallow with thin liquids; and aspiration during the swallow with a very delayed and ineffective throat clear with thin liquids  (aspiration noted on 1/3 trials).  Pt was able to tolerate a barium capsule floated in applesauce without difficulty.      Plan    Downgrade diet to 5- Minced & Moist textures with 2- Mildly Thick liquids (changed from thin liquids due to aspiration), medications ok to be given whole one at a time floated in pudding (pt does not like applesauce).  Target oral and pharyngeal strengthening exercises. Recommended compensatory swallowing strategies include clearing oral cavity before taking another bite/sip, double swallows, clearing left buccal cavity between bites with a lingual sweep.

## 2021-12-06 NOTE — THERAPY
Speech Language Pathology  Daily Treatment     Patient Name: Nakita Sheppard  Age:  76 y.o., Sex:  female  Medical Record #: [id number]  Today's Date: 12/6/2021     Precautions  Precautions: Fall Risk,Swallow Precautions ( See Comments)  Comments: check L wrist WB status, L inattention, slow to mobilize, ZIO patch    Subjective    Pt was seen for 2 separate ST sessions     Objective       12/06/21 0831   Speech Language Pathologist Assigned   Assigned SLP / Extension CW 60+ TDINE NO SPLIT   SLP Total Time Spent   SLP Individual Total Time Spent (Mins) 60   Treatment Charges   SLP Swallowing Dysfunction Treatment Swallowing Dysfunction Treatment       Assessment    0912-6687: Pt was seen with 5- Minced & Moist textures and 0-Thin liquids during breakfast.  Coughing was noted on two occasions, once after a cup sip of thin liquids and once after eating a large ice chip.  Pt required mod cues during this meal to complete double swallows, clear her left cheek out between bites and to refrain from taking another bite or drink until finished coughing.  Pt noted to have a very soft vocal quality making intelligibility challenging.  Pt requested that her pills not be crushed up anymore, pt educated in MBSS to be completed later this morning with pills to be tried whole if deemed appropriate.      11:30-12:00: Limited PO intake noted this afternoon; however with the small amount she did eat and drink she was noted to cough on 2 cup sips of thin liquids.  MBSS this morning showed aspiration of thin liquids.  When liquids were switched to 2- Mildly Thick this afternoon pt tolerated them without difficulty noted.  Pt tolerated puree textures and 5- Minced & Moist textures with mild oral residue, but other than this no difficulty.  Recommend downgrading pt's diet to 5- Minced & Moist textures with 2- Mildly Thick liquids.  Medications OK to be given whole floated in puree (pt does not like applesauce).      Strengths: Alert  and oriented,Independent prior level of function,Good insight into deficits/needs,Willingly participates in therapeutic activities,Supportive family,Pleasant and cooperative,Motivated for self care and independence  Barriers: Impaired functional cognition,Difficulty following instructions,Decreased endurance,Impaired activity tolerance    Plan    Downgrade diet to 5- Minced & Moist textures with 2- Mildly Thick liquids, target effortful swallows, target speech intelligibility       Speech Therapy Problems (Active)     Problem: Expression STGs     Dates: Start: 12/04/21       Goal: STG-Within one week, patient will     Dates: Start: 12/04/21       Description: Utilize clear speech strategies with mod cues in 70% of opportunities to increase overall speech intelligibility.        Goal: STG-Within one week, patient will     Dates: Start: 12/04/21       Description: Participate in a standardized language assessment to further identify expressive/receptive language needs.          Problem: Memory STGs     Dates: Start: 12/04/21       Goal: STG-Within one week, patient will     Dates: Start: 12/04/21       Description: Complete functional IADL tasks (e.g. med and financial management) with 70% acc and mod cues to ensure safe return to her PLOF.           Problem: Speech/Swallowing LTGs     Dates: Start: 12/04/21       Goal: LTG-By discharge, patient will safely swallow     Dates: Start: 12/04/21       Description: Safest/least restrictive diet with no overt s/sx of aspiration for 100% of intake to maintain adequate nutrition and hydration.        Goal: LTG-By discharge, patient will     Dates: Start: 12/04/21       Description: Complete cog-ling tasks at modified independence level to ensure safe return to PLOF.        Goal: LTG-By discharge, patient will     Dates: Start: 12/04/21       Description: Communicate complex ideas at the conversation level with 90% intelligibility to a naiive listener.           Problem:  Swallowing STGs     Dates: Start: 12/04/21       Goal: STG-Within one week, patient will     Dates: Start: 12/04/21       Description: Participate in instrumental swallow examination to better visualize anatomy and physiology of pt's swallow and guide individualized POC targeting dysphagia management.

## 2021-12-06 NOTE — PROGRESS NOTES
[facility] Medicine Daily Progress Note    Date of Service  12/6/2021    Chief Complaint:  Hypertension    Interval History:  PT happy about IV access being removed.    Review of Systems  Review of Systems   Constitutional: Negative for chills and fever.   Respiratory: Negative for shortness of breath.    Cardiovascular: Negative for chest pain.   Gastrointestinal: Negative for abdominal pain, diarrhea, nausea and vomiting.   Psychiatric/Behavioral: The patient is not nervous/anxious.         Physical Exam  Temp:  [36.4 °C (97.6 °F)-36.9 °C (98.4 °F)] 36.9 °C (98.4 °F)  Pulse:  [67-80] 75  Resp:  [18] 18  BP: (126-180)/(53-86) 126/73  SpO2:  [91 %-96 %] 91 %    Physical Exam  Vitals and nursing note reviewed.   Constitutional:       General: She is not in acute distress.  HENT:      Mouth/Throat:      Mouth: Mucous membranes are moist.      Pharynx: Oropharynx is clear.   Eyes:      General: No scleral icterus.  Neck:      Vascular: No JVD.   Cardiovascular:      Rate and Rhythm: Normal rate and regular rhythm.      Heart sounds: Normal heart sounds.   Pulmonary:      Effort: Pulmonary effort is normal.      Breath sounds: No wheezing or rales.   Abdominal:      General: Bowel sounds are normal.   Musculoskeletal:      Cervical back: No rigidity.      Right lower leg: No edema.      Left lower leg: No edema.   Skin:     General: Skin is warm and dry.   Neurological:      Mental Status: She is alert and oriented to person, place, and time.   Psychiatric:         Mood and Affect: Mood normal.         Behavior: Behavior normal.         Fluids    Intake/Output Summary (Last 24 hours) at 12/6/2021 1115  Last data filed at 12/6/2021 0800  Gross per 24 hour   Intake 290 ml   Output --   Net 290 ml       Laboratory  Recent Labs     12/04/21  0553   WBC 8.1   RBC 3.78*   HEMOGLOBIN 12.0   HEMATOCRIT 37.4   MCV 98.9*   MCH 31.7   MCHC 32.1*   RDW 45.1   PLATELETCT 336   MPV 9.5     Recent Labs     12/04/21  0553 12/06/21  0600    SODIUM 144 145   POTASSIUM 3.4* 4.1   CHLORIDE 106 107   CO2 24 25   GLUCOSE 108* 84   BUN 17 16   CREATININE 0.81 0.74   CALCIUM 9.0 9.0             Recent Labs     12/04/21  0553   TRIGLYCERIDE 103   HDL 62   LDL 74       Imaging    Assessment/Plan  * Ischemic stroke (HCC)- (present on admission)  Assessment & Plan  Had right MCA CVA  On ASA & Plavix  On Lipitor    Hypokalemia  Assessment & Plan  K+ 3.4 --> 4.1  On K+ supplements  S/P KCL 40 meq x 1  Monitor    Hypertension- (present on admission)  Assessment & Plan  BP better this am  On Labetalol  On Lisinopril  On Norvasc --> dose increased recently  Will monitor another day since meds were recently adjusted

## 2021-12-06 NOTE — DIETARY
"Nutrition services: Day 3 of admit.  Nakita Sheppard is a 76 y.o. female with admitting DX of Ischemic stroke     Consult received for poor PO noted on initial nutrition screen      Assessment:  Height: 157.5 cm (5' 2\")  Weight: 80 kg (176 lb 5.9 oz)  Body mass index is 32.26 kg/m²., BMI classification: class 1 obesity  Diet/Intake: level 5-minced and moist with level 0-thin liquids/25-50% x 5 meals    Evaluation:   1. Recorded PO intake is currently not adequate. RD visited pt in her room. Pt said that even PTA she did not eat much. Pt reports that her intake is closer to 50% than 25%. Pt does not want Boost Plus, ice cream/Magic Cup, smoothies or yogurt. RD encouraged PO intake of meats/dairy/eggs for protein. RD spoke with pt's nurse. She also reviewed medical record. It was noted that intake appears to be closer to 25%. RD will add Boost Breeze for additional kcals and protein. Pt may like this supplement because it is clear liquid/juice.   2. GI: LBM on 12/4/21  3. Meds: potassium chloride, senna-docusate    Malnutrition Risk: risk noted due to poor PO intake.     Recommendations/Plan:  1. Add Boost Breeze to meals TID.   2. Encourage intake of >50%  3. Document intake of all meals and supplements as % taken in ADL's to provide interdisciplinary communication across all shifts.   4. Monitor weight.  5. Nutrition rep will continue to see patient for ongoing meal and snack preferences.     RD will follow.  "

## 2021-12-06 NOTE — CARE PLAN
The patient is Watcher - Medium risk of patient condition declining or worsening    Shift Goals  Clinical Goals: Swallow and mobility safety  Patient Goals: Complete swallow study    Progress made toward(s) clinical / shift goals:  Patient upgraded to whole pills floated in pudding d/t aversion to applesauce. Patient transferred and repositioned with two people as patient tires quickly.    Patient is not progressing towards the following goals:      Problem: Pain - Standard  Goal: Alleviation of pain or a reduction in pain to the patient’s comfort goal  Outcome: Progressing     Problem: Skin Integrity  Goal: Skin integrity is maintained or improved  Outcome: Progressing

## 2021-12-06 NOTE — CARE PLAN
"  Problem: Pain - Standard  Goal: Alleviation of pain or a reduction in pain to the patient’s comfort goal  Note: Patient c/o HA, /86, as needed medications given, CN notified and intervene. Re- check BP-142/81 manually. No c/o HA at 2130. CN notified on call Dr. Alvarez, no orders receive. Will monitor.      Problem: Fall Risk - Rehab  Goal: Patient will remain free from falls  Note: Erin Irizarry Fall risk Assessment Score: 18    High fall risk Interventions   - Alarming seatbelt  - Wander guard  - Bed and strip alarm   - Yellow sign by the door   - Yellow wrist band \"Fall risk\"  - Room near to the nurse station  - Do not leave patient unattended in the bathroom  - Fall risk education provided           The patient is Watcher - Medium risk of patient condition declining or worsening    Shift Goals  Clinical Goals: sefety  Patient Goals: progress with swallow thick to thin      "

## 2021-12-06 NOTE — CARE PLAN
Problem: Nutritional:  Goal: Achieve adequate nutritional intake  Description: Patient will consume >50% of meals and supplements.   Outcome: Not Met     See RD note.

## 2021-12-06 NOTE — PROGRESS NOTES
At approximately 2030 Carmen CARRILLO reported that pt's bp qil628/86 and c/o headache. Pt was given scheduled labetalol 200 mg po, Hydralazine 10 mg po prn, and Tylenol 650 mg po prn. At 2010 pt's bp was 142/50 KELLEY manual and stated her headache was gone. On call hospitalist Dr Alvarez was informed, no orders given, pt is currently resting, no other concerns mentioned. Will continue to monitor.

## 2021-12-07 ENCOUNTER — APPOINTMENT (OUTPATIENT)
Dept: RADIOLOGY | Facility: MEDICAL CENTER | Age: 76
End: 2021-12-07
Attending: PHYSICAL MEDICINE & REHABILITATION
Payer: MEDICARE

## 2021-12-07 LAB
APPEARANCE UR: CLEAR
BILIRUB UR QL STRIP.AUTO: NEGATIVE
COLOR UR: YELLOW
GLUCOSE UR STRIP.AUTO-MCNC: NEGATIVE MG/DL
KETONES UR STRIP.AUTO-MCNC: 40 MG/DL
LEUKOCYTE ESTERASE UR QL STRIP.AUTO: NEGATIVE
MICRO URNS: ABNORMAL
NITRITE UR QL STRIP.AUTO: NEGATIVE
PH UR STRIP.AUTO: 5 [PH] (ref 5–8)
PROT UR QL STRIP: NEGATIVE MG/DL
RBC UR QL AUTO: NEGATIVE
SP GR UR STRIP.AUTO: 1.03
UROBILINOGEN UR STRIP.AUTO-MCNC: 0.2 MG/DL

## 2021-12-07 PROCEDURE — A9270 NON-COVERED ITEM OR SERVICE: HCPCS | Performed by: HOSPITALIST

## 2021-12-07 PROCEDURE — 99233 SBSQ HOSP IP/OBS HIGH 50: CPT | Performed by: PHYSICAL MEDICINE & REHABILITATION

## 2021-12-07 PROCEDURE — 97530 THERAPEUTIC ACTIVITIES: CPT

## 2021-12-07 PROCEDURE — 770010 HCHG ROOM/CARE - REHAB SEMI PRIVAT*

## 2021-12-07 PROCEDURE — 700102 HCHG RX REV CODE 250 W/ 637 OVERRIDE(OP): Performed by: HOSPITALIST

## 2021-12-07 PROCEDURE — 700111 HCHG RX REV CODE 636 W/ 250 OVERRIDE (IP): Performed by: PHYSICAL MEDICINE & REHABILITATION

## 2021-12-07 PROCEDURE — 97112 NEUROMUSCULAR REEDUCATION: CPT

## 2021-12-07 PROCEDURE — A9270 NON-COVERED ITEM OR SERVICE: HCPCS | Performed by: PHYSICAL MEDICINE & REHABILITATION

## 2021-12-07 PROCEDURE — 97116 GAIT TRAINING THERAPY: CPT

## 2021-12-07 PROCEDURE — 700102 HCHG RX REV CODE 250 W/ 637 OVERRIDE(OP): Performed by: PHYSICAL MEDICINE & REHABILITATION

## 2021-12-07 PROCEDURE — 99232 SBSQ HOSP IP/OBS MODERATE 35: CPT | Performed by: HOSPITALIST

## 2021-12-07 PROCEDURE — 92526 ORAL FUNCTION THERAPY: CPT

## 2021-12-07 PROCEDURE — 81003 URINALYSIS AUTO W/O SCOPE: CPT

## 2021-12-07 PROCEDURE — 92507 TX SP LANG VOICE COMM INDIV: CPT

## 2021-12-07 PROCEDURE — 70450 CT HEAD/BRAIN W/O DYE: CPT | Mod: MG

## 2021-12-07 RX ORDER — LANOLIN ALCOHOL/MO/W.PET/CERES
3 CREAM (GRAM) TOPICAL
Status: DISCONTINUED | OUTPATIENT
Start: 2021-12-07 | End: 2021-12-16

## 2021-12-07 RX ORDER — AMLODIPINE BESYLATE 5 MG/1
10 TABLET ORAL
Status: DISCONTINUED | OUTPATIENT
Start: 2021-12-08 | End: 2021-12-22 | Stop reason: HOSPADM

## 2021-12-07 RX ORDER — TRAZODONE HYDROCHLORIDE 50 MG/1
50 TABLET ORAL
Status: DISCONTINUED | OUTPATIENT
Start: 2021-12-07 | End: 2021-12-16

## 2021-12-07 RX ORDER — BUTALBITAL, ACETAMINOPHEN AND CAFFEINE 50; 325; 40 MG/1; MG/1; MG/1
1 TABLET ORAL EVERY 6 HOURS PRN
Status: DISCONTINUED | OUTPATIENT
Start: 2021-12-07 | End: 2021-12-22 | Stop reason: HOSPADM

## 2021-12-07 RX ADMIN — CARBAMAZEPINE 200 MG: 100 TABLET, CHEWABLE ORAL at 20:23

## 2021-12-07 RX ADMIN — ACETAMINOPHEN 650 MG: 325 TABLET ORAL at 10:47

## 2021-12-07 RX ADMIN — OMEPRAZOLE 20 MG: 20 CAPSULE, DELAYED RELEASE ORAL at 08:28

## 2021-12-07 RX ADMIN — Medication 1000 UNITS: at 08:29

## 2021-12-07 RX ADMIN — ATORVASTATIN CALCIUM 80 MG: 40 TABLET, FILM COATED ORAL at 08:28

## 2021-12-07 RX ADMIN — CARBAMAZEPINE 200 MG: 100 TABLET, CHEWABLE ORAL at 08:28

## 2021-12-07 RX ADMIN — SENNOSIDES AND DOCUSATE SODIUM 2 TABLET: 50; 8.6 TABLET ORAL at 08:28

## 2021-12-07 RX ADMIN — TRAZODONE HYDROCHLORIDE 50 MG: 50 TABLET ORAL at 20:23

## 2021-12-07 RX ADMIN — LABETALOL HYDROCHLORIDE 200 MG: 100 TABLET, FILM COATED ORAL at 08:27

## 2021-12-07 RX ADMIN — LABETALOL HYDROCHLORIDE 200 MG: 100 TABLET, FILM COATED ORAL at 20:23

## 2021-12-07 RX ADMIN — ENOXAPARIN SODIUM 40 MG: 40 INJECTION SUBCUTANEOUS at 08:29

## 2021-12-07 RX ADMIN — AMLODIPINE BESYLATE 7.5 MG: 5 TABLET ORAL at 05:16

## 2021-12-07 RX ADMIN — TIZANIDINE 2 MG: 4 TABLET ORAL at 20:23

## 2021-12-07 RX ADMIN — MELATONIN TAB 3 MG 3 MG: 3 TAB at 20:23

## 2021-12-07 RX ADMIN — CLOPIDOGREL BISULFATE 75 MG: 75 TABLET ORAL at 08:27

## 2021-12-07 RX ADMIN — POTASSIUM CHLORIDE 20 MEQ: 1500 TABLET, EXTENDED RELEASE ORAL at 08:27

## 2021-12-07 RX ADMIN — ASPIRIN 81 MG: 81 TABLET, COATED ORAL at 08:29

## 2021-12-07 RX ADMIN — LISINOPRIL 40 MG: 20 TABLET ORAL at 08:27

## 2021-12-07 ASSESSMENT — GAIT ASSESSMENTS
DISTANCE (FEET): 8
ASSISTIVE DEVICE: OTHER (COMMENTS)
GAIT LEVEL OF ASSIST: MODERATE ASSIST

## 2021-12-07 ASSESSMENT — ENCOUNTER SYMPTOMS
FEVER: 0
NAUSEA: 0
SHORTNESS OF BREATH: 0
ABDOMINAL PAIN: 0
VOMITING: 0
CHILLS: 0

## 2021-12-07 ASSESSMENT — PAIN DESCRIPTION - PAIN TYPE: TYPE: ACUTE PAIN

## 2021-12-07 NOTE — PROGRESS NOTES
Patient has frequency with urination, UA ordered. Collection attempted but stool contaminated specimen.     Collection HAT, Specimen cup, labels and order printed, items bagged and awaiting next collection.

## 2021-12-07 NOTE — PROGRESS NOTES
Hospital Medicine Daily Progress Note      Chief Complaint:  Hypertension    Interval History:  Blood pressures trending up; pt asymptomatic.    Review of Systems  Review of Systems   Constitutional: Negative for chills and fever.   HENT: Negative.    Eyes: Negative.    Respiratory: Negative for cough and shortness of breath.    Cardiovascular: Negative for chest pain and palpitations.   Gastrointestinal: Negative for abdominal pain, nausea and vomiting.   Genitourinary: Negative.    Musculoskeletal: Negative.    Skin: Negative for itching and rash.   Neurological: Positive for speech change and focal weakness.   Endo/Heme/Allergies: Negative for polydipsia. Does not bruise/bleed easily.        Physical Exam  Temp:  [36.3 °C (97.4 °F)-36.6 °C (97.9 °F)] 36.6 °C (97.9 °F)  Pulse:  [58-69] 63  Resp:  [17] 17  BP: (136-145)/(52-65) 145/65  SpO2:  [94 %] 94 %    Physical Exam  Vitals reviewed.   Constitutional:       General: She is not in acute distress.     Appearance: Normal appearance. She is not ill-appearing.   HENT:      Head: Normocephalic and atraumatic.      Right Ear: External ear normal.      Left Ear: External ear normal.      Nose: Nose normal.      Mouth/Throat:      Pharynx: Oropharynx is clear.   Eyes:      General:         Right eye: No discharge.         Left eye: No discharge.      Extraocular Movements: Extraocular movements intact.      Conjunctiva/sclera: Conjunctivae normal.   Neck:      Vascular: No JVD.   Cardiovascular:      Rate and Rhythm: Normal rate and regular rhythm.   Pulmonary:      Effort: Pulmonary effort is normal. No respiratory distress.      Breath sounds: Normal breath sounds. No wheezing.   Abdominal:      General: Bowel sounds are normal. There is no distension.      Palpations: Abdomen is soft.      Tenderness: There is no abdominal tenderness.   Musculoskeletal:      Cervical back: Normal range of motion and neck supple.      Right lower leg: No edema.      Left lower leg:  No edema.   Skin:     General: Skin is warm and dry.   Neurological:      Mental Status: She is alert and oriented to person, place, and time.         Fluids    Intake/Output Summary (Last 24 hours) at 12/9/2021 1612  Last data filed at 12/9/2021 0900  Gross per 24 hour   Intake 300 ml   Output --   Net 300 ml       Laboratory  Recent Labs     12/08/21  0535   WBC 7.8   RBC 3.89*   HEMOGLOBIN 12.5   HEMATOCRIT 38.2   MCV 98.2*   MCH 32.1   MCHC 32.7*   RDW 44.8   PLATELETCT 351   MPV 9.9     Recent Labs     12/08/21  0535   SODIUM 143   POTASSIUM 3.9   CHLORIDE 104   CO2 25   GLUCOSE 80   BUN 17   CREATININE 0.80   CALCIUM 9.0                   Assessment/Plan  * Ischemic stroke (HCC)- (present on admission)  Assessment & Plan  On ASA, Plavix, and Lipitor    Vitamin D deficiency- (present on admission)  Assessment & Plan  Vit D level 19  On supplementation    Hypokalemia- (present on admission)  Assessment & Plan  K+ repleted  Discontinue KCl to avoid oversupplementation  Check Mg++ and Phos    Hypertension- (present on admission)  Assessment & Plan  On Norvasc, Labetalol, and Lisinopril  Increase Norvasc for elevated blood pressures    DNR

## 2021-12-07 NOTE — THERAPY
12/06/21 1129   Precautions   Precautions Fall Risk;Swallow Precautions ( See Comments)   Comments Check L wrist WB status,L neglect, slow to mobilize, ZIO patch   Pain 0 - 10 Group   Therapist Pain Assessment 0   Cognition    Speech/ Communication Slurred   Level of Consciousness Alert   Ability To Follow Commands 2 Step   Attention Impaired   Wheelchair Functional Level of Assist   Wheelchair Assist Total Assist   Transfer Functional Level of Assist   Bed, Chair, Wheelchair Transfer Moderate Assist   Bed Chair Wheelchair Transfer Description Assist with one limb;Increased time;Supervision for safety;Verbal cueing   Bed Mobility    Supine to Sit Minimal Assist   Sit to Stand Minimal Assist   Scooting Moderate Assist   Rolling Minimum Assist to Lt.   Neuro-Muscular Treatments   Neuro-Muscular Treatments Facilitation;Postural Facilitation;Sequencing;Tactile Cuing;Verbal Cuing;Weight Shift Right;Weight Shift Left   Comments Sequencing bed mobility for rolling left, supine to sit, seated scooting edge of bed, sequencing sit to stand and transfer to the wheelchair, facilitated sequencing sit to/from stand, midline standing posture using a right hallway railing for support, sequencing stand to sit using the hallway railing for support   Interdisciplinary Plan of Care Collaboration   Patient Position at End of Therapy Seated;Chair Alarm On;Self Releasing Lap Belt Applied;Other (Comments)  (T dine)   PT Total Time Spent   PT Individual Total Time Spent (Mins) 30   PT Charge Group   Charges Yes   PT Neuromuscular Re-Education / Balance 1   PT Therapeutic Activities 1   Physical Therapy   Daily Treatment     Patient Name: Nakita Sheppard  Age:  76 y.o., Sex:  female  Medical Record #: [id number]  Today's Date: 12/6/2021     Precautions  Precautions: Fall Risk,Swallow Precautions ( See Comments)  Comments: Check L wrist WB status,L neglect, slow to mobilize, ZIO patch    Subjective    The patient was resting in bed.   She agreed to PT.     Objective    Patient participated in bed mobility for rolling to the left, supine to sit, seated scooting edge of bed, sit to stand and transfer to the wheelchair.  She was also able to use the foot rests on the wheelchair for seated positioning.  The patient used a right hallway railing for sit to stand and standing tolerance with facilitation from PT at the left lower extremity, trouble standing posture with facilitation for spine extension and head up posture.  She tolerated standing 10 to 30 seconds x 8.  After the session was completed PT assisted the patient in the wheelchair to T dine.    Assessment    The patient was able to follow basic directions for bed mobility, transferring, sit to stand, standing posture and sequencing seated scooting.  The patient's tolerance for activity is severely impaired as she was able to tolerate 8 to 30 seconds of standing x8 using the right hallway railing for support.  Strengths: Able to follow instructions,Alert and oriented,Adequate strength,Independent prior level of function,Motivated for self care and independence,Pleasant and cooperative,Supportive family,Willingly participates in therapeutic activities  Barriers: Decreased endurance,Generalized weakness,Impaired balance,Hemiplegia    Plan    Safety education, neuromuscular reeducation, tolerance for activity and endurance, pregait and gait training    Passport items to be completed:  Passport items to be completed:  Get in/out of bed safely, in/out of a vehicle, safely use mobility device, walk or wheel around home/community, navigate up and down stairs, show how to get up/down from the ground, ensure home is accessible, demonstrate HEP, complete caregiver training    Physical Therapy Problems (Active)     Problem: Mobility     Dates: Start: 12/04/21       Goal: STG-Within one week, patient will ambulate household distance x10' with SPC and Mod A     Dates: Start: 12/04/21             Problem:  Mobility Transfers     Dates: Start: 12/04/21       Goal: STG-Within one week, patient will perform bed mobility Min A without bedrails     Dates: Start: 12/04/21          Goal: STG-Within one week, patient will sit to stand CGA 5/5 trials with SPC     Dates: Start: 12/04/21          Goal: STG-Within one week, patient will transfer bed to chair Min A     Dates: Start: 12/04/21             Problem: PT-Long Term Goals     Dates: Start: 12/04/21       Goal: LTG-By discharge, patient will maintain balance at least 45/56 on the [place] to DC to home safely with support of spouse     Dates: Start: 12/04/21          Goal: LTG-By discharge, patient will ambulate at least 500' with SPC and SPV inside and outside surfaces     Dates: Start: 12/04/21          Goal: LTG-By discharge, patient will transfer one surface to another SPV with SPC     Dates: Start: 12/04/21          Goal: LTG-By discharge, patient will transfer in/out of a car SBA from ambulatory level with SPC     Dates: Start: 12/04/21          Goal: LTG-By discharge, patient will ascend/descend ADA ramp with SPC and SBA to enter/exit her home safely     Dates: Start: 12/04/21

## 2021-12-07 NOTE — PROGRESS NOTES
"Rehab Progress Note     Date of Service: 12/6/2021  Chief Complaint: Follow-up stroke    Interval Events (Subjective)    Patient seen and examined today in her room.  Her daughter Irene is present.  Patient reports she is not sleeping well at night.  She was taking tizanidine at home both for her peripheral neuropathy as well as leg spasms.  She had a modified barium swallow study today in her liquids were downgraded to nectars due to aspiration.  Patient also is reporting urinary frequency but denies any dysuria.  In addition patient is complaining of left wrist pain and daughter reports a fracture was discovered at the acute hospital.  Therapy is waiting for weightbearing recommendations.    ROS: No changes to bowel or mood.      Objective:  VITAL SIGNS: /64   Pulse 74   Temp 36.2 °C (97.2 °F) (Temporal)   Resp 17   Ht 1.575 m (5' 2\")   Wt 80 kg (176 lb 5.9 oz)   SpO2 95%   BMI 32.26 kg/m²   Gen: alert, no apparent distress  Neuro: notable for left facial droop, left hemiplegia  MSK: Tenderness to palpation over the left wrist    Recent Results (from the past 72 hour(s))   SARS-CoV-2 PCR (24 hour In-House): Collect NP swab in [facility]    Collection Time: 12/03/21  4:33 PM    Specimen: Nasopharyngeal; Respirate   Result Value Ref Range    SARS-CoV-2 Source NP Swab     SARS-CoV-2 by PCR NotDetected    CBC with Differential    Collection Time: 12/04/21  5:53 AM   Result Value Ref Range    WBC 8.1 4.8 - 10.8 K/uL    RBC 3.78 (L) 4.20 - 5.40 M/uL    Hemoglobin 12.0 12.0 - 16.0 g/dL    Hematocrit 37.4 37.0 - 47.0 %    MCV 98.9 (H) 81.4 - 97.8 fL    MCH 31.7 27.0 - 33.0 pg    MCHC 32.1 (L) 33.6 - 35.0 g/dL    RDW 45.1 35.9 - 50.0 fL    Platelet Count 336 164 - 446 K/uL    MPV 9.5 9.0 - 12.9 fL    Neutrophils-Polys 66.30 44.00 - 72.00 %    Lymphocytes 21.60 (L) 22.00 - 41.00 %    Monocytes 7.90 0.00 - 13.40 %    Eosinophils 2.60 0.00 - 6.90 %    Basophils 1.10 0.00 - 1.80 %    Immature Granulocytes 0.50 0.00 - 0.90 " %    Nucleated RBC 0.00 /100 WBC    Neutrophils (Absolute) 5.35 2.00 - 7.15 K/uL    Lymphs (Absolute) 1.74 1.00 - 4.80 K/uL    Monos (Absolute) 0.64 0.00 - 0.85 K/uL    Eos (Absolute) 0.21 0.00 - 0.51 K/uL    Baso (Absolute) 0.09 0.00 - 0.12 K/uL    Immature Granulocytes (abs) 0.04 0.00 - 0.11 K/uL    NRBC (Absolute) 0.00 K/uL   Comp Metabolic Panel (CMP)    Collection Time: 12/04/21  5:53 AM   Result Value Ref Range    Sodium 144 135 - 145 mmol/L    Potassium 3.4 (L) 3.6 - 5.5 mmol/L    Chloride 106 96 - 112 mmol/L    Co2 24 20 - 33 mmol/L    Anion Gap 14.0 7.0 - 16.0    Glucose 108 (H) 65 - 99 mg/dL    Bun 17 8 - 22 mg/dL    Creatinine 0.81 0.50 - 1.40 mg/dL    Calcium 9.0 8.5 - 10.5 mg/dL    AST(SGOT) 14 12 - 45 U/L    ALT(SGPT) <5 2 - 50 U/L    Alkaline Phosphatase 121 (H) 30 - 99 U/L    Total Bilirubin 0.4 0.1 - 1.5 mg/dL    Albumin 3.9 3.2 - 4.9 g/dL    Total Protein 6.7 6.0 - 8.2 g/dL    Globulin 2.8 1.9 - 3.5 g/dL    A-G Ratio 1.4 g/dL   Magnesium    Collection Time: 12/04/21  5:53 AM   Result Value Ref Range    Magnesium 2.0 1.5 - 2.5 mg/dL   Lipid Profile    Collection Time: 12/04/21  5:53 AM   Result Value Ref Range    Cholesterol,Tot 157 100 - 199 mg/dL    Triglycerides 103 0 - 149 mg/dL    HDL 62 >=40 mg/dL    LDL 74 <100 mg/dL   HEMOGLOBIN A1C    Collection Time: 12/04/21  5:53 AM   Result Value Ref Range    Glycohemoglobin 5.6 4.0 - 5.6 %    Est Avg Glucose 114 mg/dL   ESTIMATED GFR    Collection Time: 12/04/21  5:53 AM   Result Value Ref Range    GFR If African American >60 >60 mL/min/1.73 m 2    GFR If Non African American >60 >60 mL/min/1.73 m 2   Basic Metabolic Panel    Collection Time: 12/06/21  6:00 AM   Result Value Ref Range    Sodium 145 135 - 145 mmol/L    Potassium 4.1 3.6 - 5.5 mmol/L    Chloride 107 96 - 112 mmol/L    Co2 25 20 - 33 mmol/L    Glucose 84 65 - 99 mg/dL    Bun 16 8 - 22 mg/dL    Creatinine 0.74 0.50 - 1.40 mg/dL    Calcium 9.0 8.5 - 10.5 mg/dL    Anion Gap 13.0 7.0 - 16.0    ESTIMATED GFR    Collection Time: 12/06/21  6:00 AM   Result Value Ref Range    GFR If African American >60 >60 mL/min/1.73 m 2    GFR If Non African American >60 >60 mL/min/1.73 m 2       Current Facility-Administered Medications   Medication Frequency   • tizanidine (ZANAFLEX) tablet 2 mg QHS   • amLODIPine (NORVASC) tablet 7.5 mg Q DAY   • potassium chloride SA (Kdur) tablet 20 mEq DAILY   • hydrOXYzine HCl (ATARAX) tablet 50 mg Q6HRS PRN   • melatonin tablet 3 mg HS PRN   • Respiratory Therapy Consult Continuous RT   • Pharmacy Consult Request ...Pain Management Review 1 Each PHARMACY TO DOSE   • hydrALAZINE (APRESOLINE) tablet 10 mg Q8HRS PRN   • acetaminophen (Tylenol) tablet 650 mg Q4HRS PRN   • lactulose 20 GM/30ML solution 30 mL QDAY PRN   • docusate sodium (ENEMEEZ) enema 283 mg QDAY PRN   • sodium phosphate (Fleet) enema 133 mL QDAY PRN   • omeprazole (PRILOSEC) capsule 20 mg DAILY   • artificial tears ophthalmic solution 1 Drop PRN   • benzocaine-menthol (CEPACOL) lozenge 1 Lozenge Q2HRS PRN   • mag hydrox-al hydrox-simeth (MAALOX PLUS ES or MYLANTA DS) suspension 20 mL Q2HRS PRN   • ondansetron (ZOFRAN ODT) dispertab 4 mg 4X/DAY PRN    Or   • ondansetron (ZOFRAN) syringe/vial injection 4 mg 4X/DAY PRN   • traZODone (DESYREL) tablet 50 mg QHS PRN   • sodium chloride (OCEAN) 0.65 % nasal spray 2 Spray PRN   • midazolam (VERSED) 5 mg/mL (1 mL vial) PRN   • enoxaparin (LOVENOX) inj 40 mg DAILY   • senna-docusate (PERICOLACE or SENOKOT S) 8.6-50 MG per tablet 2 Tablet BID    And   • polyethylene glycol/lytes (MIRALAX) PACKET 1 Packet QDAY PRN    And   • magnesium hydroxide (MILK OF MAGNESIA) suspension 30 mL QDAY PRN    And   • bisacodyl (DULCOLAX) suppository 10 mg QDAY PRN   • aspirin EC (ECOTRIN) tablet 81 mg DAILY   • atorvastatin (LIPITOR) tablet 80 mg DAILY   • carBAMazepine (TEGRETOL) chewable tab 200 mg BID   • clopidogrel (PLAVIX) tablet 75 mg DAILY   • labetalol (NORMODYNE) tablet 200 mg Q12HRS    • lisinopril (PRINIVIL) tablet 40 mg DAILY   • vitamin D3 (cholecalciferol) tablet 1,000 Units DAILY       Orders Placed This Encounter   Procedures   • Diet Order Diet: Level 5 - Minced and Moist (Medications floated whole, one at a time, in pudding (pt does not like applesauce)); Liquid level: Level 2 - Mildly Thick; Tray Modifications (optional): No Straws     Standing Status:   Standing     Number of Occurrences:   1     Order Specific Question:   Diet:     Answer:   Level 5 - Minced and Moist [24]     Comments:   Medications floated whole, one at a time, in pudding (pt does not like applesauce)     Order Specific Question:   Liquid level     Answer:   Level 2 - Mildly Thick     Order Specific Question:   Tray Modifications (optional)     Answer:   No Straws       Assessment:  Active Hospital Problems    Diagnosis    • *Ischemic stroke (HCC)    • Polyuria    • Wrist fracture, closed, left, sequela    • Other insomnia    • Hypokalemia    • Class 1 obesity without serious comorbidity in adult    • Spasticity    • Left-sided neglect    • Other dysphagia    • Impaired mobility and ADLs    • Hyperlipidemia LDL goal <70    • Peripheral neuropathy    • Hypertension      This patient is a 76 y.o. female admitted for acute inpatient rehabilitation with Ischemic stroke (HCC).      Medical Decision Making and Plan:    Right MCA stroke  Left hemiparesis  Nondominant  Left neglect  Dysphagia  Cognitive impairments  Continue full rehab program  PT/OT/SLP, 1 hr each discipline, 5 days per week     Aspirin  Plavix  Statin     Patient with Zio patch cardiac monitor, return 2 weeks from admission    [facility], cardiology, Dr. Callejas, referrals made    MBS today with aspiration on thins, diet downgraded    Left wrist pain  X-ray at acute showed possible avulsion fractures  Discussed with orthopedic surgery  Wrist brace and weightbearing as  tolerated     Hypertension  Lisinopril  Labetalol  Amlodipine  Appreciate hospitalist assistance     Peripheral neuropathy  Continue home medication carbamazepine  Was also on Zanaflex (6 mg TID), restart at night due to complaints of leg spasms    Hypokalemia  Continue supplementation    Insomnia  Restart home medication tizanidine  If ineffective will try melatonin and trazodone     Obesity  BMI 32.6  Outpatient nutritional counseling    Vitamin D deficiency  Continue supplementation    GI prophylaxis  Omeprazole     Normocytic anemia, resolved    Polyuria  Check urinalysis    Bowel program  Continue bowel medications  Last BM 12/4    Bladder program  Check PVRs - 25, 15, 10  Bladder scan for no voids  ICP for over 400 cc  Scheduled toileting    DVT prophylaxis  Lovenox      Total time:  35 minutes.  I spent greater than 50% of the time for patient care, counseling, and coordination on this date, including patient face-to face time, unit/floor time with review of records/pertinent lab data and studies, as well as discussing diagnostic evaluation/work up, planned therapeutic interventions, and future disposition of care, as per the interval events/subjective and the assessment and plan as noted above.      Hawa Alva M.D.   Physical Medicine and Rehabilitation

## 2021-12-07 NOTE — PROGRESS NOTES
"Rehab Progress Note     Date of Service: 12/7/2021  Chief Complaint: Follow-up stroke    Interval Events (Subjective)    Patient seen and examined today in her room.  She reports having a headache for which the nurse getting her some Tylenol.  Patient reports she did not sleep well last night despite the addition of tizanidine.  She has no other new complaints today.    Patient with worsening headache 8 out of 10 in the late afternoon for which she was sent for stat head CT.    ROS: No changes to bowel, bladder, mood, or sleep.         Objective:  VITAL SIGNS: /63   Pulse 75   Temp 36.9 °C (98.5 °F) (Temporal)   Resp 18   Ht 1.575 m (5' 2\")   Wt 80 kg (176 lb 5.9 oz)   SpO2 93%   BMI 32.26 kg/m²   Gen: alert, no apparent distress  Neuro: notable for left hemiplegia, left facial droop, dysarthria    Recent Results (from the past 72 hour(s))   Basic Metabolic Panel    Collection Time: 12/06/21  6:00 AM   Result Value Ref Range    Sodium 145 135 - 145 mmol/L    Potassium 4.1 3.6 - 5.5 mmol/L    Chloride 107 96 - 112 mmol/L    Co2 25 20 - 33 mmol/L    Glucose 84 65 - 99 mg/dL    Bun 16 8 - 22 mg/dL    Creatinine 0.74 0.50 - 1.40 mg/dL    Calcium 9.0 8.5 - 10.5 mg/dL    Anion Gap 13.0 7.0 - 16.0   ESTIMATED GFR    Collection Time: 12/06/21  6:00 AM   Result Value Ref Range    GFR If African American >60 >60 mL/min/1.73 m 2    GFR If Non African American >60 >60 mL/min/1.73 m 2   URINALYSIS    Collection Time: 12/07/21  1:00 AM   Result Value Ref Range    Color Yellow     Character Clear     Specific Gravity 1.026 <1.035    Ph 5.0 5.0 - 8.0    Glucose Negative Negative mg/dL    Ketones 40 (A) Negative mg/dL    Protein Negative Negative mg/dL    Bilirubin Negative Negative    Urobilinogen, Urine 0.2 Negative    Nitrite Negative Negative    Leukocyte Esterase Negative Negative    Occult Blood Negative Negative    Micro Urine Req see below        Current Facility-Administered Medications   Medication Frequency "   • traZODone (DESYREL) tablet 50 mg QHS   • melatonin tablet 3 mg QHS   • tizanidine (ZANAFLEX) tablet 2 mg QHS   • omeprazole (PRILOSEC) capsule 20 mg QAM AC   • amLODIPine (NORVASC) tablet 7.5 mg Q DAY   • potassium chloride SA (Kdur) tablet 20 mEq DAILY   • hydrOXYzine HCl (ATARAX) tablet 50 mg Q6HRS PRN   • Respiratory Therapy Consult Continuous RT   • Pharmacy Consult Request ...Pain Management Review 1 Each PHARMACY TO DOSE   • hydrALAZINE (APRESOLINE) tablet 10 mg Q8HRS PRN   • acetaminophen (Tylenol) tablet 650 mg Q4HRS PRN   • lactulose 20 GM/30ML solution 30 mL QDAY PRN   • docusate sodium (ENEMEEZ) enema 283 mg QDAY PRN   • sodium phosphate (Fleet) enema 133 mL QDAY PRN   • artificial tears ophthalmic solution 1 Drop PRN   • benzocaine-menthol (CEPACOL) lozenge 1 Lozenge Q2HRS PRN   • mag hydrox-al hydrox-simeth (MAALOX PLUS ES or MYLANTA DS) suspension 20 mL Q2HRS PRN   • ondansetron (ZOFRAN ODT) dispertab 4 mg 4X/DAY PRN    Or   • ondansetron (ZOFRAN) syringe/vial injection 4 mg 4X/DAY PRN   • sodium chloride (OCEAN) 0.65 % nasal spray 2 Spray PRN   • midazolam (VERSED) 5 mg/mL (1 mL vial) PRN   • enoxaparin (LOVENOX) inj 40 mg DAILY   • senna-docusate (PERICOLACE or SENOKOT S) 8.6-50 MG per tablet 2 Tablet BID    And   • polyethylene glycol/lytes (MIRALAX) PACKET 1 Packet QDAY PRN    And   • magnesium hydroxide (MILK OF MAGNESIA) suspension 30 mL QDAY PRN    And   • bisacodyl (DULCOLAX) suppository 10 mg QDAY PRN   • aspirin EC (ECOTRIN) tablet 81 mg DAILY   • atorvastatin (LIPITOR) tablet 80 mg DAILY   • carBAMazepine (TEGRETOL) chewable tab 200 mg BID   • clopidogrel (PLAVIX) tablet 75 mg DAILY   • labetalol (NORMODYNE) tablet 200 mg Q12HRS   • lisinopril (PRINIVIL) tablet 40 mg DAILY   • vitamin D3 (cholecalciferol) tablet 1,000 Units DAILY       Orders Placed This Encounter   Procedures   • Diet Order Diet: Level 5 - Minced and Moist (Medications floated whole, one at a time, in pudding (pt does  not like applesauce)); Liquid level: Level 2 - Mildly Thick; Tray Modifications (optional): No Straws     Standing Status:   Standing     Number of Occurrences:   1     Order Specific Question:   Diet:     Answer:   Level 5 - Minced and Moist [24]     Comments:   Medications floated whole, one at a time, in pudding (pt does not like applesauce)     Order Specific Question:   Liquid level     Answer:   Level 2 - Mildly Thick     Order Specific Question:   Tray Modifications (optional)     Answer:   No Straws       Assessment:  Active Hospital Problems    Diagnosis    • *Ischemic stroke (HCC)    • Polyuria    • Wrist fracture, closed, left, sequela    • Other insomnia    • Hypokalemia    • Class 1 obesity without serious comorbidity in adult    • Spasticity    • Left-sided neglect    • Other dysphagia    • Impaired mobility and ADLs    • Hyperlipidemia LDL goal <70    • Peripheral neuropathy    • Hypertension      This patient is a 76 y.o. female admitted for acute inpatient rehabilitation with Ischemic stroke (HCC).    Therapy notes reviewed.    We will have her first weekly conference tomorrow to discuss a discharge date.      Medical Decision Making and Plan:    Right MCA stroke  Left hemiparesis  Nondominant  Left neglect  Dysphagia  Cognitive impairments  Continue full rehab program  PT/OT/SLP, 1 hr each discipline, 5 days per week     Aspirin  Plavix  Statin     Patient with Zio patch cardiac monitor, return 2 weeks from admission    [facility]patient [facility], cardiology, Dr. Callejas, referrals made    MBS with aspiration on thins, diet downgraded per speech therapy recommendations    Headache  Severe today  Sent for stat head CT to check for hemorrhage or edema    Left wrist pain  X-ray at acute showed possible avulsion fractures  Discussed with orthopedic surgery  Wrist brace and weightbearing as tolerated     Hypertension  Lisinopril  Labetalol  Amlodipine, dose decreased  Appreciate  hospitalist assistance     Peripheral neuropathy  Continue home medication carbamazepine  Was also on Zanaflex (6 mg TID), restart 2 mg at night due to complaints of leg spasms    Hypokalemia  Continue supplementation    Insomnia, continues  Restart home medication tizanidine 2 mg  Add scheduled melatonin and trazodone     Obesity  BMI 32.6  Outpatient nutritional counseling    Vitamin D deficiency  Continue supplementation    GI prophylaxis  Omeprazole     Polyuria  Negative urinalysis    Bowel program  Continue bowel medications  Last BM 12/6    Bladder program  Check PVRs - 25, 15, 10  Not retaining  Bladder scan for no voids  ICP for over 400 cc  Scheduled toileting    DVT prophylaxis  Lovenox      Total time:  36 minutes.  I spent greater than 50% of the time for patient care, counseling, and coordination on this date, including patient face-to face time, unit/floor time with review of records/pertinent lab data and studies, as well as discussing diagnostic evaluation/work up, planned therapeutic interventions, and future disposition of care, as per the interval events/subjective and the assessment and plan as noted above.      I have performed a physical exam, reviewed and updated ROS, as well as the assessment and plan today 12/7/2021. In review of note from 12/6/2021 there are no new changes except as documented above.        Hawa Alva M.D.   Physical Medicine and Rehabilitation

## 2021-12-07 NOTE — THERAPY
12/07/21 1359   Precautions   Precautions Fall Risk;Weight Bearing As Tolerated Left Upper Extremity;Swallow Precautions ( See Comments)   Comments L wrist WBAT, L neglect, slow to mobilize, Zio patch   Pain 0 - 10 Group   Location Head   Location Orientation Right   Therapist Pain Assessment 8;Prior to Activity;During Activity   Cognition    Speech/ Communication Slurred   Level of Consciousness Alert   Ability To Follow Commands 2 Step   Attention Impaired   Sequencing Impaired   Initiation Impaired   ABS (Agitated Behavior Scale)   Agitated Behavior Scale Performed No   Gait Functional Level of Assist    Gait Level Of Assist Moderate Assist   Assistive Device Other (Comments)  (Right hallway railing)   Distance (Feet) 8   # of Times Distance was Traveled 2   Deviation Ataxic;Step To;Increased Base Of Support;Shuffled Gait;Decreased Heel Strike;Decreased Toe Off   Wheelchair Functional Level of Assist   Wheelchair Assist Total Assist   Distance Wheelchair (Feet or Distance) 0   Transfer Functional Level of Assist   Bed, Chair, Wheelchair Transfer Moderate Assist   Bed Chair Wheelchair Transfer Description Assist with two limbs;Increased time;Set-up of equipment;Supervision for safety;Verbal cueing;Adaptive equipment   Bed Mobility    Supine to Sit Contact Guard Assist   Sit to Supine Moderate Assist   Sit to Stand Minimal Assist   Scooting Moderate Assist   Rolling Minimal Assist to Rt.;Minimum Assist to Lt.   Neuro-Muscular Treatments   Neuro-Muscular Treatments Facilitation;Joint Approximation;Postural Facilitation;Sequencing;Tactile Cuing;Tapping;Verbal Cuing;Weight Shift Right;Weight Shift Left   Interdisciplinary Plan of Care Collaboration   Patient Position at End of Therapy In Bed;Call Light within Reach;Tray Table within Reach;Phone within Reach;Family / Friend in Room   PT Total Time Spent   PT Individual Total Time Spent (Mins) 60   PT Charge Group   PT Gait Training 1   PT Neuromuscular Re-Education  / Balance 1   PT Therapeutic Activities 2   Physical Therapy   Daily Treatment     Patient Name: Nakita Sheppard  Age:  76 y.o., Sex:  female  Medical Record #: [id number]  Today's Date: 12/7/2021     Precautions  Precautions: Fall Risk,Weight Bearing As Tolerated Left Upper Extremity,Swallow Precautions ( See Comments)  Comments: L wrist WBAAT-brace for comfort, L inattention, slow to mobilize, Zio patch    Subjective    The patient was sleeping in bed.  She agreed to PT.  She reported having a headache during the session.     Objective    Patient participated in bed mobility and transfer training bed mobility to sit up required CGA/Tyson and to lie down moderate assistance.  She transferred stand.  Bed to/from wheelchair with moderate assistance.  The patient also participated in sequencing sit to/from stand, midline standing posture and gait training using a right hallway railing for support.  She tolerated 8 FT x2.    Assessment    The patient participated in the above activities but was lethargic and complained of a headache on the right side.  She participated in then requested to return to her room because of her headache and she did not want to continue.  PT returned the patient to the room in the wheelchair and assisted her to transfer to bed and assisted bed mobility for positioning.  Strengths: Able to follow instructions,Alert and oriented,Adequate strength,Independent prior level of function,Motivated for self care and independence,Pleasant and cooperative,Supportive family,Willingly participates in therapeutic activities  Barriers: Decreased endurance,Generalized weakness,Impaired balance,Hemiplegia    Plan    Safety education, bed mobility and transfer training, neuromuscular reeducation for standing posture and gait training as tolerated, encourage more out of bed time and activity.    Passport items to be completed:  Passport items to be completed:  Get in/out of bed safely, in/out of a vehicle,  safely use mobility device, walk or wheel around home/community, navigate up and down stairs, show how to get up/down from the ground, ensure home is accessible, demonstrate HEP, complete caregiver training    Physical Therapy Problems (Active)     Problem: Mobility     Dates: Start: 12/04/21       Goal: STG-Within one week, patient will ambulate household distance x10' with SPC and Mod A     Dates: Start: 12/04/21             Problem: Mobility Transfers     Dates: Start: 12/04/21       Goal: STG-Within one week, patient will perform bed mobility Min A without bedrails     Dates: Start: 12/04/21          Goal: STG-Within one week, patient will sit to stand CGA 5/5 trials with SPC     Dates: Start: 12/04/21          Goal: STG-Within one week, patient will transfer bed to chair Min A     Dates: Start: 12/04/21             Problem: PT-Long Term Goals     Dates: Start: 12/04/21       Goal: LTG-By discharge, patient will maintain balance at least 45/56 on the [place] to DC to home safely with support of spouse     Dates: Start: 12/04/21          Goal: LTG-By discharge, patient will ambulate at least 500' with SPC and SPV inside and outside surfaces     Dates: Start: 12/04/21          Goal: LTG-By discharge, patient will transfer one surface to another SPV with SPC     Dates: Start: 12/04/21          Goal: LTG-By discharge, patient will transfer in/out of a car SBA from ambulatory level with SPC     Dates: Start: 12/04/21          Goal: LTG-By discharge, patient will ascend/descend ADA ramp with SPC and SBA to enter/exit her home safely     Dates: Start: 12/04/21

## 2021-12-07 NOTE — THERAPY
Occupational Therapy  Daily Treatment     Patient Name: Nakita Sheppard  Age:  76 y.o., Sex:  female  Medical Record #: [id number]  Today's Date: 12/7/2021     Precautions  Precautions: (P) Fall Risk,Weight Bearing As Tolerated Left Upper Extremity,Swallow Precautions ( See Comments)  Comments: (P) L wrist WBAT-brace for comfort, L inattention, slow to mobilize, ZIO patch.    Safety   ADL Safety : Requires Supervision for Safety,Requires Physical Assist for Safety,Requires Cueing for Safety  Bathroom Safety: Requires Supervision for Safety,Requires Physical Assist for Safety,Requires Cuing for Safety  Comments: see functional levels below for ADL performance details.    Subjective    Pt resting in bed with spouse present. She reported headache and feeling very fatigued. Dr. Alva ordered stat CT and approved medical hold.      Objective       12/07/21 1401   Precautions   Precautions Fall Risk;Weight Bearing As Tolerated Left Upper Extremity;Swallow Precautions ( See Comments)   Comments L wrist WBAT-brace for comfort, L inattention, slow to mobilize, ZIO patch.   Interdisciplinary Plan of Care Collaboration   IDT Collaboration with  Family / Caregiver;Nursing;Physician   Patient Position at End of Therapy In Bed;Call Light within Reach;Tray Table within Reach;Phone within Reach   Collaboration Comments CLOF, POC, headache, stat CT   Therapy Missed   Missed Therapy (Minutes) 45   Reason For Missed Therapy Medical - Patient on Hold from Therapy;Medical - Other (Please Comment)  (pt c/o headache; sent for stat CT; med hold per Dr. Alva)   OT Total Time Spent   OT Individual Total Time Spent (Mins) 15   OT Charge Group   OT Therapy Activity 1     Provided pt with brace for L wrist for pain mgt. Instructed pt to remove brace at nighttime.     Discussed pt's CLOF for ADLs and mobility, rehab goals/expectations, potential equipment needs and likely home health therapies at d/c with pt and spouse.      Assessment    Pt limited by reports of fatigue and headache. Vitals WFL. Dr. Alva ordered stat CT for pt during session and approved med hold.   Strengths: Independent prior level of function,Motivated for self care and independence,Pleasant and cooperative,Supportive family,Willingly participates in therapeutic activities  Barriers: Aspiration risk,Decreased endurance,Fatigue,Hemiparesis,Impaired activity tolerance,Impaired balance,Impaired functional cognition,Pain    Plan    ADLs, functional transfers, LUE ROM/positioning (get L arm tray/trough), LUE neuro re-ed, activities to improve L side attention. Monitor L wrist pain/brace.      Passport items to be completed:  Perform bathroom transfers, complete dressing, complete feeding, get ready for the day, prepare a simple meal, participate in household tasks, adapt home for safety needs, demonstrate home exercise program, complete caregiver training     Occupational Therapy Goals (Active)     Problem: Bathing     Dates: Start: 12/04/21       Goal: STG-Within one week, patient will bathe with min A using AE as needed.     Dates: Start: 12/04/21             Problem: Dressing     Dates: Start: 12/04/21       Goal: STG-Within one week, patient will dress UB with min A using alexx technique.     Dates: Start: 12/04/21          Goal: STG-Within one week, patient will dress LB with mod A using alexx technique.      Dates: Start: 12/04/21             Problem: Functional Transfers     Dates: Start: 12/04/21       Goal: STG-Within one week, patient will transfer to toilet with min A using AE/DME as needed.      Dates: Start: 12/04/21             Problem: OT Long Term Goals     Dates: Start: 12/04/21       Goal: LTG-By discharge, patient will complete basic self care tasks at supervision to mod I level using AE as needed.      Dates: Start: 12/04/21          Goal: LTG-By discharge, patient will perform bathroom transfers at supervision level using AE/DME as needed.       Dates: Start: 12/04/21             Problem: Toileting     Dates: Start: 12/04/21       Goal: STG-Within one week, patient will complete toileting tasks with mod A using AE as needed.      Dates: Start: 12/04/21

## 2021-12-07 NOTE — THERAPY
"Speech Language Pathology  Daily Treatment     Patient Name: Nakita Sheppard  Age:  76 y.o., Sex:  female  Medical Record #: [id number]  Today's Date: 12/7/2021     Precautions  Precautions: Fall Risk,Swallow Precautions ( See Comments)  Comments: Check L wrist WB status,L neglect, slow to mobilize, ZIO patch    Subjective    \"My  says I whisper\"   \"I have a headache\" pt reporting 8/10 RN informed.      Objective       12/07/21 0903   SLP Total Time Spent   SLP Individual Total Time Spent (Mins) 60   Treatment Charges   SLP Treatment - Individual Speech Language Treatment - Individual   SLP Swallowing Dysfunction Treatment Swallowing Dysfunction Treatment       Assessment    Modified MDTP initiated at this time in conjunction with effortful swallows following presentation of milldy thick and thin liquids given 5ml, 10ml and cup sips. Pt completed 56 swallows in total. Pt tolerating 10/10 trials of mildly thick by cup without s/sx of asp/pen noted. Pt tolerating 5ml thins with cough on 1/10 trials, pt with tc on 1/10 trials of 10 ml. Pt with cough on 4/26 trials of cup sips thin, cough noted to be strong and productive. Pt indep recalled need for two swallows every bite and sip however required verbal cues for implementation. Ot with slight oral holding/delayed onset of swallow. Cues required to refrain from additional intake when cough present, cues to cough hard and take deep breaths prior to additional presentation. Pt with slow initiation and rate of intake, increased fatigue as session continued. Introduced speech intelligibility strategies at this time. Pt reporting soft voice at baseline. Pt unable to increase volume with speech or use of sustained ah. MAX cues for exaggerated movements of articulators.      Strengths: Alert and oriented,Independent prior level of function,Good insight into deficits/needs,Willingly participates in therapeutic activities,Supportive family,Pleasant and " cooperative,Motivated for self care and independence  Barriers: Impaired functional cognition,Difficulty following instructions,Decreased endurance,Impaired activity tolerance    Plan    Cont effortful swallows, speech intelligibility strategies.     Speech Therapy Problems (Active)     Problem: Expression STGs     Dates: Start: 12/04/21       Goal: STG-Within one week, patient will     Dates: Start: 12/04/21       Description: Utilize clear speech strategies with mod cues in 70% of opportunities to increase overall speech intelligibility.        Goal: STG-Within one week, patient will     Dates: Start: 12/04/21       Description: Participate in a standardized language assessment to further identify expressive/receptive language needs.          Problem: Memory STGs     Dates: Start: 12/04/21       Goal: STG-Within one week, patient will     Dates: Start: 12/04/21       Description: Complete functional IADL tasks (e.g. med and financial management) with 70% acc and mod cues to ensure safe return to her PLOF.           Problem: Speech/Swallowing LTGs     Dates: Start: 12/04/21       Goal: LTG-By discharge, patient will safely swallow     Dates: Start: 12/04/21       Description: Safest/least restrictive diet with no overt s/sx of aspiration for 100% of intake to maintain adequate nutrition and hydration.        Goal: LTG-By discharge, patient will     Dates: Start: 12/04/21       Description: Complete cog-ling tasks at modified independence level to ensure safe return to PLOF.        Goal: LTG-By discharge, patient will     Dates: Start: 12/04/21       Description: Communicate complex ideas at the conversation level with 90% intelligibility to a naiive listener.           Problem: Swallowing STGs     Dates: Start: 12/04/21       Goal: STG-Within one week, patient will     Dates: Start: 12/04/21       Description: Participate in instrumental swallow examination to better visualize anatomy and physiology of pt's swallow  and guide individualized POC targeting dysphagia management.        Goal Note filed on 12/06/21 1511 by Dav Knight MS,CCC-SLP

## 2021-12-07 NOTE — CARE PLAN
The patient is Watcher - Medium risk of patient condition declining or worsening    Shift Goals  Clinical Goals: Swallow and mobility safety  Patient Goals: Complete swallow study    VSS. Tolerated diet, no signs of aspiration. Sleeping on rounds. No C/O.    Progress made toward(s) clinical / shift goals:    Problem: Pain - Standard  Goal: Alleviation of pain or a reduction in pain to the patient’s comfort goal  Note: Patient able to verbalize needs.  Denies pain or discomfort this shift and no s/s same noted.  Will continue to monitor.      Problem: Skin Integrity  Goal: Skin integrity is maintained or improved  Note: Patient's skin remains intact and free from new or accidental injury this shift.  Will continue to monitor.      Problem: Fall Risk - Rehab  Goal: Patient will remain free from falls  Note: Patient's skin remains intact and free from new or accidental injury this shift.  Will continue to monitor.        Patient is not progressing towards the following goals:

## 2021-12-08 LAB
25(OH)D3 SERPL-MCNC: 19 NG/ML (ref 30–80)
ANION GAP SERPL CALC-SCNC: 14 MMOL/L (ref 7–16)
BUN SERPL-MCNC: 17 MG/DL (ref 8–22)
CALCIUM SERPL-MCNC: 9 MG/DL (ref 8.5–10.5)
CHLORIDE SERPL-SCNC: 104 MMOL/L (ref 96–112)
CO2 SERPL-SCNC: 25 MMOL/L (ref 20–33)
CREAT SERPL-MCNC: 0.8 MG/DL (ref 0.5–1.4)
ERYTHROCYTE [DISTWIDTH] IN BLOOD BY AUTOMATED COUNT: 44.8 FL (ref 35.9–50)
GLUCOSE SERPL-MCNC: 80 MG/DL (ref 65–99)
HCT VFR BLD AUTO: 38.2 % (ref 37–47)
HGB BLD-MCNC: 12.5 G/DL (ref 12–16)
MAGNESIUM SERPL-MCNC: 2 MG/DL (ref 1.5–2.5)
MCH RBC QN AUTO: 32.1 PG (ref 27–33)
MCHC RBC AUTO-ENTMCNC: 32.7 G/DL (ref 33.6–35)
MCV RBC AUTO: 98.2 FL (ref 81.4–97.8)
PHOSPHATE SERPL-MCNC: 4.3 MG/DL (ref 2.5–4.5)
PLATELET # BLD AUTO: 351 K/UL (ref 164–446)
PMV BLD AUTO: 9.9 FL (ref 9–12.9)
POTASSIUM SERPL-SCNC: 3.9 MMOL/L (ref 3.6–5.5)
RBC # BLD AUTO: 3.89 M/UL (ref 4.2–5.4)
SODIUM SERPL-SCNC: 143 MMOL/L (ref 135–145)
WBC # BLD AUTO: 7.8 K/UL (ref 4.8–10.8)

## 2021-12-08 PROCEDURE — 85027 COMPLETE CBC AUTOMATED: CPT

## 2021-12-08 PROCEDURE — 97530 THERAPEUTIC ACTIVITIES: CPT

## 2021-12-08 PROCEDURE — 700111 HCHG RX REV CODE 636 W/ 250 OVERRIDE (IP): Performed by: PHYSICAL MEDICINE & REHABILITATION

## 2021-12-08 PROCEDURE — 99232 SBSQ HOSP IP/OBS MODERATE 35: CPT | Performed by: HOSPITALIST

## 2021-12-08 PROCEDURE — 700102 HCHG RX REV CODE 250 W/ 637 OVERRIDE(OP): Performed by: HOSPITALIST

## 2021-12-08 PROCEDURE — 770010 HCHG ROOM/CARE - REHAB SEMI PRIVAT*

## 2021-12-08 PROCEDURE — 84100 ASSAY OF PHOSPHORUS: CPT

## 2021-12-08 PROCEDURE — 700102 HCHG RX REV CODE 250 W/ 637 OVERRIDE(OP): Performed by: PHYSICAL MEDICINE & REHABILITATION

## 2021-12-08 PROCEDURE — 92526 ORAL FUNCTION THERAPY: CPT

## 2021-12-08 PROCEDURE — A9270 NON-COVERED ITEM OR SERVICE: HCPCS | Performed by: HOSPITALIST

## 2021-12-08 PROCEDURE — 83735 ASSAY OF MAGNESIUM: CPT

## 2021-12-08 PROCEDURE — 99233 SBSQ HOSP IP/OBS HIGH 50: CPT | Performed by: PHYSICAL MEDICINE & REHABILITATION

## 2021-12-08 PROCEDURE — 97110 THERAPEUTIC EXERCISES: CPT

## 2021-12-08 PROCEDURE — 97116 GAIT TRAINING THERAPY: CPT

## 2021-12-08 PROCEDURE — A9270 NON-COVERED ITEM OR SERVICE: HCPCS | Performed by: PHYSICAL MEDICINE & REHABILITATION

## 2021-12-08 PROCEDURE — 97112 NEUROMUSCULAR REEDUCATION: CPT

## 2021-12-08 PROCEDURE — 97535 SELF CARE MNGMENT TRAINING: CPT

## 2021-12-08 PROCEDURE — 80048 BASIC METABOLIC PNL TOTAL CA: CPT

## 2021-12-08 PROCEDURE — 36415 COLL VENOUS BLD VENIPUNCTURE: CPT

## 2021-12-08 RX ORDER — MODAFINIL 100 MG/1
100 TABLET ORAL EVERY MORNING
Status: DISCONTINUED | OUTPATIENT
Start: 2021-12-09 | End: 2021-12-22 | Stop reason: HOSPADM

## 2021-12-08 RX ADMIN — AMLODIPINE BESYLATE 10 MG: 5 TABLET ORAL at 06:41

## 2021-12-08 RX ADMIN — LABETALOL HYDROCHLORIDE 200 MG: 100 TABLET, FILM COATED ORAL at 19:55

## 2021-12-08 RX ADMIN — ASPIRIN 81 MG: 81 TABLET, COATED ORAL at 09:57

## 2021-12-08 RX ADMIN — LABETALOL HYDROCHLORIDE 200 MG: 100 TABLET, FILM COATED ORAL at 09:57

## 2021-12-08 RX ADMIN — Medication 1000 UNITS: at 09:59

## 2021-12-08 RX ADMIN — TIZANIDINE 2 MG: 4 TABLET ORAL at 19:56

## 2021-12-08 RX ADMIN — MELATONIN TAB 3 MG 3 MG: 3 TAB at 19:56

## 2021-12-08 RX ADMIN — CARBAMAZEPINE 200 MG: 100 TABLET, CHEWABLE ORAL at 19:55

## 2021-12-08 RX ADMIN — ENOXAPARIN SODIUM 40 MG: 40 INJECTION SUBCUTANEOUS at 09:59

## 2021-12-08 RX ADMIN — CLOPIDOGREL BISULFATE 75 MG: 75 TABLET ORAL at 09:58

## 2021-12-08 RX ADMIN — LISINOPRIL 40 MG: 20 TABLET ORAL at 09:57

## 2021-12-08 RX ADMIN — TRAZODONE HYDROCHLORIDE 50 MG: 50 TABLET ORAL at 19:56

## 2021-12-08 RX ADMIN — ATORVASTATIN CALCIUM 80 MG: 40 TABLET, FILM COATED ORAL at 09:57

## 2021-12-08 RX ADMIN — OMEPRAZOLE 20 MG: 20 CAPSULE, DELAYED RELEASE ORAL at 09:58

## 2021-12-08 RX ADMIN — SENNOSIDES AND DOCUSATE SODIUM 2 TABLET: 50; 8.6 TABLET ORAL at 09:58

## 2021-12-08 RX ADMIN — CARBAMAZEPINE 200 MG: 100 TABLET, CHEWABLE ORAL at 09:58

## 2021-12-08 ASSESSMENT — GAIT ASSESSMENTS
DISTANCE (FEET): 12
GAIT LEVEL OF ASSIST: MINIMAL ASSIST
ASSISTIVE DEVICE: OTHER (COMMENTS)

## 2021-12-08 ASSESSMENT — ACTIVITIES OF DAILY LIVING (ADL)
TOILET_TRANSFER_DESCRIPTION: GRAB BAR;INCREASED TIME;SET-UP OF EQUIPMENT;SUPERVISION FOR SAFETY;VERBAL CUEING
TOILETING_LEVEL_OF_ASSIST_DESCRIPTION: ASSIST TO PULL PANTS UP;ASSIST TO PULL PANTS DOWN;ASSIST FOR STANDING BALANCE;GRAB BAR;INCREASED TIME;SET-UP OF EQUIPMENT;SUPERVISION FOR SAFETY;VERBAL CUEING
BED_CHAIR_WHEELCHAIR_TRANSFER_DESCRIPTION: INCREASED TIME;INITIAL PREPARATION FOR TASK;SET-UP OF EQUIPMENT;SUPERVISION FOR SAFETY;VERBAL CUEING
TUB_SHOWER_TRANSFER_DESCRIPTION: GRAB BAR;SHOWER BENCH;INCREASED TIME;INITIAL PREPARATION FOR TASK;SET-UP OF EQUIPMENT;SUPERVISION FOR SAFETY;VERBAL CUEING
TOILET_TRANSFER_DESCRIPTION: ADAPTIVE EQUIPMENT;GRAB BAR;INCREASED TIME;SUPERVISION FOR SAFETY;VERBAL CUEING

## 2021-12-08 ASSESSMENT — PAIN DESCRIPTION - PAIN TYPE: TYPE: ACUTE PAIN

## 2021-12-08 NOTE — CARE PLAN
Problem: Expression STGs  Goal: STG-Within one week, patient will  Description: Utilize clear speech strategies with mod cues in 70% of opportunities to increase overall speech intelligibility.   Outcome: Not Met  Goal: STG-Within one week, patient will  Description: Participate in a standardized language assessment to further identify expressive/receptive language needs.  Outcome: Not Met     Problem: Memory STGs  Goal: STG-Within one week, patient will  Description: Complete functional IADL tasks (e.g. med and financial management) with 70% acc and mod cues to ensure safe return to her PLOF.   Outcome: Not Met     Problem: Swallowing STGs  Goal: STG-Within one week, patient will  Description: Participate in instrumental swallow examination to better visualize anatomy and physiology of pt's swallow and guide individualized POC targeting dysphagia management.      Outcome: Met

## 2021-12-08 NOTE — THERAPY
"Speech Language Pathology  Daily Treatment     Patient Name: Nakita Sheppard  Age:  76 y.o., Sex:  female  Medical Record #: [id number]  Today's Date: 12/8/2021     Precautions  Precautions: Fall Risk,Weight Bearing As Tolerated Left Upper Extremity,Swallow Precautions ( See Comments)  Comments: L wrist WBAT-brace for comfort, left neglect, slow to mobilize, Zio patch    Subjective    Pt with limited participation at this time, pt with poor PO intake despite alternative choices being presented to pt. Pt consistently requesting to get back into bed.      Objective       12/08/21 1133   SLP Total Time Spent   SLP Individual Total Time Spent (Mins) 30   Treatment Charges   SLP Swallowing Dysfunction Treatment Swallowing Dysfunction Treatment     Assessment    Pt assessed with minced and moist diet with mildly thick liquids. Pt initially declined attending lunch, with encouragement pt willing to attend therapeutic dining. Upon presentation of tray pt reporting willingness to consume mashed potatoes however limited bites consumed. Pt indep recalled strategy for two swallows every bite and sip. Pt with oral holding and delayed onset of swallow, cues for hard and fast swallow. Pt with anterior spillage of liquids. Pt refusing further intake at this time, pt offered variety of alternative options, pt stating \"your food taste like shit.\" Ongoing education provided re: modified diet, diet downgrade and safe PO intake.     Strengths: Pleasant and cooperative,Motivated for self care and independence,Willingly participates in therapeutic activities,Independent prior level of function,Supportive family  Barriers: Aspiration risk,Impaired insight/denial of deficits,Impaired carryover of learning,Impaired functional cognition    Plan    Cont POC, trials of thin liquids with use of effortful swallow in isolation with SLP only.     Speech Therapy Problems (Active)     Problem: Expression STGs     Dates: Start: 12/04/21       " Goal: STG-Within one week, patient will     Dates: Start: 12/04/21       Description: Utilize clear speech strategies with mod cues in 70% of opportunities to increase overall speech intelligibility.        Goal: STG-Within one week, patient will     Dates: Start: 12/04/21       Description: Participate in a standardized language assessment to further identify expressive/receptive language needs.          Problem: Memory STGs     Dates: Start: 12/04/21       Goal: STG-Within one week, patient will     Dates: Start: 12/04/21       Description: Complete functional IADL tasks (e.g. med and financial management) with 70% acc and mod cues to ensure safe return to her PLOF.           Problem: Speech/Swallowing LTGs     Dates: Start: 12/04/21       Goal: LTG-By discharge, patient will safely swallow     Dates: Start: 12/04/21       Description: Safest/least restrictive diet with no overt s/sx of aspiration for 100% of intake to maintain adequate nutrition and hydration.        Goal: LTG-By discharge, patient will     Dates: Start: 12/04/21       Description: Complete cog-ling tasks at modified independence level to ensure safe return to PLOF.        Goal: LTG-By discharge, patient will     Dates: Start: 12/04/21       Description: Communicate complex ideas at the conversation level with 90% intelligibility to a naiive listener.           Problem: Swallowing STGs     Dates: Start: 12/04/21       Goal: STG-Within one week, patient will consume trials of thin liquids with use of effortful swallow with no overt s/sx of asp/pen noted on 80% of trials provided MIN cues     Dates: Start: 12/08/21

## 2021-12-08 NOTE — THERAPY
Occupational Therapy  Daily Treatment     Patient Name: Nakita Sheppard  Age:  76 y.o., Sex:  female  Medical Record #: [id number]  Today's Date: 12/8/2021     Precautions  Precautions: (P) Fall Risk,Weight Bearing As Tolerated Left Upper Extremity,Swallow Precautions ( See Comments)  Comments: (P) L wrist WBAT-brace for comfort, left neglect, slow to mobilize, Zio patch    Safety   ADL Safety : Requires Supervision for Safety,Requires Physical Assist for Safety,Requires Cueing for Safety  Bathroom Safety: Requires Supervision for Safety,Requires Physical Assist for Safety,Requires Cuing for Safety  Comments: see functional levels below for ADL performance details.    Subjective    Pt reports that her headache is not as bad as yesterday.      Objective       12/08/21 1001   Precautions   Precautions Fall Risk;Weight Bearing As Tolerated Left Upper Extremity;Swallow Precautions ( See Comments)   Comments L wrist WBAT-brace for comfort, left neglect, slow to mobilize, Zio patch   Functional Level of Assist   Grooming Stand by Assist;Seated   Grooming Description Increased time;Set-up of equipment;Supervision for safety;Verbal cueing   Interdisciplinary Plan of Care Collaboration   IDT Collaboration with  Nursing   Patient Position at End of Therapy Seated;Chair Alarm On;Self Releasing Lap Belt Applied;Call Light within Reach;Tray Table within Reach;Phone within Reach   Collaboration Comments RN administered medications.   OT Total Time Spent   OT Individual Total Time Spent (Mins) 30   OT Charge Group   OT Self Care / ADL 1   OT Therapy Activity 1     LUE PROM to tolerance for shoulder flex/ext, abduction/adduction, int/ext rotation, elbow flex/ext, pronation/supination, finger flex/ext-wrist deferred d/t pain.     Placed pt's LUE in arm trough for improved postioning. Also educated pt on importance of positioning LUE using pillows when in bed. Donned L wrist brace for pain mgt.     Assessment    Pt more alert  this session, and reported decreased headache in comparison to yesterday. She con't to be slow to mobilize and limited by fatigue. Pt reported that L wrist brace feels ok and instructed pt to con't to wear during the day.   Strengths: Independent prior level of function,Motivated for self care and independence,Pleasant and cooperative,Supportive family,Willingly participates in therapeutic activities  Barriers: Aspiration risk,Decreased endurance,Fatigue,Hemiparesis,Impaired activity tolerance,Impaired balance,Impaired functional cognition,Pain    Plan    ADLs, functional transfers, LUE ROM/positioning, LUE neuro re-ed, activities to improve L side attention. Monitor L wrist pain/brace.      Passport items to be completed:  Perform bathroom transfers, complete dressing, complete feeding, get ready for the day, prepare a simple meal, participate in household tasks, adapt home for safety needs, demonstrate home exercise program, complete caregiver training     Occupational Therapy Goals (Active)     Problem: Bathing     Dates: Start: 12/04/21       Goal: STG-Within one week, patient will bathe with min A using AE as needed.     Dates: Start: 12/04/21             Problem: Dressing     Dates: Start: 12/04/21       Goal: STG-Within one week, patient will dress UB with min A using alexx technique.     Dates: Start: 12/04/21          Goal: STG-Within one week, patient will dress LB with mod A using alexx technique.      Dates: Start: 12/04/21             Problem: Functional Transfers     Dates: Start: 12/04/21       Goal: STG-Within one week, patient will transfer to toilet with min A using AE/DME as needed.      Dates: Start: 12/04/21             Problem: OT Long Term Goals     Dates: Start: 12/04/21       Goal: LTG-By discharge, patient will complete basic self care tasks at supervision to mod I level using AE as needed.      Dates: Start: 12/04/21          Goal: LTG-By discharge, patient will perform bathroom transfers at  supervision level using AE/DME as needed.      Dates: Start: 12/04/21             Problem: Toileting     Dates: Start: 12/04/21       Goal: STG-Within one week, patient will complete toileting tasks with mod A using AE as needed.      Dates: Start: 12/04/21

## 2021-12-08 NOTE — CARE PLAN
Problem: Bathing  Goal: STG-Within one week, patient will bathe with min A using AE as needed.  Outcome: Not Met  Note: Con't to require mod-max A d/t L hemiparesis, pain, fatigue.      Problem: Dressing  Goal: STG-Within one week, patient will dress UB with min A using alexx technique.  Outcome: Not Met  Note: Con't to require mod-max A d/t L hemiparesis, pain, fatigue.   Goal: STG-Within one week, patient will dress LB with mod A using alexx technique.   Outcome: Not Met  Note: Con't to require max A d/t L hemiparesis, pain, fatigue.      Problem: Toileting  Goal: STG-Within one week, patient will complete toileting tasks with mod A using AE as needed.   Outcome: Not Met  Note: Con't to require max A d/t L hemiparesis, pain, fatigue.      Problem: Functional Transfers  Goal: STG-Within one week, patient will transfer to toilet with min A using AE/DME as needed.   Outcome: Not Met  Note: Con't to require mod-max A d/t L hemiparesis, pain, fatigue.

## 2021-12-08 NOTE — THERAPY
"Occupational Therapy  Daily Treatment     Patient Name: Nakita Sheppard  Age:  76 y.o., Sex:  female  Medical Record #: [id number]  Today's Date: 12/8/2021     Precautions  Precautions: (P) Fall Risk,Weight Bearing As Tolerated Left Upper Extremity,Swallow Precautions ( See Comments)  Comments: (P) L wrist WBAT-brace for comfort, left neglect, slow to mobilize, Zio patch    Safety   ADL Safety : (P) Requires Supervision for Safety,Requires Physical Assist for Safety,Requires Cueing for Safety  Bathroom Safety: (P) Requires Supervision for Safety,Requires Physical Assist for Safety,Requires Cuing for Safety  Comments: (P) see functional levels below for ADL performance details.    Subjective    \"I want to shower\"     Objective       12/08/21 1431   Precautions   Precautions Fall Risk;Weight Bearing As Tolerated Left Upper Extremity;Swallow Precautions ( See Comments)   Comments L wrist WBAT-brace for comfort, left neglect, slow to mobilize, Zio patch   Safety    ADL Safety  Requires Supervision for Safety;Requires Physical Assist for Safety;Requires Cueing for Safety   Bathroom Safety Requires Supervision for Safety;Requires Physical Assist for Safety;Requires Cuing for Safety   Comments see functional levels below for ADL performance details.   Cognition    Speech/ Communication Delayed Responses;Slurred   Level of Consciousness Alert   Ability To Follow Commands 2 Step   Attention Impaired   Sequencing Impaired   Initiation Impaired   Functional Level of Assist   Grooming Stand by Assist;Seated   Grooming Description Increased time;Set-up of equipment;Supervision for safety;Verbal cueing   Bathing Moderate Assist   Bathing Description Grab bar;Hand held shower;Tub bench;Assit with perineal;Increased time;Set-up of equipment;Supervision for safety;Verbal cueing  (A to wash RUE, feet and buttocks, Min A standing bal)   Upper Body Dressing Moderate Assist   Upper Body Dressing Description Assit with threading arms " through sleeves;Assist with pulling shirt over head;Increased time;Set-up of equipment;Supervision for safety;Verbal cueing  (don/doff pullover shirt)   Lower Body Dressing Maximal Assist   Lower Body Dressing Description Assist with threading into pant leg;Increased time;Initial preparation for task;Set-up of equipment;Supervision for safety;Verbal cueing  (don/doff brief, pants, slip on shoes)   Toileting Maximal Assist   Toileting Description Assist to pull pants up;Assist to pull pants down;Assist for standing balance;Grab bar;Increased time;Set-up of equipment;Supervision for safety;Verbal cueing   Bed, Chair, Wheelchair Transfer Moderate Assist   Bed Chair Wheelchair Transfer Description Increased time;Initial preparation for task;Set-up of equipment;Supervision for safety;Verbal cueing   Toilet Transfers Moderate Assist   Toilet Transfer Description Grab bar;Increased time;Set-up of equipment;Supervision for safety;Verbal cueing  (w/c<>toilet stand step via GB)   Tub / Shower Transfers Moderate Assist   Tub Shower Transfer Description Grab bar;Shower bench;Increased time;Initial preparation for task;Set-up of equipment;Supervision for safety;Verbal cueing  (w/c<>shower bench stand step via GB)   Bed Mobility    Supine to Sit Contact Guard Assist   Sit to Supine Maximal Assist  (d/t fatigue following ADLs.)   Scooting Minimal Assist   Rolling Minimum Assist to Lt.   Skilled Intervention Postural Facilitation;Sequencing;Tactile Cuing;Verbal Cuing   Interdisciplinary Plan of Care Collaboration   IDT Collaboration with  Therapy Tech   Patient Position at End of Therapy In Bed;Bed Alarm On;Call Light within Reach;Tray Table within Reach;Phone within Reach   Collaboration Comments tech assist prn   OT Total Time Spent   OT Individual Total Time Spent (Mins) 45   OT Charge Group   OT Self Care / ADL 3       Assessment    Pt tolerated OT session well and demonstrated increased motivation. She requested to complete  shower. She demonstrated increased initiation with ADLs and improved from max A to min-mod A for bathroom transfers. She con't to fluctuate with level of assistance required d/t fatigue, and requires cues for attending to LUE/LLE.   Strengths: Independent prior level of function,Motivated for self care and independence,Pleasant and cooperative,Supportive family,Willingly participates in therapeutic activities  Barriers: Aspiration risk,Decreased endurance,Fatigue,Hemiparesis,Impaired activity tolerance,Impaired balance,Impaired functional cognition,Pain    Plan    ADLs, functional transfers, LUE ROM/positioning, LUE neuro re-ed, activities to improve L side attention. Monitor L wrist pain/brace.      Passport items to be completed:  Perform bathroom transfers, complete dressing, complete feeding, get ready for the day, prepare a simple meal, participate in household tasks, adapt home for safety needs, demonstrate home exercise program, complete caregiver training     Occupational Therapy Goals (Active)     Problem: Bathing     Dates: Start: 12/04/21       Goal: STG-Within one week, patient will bathe with min A using AE as needed.     Dates: Start: 12/04/21       Goal Note filed on 12/08/21 1345 by Delaney Price OT     Con't to require mod-max A d/t L hemiparesis, pain, fatigue.                Problem: Dressing     Dates: Start: 12/04/21       Goal: STG-Within one week, patient will dress UB with min A using alexx technique.     Dates: Start: 12/04/21       Goal Note filed on 12/08/21 1345 by Delaney Price OT     Con't to require mod-max A d/t L hemiparesis, pain, fatigue.             Goal: STG-Within one week, patient will dress LB with mod A using alexx technique.      Dates: Start: 12/04/21       Goal Note filed on 12/08/21 1345 by Delaney Price OT     Con't to require max A d/t L hemiparesis, pain, fatigue.                Problem: Functional Transfers     Dates: Start: 12/04/21       Goal: STG-Within  one week, patient will transfer to toilet with min A using AE/DME as needed.      Dates: Start: 12/04/21       Goal Note filed on 12/08/21 1345 by Delaney Price OT     Con't to require mod-max A d/t L hemiparesis, pain, fatigue.                Problem: OT Long Term Goals     Dates: Start: 12/04/21       Goal: LTG-By discharge, patient will complete basic self care tasks at supervision to mod I level using AE as needed.      Dates: Start: 12/04/21          Goal: LTG-By discharge, patient will perform bathroom transfers at supervision level using AE/DME as needed.      Dates: Start: 12/04/21             Problem: Toileting     Dates: Start: 12/04/21       Goal: STG-Within one week, patient will complete toileting tasks with mod A using AE as needed.      Dates: Start: 12/04/21       Goal Note filed on 12/08/21 1345 by Delaney Price OT     Con't to require max A d/t L hemiparesis, pain, fatigue.

## 2021-12-08 NOTE — THERAPY
"Physical Therapy   Daily Treatment     Patient Name: Nakita Sheppard  Age:  76 y.o., Sex:  female  Medical Record #: [id number]  Today's Date: 12/8/2021     Precautions  Precautions: Fall Risk,Weight Bearing As Tolerated Left Upper Extremity,Swallow Precautions ( See Comments)  Comments: L wrist WBAT-brace for comfort, left neglect, slow to mobilize, Zio patch    Subjective    Patient agrees to therapy session. Spouse present.      Objective       12/08/21 1301   Cognition    Speech/ Communication Delayed Responses;Slurred   Comments L inattention throughout session   Transfer Functional Level of Assist   Bed, Chair, Wheelchair Transfer Moderate Assist  (to guide buttock to full turn prior to sitting)   Bed Chair Wheelchair Transfer Description Squat pivot transfer to wheelchair;Assist with one limb;Supervision for safety;Verbal cueing  (squat reach pivot w/c <> EOM)   Bed Mobility    Supine to Sit Minimal Assist  (to get sitting midline on EOB)   Sit to Stand Minimal Assist   Interdisciplinary Plan of Care Collaboration   IDT Collaboration with  Family / Caregiver   Patient Position at End of Therapy In Bed;Self Releasing Lap Belt Applied;Family / Friend in Room;Call Light within Reach;Chair Alarm On   Collaboration Comments spouse present throughout session   PT Total Time Spent   PT Individual Total Time Spent (Mins) 30   PT Charge Group   PT Neuromuscular Re-Education / Balance 1   PT Therapeutic Activities 1   STS training on EOM w/ pt in \"high perch\", STS x 5 w/ spouse in front using forearm as visual feedback for midline stance.  X 5 w/ staggered stance LLE back. PT on pts L side providing support at L knee, sacrum and chest for upright stance facilitation and cues.  Worked on controlled descent with hip hinge and knee flexion, however unable to adhere and plops frequently into seated.  Stands approx 15-40 s each trial.     Seated EOM L side attention by pt following finger towards L visual field with " "eyes > head > trunk in that order x 5.     Assessment    Patient shows improved upright stance with visual feedback and tactile cues, however is easily distracted by objects in the background that makes her retropulsion and lose balance frequently.  Spouse is also a barrier during session by giving patient commands IE \"raise you hand\" or \"wave at me\" during standing tasks which throws her off balance.  Education provided to spouse about less input for improved attention.     Strengths: Able to follow instructions,Alert and oriented,Adequate strength,Independent prior level of function,Motivated for self care and independence,Pleasant and cooperative,Supportive family,Willingly participates in therapeutic activities  Barriers: Decreased endurance,Generalized weakness,Impaired balance,Hemiplegia    Plan    Safety education, neuromuscular reeducation for mobility, standing posture, static and dynamic weight shifting, gait training, therapeutic exercise     Passport items to be completed:  Passport items to be completed:  Get in/out of bed safely, in/out of a vehicle, safely use mobility device, walk or wheel around home/community, navigate up and down stairs, show how to get up/down from the ground, ensure home is accessible, demonstrate HEP, complete caregiver training    Physical Therapy Problems (Active)     Problem: Mobility     Dates: Start: 12/04/21       Goal: STG-Within one week, patient will ambulate household distance x10' with SPC and Mod A     Dates: Start: 12/04/21             Problem: Mobility Transfers     Dates: Start: 12/04/21       Goal: STG-Within one week, patient will perform bed mobility Min A without bedrails     Dates: Start: 12/04/21          Goal: STG-Within one week, patient will sit to stand CGA 5/5 trials with SPC     Dates: Start: 12/04/21          Goal: STG-Within one week, patient will transfer bed to chair Min A     Dates: Start: 12/04/21             Problem: PT-Long Term Goals     " Dates: Start: 12/04/21       Goal: LTG-By discharge, patient will maintain balance at least 45/56 on the [place] to DC to home safely with support of spouse     Dates: Start: 12/04/21          Goal: LTG-By discharge, patient will ambulate at least 500' with SPC and SPV inside and outside surfaces     Dates: Start: 12/04/21          Goal: LTG-By discharge, patient will transfer one surface to another SPV with SPC     Dates: Start: 12/04/21          Goal: LTG-By discharge, patient will transfer in/out of a car SBA from ambulatory level with SPC     Dates: Start: 12/04/21          Goal: LTG-By discharge, patient will ascend/descend ADA ramp with SPC and SBA to enter/exit her home safely     Dates: Start: 12/04/21

## 2021-12-08 NOTE — CARE PLAN
Problem: Mobility  Goal: STG-Within one week, patient will ambulate household distance x10' with SPC and Mod A  Outcome: Progressing     Problem: Mobility Transfers  Goal: STG-Within one week, patient will perform bed mobility Min A without bedrails  Outcome: Progressing  Goal: STG-Within one week, patient will sit to stand CGA 5/5 trials with SPC  Outcome: Progressing  Goal: STG-Within one week, patient will transfer bed to chair Min A  Outcome: Progressing

## 2021-12-08 NOTE — CARE PLAN
Problem: Knowledge Deficit - Standard  Goal: Patient and family/care givers will demonstrate understanding of plan of care, disease process/condition, diagnostic tests and medications  Outcome: Progressing   Pt education given regarding plan of care, pt shows some understanding, will continue to reinforce education and continue to monitor.       Problem: Fall Risk - Rehab  Goal: Patient will remain free from falls  Outcome: Progressing   Pt education given regarding fall precautions AND safety measures, pt shows good understanding, has not attempted to self transfer this shift, will continue to reinforce education and continue to monitor.

## 2021-12-08 NOTE — CARE PLAN
"The patient is Stable - Low risk of patient condition declining or worsening    Shift Goals  Clinical Goals: Swallow and mobility safety  Patient Goals: Complete swallow study    Patient is not progressing towards the following goals:      Problem: Fall Risk - Rehab  Goal: Patient will remain free from falls  Outcome: Not Met  Note: Erin Irizarry Fall risk Assessment Score: 14    Moderate fall risk Interventions  - Bed and strip alarm   - Yellow sign by the door   - Yellow wrist band \"Fall risk\"  - Do not leave patient unattended in the bathroom  - Fall risk education provided     "

## 2021-12-08 NOTE — THERAPY
12/08/21 0959   Precautions   Precautions Fall Risk;Weight Bearing As Tolerated Left Upper Extremity;Swallow Precautions ( See Comments)   Comments L wrist WBAT-brace for comfort, left neglect, slow to mobilize, Zio patch   Pain 0 - 10 Group   Therapist Pain Assessment 0   Cognition    Speech/ Communication Slurred   Level of Consciousness Alert   Attention Impaired   Sequencing Impaired   Initiation Impaired   Gait Functional Level of Assist    Gait Level Of Assist Minimal Assist   Assistive Device Other (Comments)  (R hallway railing)   Distance (Feet) 12   # of Times Distance was Traveled 4   Deviation Ataxic;Step To;Bradykinetic;Decreased Heel Strike;Decreased Toe Off;Other (Comment)  (Impaired weight shift, left lower extremity control)   Wheelchair Functional Level of Assist   Wheelchair Assist Total Assist   Distance Wheelchair (Feet or Distance) 0   Stairs Functional Level of Assist   Level of Assist with Stairs Unable to Participate   # of Stairs Climbed   (Unsafe due to LLE weakness and stability)   Transfer Functional Level of Assist   Bed, Chair, Wheelchair Transfer Moderate Assist   Bed Chair Wheelchair Transfer Description Assist with two limbs;Increased time;Set-up of equipment;Supervision for safety;Verbal cueing   Toilet Transfers Moderate Assist   Toilet Transfer Description Adaptive equipment;Grab bar;Increased time;Supervision for safety;Verbal cueing   Sitting Lower Body Exercises   Ankle Pumps 2 sets of 15;Bilateral   Hip Flexion 2 sets of 15;Bilateral   Hip Abduction 2 sets of 15;Bilateral   Hip Adduction 2 sets of 15;Bilateral   Long Arc Quad 2 sets of 15;Bilateral   Hamstring Curl 2 sets of 15;Bilateral   Bed Mobility    Supine to Sit Contact Guard Assist   Sit to Supine Moderate Assist   Sit to Stand Minimal Assist   Scooting Moderate Assist   Rolling Minimal Assist to Rt.;Minimum Assist to Lt.   Neuro-Muscular Treatments   Neuro-Muscular Treatments Facilitation;Joint  Approximation;Postural Facilitation;Sequencing;Tactile Cuing;Verbal Cuing;Weight Shift Right;Weight Shift Left   Comments Sequencing seated scooting in the wheelchair, sit to/from stand, midline standing posture, static weight shift, facilitated right weight shift during gait, left lower extremity control and stability   Interdisciplinary Plan of Care Collaboration   Patient Position at End of Therapy Seated;Chair Alarm On;Self Releasing Lap Belt Applied;Call Light within Reach;Tray Table within Reach   PT Total Time Spent   PT Individual Total Time Spent (Mins) 60   PT Charge Group   PT Gait Training 1   PT Therapeutic Exercise 2   PT Neuromuscular Re-Education / Balance 1   Physical Therapy   Daily Treatment     Patient Name: Nakita Sheppard  Age:  76 y.o., Sex:  female  Medical Record #: [id number]  Today's Date: 12/8/2021     Precautions  Precautions: Fall Risk,Weight Bearing As Tolerated Left Upper Extremity,Swallow Precautions ( See Comments)  Comments: L wrist WBAT-brace for comfort, left neglect, slow to mobilize, Zio patch    Subjective    The patient agreed to PT.  She had no complaints of lightheadedness.     Objective    The patient participated in sequencing sit to/from stand, midline standing posture, static and dynamic standing weight shifting, gait training using a right hallway railing for support.  The patient tolerated 12 FT x4 minimal assistance to facilitate right weight shift, controlled left lower extremity during gait.  The patient also participated in seated bilateral lower extremity therapeutic exercise 2 sets of 15 which are indicated above.    Assessment    The patient was more alert and able to participate in PT this morning.  She tolerated standing activities for example sit to stand and midline standing posture, gait training using a right hallway railing for support and with PT facilitation of right weight shift, left lower extremity control and stability.  The patient's left  lower extremity has adequate strength to stand and walk short distances.  However, she lacks the strength and coordination to improve her step length and the left lower extremity stability during gait.  She tolerated 12 FT x4.  She will need to be more mobile, alert and safe when discharging to home.  Strengths: Able to follow instructions,Alert and oriented,Adequate strength,Independent prior level of function,Motivated for self care and independence,Pleasant and cooperative,Supportive family,Willingly participates in therapeutic activities  Barriers: Decreased endurance,Generalized weakness,Impaired balance,Hemiplegia    Plan    Safety education, neuromuscular reeducation for mobility, standing posture, static and dynamic weight shifting, gait training, therapeutic exercise    Passport items to be completed:  Passport items to be completed:  Get in/out of bed safely, in/out of a vehicle, safely use mobility device, walk or wheel around home/community, navigate up and down stairs, show how to get up/down from the ground, ensure home is accessible, demonstrate HEP, complete caregiver training    Physical Therapy Problems (Active)     Problem: Mobility     Dates: Start: 12/04/21       Goal: STG-Within one week, patient will ambulate household distance x10' with SPC and Mod A     Dates: Start: 12/04/21             Problem: Mobility Transfers     Dates: Start: 12/04/21       Goal: STG-Within one week, patient will perform bed mobility Min A without bedrails     Dates: Start: 12/04/21          Goal: STG-Within one week, patient will sit to stand CGA 5/5 trials with SPC     Dates: Start: 12/04/21          Goal: STG-Within one week, patient will transfer bed to chair Min A     Dates: Start: 12/04/21             Problem: PT-Long Term Goals     Dates: Start: 12/04/21       Goal: LTG-By discharge, patient will maintain balance at least 45/56 on the [place] to DC to home safely with support of spouse     Dates: Start: 12/04/21           Goal: LTG-By discharge, patient will ambulate at least 500' with SPC and SPV inside and outside surfaces     Dates: Start: 12/04/21          Goal: LTG-By discharge, patient will transfer one surface to another SPV with SPC     Dates: Start: 12/04/21          Goal: LTG-By discharge, patient will transfer in/out of a car SBA from ambulatory level with SPC     Dates: Start: 12/04/21          Goal: LTG-By discharge, patient will ascend/descend ADA ramp with SPC and SBA to enter/exit her home safely     Dates: Start: 12/04/21

## 2021-12-09 PROCEDURE — 700102 HCHG RX REV CODE 250 W/ 637 OVERRIDE(OP): Performed by: HOSPITALIST

## 2021-12-09 PROCEDURE — 99231 SBSQ HOSP IP/OBS SF/LOW 25: CPT | Performed by: HOSPITALIST

## 2021-12-09 PROCEDURE — 700111 HCHG RX REV CODE 636 W/ 250 OVERRIDE (IP): Performed by: PHYSICAL MEDICINE & REHABILITATION

## 2021-12-09 PROCEDURE — 700102 HCHG RX REV CODE 250 W/ 637 OVERRIDE(OP): Performed by: PHYSICAL MEDICINE & REHABILITATION

## 2021-12-09 PROCEDURE — 97110 THERAPEUTIC EXERCISES: CPT

## 2021-12-09 PROCEDURE — 97530 THERAPEUTIC ACTIVITIES: CPT

## 2021-12-09 PROCEDURE — 99232 SBSQ HOSP IP/OBS MODERATE 35: CPT | Performed by: PHYSICAL MEDICINE & REHABILITATION

## 2021-12-09 PROCEDURE — 97116 GAIT TRAINING THERAPY: CPT

## 2021-12-09 PROCEDURE — 770010 HCHG ROOM/CARE - REHAB SEMI PRIVAT*

## 2021-12-09 PROCEDURE — 92526 ORAL FUNCTION THERAPY: CPT

## 2021-12-09 PROCEDURE — A9270 NON-COVERED ITEM OR SERVICE: HCPCS | Performed by: PHYSICAL MEDICINE & REHABILITATION

## 2021-12-09 PROCEDURE — 97535 SELF CARE MNGMENT TRAINING: CPT

## 2021-12-09 PROCEDURE — A9270 NON-COVERED ITEM OR SERVICE: HCPCS | Performed by: HOSPITALIST

## 2021-12-09 RX ADMIN — LISINOPRIL 40 MG: 20 TABLET ORAL at 09:37

## 2021-12-09 RX ADMIN — CARBAMAZEPINE 200 MG: 100 TABLET, CHEWABLE ORAL at 09:45

## 2021-12-09 RX ADMIN — CLOPIDOGREL BISULFATE 75 MG: 75 TABLET ORAL at 09:39

## 2021-12-09 RX ADMIN — TIZANIDINE 2 MG: 4 TABLET ORAL at 20:10

## 2021-12-09 RX ADMIN — MODAFINIL 100 MG: 100 TABLET ORAL at 09:37

## 2021-12-09 RX ADMIN — SENNOSIDES AND DOCUSATE SODIUM 2 TABLET: 50; 8.6 TABLET ORAL at 20:09

## 2021-12-09 RX ADMIN — MELATONIN TAB 3 MG 3 MG: 3 TAB at 20:09

## 2021-12-09 RX ADMIN — CARBAMAZEPINE 200 MG: 100 TABLET, CHEWABLE ORAL at 20:09

## 2021-12-09 RX ADMIN — SENNOSIDES AND DOCUSATE SODIUM 2 TABLET: 50; 8.6 TABLET ORAL at 09:38

## 2021-12-09 RX ADMIN — AMLODIPINE BESYLATE 10 MG: 5 TABLET ORAL at 05:32

## 2021-12-09 RX ADMIN — OMEPRAZOLE 20 MG: 20 CAPSULE, DELAYED RELEASE ORAL at 09:38

## 2021-12-09 RX ADMIN — ASPIRIN 81 MG: 81 TABLET, COATED ORAL at 09:38

## 2021-12-09 RX ADMIN — LABETALOL HYDROCHLORIDE 200 MG: 100 TABLET, FILM COATED ORAL at 09:38

## 2021-12-09 RX ADMIN — Medication 1000 UNITS: at 09:39

## 2021-12-09 RX ADMIN — TRAZODONE HYDROCHLORIDE 50 MG: 50 TABLET ORAL at 20:10

## 2021-12-09 RX ADMIN — LABETALOL HYDROCHLORIDE 200 MG: 100 TABLET, FILM COATED ORAL at 20:09

## 2021-12-09 RX ADMIN — ENOXAPARIN SODIUM 40 MG: 40 INJECTION SUBCUTANEOUS at 09:39

## 2021-12-09 RX ADMIN — ATORVASTATIN CALCIUM 80 MG: 40 TABLET, FILM COATED ORAL at 09:38

## 2021-12-09 ASSESSMENT — ENCOUNTER SYMPTOMS
EYES NEGATIVE: 1
CHILLS: 0
BRUISES/BLEEDS EASILY: 0
VOMITING: 0
COUGH: 0
MUSCULOSKELETAL NEGATIVE: 1
ABDOMINAL PAIN: 0
POLYDIPSIA: 0
PALPITATIONS: 0
FOCAL WEAKNESS: 1
SPEECH CHANGE: 1
FEVER: 0
NAUSEA: 0
SHORTNESS OF BREATH: 0

## 2021-12-09 ASSESSMENT — ACTIVITIES OF DAILY LIVING (ADL)
TOILET_TRANSFER_DESCRIPTION: ADAPTIVE EQUIPMENT;GRAB BAR;INCREASED TIME;VERBAL CUEING;SUPERVISION FOR SAFETY
TOILETING_LEVEL_OF_ASSIST_DESCRIPTION: ASSIST TO PULL PANTS UP;ASSIST TO PULL PANTS DOWN;ASSIST FOR STANDING BALANCE;GRAB BAR;INCREASED TIME;SET-UP OF EQUIPMENT;SUPERVISION FOR SAFETY;VERBAL CUEING
TOILET_TRANSFER_DESCRIPTION: GRAB BAR;INCREASED TIME;INITIAL PREPARATION FOR TASK;SET-UP OF EQUIPMENT;SUPERVISION FOR SAFETY;VERBAL CUEING

## 2021-12-09 ASSESSMENT — GAIT ASSESSMENTS
ASSISTIVE DEVICE: OTHER (COMMENTS)
DISTANCE (FEET): 30
DEVIATION: ATAXIC;DECREASED BASE OF SUPPORT;BRADYKINETIC;DECREASED HEEL STRIKE;DECREASED TOE OFF;OTHER (COMMENT)
GAIT LEVEL OF ASSIST: MINIMAL ASSIST

## 2021-12-09 ASSESSMENT — PAIN DESCRIPTION - PAIN TYPE: TYPE: ACUTE PAIN

## 2021-12-09 NOTE — PROGRESS NOTES
"Rehab Progress Note     Date of Service: 12/8/2021  Chief Complaint: Follow-up stroke    Interval Events (Subjective)    Patient seen and examined today in her room.  We discussed the results of her stat head CT which did not show any hemorrhage or edema.  Patient reports her headache has resolved today.  She actually slept well last night on the melatonin and trazodone.  She continues to be incontinent for bowel and bladder.  Per staff she is very fatigued intermittently lethargic with very slow processing.  Patient has no complaints today.    ROS: No changes to bowel, bladder, pain, mood, or sleep.            Objective:  VITAL SIGNS: /65   Pulse 65   Temp 36.6 °C (97.8 °F) (Temporal)   Resp 18   Ht 1.575 m (5' 2\")   Wt 80 kg (176 lb 5.9 oz)   SpO2 92%   BMI 32.26 kg/m²   Gen: alert, no apparent distress  Neuro: notable for dysarthria, left facial droop, left hemiplegia, hypophonia, delayed processing    Recent Results (from the past 72 hour(s))   Basic Metabolic Panel    Collection Time: 12/06/21  6:00 AM   Result Value Ref Range    Sodium 145 135 - 145 mmol/L    Potassium 4.1 3.6 - 5.5 mmol/L    Chloride 107 96 - 112 mmol/L    Co2 25 20 - 33 mmol/L    Glucose 84 65 - 99 mg/dL    Bun 16 8 - 22 mg/dL    Creatinine 0.74 0.50 - 1.40 mg/dL    Calcium 9.0 8.5 - 10.5 mg/dL    Anion Gap 13.0 7.0 - 16.0   ESTIMATED GFR    Collection Time: 12/06/21  6:00 AM   Result Value Ref Range    GFR If African American >60 >60 mL/min/1.73 m 2    GFR If Non African American >60 >60 mL/min/1.73 m 2   URINALYSIS    Collection Time: 12/07/21  1:00 AM   Result Value Ref Range    Color Yellow     Character Clear     Specific Gravity 1.026 <1.035    Ph 5.0 5.0 - 8.0    Glucose Negative Negative mg/dL    Ketones 40 (A) Negative mg/dL    Protein Negative Negative mg/dL    Bilirubin Negative Negative    Urobilinogen, Urine 0.2 Negative    Nitrite Negative Negative    Leukocyte Esterase Negative Negative    Occult Blood Negative " Negative    Micro Urine Req see below    MAGNESIUM    Collection Time: 12/08/21  5:35 AM   Result Value Ref Range    Magnesium 2.0 1.5 - 2.5 mg/dL   PHOSPHORUS    Collection Time: 12/08/21  5:35 AM   Result Value Ref Range    Phosphorus 4.3 2.5 - 4.5 mg/dL   CBC WITHOUT DIFFERENTIAL    Collection Time: 12/08/21  5:35 AM   Result Value Ref Range    WBC 7.8 4.8 - 10.8 K/uL    RBC 3.89 (L) 4.20 - 5.40 M/uL    Hemoglobin 12.5 12.0 - 16.0 g/dL    Hematocrit 38.2 37.0 - 47.0 %    MCV 98.2 (H) 81.4 - 97.8 fL    MCH 32.1 27.0 - 33.0 pg    MCHC 32.7 (L) 33.6 - 35.0 g/dL    RDW 44.8 35.9 - 50.0 fL    Platelet Count 351 164 - 446 K/uL    MPV 9.9 9.0 - 12.9 fL   Basic Metabolic Panel    Collection Time: 12/08/21  5:35 AM   Result Value Ref Range    Sodium 143 135 - 145 mmol/L    Potassium 3.9 3.6 - 5.5 mmol/L    Chloride 104 96 - 112 mmol/L    Co2 25 20 - 33 mmol/L    Glucose 80 65 - 99 mg/dL    Bun 17 8 - 22 mg/dL    Creatinine 0.80 0.50 - 1.40 mg/dL    Calcium 9.0 8.5 - 10.5 mg/dL    Anion Gap 14.0 7.0 - 16.0   ESTIMATED GFR    Collection Time: 12/08/21  5:35 AM   Result Value Ref Range    GFR If African American >60 >60 mL/min/1.73 m 2    GFR If Non African American >60 >60 mL/min/1.73 m 2       Current Facility-Administered Medications   Medication Frequency   • [START ON 12/9/2021] modafinil (PROVIGIL) tablet 100 mg QAM   • traZODone (DESYREL) tablet 50 mg QHS   • melatonin tablet 3 mg QHS   • amLODIPine (NORVASC) tablet 10 mg Q DAY   • butalbital/apap/caffeine -40 mg (Fioricet) per tablet 1 Tablet Q6HRS PRN   • tizanidine (ZANAFLEX) tablet 2 mg QHS   • omeprazole (PRILOSEC) capsule 20 mg QAM AC   • hydrOXYzine HCl (ATARAX) tablet 50 mg Q6HRS PRN   • Respiratory Therapy Consult Continuous RT   • Pharmacy Consult Request ...Pain Management Review 1 Each PHARMACY TO DOSE   • hydrALAZINE (APRESOLINE) tablet 10 mg Q8HRS PRN   • acetaminophen (Tylenol) tablet 650 mg Q4HRS PRN   • lactulose 20 GM/30ML solution 30 mL QDAY  PRN   • docusate sodium (ENEMEEZ) enema 283 mg QDAY PRN   • sodium phosphate (Fleet) enema 133 mL QDAY PRN   • artificial tears ophthalmic solution 1 Drop PRN   • benzocaine-menthol (CEPACOL) lozenge 1 Lozenge Q2HRS PRN   • mag hydrox-al hydrox-simeth (MAALOX PLUS ES or MYLANTA DS) suspension 20 mL Q2HRS PRN   • ondansetron (ZOFRAN ODT) dispertab 4 mg 4X/DAY PRN    Or   • ondansetron (ZOFRAN) syringe/vial injection 4 mg 4X/DAY PRN   • sodium chloride (OCEAN) 0.65 % nasal spray 2 Spray PRN   • midazolam (VERSED) 5 mg/mL (1 mL vial) PRN   • enoxaparin (LOVENOX) inj 40 mg DAILY   • senna-docusate (PERICOLACE or SENOKOT S) 8.6-50 MG per tablet 2 Tablet BID    And   • polyethylene glycol/lytes (MIRALAX) PACKET 1 Packet QDAY PRN    And   • magnesium hydroxide (MILK OF MAGNESIA) suspension 30 mL QDAY PRN    And   • bisacodyl (DULCOLAX) suppository 10 mg QDAY PRN   • aspirin EC (ECOTRIN) tablet 81 mg DAILY   • atorvastatin (LIPITOR) tablet 80 mg DAILY   • carBAMazepine (TEGRETOL) chewable tab 200 mg BID   • clopidogrel (PLAVIX) tablet 75 mg DAILY   • labetalol (NORMODYNE) tablet 200 mg Q12HRS   • lisinopril (PRINIVIL) tablet 40 mg DAILY   • vitamin D3 (cholecalciferol) tablet 1,000 Units DAILY       Orders Placed This Encounter   Procedures   • Diet Order Diet: Level 5 - Minced and Moist (Medications floated whole, one at a time, in pudding (pt does not like applesauce)); Liquid level: Level 2 - Mildly Thick; Tray Modifications (optional): No Straws     Standing Status:   Standing     Number of Occurrences:   1     Order Specific Question:   Diet:     Answer:   Level 5 - Minced and Moist [24]     Comments:   Medications floated whole, one at a time, in pudding (pt does not like applesauce)     Order Specific Question:   Liquid level     Answer:   Level 2 - Mildly Thick     Order Specific Question:   Tray Modifications (optional)     Answer:   No Straws       Assessment:  Active Hospital Problems    Diagnosis    • *Ischemic  stroke (HCC)    • Polyuria    • Wrist fracture, closed, left, sequela    • Other insomnia    • Hypokalemia    • Class 1 obesity without serious comorbidity in adult    • Spasticity    • Left-sided neglect    • Other dysphagia    • Impaired mobility and ADLs    • Hyperlipidemia LDL goal <70    • Peripheral neuropathy    • Hypertension      This patient is a 76 y.o. female admitted for acute inpatient rehabilitation with Ischemic stroke (HCC).    I led and attended the weekly conference today, and agree with the IDT conference documentation and plan of care as noted below.    Date of conference: 12/8/2021    Goals and barriers: See IDT note.    Biggest barriers: Low motivation, lethargic, left wrist pain, hypophonia, moderate cognitive impairments, incontinent bowel bladder, fatigue, slow processing    Goals in next week: Improved motivation    CM/social support: Unclear how much physical support  can provide    Anticipated DC date: 12/24    Home health: PT OT speech and nursing    Equip: To be determined    Follow up: PCP, [facility], Dr. Callejas, cardiology, orthopedic surgery          Medical Decision Making and Plan:    Right MCA stroke  Left hemiparesis  Nondominant  Left neglect  Dysphagia  Cognitive impairments  Continue full rehab program  PT/OT/SLP, 1 hr each discipline, 5 days per week     Aspirin  Plavix  Statin     Patient with Zio patch cardiac monitor, return 2 weeks from admission    Outpatient follow-up with [facility], cardiology, Dr. Callejas, referrals made    MBS with aspiration on thins, diet downgraded per speech therapy recommendations    Start Provigil for neuro stimulation    Left wrist pain  X-ray at acute showed possible avulsion fractures  Discussed with orthopedic surgery, outpatient follow-up  Wrist brace and weightbearing as tolerated     Hypertension  Lisinopril  Labetalol  Amlodipine, dose decreased  Appreciate hospitalist assistance     Peripheral  neuropathy  Continue home medication carbamazepine  Was also on Zanaflex (6 mg TID), restart 2 mg at night due to complaints of leg spasms, will not restart home dosing due to adverse lethargy    Hypokalemia resolved  Status post supplementation    Insomnia, resolved  Restarted home medication tizanidine 2 mg  Added scheduled melatonin and trazodone     Obesity  BMI 32.6  Outpatient nutritional counseling    Vitamin D deficiency  Continue supplementation    GI prophylaxis  Omeprazole     Polyuria  Negative urinalysis    Bowel program  Bowel incontinence  Continue bowel medications  Last  12/7    Bladder program  Bladder incontinence  Check PVRs - 25, 15, 10  Not retaining  Bladder scan for no voids  ICP for over 400 cc  Scheduled toileting    DVT prophylaxis  Lovenox      Total time:  40 minutes.  I spent greater than 50% of the time for patient care, counseling, and coordination on this date, including patient face-to face time, unit/floor time with review of records/pertinent lab data and studies, as well as discussing diagnostic evaluation/work up, planned therapeutic interventions, and future disposition of care, as per the interval events/subjective and the assessment and plan as noted above.        Hawa Alva M.D.   Physical Medicine and Rehabilitation

## 2021-12-09 NOTE — THERAPY
Occupational Therapy  Daily Treatment     Patient Name: Nakita Sheppard  Age:  76 y.o., Sex:  female  Medical Record #: [id number]  Today's Date: 12/9/2021     Precautions  Precautions: (P) Fall Risk,Weight Bearing As Tolerated Left Upper Extremity,Swallow Precautions ( See Comments)  Comments: (P) L wrist WBAT-brace for comfort, left neglect, slow to mobilize, Zio patch    Safety   ADL Safety : Requires Supervision for Safety,Requires Physical Assist for Safety,Requires Cueing for Safety  Bathroom Safety: Requires Supervision for Safety,Requires Physical Assist for Safety,Requires Cuing for Safety  Comments: see functional levels below for ADL performance details.    Subjective    Pt agreeable to OT session.     Objective       12/09/21 0931   Precautions   Precautions Fall Risk;Weight Bearing As Tolerated Left Upper Extremity;Swallow Precautions ( See Comments)   Comments L wrist WBAT-brace for comfort, left neglect, slow to mobilize, Zio patch   Cognition    Speech/ Communication Delayed Responses;Slurred   Level of Consciousness Alert   Ability To Follow Commands 2 Step   Attention Impaired   Sequencing Impaired   Initiation Impaired   Functional Level of Assist   Grooming Minimal Assist;Seated   Grooming Description Increased time;Set-up of equipment;Supervision for safety;Verbal cueing  (SBA brush hair/wash face,min A for thoroughness w/ oral care)   Toileting Maximal Assist   Toileting Description Assist to pull pants up;Assist to pull pants down;Assist for standing balance;Grab bar;Increased time;Set-up of equipment;Supervision for safety;Verbal cueing   Toilet Transfers Moderate Assist   Toilet Transfer Description Grab bar;Increased time;Initial preparation for task;Set-up of equipment;Supervision for safety;Verbal cueing  (w/c<>toilet stand step transfer via GB)   Bed Mobility    Supine to Sit Contact Guard Assist   Sit to Supine Moderate Assist   Scooting Minimal Assist   Rolling Minimal Assist to  Rt.   Skilled Intervention Sequencing;Verbal Cuing   Interdisciplinary Plan of Care Collaboration   IDT Collaboration with  Nursing   Patient Position at End of Therapy In Bed;Bed Alarm On;Call Light within Reach;Tray Table within Reach;Phone within Reach   Collaboration Comments RN for medications   OT Total Time Spent   OT Individual Total Time Spent (Mins) 60   OT Charge Group   OT Self Care / ADL 2   OT Therapy Activity 2     LUE PROM to tolerance for shoulder flex/ext, abduction/adduction, int/ext rotation, elbow flex/ext, pronation/supination, finger flex/ext-wrist deferred d/t pain.     Discussed pt's home bathroom set up. She has walk in shower with GB and chair. She reports that she does not have GB near her toilet, and would like to get a BSC for nighttime toileting needs.      Assessment    Pt reported fatigue and required increased time and encouragement to participate today. She con't to be limited by L hemiparesis, L neglect, low endurance, and impaired standing tolerance/balance. Pt would benefit from installing GB by toilet and getting a BSC for nighttime toileting needs.   Strengths: Independent prior level of function,Motivated for self care and independence,Pleasant and cooperative,Supportive family,Willingly participates in therapeutic activities  Barriers: Aspiration risk,Decreased endurance,Fatigue,Hemiparesis,Impaired activity tolerance,Impaired balance,Impaired functional cognition,Pain    Plan    ADLs, functional transfers, LUE ROM/positioning, LUE neuro re-ed, activities to improve L side attention. Monitor L wrist pain/brace.      Passport items to be completed:  Perform bathroom transfers, complete dressing, complete feeding, get ready for the day, prepare a simple meal, participate in household tasks, adapt home for safety needs, demonstrate home exercise program, complete caregiver training     Occupational Therapy Goals (Active)     Problem: Bathing     Dates: Start: 12/04/21        Goal: STG-Within one week, patient will bathe with min A using AE as needed.     Dates: Start: 12/04/21       Goal Note filed on 12/08/21 1345 by Delaney Price OT     Con't to require mod-max A d/t L hemiparesis, pain, fatigue.                Problem: Dressing     Dates: Start: 12/04/21       Goal: STG-Within one week, patient will dress UB with min A using alexx technique.     Dates: Start: 12/04/21       Goal Note filed on 12/08/21 1345 by Delaney Price OT     Con't to require mod-max A d/t L hemiparesis, pain, fatigue.             Goal: STG-Within one week, patient will dress LB with mod A using alexx technique.      Dates: Start: 12/04/21       Goal Note filed on 12/08/21 1345 by Delaney Price OT     Con't to require max A d/t L hemiparesis, pain, fatigue.                Problem: Functional Transfers     Dates: Start: 12/04/21       Goal: STG-Within one week, patient will transfer to toilet with min A using AE/DME as needed.      Dates: Start: 12/04/21       Goal Note filed on 12/08/21 1345 by Delaney Price OT     Con't to require mod-max A d/t L hemiparesis, pain, fatigue.                Problem: OT Long Term Goals     Dates: Start: 12/04/21       Goal: LTG-By discharge, patient will complete basic self care tasks at supervision to mod I level using AE as needed.      Dates: Start: 12/04/21          Goal: LTG-By discharge, patient will perform bathroom transfers at supervision level using AE/DME as needed.      Dates: Start: 12/04/21             Problem: Toileting     Dates: Start: 12/04/21       Goal: STG-Within one week, patient will complete toileting tasks with mod A using AE as needed.      Dates: Start: 12/04/21       Goal Note filed on 12/08/21 1345 by Delaney Price OT     Con't to require max A d/t L hemiparesis, pain, fatigue.

## 2021-12-09 NOTE — PROGRESS NOTES
"Rehab Progress Note     Date of Service: 12/9/2021  Chief Complaint: Follow-up stroke    Interval Events (Subjective)    Patient seen and examined today twice.  First time was in her room.  She reports she slept well last night.  Patient seen again walking with therapy using the handrail in the afternoon.  She has had much improvement with motivation today.  Advised patient her estimated discharge date is 24 December and that she needs to work hard in order to get home.  Psychology consulted to see patient today.  Apparently patient is concerned about her ability to communicate her needs due to her low speech volume and difficulty opening her mouth.    ROS: No changes to bowel, bladder, pain, mood, or sleep.              Objective:  VITAL SIGNS: /65   Pulse 63   Temp 36.6 °C (97.9 °F) (Temporal)   Resp 17   Ht 1.575 m (5' 2\")   Wt 80 kg (176 lb 5.9 oz)   SpO2 94%   BMI 32.26 kg/m²   Gen: alert, no apparent distress  Neuro: notable for dysarthria, hypophonia, left facial droop, left hemiplegia in the arm, left hemiparesis in the leg, left neglect    Recent Results (from the past 72 hour(s))   URINALYSIS    Collection Time: 12/07/21  1:00 AM   Result Value Ref Range    Color Yellow     Character Clear     Specific Gravity 1.026 <1.035    Ph 5.0 5.0 - 8.0    Glucose Negative Negative mg/dL    Ketones 40 (A) Negative mg/dL    Protein Negative Negative mg/dL    Bilirubin Negative Negative    Urobilinogen, Urine 0.2 Negative    Nitrite Negative Negative    Leukocyte Esterase Negative Negative    Occult Blood Negative Negative    Micro Urine Req see below    MAGNESIUM    Collection Time: 12/08/21  5:35 AM   Result Value Ref Range    Magnesium 2.0 1.5 - 2.5 mg/dL   PHOSPHORUS    Collection Time: 12/08/21  5:35 AM   Result Value Ref Range    Phosphorus 4.3 2.5 - 4.5 mg/dL   CBC WITHOUT DIFFERENTIAL    Collection Time: 12/08/21  5:35 AM   Result Value Ref Range    WBC 7.8 4.8 - 10.8 K/uL    RBC 3.89 (L) 4.20 - " 5.40 M/uL    Hemoglobin 12.5 12.0 - 16.0 g/dL    Hematocrit 38.2 37.0 - 47.0 %    MCV 98.2 (H) 81.4 - 97.8 fL    MCH 32.1 27.0 - 33.0 pg    MCHC 32.7 (L) 33.6 - 35.0 g/dL    RDW 44.8 35.9 - 50.0 fL    Platelet Count 351 164 - 446 K/uL    MPV 9.9 9.0 - 12.9 fL   Basic Metabolic Panel    Collection Time: 12/08/21  5:35 AM   Result Value Ref Range    Sodium 143 135 - 145 mmol/L    Potassium 3.9 3.6 - 5.5 mmol/L    Chloride 104 96 - 112 mmol/L    Co2 25 20 - 33 mmol/L    Glucose 80 65 - 99 mg/dL    Bun 17 8 - 22 mg/dL    Creatinine 0.80 0.50 - 1.40 mg/dL    Calcium 9.0 8.5 - 10.5 mg/dL    Anion Gap 14.0 7.0 - 16.0   ESTIMATED GFR    Collection Time: 12/08/21  5:35 AM   Result Value Ref Range    GFR If African American >60 >60 mL/min/1.73 m 2    GFR If Non African American >60 >60 mL/min/1.73 m 2       Current Facility-Administered Medications   Medication Frequency   • modafinil (PROVIGIL) tablet 100 mg QAM   • traZODone (DESYREL) tablet 50 mg QHS   • melatonin tablet 3 mg QHS   • amLODIPine (NORVASC) tablet 10 mg Q DAY   • butalbital/apap/caffeine -40 mg (Fioricet) per tablet 1 Tablet Q6HRS PRN   • tizanidine (ZANAFLEX) tablet 2 mg QHS   • omeprazole (PRILOSEC) capsule 20 mg QAM AC   • hydrOXYzine HCl (ATARAX) tablet 50 mg Q6HRS PRN   • Respiratory Therapy Consult Continuous RT   • Pharmacy Consult Request ...Pain Management Review 1 Each PHARMACY TO DOSE   • hydrALAZINE (APRESOLINE) tablet 10 mg Q8HRS PRN   • acetaminophen (Tylenol) tablet 650 mg Q4HRS PRN   • lactulose 20 GM/30ML solution 30 mL QDAY PRN   • docusate sodium (ENEMEEZ) enema 283 mg QDAY PRN   • sodium phosphate (Fleet) enema 133 mL QDAY PRN   • artificial tears ophthalmic solution 1 Drop PRN   • benzocaine-menthol (CEPACOL) lozenge 1 Lozenge Q2HRS PRN   • mag hydrox-al hydrox-simeth (MAALOX PLUS ES or MYLANTA DS) suspension 20 mL Q2HRS PRN   • ondansetron (ZOFRAN ODT) dispertab 4 mg 4X/DAY PRN    Or   • ondansetron (ZOFRAN) syringe/vial injection  4 mg 4X/DAY PRN   • sodium chloride (OCEAN) 0.65 % nasal spray 2 Spray PRN   • midazolam (VERSED) 5 mg/mL (1 mL vial) PRN   • enoxaparin (LOVENOX) inj 40 mg DAILY   • senna-docusate (PERICOLACE or SENOKOT S) 8.6-50 MG per tablet 2 Tablet BID    And   • polyethylene glycol/lytes (MIRALAX) PACKET 1 Packet QDAY PRN    And   • magnesium hydroxide (MILK OF MAGNESIA) suspension 30 mL QDAY PRN    And   • bisacodyl (DULCOLAX) suppository 10 mg QDAY PRN   • aspirin EC (ECOTRIN) tablet 81 mg DAILY   • atorvastatin (LIPITOR) tablet 80 mg DAILY   • carBAMazepine (TEGRETOL) chewable tab 200 mg BID   • clopidogrel (PLAVIX) tablet 75 mg DAILY   • labetalol (NORMODYNE) tablet 200 mg Q12HRS   • lisinopril (PRINIVIL) tablet 40 mg DAILY   • vitamin D3 (cholecalciferol) tablet 1,000 Units DAILY       Orders Placed This Encounter   Procedures   • Diet Order Diet: Level 5 - Minced and Moist (Medications floated whole, one at a time, in pudding (pt does not like applesauce)); Liquid level: Level 2 - Mildly Thick; Tray Modifications (optional): No Straws     Standing Status:   Standing     Number of Occurrences:   1     Order Specific Question:   Diet:     Answer:   Level 5 - Minced and Moist [24]     Comments:   Medications floated whole, one at a time, in pudding (pt does not like applesauce)     Order Specific Question:   Liquid level     Answer:   Level 2 - Mildly Thick     Order Specific Question:   Tray Modifications (optional)     Answer:   No Straws       Assessment:  Active Hospital Problems    Diagnosis    • *Ischemic stroke (HCC)    • Polyuria    • Wrist fracture, closed, left, sequela    • Other insomnia    • Hypokalemia    • Class 1 obesity without serious comorbidity in adult    • Spasticity    • Left-sided neglect    • Other dysphagia    • Impaired mobility and ADLs    • Hyperlipidemia LDL goal <70    • Peripheral neuropathy    • Hypertension      This patient is a 76 y.o. female admitted for acute inpatient rehabilitation  with Ischemic stroke (HCC).    I led and attended the weekly conference, and agree with the IDT conference documentation and plan of care as noted below.    Date of conference: 12/8/2021    Goals and barriers: See IDT note.    Biggest barriers: Low motivation, lethargic, left wrist pain, hypophonia, moderate cognitive impairments, incontinent bowel bladder, fatigue, slow processing    Goals in next week: Improved motivation    CM/social support: Unclear how much physical support  can provide    Anticipated DC date: 12/24    Home health: PT OT speech and nursing    Equip: To be determined    Follow up: PCP, [facility], Dr. Callejas, cardiology, orthopedic surgery        Medical Decision Making and Plan:    Right MCA stroke  Left hemiparesis  Nondominant  Left neglect  Dysphagia  Cognitive impairments  Continue full rehab program  PT/OT/SLP, 1 hr each discipline, 5 days per week     Aspirin  Plavix  Statin     Patient with Zio patch cardiac monitor, return 2 weeks from admission    Outpatient follow-up with [facility], cardiology, Dr. Callejas, referrals made    MBS with aspiration on thins, diet downgraded per speech therapy recommendations    Started Provigil for neuro stimulation, improved initiation today    Psychology consulted for evaluation.    Left wrist pain  X-ray at acute showed possible avulsion fractures  Discussed with orthopedic surgery, outpatient follow-up  Wrist brace and weightbearing as tolerated     Hypertension  Lisinopril  Labetalol  Amlodipine, dose increased  Appreciate hospitalist assistance     Peripheral neuropathy  Continue home medication carbamazepine  Was also on Zanaflex (6 mg TID), restart 2 mg at night due to complaints of leg spasms, will not restart home dosing due to adverse lethargy    Hypokalemia resolved  Status post supplementation    Insomnia, resolved  Restarted home medication tizanidine 2 mg  Added scheduled melatonin and trazodone     Obesity  BMI  32.6  Outpatient nutritional counseling    Vitamin D deficiency  Continue supplementation    GI prophylaxis  Omeprazole     Polyuria  Negative urinalysis    Bowel program  Bowel incontinence  Continue bowel medications  Last BM 12/7    Bladder program  Bladder incontinence  Check PVRs - 25, 15, 10  Not retaining  Bladder scan for no voids  ICP for over 400 cc  Scheduled toileting    DVT prophylaxis  Lovenox      Total time:  27 minutes.  I spent greater than 50% of the time for patient care, counseling, and coordination on this date, including patient face-to face time, unit/floor time with review of records/pertinent lab data and studies, as well as discussing diagnostic evaluation/work up, planned therapeutic interventions, and future disposition of care, as per the interval events/subjective and the assessment and plan as noted above.    I have performed a physical exam, reviewed and updated ROS, as well as the assessment and plan today 12/9/2021. In review of note from 12/8/2021 there are no new changes except as documented above.          Hawa Alva M.D.   Physical Medicine and Rehabilitation

## 2021-12-09 NOTE — THERAPY
"Speech Language Pathology  Daily Treatment     Patient Name: Nakita Sheppard  Age:  76 y.o., Sex:  female  Medical Record #: [id number]  Today's Date: 12/9/2021     Precautions  Precautions: Fall Risk,Weight Bearing As Tolerated Left Upper Extremity,Swallow Precautions ( See Comments)  Comments: L wrist WBAT-brace for comfort, left neglect, slow to mobilize, Zio patch    Subjective    Pt was pleasant and cooperative during this ST session, pt's daughter present during therapy.      Objective       12/09/21 1401   Interdisciplinary Plan of Care Collaboration   IDT Collaboration with  Family / Caregiver   Patient Position at End of Therapy Seated;Self Releasing Lap Belt Applied;Family / Friend in Room   Collaboration Comments Pt's daughter present for this ST session    SLP Total Time Spent   SLP Individual Total Time Spent (Mins) 60   Treatment Charges   SLP Swallowing Dysfunction Treatment Swallowing Dysfunction Treatment       Assessment    Pt trialed thin liquids via teaspoon and tolerated 8/10 trials without s/sx of aspiration.  Anterior spillage noted out the left side on several trials, pt benefited from cues for tight lips while swallowing to prevent this.  Attempted cup sips of thin liquids (water and coke) pt tolerated 1/5 trials and demonstrated immediate coughing on 4 trials.  Pt was able to tolerate single ice chips and cup sips of 2- Mildly Thick liquids without s/sx of aspiration.  Pt completed a total of 36 effortful swallows during trials this session.  Pt and pt's daughter were educated about the importance of oral care to prevent aspiration pneumonia.  Pt reported that she usually gets help to brush her teeth \"Every other day\".  Pt attempted to brush her teeth at the sink in her bathroom at the end of this session; however she was unable to reach the sink to spit and is not safe to swish with thin liquids at this time.  Pt required assistance rinsing her dentures which had ground up meat from " lunch in them.  Pt's CNA was asked to hook up suction in pt's room so she can use a suction toothbrush for oral care 3 times daily.  Pt will also still need to rinse dentures after meals to clear any residue.      Strengths: Pleasant and cooperative,Motivated for self care and independence,Willingly participates in therapeutic activities,Independent prior level of function,Supportive family  Barriers: Aspiration risk,Impaired insight/denial of deficits,Impaired carryover of learning,Impaired functional cognition    Plan    Practice oral care with suction toothbrush, trial thin liquids via teaspoon, target effortful swallows, speech intelligibility.       Speech Therapy Problems (Active)     Problem: Expression STGs     Dates: Start: 12/04/21       Goal: STG-Within one week, patient will     Dates: Start: 12/04/21       Description: Utilize clear speech strategies with mod cues in 70% of opportunities to increase overall speech intelligibility.        Goal: STG-Within one week, patient will     Dates: Start: 12/04/21       Description: Participate in a standardized language assessment to further identify expressive/receptive language needs.          Problem: Memory STGs     Dates: Start: 12/04/21       Goal: STG-Within one week, patient will     Dates: Start: 12/04/21       Description: Complete functional IADL tasks (e.g. med and financial management) with 70% acc and mod cues to ensure safe return to her PLOF.           Problem: Speech/Swallowing LTGs     Dates: Start: 12/04/21       Goal: LTG-By discharge, patient will safely swallow     Dates: Start: 12/04/21       Description: Safest/least restrictive diet with no overt s/sx of aspiration for 100% of intake to maintain adequate nutrition and hydration.        Goal: LTG-By discharge, patient will     Dates: Start: 12/04/21       Description: Complete cog-ling tasks at modified independence level to ensure safe return to PLOF.        Goal: LTG-By discharge,  patient will     Dates: Start: 12/04/21       Description: Communicate complex ideas at the conversation level with 90% intelligibility to a naiive listener.           Problem: Swallowing STGs     Dates: Start: 12/04/21       Goal: STG-Within one week, patient will consume trials of thin liquids with use of effortful swallow with no overt s/sx of asp/pen noted on 80% of trials provided MIN cues     Dates: Start: 12/08/21

## 2021-12-09 NOTE — CARE PLAN
"The patient is Stable - Low risk of patient condition declining or worsening    Shift Goals  Clinical Goals: Swallow and mobility safety  Patient Goals: Complete swallow study    Patient is not progressing towards the following goals:      Problem: Fall Risk - Rehab  Goal: Patient will remain free from falls  Outcome: Not Met  Note: Erin Irizarry Fall risk Assessment Score: 11    Moderate fall risk Interventions  - Bed and strip alarm   - Yellow sign by the door   - Yellow wrist band \"Fall risk\"  - Do not leave patient unattended in the bathroom  - Fall risk education provided     "

## 2021-12-10 PROCEDURE — 94760 N-INVAS EAR/PLS OXIMETRY 1: CPT

## 2021-12-10 PROCEDURE — 700111 HCHG RX REV CODE 636 W/ 250 OVERRIDE (IP): Performed by: PHYSICAL MEDICINE & REHABILITATION

## 2021-12-10 PROCEDURE — 97535 SELF CARE MNGMENT TRAINING: CPT

## 2021-12-10 PROCEDURE — 770010 HCHG ROOM/CARE - REHAB SEMI PRIVAT*

## 2021-12-10 PROCEDURE — A9270 NON-COVERED ITEM OR SERVICE: HCPCS | Performed by: PHYSICAL MEDICINE & REHABILITATION

## 2021-12-10 PROCEDURE — 99231 SBSQ HOSP IP/OBS SF/LOW 25: CPT | Performed by: PHYSICAL MEDICINE & REHABILITATION

## 2021-12-10 PROCEDURE — 700102 HCHG RX REV CODE 250 W/ 637 OVERRIDE(OP): Performed by: HOSPITALIST

## 2021-12-10 PROCEDURE — 99231 SBSQ HOSP IP/OBS SF/LOW 25: CPT | Performed by: HOSPITALIST

## 2021-12-10 PROCEDURE — A9270 NON-COVERED ITEM OR SERVICE: HCPCS | Performed by: HOSPITALIST

## 2021-12-10 PROCEDURE — 97530 THERAPEUTIC ACTIVITIES: CPT

## 2021-12-10 PROCEDURE — 97110 THERAPEUTIC EXERCISES: CPT

## 2021-12-10 PROCEDURE — 97116 GAIT TRAINING THERAPY: CPT

## 2021-12-10 PROCEDURE — 92526 ORAL FUNCTION THERAPY: CPT

## 2021-12-10 PROCEDURE — 700102 HCHG RX REV CODE 250 W/ 637 OVERRIDE(OP): Performed by: PHYSICAL MEDICINE & REHABILITATION

## 2021-12-10 RX ADMIN — LISINOPRIL 40 MG: 20 TABLET ORAL at 09:52

## 2021-12-10 RX ADMIN — SENNOSIDES AND DOCUSATE SODIUM 2 TABLET: 50; 8.6 TABLET ORAL at 09:52

## 2021-12-10 RX ADMIN — ATORVASTATIN CALCIUM 80 MG: 40 TABLET, FILM COATED ORAL at 09:53

## 2021-12-10 RX ADMIN — TIZANIDINE 2 MG: 4 TABLET ORAL at 19:40

## 2021-12-10 RX ADMIN — MAGNESIUM HYDROXIDE,ALUMINUM HYDROXICE,SIMETHICONE 20 ML: 240; 2400; 2400 SUSPENSION ORAL at 10:37

## 2021-12-10 RX ADMIN — ASPIRIN 81 MG: 81 TABLET, COATED ORAL at 09:53

## 2021-12-10 RX ADMIN — ENOXAPARIN SODIUM 40 MG: 40 INJECTION SUBCUTANEOUS at 09:53

## 2021-12-10 RX ADMIN — LABETALOL HYDROCHLORIDE 200 MG: 100 TABLET, FILM COATED ORAL at 19:40

## 2021-12-10 RX ADMIN — OMEPRAZOLE 20 MG: 20 CAPSULE, DELAYED RELEASE ORAL at 07:46

## 2021-12-10 RX ADMIN — SENNOSIDES AND DOCUSATE SODIUM 2 TABLET: 50; 8.6 TABLET ORAL at 19:40

## 2021-12-10 RX ADMIN — CARBAMAZEPINE 200 MG: 100 TABLET, CHEWABLE ORAL at 09:53

## 2021-12-10 RX ADMIN — AMLODIPINE BESYLATE 10 MG: 5 TABLET ORAL at 05:20

## 2021-12-10 RX ADMIN — MODAFINIL 100 MG: 100 TABLET ORAL at 09:52

## 2021-12-10 RX ADMIN — LABETALOL HYDROCHLORIDE 200 MG: 100 TABLET, FILM COATED ORAL at 09:52

## 2021-12-10 RX ADMIN — CARBAMAZEPINE 200 MG: 100 TABLET, CHEWABLE ORAL at 19:40

## 2021-12-10 RX ADMIN — CLOPIDOGREL BISULFATE 75 MG: 75 TABLET ORAL at 09:53

## 2021-12-10 RX ADMIN — Medication 1000 UNITS: at 09:52

## 2021-12-10 RX ADMIN — MELATONIN TAB 3 MG 3 MG: 3 TAB at 19:40

## 2021-12-10 RX ADMIN — TRAZODONE HYDROCHLORIDE 50 MG: 50 TABLET ORAL at 19:40

## 2021-12-10 ASSESSMENT — ACTIVITIES OF DAILY LIVING (ADL)
TOILET_TRANSFER_DESCRIPTION: INCREASED TIME;INITIAL PREPARATION FOR TASK;SET-UP OF EQUIPMENT;SUPERVISION FOR SAFETY;VERBAL CUEING
BED_CHAIR_WHEELCHAIR_TRANSFER_DESCRIPTION: INCREASED TIME;SET-UP OF EQUIPMENT;SUPERVISION FOR SAFETY;VERBAL CUEING

## 2021-12-10 ASSESSMENT — ENCOUNTER SYMPTOMS
FOCAL WEAKNESS: 1
VOMITING: 0
PALPITATIONS: 0
CHILLS: 0
MUSCULOSKELETAL NEGATIVE: 1
SHORTNESS OF BREATH: 0
BRUISES/BLEEDS EASILY: 0
POLYDIPSIA: 0
NAUSEA: 0
EYES NEGATIVE: 1
SPEECH CHANGE: 1
FEVER: 0
COUGH: 0
ABDOMINAL PAIN: 0

## 2021-12-10 ASSESSMENT — GAIT ASSESSMENTS
DEVIATION: ATAXIC;DECREASED BASE OF SUPPORT;BRADYKINETIC;DECREASED HEEL STRIKE;DECREASED TOE OFF
ASSISTIVE DEVICE: OTHER (COMMENTS)
GAIT LEVEL OF ASSIST: MINIMAL ASSIST
DISTANCE (FEET): 30

## 2021-12-10 ASSESSMENT — PAIN DESCRIPTION - PAIN TYPE: TYPE: ACUTE PAIN

## 2021-12-10 NOTE — THERAPY
12/10/21 1129   Precautions   Precautions Fall Risk;Weight Bearing As Tolerated Left Upper Extremity;Swallow Precautions ( See Comments)   Comments L wrist brace WBAT, L neglect, slow to mobilize, Zio patch   Pain 0 - 10 Group   Therapist Pain Assessment 0   Cognition    Speech/ Communication Delayed Responses   Level of Consciousness Alert   Ability To Follow Commands 2 Step   Attention Impaired   Sequencing Impaired   Initiation Impaired   Gait Functional Level of Assist    Gait Level Of Assist Minimal Assist  (For weight shifting, tactile cues LLE)   Assistive Device Other (Comments)  (Hallway railing)   Distance (Feet) 30   # of Times Distance was Traveled 2   Deviation Ataxic;Decreased Base Of Support;Bradykinetic;Decreased Heel Strike;Decreased Toe Off   Wheelchair Functional Level of Assist   Wheelchair Assist Total Assist   Distance Wheelchair (Feet or Distance) 0   Transfer Functional Level of Assist   Bed, Chair, Wheelchair Transfer Minimal Assist  (Stand and step)   Bed Chair Wheelchair Transfer Description Increased time;Set-up of equipment;Supervision for safety;Verbal cueing   Sitting Lower Body Exercises   Ankle Pumps 2 sets of 15;Bilateral   Hip Flexion 2 sets of 15;Bilateral   Hip Abduction 2 sets of 15;Bilateral   Hip Adduction 2 sets of 15;Bilateral   Long Arc Quad 2 sets of 15;Bilateral   Hamstring Curl 2 sets of 15;Bilateral   Bed Mobility    Supine to Sit Contact Guard Assist   Sit to Stand Minimal Assist   Scooting Minimal Assist   Rolling Minimum Assist to Lt.   Neuro-Muscular Treatments   Neuro-Muscular Treatments Facilitation;Postural Facilitation;Sequencing;Tactile Cuing;Tapping;Verbal Cuing;Weight Shift Right;Anterior weight shift;Weight Shift Left   Comments Facilitated seated scooting in the wheelchair, sit to/from stand, midline standing, facilitated gait for right and left weight shift, left lower extremity control and stability   Interdisciplinary Plan of Care Collaboration    Patient Position at End of Therapy Seated;Self Releasing Lap Belt Applied;Call Light within Reach;Tray Table within Reach;Family / Friend in Room   PT Total Time Spent   PT Individual Total Time Spent (Mins) 60   PT Charge Group   PT Gait Training 1   PT Therapeutic Exercise 2   PT Therapeutic Activities 1   Physical Therapy   Daily Treatment     Patient Name: Nakita Sheppard  Age:  76 y.o., Sex:  female  Medical Record #: [id number]  Today's Date: 12/10/2021     Precautions  Precautions: Fall Risk,Weight Bearing As Tolerated Left Upper Extremity,Swallow Precautions ( See Comments)  Comments: L wrist brace WBAT, L neglect, slow to mobilize, Zio patch    Subjective    The patient was resting in bed.  She said she wanted to skip the session because she was tired after OT.     Objective    The patient participated in bed mobility supine to sit CGA and stand and step transfer to the wheelchair with minimal assistance.  In the gym she participated in gait training using a right hallway railing for support.  PT facilitated right weight shift and left lower extremity initiation as needed, left lower extremity control and stability.  She tolerated 30 FT x2 minimal assistance.  After gait training, the patient participated in seated bilateral lower extremity therapeutic exercise 2 sets of 15.    Assessment    The patient's gait quality is improving significantly.  She would benefit from initiating gait training with a left platform walker due to her impaired left upper extremity.  The family is planning on discharge to home and would like to have a hospital bed because of the elevated head rest and leg elevation capability.  This would enable ease of transfers and bed mobility.  The patient's  is a  and he will [facility] and [facility].  Strengths: Able to follow instructions,Alert and oriented,Adequate strength,Independent prior level of function,Motivated for self care and  independence,Pleasant and cooperative,Supportive family,Willingly participates in therapeutic activities  Barriers: Decreased endurance,Generalized weakness,Impaired balance,Hemiplegia    Plan    Safety education, encourage participation in PT even when she reports fatigue, gait training left platform walker, practice bed mobility particularly sit to supine, standing tolerance, therapeutic exercise    Passport items to be completed:  Passport items to be completed:  Get in/out of bed safely, in/out of a vehicle, safely use mobility device, walk or wheel around home/community, navigate up and down stairs, show how to get up/down from the ground, ensure home is accessible, demonstrate HEP, complete caregiver training    Physical Therapy Problems (Active)     Problem: Mobility     Dates: Start: 12/04/21       Goal: STG-Within one week, patient will ambulate household distance x10' with SPC and Mod A     Dates: Start: 12/04/21             Problem: Mobility Transfers     Dates: Start: 12/04/21       Goal: STG-Within one week, patient will perform bed mobility Min A without bedrails     Dates: Start: 12/04/21          Goal: STG-Within one week, patient will sit to stand CGA 5/5 trials with SPC     Dates: Start: 12/04/21          Goal: STG-Within one week, patient will transfer bed to chair Min A     Dates: Start: 12/04/21             Problem: PT-Long Term Goals     Dates: Start: 12/04/21       Goal: LTG-By discharge, patient will maintain balance at least 45/56 on the [place] to DC to home safely with support of spouse     Dates: Start: 12/04/21          Goal: LTG-By discharge, patient will ambulate at least 500' with SPC and SPV inside and outside surfaces     Dates: Start: 12/04/21          Goal: LTG-By discharge, patient will transfer one surface to another SPV with SPC     Dates: Start: 12/04/21          Goal: LTG-By discharge, patient will transfer in/out of a car SBA from ambulatory level with SPC     Dates: Start:  12/04/21          Goal: LTG-By discharge, patient will ascend/descend ADA ramp with SPC and SBA to enter/exit her home safely     Dates: Start: 12/04/21

## 2021-12-10 NOTE — CARE PLAN
Problem: Inadequate nutrient intake  Goal: Patient to consume greater than or equal to 50% of meals  Outcome: Not Progressing   Patient does not eat dairy products of any kind, eggs, or chicken.  Patient's daughter essentially saying no to everything offered to patient while on current diet with altered textures.  Patient refusing to drink the nectar thick juices as well.  Patient also refusing oatmeal and cream of wheat.    Discussed with patient and daughter that options are limited given current diet.  They understand.  Patient essentially eating fruit and that is about it.      She dislikes the diet texture.  She exhibits frustration with her lack of ability to communicate.  She is only drinking some of the nectar thick boost breeze.    We discussed her increased risk for weight loss and dehydration with poor fluid and oral intake of foods. As well as possibility of altered GI/  function. We also discussed that pt's fatigue is exacerbated by her lack of nutrient/ fluid intake.     Patient's baseline appetite is poor.  She has psych eval pending.  May benefit from discussion around depression d/t her recent stroke.     RD Following.  However, we are limited by patient's many food preferences/ limitations and diet texture at this time. Daughter and patient both understand.

## 2021-12-10 NOTE — PROGRESS NOTES
Hospital Medicine Daily Progress Note      Chief Complaint:  Hypertension    Interval History:  Pt seen and examined during therapies, denies complaints.    Review of Systems  Review of Systems   Constitutional: Negative for chills and fever.   HENT: Negative.    Eyes: Negative.    Respiratory: Negative for cough and shortness of breath.    Cardiovascular: Negative for chest pain and palpitations.   Gastrointestinal: Negative for abdominal pain, nausea and vomiting.   Genitourinary: Negative.    Musculoskeletal: Negative.    Skin: Negative for itching and rash.   Neurological: Positive for speech change and focal weakness.   Endo/Heme/Allergies: Negative for polydipsia. Does not bruise/bleed easily.        Physical Exam  Temp:  [36.3 °C (97.4 °F)-36.6 °C (97.9 °F)] 36.6 °C (97.9 °F)  Pulse:  [58-69] 63  Resp:  [17] 17  BP: (136-145)/(52-65) 145/65  SpO2:  [94 %] 94 %    Physical Exam  Vitals reviewed.   Constitutional:       General: She is not in acute distress.     Appearance: Normal appearance. She is not ill-appearing.   HENT:      Head: Normocephalic and atraumatic.      Right Ear: External ear normal.      Left Ear: External ear normal.      Nose: Nose normal.      Mouth/Throat:      Pharynx: Oropharynx is clear.   Eyes:      General:         Right eye: No discharge.         Left eye: No discharge.      Extraocular Movements: Extraocular movements intact.      Conjunctiva/sclera: Conjunctivae normal.   Neck:      Vascular: No JVD.   Cardiovascular:      Rate and Rhythm: Normal rate and regular rhythm.   Pulmonary:      Effort: Pulmonary effort is normal. No respiratory distress.      Breath sounds: Normal breath sounds. No wheezing.   Abdominal:      General: Bowel sounds are normal. There is no distension.      Palpations: Abdomen is soft.      Tenderness: There is no abdominal tenderness.   Musculoskeletal:      Cervical back: Normal range of motion and neck supple.      Right lower leg: No edema.      Left  lower leg: No edema.   Skin:     General: Skin is warm and dry.   Neurological:      Mental Status: She is alert and oriented to person, place, and time.         Fluids    Intake/Output Summary (Last 24 hours) at 12/9/2021 1617  Last data filed at 12/9/2021 0900  Gross per 24 hour   Intake 300 ml   Output --   Net 300 ml       Laboratory  Recent Labs     12/08/21  0535   WBC 7.8   RBC 3.89*   HEMOGLOBIN 12.5   HEMATOCRIT 38.2   MCV 98.2*   MCH 32.1   MCHC 32.7*   RDW 44.8   PLATELETCT 351   MPV 9.9     Recent Labs     12/08/21  0535   SODIUM 143   POTASSIUM 3.9   CHLORIDE 104   CO2 25   GLUCOSE 80   BUN 17   CREATININE 0.80   CALCIUM 9.0                   Assessment/Plan  * Ischemic stroke (HCC)- (present on admission)  Assessment & Plan  F/U CT 12/7/21 evolving ischemia, negative acute  On ASA, Plavix, and Lipitor  Now on Provigil per Physiatry    Vitamin D deficiency- (present on admission)  Assessment & Plan  Vit D level 19  On supplementation    Hypertension- (present on admission)  Assessment & Plan  On Norvasc, Labetalol, and Lisinopril  Observe blood pressure trends     DNR    Patient seen and examined.  Chart reviewed.  Assessment and Plan as above.  No changes today from yesterday's medical decision making.

## 2021-12-10 NOTE — PROGRESS NOTES
Hospital Medicine Daily Progress Note      Chief Complaint:  Hypertension    Interval History:  No overnight problems.  Labs reviewed.     Review of Systems  Review of Systems   Constitutional: Negative for chills and fever.   HENT: Negative.    Eyes: Negative.    Respiratory: Negative for cough and shortness of breath.    Cardiovascular: Negative for chest pain and palpitations.   Gastrointestinal: Negative for abdominal pain, nausea and vomiting.   Genitourinary: Negative.    Musculoskeletal: Negative.    Skin: Negative for itching and rash.   Neurological: Positive for speech change and focal weakness.   Endo/Heme/Allergies: Negative for polydipsia. Does not bruise/bleed easily.        Physical Exam  Temp:  [36.3 °C (97.4 °F)-36.6 °C (97.9 °F)] 36.6 °C (97.9 °F)  Pulse:  [58-69] 63  Resp:  [17] 17  BP: (136-145)/(52-65) 145/65  SpO2:  [94 %] 94 %    Physical Exam  Vitals reviewed.   Constitutional:       General: She is not in acute distress.     Appearance: Normal appearance. She is not ill-appearing.   HENT:      Head: Normocephalic and atraumatic.      Right Ear: External ear normal.      Left Ear: External ear normal.      Nose: Nose normal.      Mouth/Throat:      Pharynx: Oropharynx is clear.   Eyes:      General:         Right eye: No discharge.         Left eye: No discharge.      Extraocular Movements: Extraocular movements intact.      Conjunctiva/sclera: Conjunctivae normal.   Neck:      Vascular: No JVD.   Cardiovascular:      Rate and Rhythm: Normal rate and regular rhythm.   Pulmonary:      Effort: Pulmonary effort is normal. No respiratory distress.      Breath sounds: Normal breath sounds. No wheezing.   Abdominal:      General: Bowel sounds are normal. There is no distension.      Palpations: Abdomen is soft.      Tenderness: There is no abdominal tenderness.   Musculoskeletal:      Cervical back: Normal range of motion and neck supple.      Right lower leg: No edema.      Left lower leg: No  edema.   Skin:     General: Skin is warm and dry.   Neurological:      Mental Status: She is alert and oriented to person, place, and time.         Fluids    Intake/Output Summary (Last 24 hours) at 12/9/2021 1616  Last data filed at 12/9/2021 0900  Gross per 24 hour   Intake 300 ml   Output --   Net 300 ml       Laboratory  Recent Labs     12/08/21  0535   WBC 7.8   RBC 3.89*   HEMOGLOBIN 12.5   HEMATOCRIT 38.2   MCV 98.2*   MCH 32.1   MCHC 32.7*   RDW 44.8   PLATELETCT 351   MPV 9.9     Recent Labs     12/08/21  0535   SODIUM 143   POTASSIUM 3.9   CHLORIDE 104   CO2 25   GLUCOSE 80   BUN 17   CREATININE 0.80   CALCIUM 9.0                   Assessment/Plan  * Ischemic stroke (HCC)- (present on admission)  Assessment & Plan  F/U CT 12/7/21 evolving ischemia, negative acute  On ASA, Plavix, and Lipitor  Now on Provigil per Physiatry    Vitamin D deficiency- (present on admission)  Assessment & Plan  Vit D level 19  On supplementation    Hypertension- (present on admission)  Assessment & Plan  On Norvasc, Labetalol, and Lisinopril  Observe blood pressure trends     DNR

## 2021-12-10 NOTE — THERAPY
12/09/21 1359   Precautions   Precautions Fall Risk;Weight Bearing As Tolerated Left Upper Extremity;Swallow Precautions ( See Comments)   Comments L Wrist brace WBAT, L neglect, slow to mobilize, Zio patch   Pain 0 - 10 Group   Therapist Pain Assessment 0   Cognition    Speech/ Communication Delayed Responses;Slurred   Level of Consciousness Alert   Ability To Follow Commands 2 Step   Attention Impaired   Sequencing Impaired   Initiation Impaired   Gait Functional Level of Assist    Gait Level Of Assist Minimal Assist   Assistive Device Other (Comments)  (Right handrail)   Distance (Feet) 30   # of Times Distance was Traveled 3   Deviation Ataxic;Decreased Base Of Support;Bradykinetic;Decreased Heel Strike;Decreased Toe Off;Other (Comment)  (Impaired weight shift)   Wheelchair Functional Level of Assist   Wheelchair Assist Total Assist   Stairs Functional Level of Assist   Level of Assist with Stairs Unable to Participate   # of Stairs Climbed 0  (Unsafe)   Transfer Functional Level of Assist   Bed, Chair, Wheelchair Transfer Moderate Assist   Bed Chair Wheelchair Transfer Description Increased time;Assist with one limb;Set-up of equipment;Supervision for safety;Verbal cueing   Toilet Transfers Moderate Assist   Toilet Transfer Description Adaptive equipment;Grab bar;Increased time;Verbal cueing;Supervision for safety   Sitting Lower Body Exercises   Ankle Pumps 2 sets of 15;Bilateral   Hip Flexion 2 sets of 15;Bilateral   Hip Abduction 2 sets of 15;Bilateral   Hip Adduction 2 sets of 15;Bilateral   Long Arc Quad 2 sets of 15;Bilateral   Hamstring Curl 3 sets of 15;Bilateral   Bed Mobility    Supine to Sit Contact Guard Assist   Sit to Supine Moderate Assist   Sit to Stand Minimal Assist   Scooting Minimal Assist   Rolling Minimal Assist to Rt.   Interdisciplinary Plan of Care Collaboration   Patient Position at End of Therapy Seated;Chair Alarm On;Self Releasing Lap Belt Applied;Call Light within Reach;Tray  Table within Reach;Phone within Reach;Family / Friend in Room   PT Total Time Spent   PT Individual Total Time Spent (Mins) 60   PT Charge Group   PT Gait Training 2   PT Therapeutic Exercise 2   Physical Therapy   Daily Treatment     Patient Name: Nakita Sheppard  Age:  76 y.o., Sex:  female  Medical Record #: [id number]  Today's Date: 12/9/2021     Precautions  Precautions: Fall Risk,Weight Bearing As Tolerated Left Upper Extremity,Swallow Precautions ( See Comments)  Comments: L Wrist brace WBAT, L neglect, slow to mobilize, Zio patch    Subjective    The patient agreed to PT.  She had no complaints of pain.     Objective    The patient participated in sequencing sit to/from stand from the wheelchair using a right handrail for support.  She participated in gait training with a handrail on the right for support and she tolerated 30 FT x2 with minimal to moderate assistance from PT.  The patient participated also in seated bilateral lower extremity therapeutic exercise 2 sets of 15 with minimal assistance for seated hip flexion.    Assessment    The patient walked today using a right hallway railing and she tolerated 30 FT x3.  That is the most shoes walked since she was admitted 12/3.  She has gained confidence in being able to walk short distances using the handrail.  Depending on her ability tomorrow it may be beneficial to do a trial with an assistive device a platform walker would be appropriate or a hemiwalker.  Strengths: Able to follow instructions,Alert and oriented,Adequate strength,Independent prior level of function,Motivated for self care and independence,Pleasant and cooperative,Supportive family,Willingly participates in therapeutic activities  Barriers: Decreased endurance,Generalized weakness,Impaired balance,Hemiplegia    Plan    Safety education, bed mobility and transfer training, therapeutic exercise for strength and endurance, gait training as tolerated, neuromuscular  reeducation    Passport items to be completed:  Passport items to be completed:  Get in/out of bed safely, in/out of a vehicle, safely use mobility device, walk or wheel around home/community, navigate up and down stairs, show how to get up/down from the ground, ensure home is accessible, demonstrate HEP, complete caregiver training    Physical Therapy Problems (Active)     Problem: Mobility     Dates: Start: 12/04/21       Goal: STG-Within one week, patient will ambulate household distance x10' with SPC and Mod A     Dates: Start: 12/04/21             Problem: Mobility Transfers     Dates: Start: 12/04/21       Goal: STG-Within one week, patient will perform bed mobility Min A without bedrails     Dates: Start: 12/04/21          Goal: STG-Within one week, patient will sit to stand CGA 5/5 trials with SPC     Dates: Start: 12/04/21          Goal: STG-Within one week, patient will transfer bed to chair Min A     Dates: Start: 12/04/21             Problem: PT-Long Term Goals     Dates: Start: 12/04/21       Goal: LTG-By discharge, patient will maintain balance at least 45/56 on the [place] to DC to home safely with support of spouse     Dates: Start: 12/04/21          Goal: LTG-By discharge, patient will ambulate at least 500' with SPC and SPV inside and outside surfaces     Dates: Start: 12/04/21          Goal: LTG-By discharge, patient will transfer one surface to another SPV with SPC     Dates: Start: 12/04/21          Goal: LTG-By discharge, patient will transfer in/out of a car SBA from ambulatory level with SPC     Dates: Start: 12/04/21          Goal: LTG-By discharge, patient will ascend/descend ADA ramp with SPC and SBA to enter/exit her home safely     Dates: Start: 12/04/21

## 2021-12-10 NOTE — PROGRESS NOTES
"Rehab Progress Note     Date of Service: 12/10/2021  Chief Complaint: Follow-up stroke    Interval Events (Subjective)    Patient seen and examined today in her room.  She is resting in between her therapies. She feels good today and has no new complaints.  She reports her left wrist is much less painful with the brace.  Hospitalist has signed off.    ROS: No changes to bowel, bladder, pain, mood, or sleep.       Objective:  VITAL SIGNS: /47   Pulse 78   Temp 36.6 °C (97.8 °F) (Oral)   Resp 17   Ht 1.575 m (5' 2\")   Wt 80 kg (176 lb 5.9 oz)   SpO2 93%   BMI 32.26 kg/m²   Gen: alert, no apparent distress  Neuro: notable for dysarthria, left sided facial droop, left hemiparesis, 2-3/5 left elbow flexion and extension    Recent Results (from the past 72 hour(s))   MAGNESIUM    Collection Time: 12/08/21  5:35 AM   Result Value Ref Range    Magnesium 2.0 1.5 - 2.5 mg/dL   PHOSPHORUS    Collection Time: 12/08/21  5:35 AM   Result Value Ref Range    Phosphorus 4.3 2.5 - 4.5 mg/dL   CBC WITHOUT DIFFERENTIAL    Collection Time: 12/08/21  5:35 AM   Result Value Ref Range    WBC 7.8 4.8 - 10.8 K/uL    RBC 3.89 (L) 4.20 - 5.40 M/uL    Hemoglobin 12.5 12.0 - 16.0 g/dL    Hematocrit 38.2 37.0 - 47.0 %    MCV 98.2 (H) 81.4 - 97.8 fL    MCH 32.1 27.0 - 33.0 pg    MCHC 32.7 (L) 33.6 - 35.0 g/dL    RDW 44.8 35.9 - 50.0 fL    Platelet Count 351 164 - 446 K/uL    MPV 9.9 9.0 - 12.9 fL   Basic Metabolic Panel    Collection Time: 12/08/21  5:35 AM   Result Value Ref Range    Sodium 143 135 - 145 mmol/L    Potassium 3.9 3.6 - 5.5 mmol/L    Chloride 104 96 - 112 mmol/L    Co2 25 20 - 33 mmol/L    Glucose 80 65 - 99 mg/dL    Bun 17 8 - 22 mg/dL    Creatinine 0.80 0.50 - 1.40 mg/dL    Calcium 9.0 8.5 - 10.5 mg/dL    Anion Gap 14.0 7.0 - 16.0   ESTIMATED GFR    Collection Time: 12/08/21  5:35 AM   Result Value Ref Range    GFR If African American >60 >60 mL/min/1.73 m 2    GFR If Non African American >60 >60 mL/min/1.73 m 2 "       Current Facility-Administered Medications   Medication Frequency   • modafinil (PROVIGIL) tablet 100 mg QAM   • traZODone (DESYREL) tablet 50 mg QHS   • melatonin tablet 3 mg QHS   • amLODIPine (NORVASC) tablet 10 mg Q DAY   • butalbital/apap/caffeine -40 mg (Fioricet) per tablet 1 Tablet Q6HRS PRN   • tizanidine (ZANAFLEX) tablet 2 mg QHS   • omeprazole (PRILOSEC) capsule 20 mg QAM AC   • hydrOXYzine HCl (ATARAX) tablet 50 mg Q6HRS PRN   • Respiratory Therapy Consult Continuous RT   • Pharmacy Consult Request ...Pain Management Review 1 Each PHARMACY TO DOSE   • hydrALAZINE (APRESOLINE) tablet 10 mg Q8HRS PRN   • acetaminophen (Tylenol) tablet 650 mg Q4HRS PRN   • lactulose 20 GM/30ML solution 30 mL QDAY PRN   • docusate sodium (ENEMEEZ) enema 283 mg QDAY PRN   • sodium phosphate (Fleet) enema 133 mL QDAY PRN   • artificial tears ophthalmic solution 1 Drop PRN   • benzocaine-menthol (CEPACOL) lozenge 1 Lozenge Q2HRS PRN   • mag hydrox-al hydrox-simeth (MAALOX PLUS ES or MYLANTA DS) suspension 20 mL Q2HRS PRN   • ondansetron (ZOFRAN ODT) dispertab 4 mg 4X/DAY PRN    Or   • ondansetron (ZOFRAN) syringe/vial injection 4 mg 4X/DAY PRN   • sodium chloride (OCEAN) 0.65 % nasal spray 2 Spray PRN   • midazolam (VERSED) 5 mg/mL (1 mL vial) PRN   • enoxaparin (LOVENOX) inj 40 mg DAILY   • senna-docusate (PERICOLACE or SENOKOT S) 8.6-50 MG per tablet 2 Tablet BID    And   • polyethylene glycol/lytes (MIRALAX) PACKET 1 Packet QDAY PRN    And   • magnesium hydroxide (MILK OF MAGNESIA) suspension 30 mL QDAY PRN    And   • bisacodyl (DULCOLAX) suppository 10 mg QDAY PRN   • aspirin EC (ECOTRIN) tablet 81 mg DAILY   • atorvastatin (LIPITOR) tablet 80 mg DAILY   • carBAMazepine (TEGRETOL) chewable tab 200 mg BID   • clopidogrel (PLAVIX) tablet 75 mg DAILY   • labetalol (NORMODYNE) tablet 200 mg Q12HRS   • lisinopril (PRINIVIL) tablet 40 mg DAILY   • vitamin D3 (cholecalciferol) tablet 1,000 Units DAILY       Orders  Placed This Encounter   Procedures   • Diet Order Diet: Level 5 - Minced and Moist (Medications floated whole, one at a time, in pudding (pt does not like applesauce)); Liquid level: Level 2 - Mildly Thick; Tray Modifications (optional): No Straws     Standing Status:   Standing     Number of Occurrences:   1     Order Specific Question:   Diet:     Answer:   Level 5 - Minced and Moist [24]     Comments:   Medications floated whole, one at a time, in pudding (pt does not like applesauce)     Order Specific Question:   Liquid level     Answer:   Level 2 - Mildly Thick     Order Specific Question:   Tray Modifications (optional)     Answer:   No Straws       Assessment:  Active Hospital Problems    Diagnosis    • *Ischemic stroke (HCC)    • Polyuria    • Wrist fracture, closed, left, sequela    • Other insomnia    • Hypokalemia    • Class 1 obesity without serious comorbidity in adult    • Spasticity    • Left-sided neglect    • Other dysphagia    • Impaired mobility and ADLs    • Hyperlipidemia LDL goal <70    • Peripheral neuropathy    • Hypertension      This patient is a 76 y.o. female admitted for acute inpatient rehabilitation with Ischemic stroke (HCC).    I led and attended the weekly conference, and agree with the IDT conference documentation and plan of care as noted below.    Date of conference: 12/8/2021    Goals and barriers: See IDT note.    Biggest barriers: Low motivation, lethargic, left wrist pain, hypophonia, moderate cognitive impairments, incontinent bowel bladder, fatigue, slow processing    Goals in next week: Improved motivation    CM/social support: Unclear how much physical support  can provide    Anticipated DC date: 12/24    Home health: PT OT speech and nursing    Equip: To be determined    Follow up: PCP, [facility], Dr. Callejas, cardiology, orthopedic surgery        Medical Decision Making and Plan:    Right MCA stroke  Left hemiparesis, improved  Nondominant  Left  neglect  Dysphagia, improved  Cognitive impairments  Continue full rehab program  PT/OT/SLP, 1 hr each discipline, 5 days per week     Aspirin  Plavix  Statin     Patient with Zio patch cardiac monitor, return 2 weeks from admission, 12/17    Outpatient follow-up with [facility], cardiology, Dr. Callejas, referrals made    MBS with aspiration on thins, diet downgraded per speech therapy recommendations    Started Provigil for neuro stimulation, improved initiation    Psychology consulted for evaluation, patient not depressed    Left wrist pain  X-ray at acute showed possible avulsion fractures  Discussed with orthopedic surgery, outpatient follow-up  Wrist brace and weightbearing as tolerated     Hypertension  Lisinopril  Labetalol  Amlodipine, dose increased  Currently stable, hospitalist has signed off     Peripheral neuropathy  Continue home medication carbamazepine  Was also on Zanaflex (6 mg TID), restart 2 mg at night due to complaints of leg spasms, will not restart home dosing due to adverse lethargy    History of migraines  Continue carbamazepine    Hypokalemia resolved  Status post supplementation    Insomnia, resolved  Restarted home medication tizanidine 2 mg  Added scheduled melatonin and trazodone     Obesity  BMI 32.6  Outpatient nutritional counseling    Vitamin D deficiency  Continue supplementation    GI prophylaxis  Omeprazole     Polyuria  Negative urinalysis    Bowel program  Bowel incontinence  Continue bowel medications  Last BM 12/9    Bladder program  Bladder incontinence  Check PVRs - 25, 15, 10  Not retaining  Bladder scan for no voids  ICP for over 400 cc  Scheduled toileting    DVT prophylaxis  Lovenox      Total time:  16 minutes.  I spent greater than 50% of the time for patient care, counseling, and coordination on this date, including patient face-to face time, unit/floor time with review of records/pertinent lab data and studies, as well as discussing diagnostic  evaluation/work up, planned therapeutic interventions, and future disposition of care, as per the interval events/subjective and the assessment and plan as noted above.    I have performed a physical exam, reviewed and updated ROS, as well as the assessment and plan today 12/10/2021. In review of note from 12/9/2021 there are no new changes except as documented above.            Hawa Alva M.D.   Physical Medicine and Rehabilitation

## 2021-12-10 NOTE — THERAPY
Occupational Therapy  Daily Treatment     Patient Name: Nakita Sheppard  Age:  76 y.o., Sex:  female  Medical Record #: [id number]  Today's Date: 12/10/2021     Precautions  Precautions: (P) Fall Risk,Weight Bearing As Tolerated Left Upper Extremity,Swallow Precautions ( See Comments)  Comments: (P) L Wrist brace WBAT, L neglect, slow to mobilize, Zio patch    Safety   ADL Safety : (P) Requires Supervision for Safety,Requires Physical Assist for Safety,Requires Cueing for Safety  Bathroom Safety: (P) Requires Physical Assist for Safety,Requires Supervision for Safety,Requires Cuing for Safety  Comments: (P) see functional levels below for ADL performance details.    Subjective    Pt participated with increased time and encouragement.      Objective       12/10/21 0901   Precautions   Precautions Fall Risk;Weight Bearing As Tolerated Left Upper Extremity;Swallow Precautions ( See Comments)   Comments L Wrist brace WBAT, L neglect, slow to mobilize, Zio patch   Safety    ADL Safety  Requires Supervision for Safety;Requires Physical Assist for Safety;Requires Cueing for Safety   Bathroom Safety Requires Physical Assist for Safety;Requires Supervision for Safety;Requires Cuing for Safety   Comments see functional levels below for ADL performance details.   Vitals   Pulse 72   Patient BP Position Sitting   Blood Pressure  131/47   Functional Level of Assist   Grooming Minimal Assist;Seated   Grooming Description Increased time;Set-up of equipment;Supervision for safety;Verbal cueing  (SBA brush hair/wash face,min A for thoroughness w/ oral care)   Toileting Maximal Assist   Toileting Description Assist to pull pants up;Assist to pull pants down;Assist for standing balance;Increased time;Grab bar;Supervision for safety;Verbal cueing   Toilet Transfers Minimal Assist   Toilet Transfer Description Increased time;Initial preparation for task;Set-up of equipment;Supervision for safety;Verbal cueing  (w/c<>toilet SPT via  GB, cues for sequencing)   Sitting Lower Body Exercises   Nustep Resistance Level 3  (for reciprocal patterning/endurance x10 min, 2 RB)   Bed Mobility    Supine to Sit Contact Guard Assist   Sit to Supine Moderate Assist   Scooting Minimal Assist   Rolling Minimal Assist to Rt.   Skilled Intervention Verbal Cuing;Sequencing  (EOB<>w/c SPT with min-mod A.)   Interdisciplinary Plan of Care Collaboration   IDT Collaboration with  Family / Caregiver   Patient Position at End of Therapy In Bed;Bed Alarm On;Call Light within Reach;Tray Table within Reach;Phone within Reach;Family / Friend in Room   Collaboration Comments pt's dtr present for session   OT Total Time Spent   OT Individual Total Time Spent (Mins) 60   OT Charge Group   OT Self Care / ADL 2   OT Therapeutic Exercise  2     EOB<>w/c, and w/c<>nustep SPT with min-mod A, cues for sequencing.     Discussed potential need for BSC and for GB placement near pt's toilet at home. Pt's dtr also reports that they may be interested in getting pt a hospital bed for home use.     Assessment    Pt con't to be slow to mobilize, but making progress with functional transfers with increased time and encouragement. She con't to be limited by fatigue, L hemiparesis, L neglect and impaired balance. Pt reports that L wrist brace is helping with pain.   Strengths: Independent prior level of function,Motivated for self care and independence,Pleasant and cooperative,Supportive family,Willingly participates in therapeutic activities  Barriers: Aspiration risk,Decreased endurance,Fatigue,Hemiparesis,Impaired activity tolerance,Impaired balance,Impaired functional cognition,Pain    Plan    ADLs, functional transfers, LUE ROM/positioning, LUE neuro re-ed, activities to improve L side attention. Monitor L wrist pain/brace.      Passport items to be completed:  Perform bathroom transfers, complete dressing, complete feeding, get ready for the day, prepare a simple meal, participate in  household tasks, adapt home for safety needs, demonstrate home exercise program, complete caregiver training     Occupational Therapy Goals (Active)     Problem: Bathing     Dates: Start: 12/04/21       Goal: STG-Within one week, patient will bathe with min A using AE as needed.     Dates: Start: 12/04/21       Goal Note filed on 12/08/21 1345 by Delaney Price, OT     Con't to require mod-max A d/t L hemiparesis, pain, fatigue.                Problem: Dressing     Dates: Start: 12/04/21       Goal: STG-Within one week, patient will dress UB with min A using alexx technique.     Dates: Start: 12/04/21       Goal Note filed on 12/08/21 1345 by Delaney Price OT     Con't to require mod-max A d/t L hemiparesis, pain, fatigue.             Goal: STG-Within one week, patient will dress LB with mod A using alexx technique.      Dates: Start: 12/04/21       Goal Note filed on 12/08/21 1345 by Delaney Price, OT     Con't to require max A d/t L hemiparesis, pain, fatigue.                Problem: Functional Transfers     Dates: Start: 12/04/21       Goal: STG-Within one week, patient will transfer to toilet with min A using AE/DME as needed.      Dates: Start: 12/04/21       Goal Note filed on 12/08/21 1345 by Delaney Price, OT     Con't to require mod-max A d/t L hemiparesis, pain, fatigue.                Problem: OT Long Term Goals     Dates: Start: 12/04/21       Goal: LTG-By discharge, patient will complete basic self care tasks at supervision to mod I level using AE as needed.      Dates: Start: 12/04/21          Goal: LTG-By discharge, patient will perform bathroom transfers at supervision level using AE/DME as needed.      Dates: Start: 12/04/21             Problem: Toileting     Dates: Start: 12/04/21       Goal: STG-Within one week, patient will complete toileting tasks with mod A using AE as needed.      Dates: Start: 12/04/21       Goal Note filed on 12/08/21 1345 by Delaney Price OT     Con't to  require max A d/t L hemiparesis, pain, fatigue.

## 2021-12-10 NOTE — PROGRESS NOTES
Hospital Medicine Daily Progress Note      Chief Complaint:  Hypertension    Interval History:  No 24 hour clinical changes.    Review of Systems  Review of Systems   Constitutional: Negative for chills and fever.   HENT: Negative.    Eyes: Negative.    Respiratory: Negative for cough and shortness of breath.    Cardiovascular: Negative for chest pain and palpitations.   Gastrointestinal: Negative for abdominal pain, nausea and vomiting.   Genitourinary: Negative.    Musculoskeletal: Negative.    Skin: Negative for itching and rash.   Neurological: Positive for speech change and focal weakness.   Endo/Heme/Allergies: Negative for polydipsia. Does not bruise/bleed easily.        Physical Exam  Temp:  [36.6 °C (97.8 °F)] 36.6 °C (97.8 °F)  Pulse:  [72-80] 78  Resp:  [16-17] 17  BP: (126-160)/(47-68) 131/47  SpO2:  [93 %] 93 %    Physical Exam  Vitals reviewed.   Constitutional:       General: She is not in acute distress.     Appearance: Normal appearance. She is not ill-appearing.   HENT:      Head: Normocephalic and atraumatic.      Right Ear: External ear normal.      Left Ear: External ear normal.      Nose: Nose normal.      Mouth/Throat:      Pharynx: Oropharynx is clear.   Eyes:      General:         Right eye: No discharge.         Left eye: No discharge.      Extraocular Movements: Extraocular movements intact.      Conjunctiva/sclera: Conjunctivae normal.   Neck:      Vascular: No JVD.   Cardiovascular:      Rate and Rhythm: Normal rate and regular rhythm.   Pulmonary:      Effort: Pulmonary effort is normal. No respiratory distress.      Breath sounds: Normal breath sounds. No wheezing.   Abdominal:      General: Bowel sounds are normal. There is no distension.      Palpations: Abdomen is soft.      Tenderness: There is no abdominal tenderness.   Musculoskeletal:      Cervical back: Normal range of motion and neck supple.      Right lower leg: No edema.      Left lower leg: No edema.   Skin:     General:  Skin is warm and dry.   Neurological:      Mental Status: She is alert and oriented to person, place, and time.         Fluids    Intake/Output Summary (Last 24 hours) at 12/10/2021 1355  Last data filed at 12/10/2021 1237  Gross per 24 hour   Intake 270 ml   Output --   Net 270 ml       Laboratory  Recent Labs     12/08/21  0535   WBC 7.8   RBC 3.89*   HEMOGLOBIN 12.5   HEMATOCRIT 38.2   MCV 98.2*   MCH 32.1   MCHC 32.7*   RDW 44.8   PLATELETCT 351   MPV 9.9     Recent Labs     12/08/21  0535   SODIUM 143   POTASSIUM 3.9   CHLORIDE 104   CO2 25   GLUCOSE 80   BUN 17   CREATININE 0.80   CALCIUM 9.0                   Assessment/Plan  * Ischemic stroke (HCC)- (present on admission)  Assessment & Plan  F/U CT 12/7/21 evolving ischemia, negative acute  On ASA, Plavix, and Lipitor  Also on Provigil per Physiatry    Vitamin D deficiency- (present on admission)  Assessment & Plan  Vit D level 19  On supplementation    Hypertension- (present on admission)  Assessment & Plan  Blood pressure controlled on Norvasc, Labetalol, and Lisinopril    DNR    Patient without acute medical issues requiring Hospitalist services at this time.  Will sign off.  Please re-consult as needed.  Discussed with Dr. Alva.

## 2021-12-10 NOTE — CARE PLAN
The patient is Stable - Low risk of patient condition declining or worsening    Shift Goals  Clinical Goals: safety  Patient Goals: Complete swallow study      Problem: Knowledge Deficit - Standard  Goal: Patient and family/care givers will demonstrate understanding of plan of care, disease process/condition, diagnostic tests and medications  Outcome: Progressing: Pt speaks very softly and has delayed responses but but A&Ox4. Pt needs extra time to communicate but once she does, she is able to express all her needs.      Problem: Nutrition  Goal: Patient's nutritional and fluid intake will be adequate or improve  Outcome: Progressing: Pt offered mildly thick liquids, she requested ice chips instead. Pt supervised when eating her ice chips and she does well. Pt ate 1/2 a pudding with her bedtime meds, was offered the rest of the container but she refused.

## 2021-12-10 NOTE — THERAPY
"Speech Language Pathology  Daily Treatment     Patient Name: Nakita Sheppard  Age:  76 y.o., Sex:  female  Medical Record #: [id number]  Today's Date: 12/10/2021     Precautions  Precautions: Fall Risk,Weight Bearing As Tolerated Left Upper Extremity,Swallow Precautions ( See Comments)  Comments: L Wrist brace WBAT, L neglect, slow to mobilize, Zio patch    Subjective    Pt pleasant and cooperative, excited regarding more \"normal looking food\"     Objective       12/10/21 0803   SLP Total Time Spent   SLP Individual Total Time Spent (Mins) 60   Treatment Charges   SLP Swallowing Dysfunction Treatment Swallowing Dysfunction Treatment       Assessment    Pt assessed with trials of soft and bite size textures with mildly thick liquids throughout mealtimes. Pt with no overt s/sx of asp/pen noted however pt with significant pocketing on left side, oral residue remaining following onset of initial swallow and anterior spillage. Pt presented with mirror for visual feedback however pt reporting unable to see self in the mirror. Pt required direct cues for implementation of finger sweep. Pt with prolonged mastication and often un chewed pieces directly located within left side pocket increasing risk of choking if not cleared. Hand off to tech with cues to enforce finger sweep and complete oral care following completion of meals.     Strengths: Pleasant and cooperative,Motivated for self care and independence,Willingly participates in therapeutic activities,Independent prior level of function,Supportive family  Barriers: Aspiration risk,Impaired insight/denial of deficits,Impaired carryover of learning,Impaired functional cognition    Plan    Cont trials with SLP present, address pts ability to self monitor and clear left side pocketing.    Speech Therapy Problems (Active)     Problem: Expression STGs     Dates: Start: 12/04/21       Goal: STG-Within one week, patient will     Dates: Start: 12/04/21       Description: " Utilize clear speech strategies with mod cues in 70% of opportunities to increase overall speech intelligibility.        Goal: STG-Within one week, patient will     Dates: Start: 12/04/21       Description: Participate in a standardized language assessment to further identify expressive/receptive language needs.          Problem: Memory STGs     Dates: Start: 12/04/21       Goal: STG-Within one week, patient will     Dates: Start: 12/04/21       Description: Complete functional IADL tasks (e.g. med and financial management) with 70% acc and mod cues to ensure safe return to her PLOF.           Problem: Speech/Swallowing LTGs     Dates: Start: 12/04/21       Goal: LTG-By discharge, patient will safely swallow     Dates: Start: 12/04/21       Description: Safest/least restrictive diet with no overt s/sx of aspiration for 100% of intake to maintain adequate nutrition and hydration.        Goal: LTG-By discharge, patient will     Dates: Start: 12/04/21       Description: Complete cog-ling tasks at modified independence level to ensure safe return to PLOF.        Goal: LTG-By discharge, patient will     Dates: Start: 12/04/21       Description: Communicate complex ideas at the conversation level with 90% intelligibility to a naiive listener.           Problem: Swallowing STGs     Dates: Start: 12/04/21       Goal: STG-Within one week, patient will consume trials of thin liquids with use of effortful swallow with no overt s/sx of asp/pen noted on 80% of trials provided MIN cues     Dates: Start: 12/08/21

## 2021-12-11 PROCEDURE — 92507 TX SP LANG VOICE COMM INDIV: CPT

## 2021-12-11 PROCEDURE — 700102 HCHG RX REV CODE 250 W/ 637 OVERRIDE(OP): Performed by: PHYSICAL MEDICINE & REHABILITATION

## 2021-12-11 PROCEDURE — A9270 NON-COVERED ITEM OR SERVICE: HCPCS | Performed by: HOSPITALIST

## 2021-12-11 PROCEDURE — 700111 HCHG RX REV CODE 636 W/ 250 OVERRIDE (IP): Performed by: PHYSICAL MEDICINE & REHABILITATION

## 2021-12-11 PROCEDURE — 770010 HCHG ROOM/CARE - REHAB SEMI PRIVAT*

## 2021-12-11 PROCEDURE — A9270 NON-COVERED ITEM OR SERVICE: HCPCS | Performed by: PHYSICAL MEDICINE & REHABILITATION

## 2021-12-11 PROCEDURE — 700102 HCHG RX REV CODE 250 W/ 637 OVERRIDE(OP): Performed by: HOSPITALIST

## 2021-12-11 RX ADMIN — ENOXAPARIN SODIUM 40 MG: 40 INJECTION SUBCUTANEOUS at 09:20

## 2021-12-11 RX ADMIN — ATORVASTATIN CALCIUM 80 MG: 40 TABLET, FILM COATED ORAL at 11:56

## 2021-12-11 RX ADMIN — ASPIRIN 81 MG: 81 TABLET, COATED ORAL at 11:55

## 2021-12-11 RX ADMIN — LABETALOL HYDROCHLORIDE 200 MG: 100 TABLET, FILM COATED ORAL at 09:18

## 2021-12-11 RX ADMIN — LABETALOL HYDROCHLORIDE 200 MG: 100 TABLET, FILM COATED ORAL at 19:38

## 2021-12-11 RX ADMIN — LISINOPRIL 40 MG: 20 TABLET ORAL at 09:18

## 2021-12-11 RX ADMIN — SENNOSIDES AND DOCUSATE SODIUM 2 TABLET: 50; 8.6 TABLET ORAL at 11:55

## 2021-12-11 RX ADMIN — MODAFINIL 100 MG: 100 TABLET ORAL at 11:55

## 2021-12-11 RX ADMIN — Medication 1000 UNITS: at 11:56

## 2021-12-11 RX ADMIN — TIZANIDINE 2 MG: 4 TABLET ORAL at 19:39

## 2021-12-11 RX ADMIN — TRAZODONE HYDROCHLORIDE 50 MG: 50 TABLET ORAL at 19:39

## 2021-12-11 RX ADMIN — MELATONIN TAB 3 MG 3 MG: 3 TAB at 19:38

## 2021-12-11 RX ADMIN — SENNOSIDES AND DOCUSATE SODIUM 2 TABLET: 50; 8.6 TABLET ORAL at 19:36

## 2021-12-11 RX ADMIN — CLOPIDOGREL BISULFATE 75 MG: 75 TABLET ORAL at 09:19

## 2021-12-11 RX ADMIN — OMEPRAZOLE 20 MG: 20 CAPSULE, DELAYED RELEASE ORAL at 07:42

## 2021-12-11 RX ADMIN — CARBAMAZEPINE 200 MG: 100 TABLET, CHEWABLE ORAL at 19:36

## 2021-12-11 RX ADMIN — AMLODIPINE BESYLATE 10 MG: 5 TABLET ORAL at 04:51

## 2021-12-11 RX ADMIN — CARBAMAZEPINE 200 MG: 100 TABLET, CHEWABLE ORAL at 09:18

## 2021-12-11 ASSESSMENT — PAIN DESCRIPTION - PAIN TYPE: TYPE: ACUTE PAIN

## 2021-12-11 NOTE — CARE PLAN
The patient is Stable - Low risk of patient condition declining or worsening    Shift Goals  Clinical Goals: increase po fluids  Patient Goals: upgrade to thin liquids    Progress made toward(s) clinical / shift goals:  Encouraged patient to increase PO fluids. Patient is on thicken liquids. Ice chips available.       Problem: Knowledge Deficit - Standard  Goal: Patient and family/care givers will demonstrate understanding of plan of care, disease process/condition, diagnostic tests and medications  Outcome: Progressing     Problem: Bladder / Voiding  Goal: Patient will establish and maintain regular urinary output  Outcome: Progressing

## 2021-12-11 NOTE — THERAPY
Speech Language Pathology  Daily Treatment     Patient Name: Nakita Sheppard  Age:  76 y.o., Sex:  female  Medical Record #: [id number]  Today's Date: 12/11/2021     Precautions  Precautions: Fall Risk,Weight Bearing As Tolerated Left Upper Extremity,Swallow Precautions ( See Comments)  Comments: L wrist brace WBAT, L neglect, slow to mobilize, Zio patch    Subjective    Pt pleasant and cooperative during tx.       Objective       12/11/21 1431   Voice   Laryngeal Relaxation Moderate (3)   Outcome Measures   Outcome Measures Utilized WAB-Bedside   WAB - Bedside (Western Aphasia Battery)   Spontaneous Speech: Content  10   Spontaneous Speech: Fluency 9   Auditory Comprehension 10   Sequential Commands 10   Repetition Score  10   Object Naming 10   Reading 2   Writing 3   Apraxia  10   Bedside Asphasia Score 98.33   Bedside Language Score 80   SLP Total Time Spent   SLP Individual Total Time Spent (Mins) 60   Treatment Charges   SLP Treatment - Individual Speech Language Treatment - Individual       Assessment    Bedside WAB administered with scores above.  Pt presented with most difficulty in written expression, and oral reading (due to dysarthria).  Pt presented with strained strangled vocal quality.  Maximum phonation time: 6 seconds.  Pt was introduced to yawn sigh, glottal valenzuela and laryngeal massage to aid in laryngeal relaxation.      Strengths: Pleasant and cooperative,Motivated for self care and independence,Willingly participates in therapeutic activities,Independent prior level of function,Supportive family  Barriers: Aspiration risk,Impaired insight/denial of deficits,Impaired carryover of learning,Impaired functional cognition    Plan    Target maximum phonation time, speech strategies, dysphagia management        Speech Therapy Problems (Active)     Problem: Expression STGs     Dates: Start: 12/04/21       Goal: STG-Within one week, patient will     Dates: Start: 12/04/21       Description: Utilize  clear speech strategies with mod cues in 70% of opportunities to increase overall speech intelligibility.        Goal: STG-Within one week, patient will     Dates: Start: 12/04/21       Description: Participate in a standardized language assessment to further identify expressive/receptive language needs.          Problem: Memory STGs     Dates: Start: 12/04/21       Goal: STG-Within one week, patient will     Dates: Start: 12/04/21       Description: Complete functional IADL tasks (e.g. med and financial management) with 70% acc and mod cues to ensure safe return to her PLOF.           Problem: Speech/Swallowing LTGs     Dates: Start: 12/04/21       Goal: LTG-By discharge, patient will safely swallow     Dates: Start: 12/04/21       Description: Safest/least restrictive diet with no overt s/sx of aspiration for 100% of intake to maintain adequate nutrition and hydration.        Goal: LTG-By discharge, patient will     Dates: Start: 12/04/21       Description: Complete cog-ling tasks at modified independence level to ensure safe return to PLOF.        Goal: LTG-By discharge, patient will     Dates: Start: 12/04/21       Description: Communicate complex ideas at the conversation level with 90% intelligibility to a naiive listener.           Problem: Swallowing STGs     Dates: Start: 12/04/21       Goal: STG-Within one week, patient will consume trials of thin liquids with use of effortful swallow with no overt s/sx of asp/pen noted on 80% of trials provided MIN cues     Dates: Start: 12/08/21

## 2021-12-11 NOTE — CARE PLAN
The patient is Stable - Low risk of patient condition declining or worsening    Shift Goals  Clinical Goals: stafety  Patient Goals: upgrade to thin liquids    Progress made toward(s) clinical / shift goals:  Patient uses call light for assistance. No unsafe behaviors noted. Staff has been encouraging patient to increase PO intake as patient has been refusing or eating less than 30% of meals. On-call Dr. Muse made aware. No new orders and will continue to encourage patient to increase PO intake.    Problem: Knowledge Deficit - Standard  Goal: Patient and family/care givers will demonstrate understanding of plan of care, disease process/condition, diagnostic tests and medications  Outcome: Progressing     Problem: Pain - Standard  Goal: Alleviation of pain or a reduction in pain to the patient’s comfort goal  Outcome: Progressing     Problem: Skin Integrity  Goal: Skin integrity is maintained or improved  Outcome: Progressing

## 2021-12-11 NOTE — CARE PLAN
The patient is Stable - Low risk of patient condition declining or worsening    Shift Goals  Clinical Goals: safety  Patient Goals: upgrade to thin liquids      Problem: Pain - Standard  Goal: Alleviation of pain or a reduction in pain to the patient’s comfort goal  Outcome: Progressing: Pt declined any pain at bedtime but was informed to ring the call light anytime during the night if she needed any pain medication. Pt stated she understood.       Problem: Hemodynamics  Goal: Patient's hemodynamics, fluid balance and neurologic status will be stable or improve  Outcome: Progressing: Pt given ice chips, thickened water, and pudding with medication administration. Pt did well drinking and eating with the meds but when asked if she wanted any extra after medications she said no. Pt has ice chips and thickened water at bedside in easy reach, she was advised to drink lots of fluids. Pt stated she understood.           today

## 2021-12-12 PROCEDURE — 92507 TX SP LANG VOICE COMM INDIV: CPT

## 2021-12-12 PROCEDURE — 700102 HCHG RX REV CODE 250 W/ 637 OVERRIDE(OP): Performed by: HOSPITALIST

## 2021-12-12 PROCEDURE — 97530 THERAPEUTIC ACTIVITIES: CPT

## 2021-12-12 PROCEDURE — 97110 THERAPEUTIC EXERCISES: CPT

## 2021-12-12 PROCEDURE — 700111 HCHG RX REV CODE 636 W/ 250 OVERRIDE (IP): Performed by: PHYSICAL MEDICINE & REHABILITATION

## 2021-12-12 PROCEDURE — 97116 GAIT TRAINING THERAPY: CPT

## 2021-12-12 PROCEDURE — A9270 NON-COVERED ITEM OR SERVICE: HCPCS | Performed by: PHYSICAL MEDICINE & REHABILITATION

## 2021-12-12 PROCEDURE — 770010 HCHG ROOM/CARE - REHAB SEMI PRIVAT*

## 2021-12-12 PROCEDURE — 700102 HCHG RX REV CODE 250 W/ 637 OVERRIDE(OP): Performed by: PHYSICAL MEDICINE & REHABILITATION

## 2021-12-12 PROCEDURE — A9270 NON-COVERED ITEM OR SERVICE: HCPCS | Performed by: HOSPITALIST

## 2021-12-12 RX ADMIN — CARBAMAZEPINE 200 MG: 100 TABLET, CHEWABLE ORAL at 09:37

## 2021-12-12 RX ADMIN — ENOXAPARIN SODIUM 40 MG: 40 INJECTION SUBCUTANEOUS at 09:38

## 2021-12-12 RX ADMIN — ASPIRIN 81 MG: 81 TABLET, COATED ORAL at 09:37

## 2021-12-12 RX ADMIN — TIZANIDINE 2 MG: 4 TABLET ORAL at 20:18

## 2021-12-12 RX ADMIN — LISINOPRIL 40 MG: 20 TABLET ORAL at 09:37

## 2021-12-12 RX ADMIN — CLOPIDOGREL BISULFATE 75 MG: 75 TABLET ORAL at 09:37

## 2021-12-12 RX ADMIN — LABETALOL HYDROCHLORIDE 200 MG: 100 TABLET, FILM COATED ORAL at 09:37

## 2021-12-12 RX ADMIN — Medication 1000 UNITS: at 09:37

## 2021-12-12 RX ADMIN — TRAZODONE HYDROCHLORIDE 50 MG: 50 TABLET ORAL at 20:19

## 2021-12-12 RX ADMIN — MELATONIN TAB 3 MG 3 MG: 3 TAB at 20:19

## 2021-12-12 RX ADMIN — CARBAMAZEPINE 200 MG: 100 TABLET, CHEWABLE ORAL at 20:18

## 2021-12-12 RX ADMIN — OMEPRAZOLE 20 MG: 20 CAPSULE, DELAYED RELEASE ORAL at 09:38

## 2021-12-12 RX ADMIN — AMLODIPINE BESYLATE 10 MG: 5 TABLET ORAL at 05:31

## 2021-12-12 RX ADMIN — LABETALOL HYDROCHLORIDE 200 MG: 100 TABLET, FILM COATED ORAL at 20:19

## 2021-12-12 RX ADMIN — MODAFINIL 100 MG: 100 TABLET ORAL at 09:37

## 2021-12-12 RX ADMIN — ATORVASTATIN CALCIUM 80 MG: 40 TABLET, FILM COATED ORAL at 09:37

## 2021-12-12 ASSESSMENT — PAIN DESCRIPTION - PAIN TYPE
TYPE: ACUTE PAIN
TYPE: ACUTE PAIN

## 2021-12-12 ASSESSMENT — PATIENT HEALTH QUESTIONNAIRE - PHQ9
2. FEELING DOWN, DEPRESSED, IRRITABLE, OR HOPELESS: NOT AT ALL
SUM OF ALL RESPONSES TO PHQ9 QUESTIONS 1 AND 2: 0
1. LITTLE INTEREST OR PLEASURE IN DOING THINGS: NOT AT ALL

## 2021-12-12 ASSESSMENT — GAIT ASSESSMENTS
DEVIATION: ATAXIC;BRADYKINETIC;SHUFFLED GAIT
DISTANCE (FEET): 10
ASSISTIVE DEVICE: PLATFORM WALKER
GAIT LEVEL OF ASSIST: MINIMAL ASSIST

## 2021-12-12 ASSESSMENT — FIBROSIS 4 INDEX: FIB4 SCORE: 1.41

## 2021-12-12 NOTE — CARE PLAN
The patient is Stable - Low risk of patient condition declining or worsening    Shift Goals  Clinical Goals: safety  Patient Goals: upgrade to thin liquids      Problem: Pain - Standard  Goal: Alleviation of pain or a reduction in pain to the patient’s comfort goal  Outcome: Progressing: Pt declined any pain at bedtime but was informed to ring the call light anytime during the night if she needed any pain medication. Pt stated she understood.       Problem: Skin Integrity  Goal: Skin integrity is maintained or improved  Outcome: Progressing: Pt has some pink on her coccyx but it blanches. Pt rings call light when she wants assistance rolling on her sides.

## 2021-12-13 PROCEDURE — A9270 NON-COVERED ITEM OR SERVICE: HCPCS | Performed by: HOSPITALIST

## 2021-12-13 PROCEDURE — 770010 HCHG ROOM/CARE - REHAB SEMI PRIVAT*

## 2021-12-13 PROCEDURE — A9270 NON-COVERED ITEM OR SERVICE: HCPCS | Performed by: PHYSICAL MEDICINE & REHABILITATION

## 2021-12-13 PROCEDURE — 700102 HCHG RX REV CODE 250 W/ 637 OVERRIDE(OP): Performed by: PHYSICAL MEDICINE & REHABILITATION

## 2021-12-13 PROCEDURE — 97112 NEUROMUSCULAR REEDUCATION: CPT

## 2021-12-13 PROCEDURE — 97116 GAIT TRAINING THERAPY: CPT

## 2021-12-13 PROCEDURE — 97535 SELF CARE MNGMENT TRAINING: CPT

## 2021-12-13 PROCEDURE — 700102 HCHG RX REV CODE 250 W/ 637 OVERRIDE(OP): Performed by: HOSPITALIST

## 2021-12-13 PROCEDURE — 700111 HCHG RX REV CODE 636 W/ 250 OVERRIDE (IP): Performed by: PHYSICAL MEDICINE & REHABILITATION

## 2021-12-13 PROCEDURE — 92526 ORAL FUNCTION THERAPY: CPT

## 2021-12-13 PROCEDURE — 97110 THERAPEUTIC EXERCISES: CPT

## 2021-12-13 PROCEDURE — 92526 ORAL FUNCTION THERAPY: CPT | Performed by: SPEECH-LANGUAGE PATHOLOGIST

## 2021-12-13 PROCEDURE — 99232 SBSQ HOSP IP/OBS MODERATE 35: CPT | Performed by: PHYSICAL MEDICINE & REHABILITATION

## 2021-12-13 RX ORDER — HYDROCHLOROTHIAZIDE 25 MG/1
12.5 TABLET ORAL
Status: DISCONTINUED | OUTPATIENT
Start: 2021-12-13 | End: 2021-12-22 | Stop reason: HOSPADM

## 2021-12-13 RX ADMIN — AMLODIPINE BESYLATE 10 MG: 5 TABLET ORAL at 05:05

## 2021-12-13 RX ADMIN — SENNOSIDES AND DOCUSATE SODIUM 2 TABLET: 50; 8.6 TABLET ORAL at 09:35

## 2021-12-13 RX ADMIN — HYDROCHLOROTHIAZIDE 12.5 MG: 25 TABLET ORAL at 11:26

## 2021-12-13 RX ADMIN — ENOXAPARIN SODIUM 40 MG: 40 INJECTION SUBCUTANEOUS at 09:32

## 2021-12-13 RX ADMIN — Medication 1000 UNITS: at 09:35

## 2021-12-13 RX ADMIN — LABETALOL HYDROCHLORIDE 200 MG: 100 TABLET, FILM COATED ORAL at 09:32

## 2021-12-13 RX ADMIN — MELATONIN TAB 3 MG 3 MG: 3 TAB at 21:43

## 2021-12-13 RX ADMIN — CLOPIDOGREL BISULFATE 75 MG: 75 TABLET ORAL at 09:33

## 2021-12-13 RX ADMIN — OMEPRAZOLE 20 MG: 20 CAPSULE, DELAYED RELEASE ORAL at 09:36

## 2021-12-13 RX ADMIN — MODAFINIL 100 MG: 100 TABLET ORAL at 09:32

## 2021-12-13 RX ADMIN — ATORVASTATIN CALCIUM 80 MG: 40 TABLET, FILM COATED ORAL at 09:33

## 2021-12-13 RX ADMIN — TRAZODONE HYDROCHLORIDE 50 MG: 50 TABLET ORAL at 21:43

## 2021-12-13 RX ADMIN — LISINOPRIL 40 MG: 20 TABLET ORAL at 09:33

## 2021-12-13 RX ADMIN — CARBAMAZEPINE 200 MG: 100 TABLET, CHEWABLE ORAL at 09:33

## 2021-12-13 RX ADMIN — ASPIRIN 81 MG: 81 TABLET, COATED ORAL at 09:33

## 2021-12-13 RX ADMIN — LABETALOL HYDROCHLORIDE 200 MG: 100 TABLET, FILM COATED ORAL at 21:42

## 2021-12-13 RX ADMIN — CARBAMAZEPINE 200 MG: 100 TABLET, CHEWABLE ORAL at 21:43

## 2021-12-13 RX ADMIN — TIZANIDINE 2 MG: 4 TABLET ORAL at 21:41

## 2021-12-13 ASSESSMENT — GAIT ASSESSMENTS
ASSISTIVE DEVICE: PLATFORM WALKER
DEVIATION: ATAXIC;DECREASED BASE OF SUPPORT;BRADYKINETIC;DECREASED HEEL STRIKE;DECREASED TOE OFF
DISTANCE (FEET): 30
GAIT LEVEL OF ASSIST: CONTACT GUARD ASSIST

## 2021-12-13 ASSESSMENT — ACTIVITIES OF DAILY LIVING (ADL)
TOILET_TRANSFER_DESCRIPTION: GRAB BAR;INCREASED TIME;SET-UP OF EQUIPMENT;SUPERVISION FOR SAFETY;VERBAL CUEING
TOILETING_LEVEL_OF_ASSIST_DESCRIPTION: ASSIST TO PULL PANTS UP;ASSIST FOR STANDING BALANCE;GRAB BAR;INCREASED TIME;SET-UP OF EQUIPMENT;SUPERVISION FOR SAFETY;VERBAL CUEING

## 2021-12-13 NOTE — THERAPY
"Speech Language Pathology  Daily Treatment     Patient Name: Nakita Sheppard  Age:  76 y.o., Sex:  female  Medical Record #: [id number]  Today's Date: 12/12/2021     Precautions  Precautions: Fall Risk,Weight Bearing As Tolerated Left Upper Extremity,Swallow Precautions ( See Comments)  Comments: L wrist brace WBAT, L neglect, slow to mobilize, Zio patch    Subjective    Pt completed tx at bedside.  Pt unwilling to get out of bed and go to lunch.  Pt reported, \"I'm not hungry.\"       Objective       12/12/21 1131   Voice   Laryngeal Relaxation Moderate (3)   Interdisciplinary Plan of Care Collaboration   IDT Collaboration with  Nursing   Collaboration Comments lethargy; neck pain, decreased appetite    SLP Total Time Spent   SLP Individual Total Time Spent (Mins) 30   Treatment Charges   SLP Treatment - Individual Speech Language Treatment - Individual       Assessment    Maximum phonation time: 3 seconds.  Pt required mod cues to complete laryngeal relaxation techniques (yawn sigh, glottal valenzuela).  Pt demonstrated ability to complete laryngeal massage independently.      Strengths: Pleasant and cooperative,Motivated for self care and independence,Willingly participates in therapeutic activities,Independent prior level of function,Supportive family  Barriers: Aspiration risk,Impaired insight/denial of deficits,Impaired carryover of learning,Impaired functional cognition    Plan    Target dysphagia management, laryngeal relaxation, clear speech strategies.          Speech Therapy Problems (Active)     Problem: Expression STGs     Dates: Start: 12/04/21       Goal: STG-Within one week, patient will     Dates: Start: 12/04/21       Description: Utilize clear speech strategies with mod cues in 70% of opportunities to increase overall speech intelligibility.        Goal: STG-Within one week, patient will     Dates: Start: 12/04/21       Description: Participate in a standardized language assessment to further identify " expressive/receptive language needs.          Problem: Memory STGs     Dates: Start: 12/04/21       Goal: STG-Within one week, patient will     Dates: Start: 12/04/21       Description: Complete functional IADL tasks (e.g. med and financial management) with 70% acc and mod cues to ensure safe return to her PLOF.           Problem: Speech/Swallowing LTGs     Dates: Start: 12/04/21       Goal: LTG-By discharge, patient will safely swallow     Dates: Start: 12/04/21       Description: Safest/least restrictive diet with no overt s/sx of aspiration for 100% of intake to maintain adequate nutrition and hydration.        Goal: LTG-By discharge, patient will     Dates: Start: 12/04/21       Description: Complete cog-ling tasks at modified independence level to ensure safe return to PLOF.        Goal: LTG-By discharge, patient will     Dates: Start: 12/04/21       Description: Communicate complex ideas at the conversation level with 90% intelligibility to a naiive listener.           Problem: Swallowing STGs     Dates: Start: 12/04/21       Goal: STG-Within one week, patient will consume trials of thin liquids with use of effortful swallow with no overt s/sx of asp/pen noted on 80% of trials provided MIN cues     Dates: Start: 12/08/21

## 2021-12-13 NOTE — THERAPY
Speech Language Pathology  Daily Treatment     Patient Name: Nakita Sheppard  Age:  76 y.o., Sex:  female  Medical Record #: [id number]  Today's Date: 12/13/2021     Precautions  Precautions: Fall Risk,Weight Bearing As Tolerated Left Upper Extremity  Comments: L wrist brace, thickened liquids    Subjective    Patient was sleeping when SLP arrived but woke to auditory stim. Patient was agreeable to ST in the speech office. Her daughter was present fort the session.      Objective       12/13/21 1401   Precautions   Precautions Fall Risk;Weight Bearing As Tolerated Left Upper Extremity   Comments L wrist brace, thickened liquids   Dysphagia    Other Treatments PO trials of upgraded diet   Diet / Liquid Recommendation Minced & Moist (5) - (Dysphagia II);Mildly Thick (2) - (Nectar Thick)   Nutritional Liquid Intake Rating Scale Thickened beverages (mildly thick unless otherwise specified)   Nutritional Food Intake Rating Scale Total oral diet with multiple consistencies but requiring special preparation or compensations   Interdisciplinary Plan of Care Collaboration   IDT Collaboration with  Family / Caregiver   Patient Position at End of Therapy In Bed;Family / Friend in Room;Call Light within Reach   Collaboration Comments dtr present for tx   SLP Total Time Spent   SLP Individual Total Time Spent (Mins) 30   Treatment Charges   SLP Swallowing Dysfunction Treatment Swallowing Dysfunction Treatment       Assessment    SLP provided patient with PO trials of thin liquids to determine potential for upgrade. Patient presented with a weak throat clear for 1/5 trials of thin via tsp and a cough response for 1/5 trials of thin via small cup sips. Patient was noted with a delayed cough after thin liquid trials, indicative of aspiration. SLP recommends continued thickened liquids due to high risk of aspiration with thin liquids.    SLP also introduced Effortful Swallow and Breath Hold with ice chip trials. Patient followed  commands to complete 5 of each. SLP also discussed with patient's daughter to cue her to complete these exercises with mildly thick liquids via half tsp, throughout the day.     Strengths: Pleasant and cooperative,Motivated for self care and independence,Willingly participates in therapeutic activities,Independent prior level of function,Supportive family  Barriers: Aspiration risk,Impaired insight/denial of deficits,Impaired carryover of learning,Impaired functional cognition    Plan    Cont Mince/Moist textures and Mildly Thick liquids  Continue oropharyngeal exercises  Consider repeat MBSS later in the week    Passport items to be completed:  Express basic needs, understand food/liquid recommendations, consistently follow swallow precautions, manage finances, manage medications, arrive to therapy appointments on time, complete daily memory log entries, solve problems related to safety situations, review education related to hospitalization, complete caregiver training     Speech Therapy Problems (Active)     Problem: Expression STGs     Dates: Start: 12/04/21       Goal: STG-Within one week, patient will     Dates: Start: 12/04/21   Expected End: 12/16/21       Description: Utilize clear speech strategies with mod cues in 70% of opportunities to increase overall speech intelligibility.        Goal: STG-Within one week, patient will     Dates: Start: 12/04/21   Expected End: 12/16/21       Description: Participate in a standardized language assessment to further identify expressive/receptive language needs.          Problem: Memory STGs     Dates: Start: 12/04/21       Goal: STG-Within one week, patient will     Dates: Start: 12/04/21   Expected End: 12/16/21       Description: Complete functional IADL tasks (e.g. med and financial management) with 70% acc and mod cues to ensure safe return to her PLOF.           Problem: Speech/Swallowing LTGs     Dates: Start: 12/04/21       Goal: LTG-By discharge, patient will  safely swallow     Dates: Start: 12/04/21   Expected End: 12/16/21       Description: Safest/least restrictive diet with no overt s/sx of aspiration for 100% of intake to maintain adequate nutrition and hydration.        Goal: LTG-By discharge, patient will     Dates: Start: 12/04/21   Expected End: 12/16/21       Description: Complete cog-ling tasks at modified independence level to ensure safe return to PLOF.        Goal: LTG-By discharge, patient will     Dates: Start: 12/04/21   Expected End: 12/16/21       Description: Communicate complex ideas at the conversation level with 90% intelligibility to a naiive listener.           Problem: Swallowing STGs     Dates: Start: 12/04/21       Goal: STG-Within one week, patient will consume trials of thin liquids with use of effortful swallow with no overt s/sx of asp/pen noted on 80% of trials provided MIN cues     Dates: Start: 12/08/21   Expected End: 12/16/21

## 2021-12-13 NOTE — THERAPY
Speech Language Pathology  Daily Treatment     Patient Name: Nakita Sheppard  Age:  76 y.o., Sex:  female  Medical Record #: [id number]  Today's Date: 12/13/2021     Precautions  Precautions: Fall Risk,Weight Bearing As Tolerated Left Upper Extremity,Swallow Precautions ( See Comments)  Comments: L wrist brace WBAT, L neglect, slow to mobilize, Zio patch    Subjective    Pt pleasant and cooperative, last minute schedule change, pt willing to participate with SLP but refused alternative items to be added to tray at this time.     Objective       12/13/21 0803   SLP Total Time Spent   SLP Individual Total Time Spent (Mins) 30   Treatment Charges   SLP Swallowing Dysfunction Treatment Swallowing Dysfunction Treatment       Assessment    Pt assessed with minced and most with mildly thick liquids, pt with throat clear X2 following textures, with mild oral residue via left side, direct verbal cues to clear. NO overt s/sx of asp/pen noted with mildly thick liquids. Pt consumed 80% of meal which was drastic improvement from prior meals observed.     Strengths: Pleasant and cooperative,Motivated for self care and independence,Willingly participates in therapeutic activities,Independent prior level of function,Supportive family  Barriers: Aspiration risk,Impaired insight/denial of deficits,Impaired carryover of learning,Impaired functional cognition    Plan    Trials of thin liquids outside of meals, soft and bite size trials at meals, effortful swallows, consider repeat MBSS    Speech Therapy Problems (Active)     Problem: Expression STGs     Dates: Start: 12/04/21       Goal: STG-Within one week, patient will     Dates: Start: 12/04/21   Expected End: 12/16/21       Description: Utilize clear speech strategies with mod cues in 70% of opportunities to increase overall speech intelligibility.        Goal: STG-Within one week, patient will     Dates: Start: 12/04/21   Expected End: 12/16/21       Description: Participate  in a standardized language assessment to further identify expressive/receptive language needs.          Problem: Memory STGs     Dates: Start: 12/04/21       Goal: STG-Within one week, patient will     Dates: Start: 12/04/21   Expected End: 12/16/21       Description: Complete functional IADL tasks (e.g. med and financial management) with 70% acc and mod cues to ensure safe return to her PLOF.           Problem: Speech/Swallowing LTGs     Dates: Start: 12/04/21       Goal: LTG-By discharge, patient will safely swallow     Dates: Start: 12/04/21   Expected End: 12/16/21       Description: Safest/least restrictive diet with no overt s/sx of aspiration for 100% of intake to maintain adequate nutrition and hydration.        Goal: LTG-By discharge, patient will     Dates: Start: 12/04/21   Expected End: 12/16/21       Description: Complete cog-ling tasks at modified independence level to ensure safe return to PLOF.        Goal: LTG-By discharge, patient will     Dates: Start: 12/04/21   Expected End: 12/16/21       Description: Communicate complex ideas at the conversation level with 90% intelligibility to a naiive listener.           Problem: Swallowing STGs     Dates: Start: 12/04/21       Goal: STG-Within one week, patient will consume trials of thin liquids with use of effortful swallow with no overt s/sx of asp/pen noted on 80% of trials provided MIN cues     Dates: Start: 12/08/21   Expected End: 12/16/21

## 2021-12-13 NOTE — THERAPY
"Occupational Therapy  Daily Treatment     Patient Name: Nakita Sheppard  Age:  76 y.o., Sex:  female  Medical Record #: [id number]  Today's Date: 12/13/2021     Precautions  Precautions: (P) Fall Risk,Weight Bearing As Tolerated Left Upper Extremity,Swallow Precautions ( See Comments)  Comments: (P) L wrist brace WBAT, L neglect, slow to mobilize, Zio patch    Safety   ADL Safety : Requires Supervision for Safety,Requires Physical Assist for Safety,Requires Cueing for Safety  Bathroom Safety: Requires Physical Assist for Safety,Requires Supervision for Safety,Requires Cuing for Safety  Comments: see functional levels below for ADL performance details.    Subjective    Pt reports feeling \"wiped out\", but agreeable to OT session.     Objective       12/13/21 1031   Precautions   Precautions Fall Risk;Weight Bearing As Tolerated Left Upper Extremity;Swallow Precautions ( See Comments)   Comments L wrist brace WBAT, L neglect, slow to mobilize, Zio patch   Cognition    Speech/ Communication Delayed Responses   Level of Consciousness Alert   Functional Level of Assist   Eating Stand by Assist   Eating Description Increased time;Modified diet;Set-up of equipment or meal/tube feeding;Supervision for safety;Verbal cueing   Grooming Supervision;Seated   Grooming Description Increased time;Set-up of equipment;Supervision for safety  (oral care and combing hair)   Bathing Minimal Assist   Bathing Description Grab bar;Hand held shower;Tub bench;Assit with perineal;Increased time;Set-up of equipment;Supervision for safety;Verbal cueing  (cues for safety, A to wash buttocks and RUE)   Upper Body Dressing Minimal Assist   Upper Body Dressing Description Assit with threading arms through sleeves  (A to thread LUE)   Lower Body Dressing Moderate Assist   Lower Body Dressing Description Assist with threading into pant leg;Increased time;Initial preparation for task;Set-up of equipment;Supervision for safety;Verbal cueing  (A to " thread LLE&pull up over L hip, s/u to don/doff shoes)   Toileting Moderate Assist   Toileting Description Assist to pull pants up;Assist for standing balance;Grab bar;Increased time;Set-up of equipment;Supervision for safety;Verbal cueing   Toilet Transfers Contact Guard Assist   Toilet Transfer Description Grab bar;Increased time;Set-up of equipment;Supervision for safety;Verbal cueing  (w/c<>toilet stand step via GB, cues for safety)   Tub / Shower Transfers Minimal Assist   Tub Shower Transfer Description Grab bar;Shower bench;Increased time;Set-up of equipment;Verbal cueing;Supervision for safety  (w/c<>shower bench stand step via GB, cues for safety)   Interdisciplinary Plan of Care Collaboration   IDT Collaboration with  Therapy Tech   Patient Position at End of Therapy Seated;Chair Alarm On;Self Releasing Lap Belt Applied;Other (Comments)   Collaboration Comments transfer of care to tech for t-dine   OT Total Time Spent   OT Individual Total Time Spent (Mins) 60   OT Charge Group   OT Self Care / ADL 3   OT Neuromuscular Re-education / Balance 1     Saebo mobile arm support (MAS) for unweighting LUE to increase repetitions, improve motor recovery, and assist in relearning normal movement patterns. Pt completed 3x10 of the following exercises, seated in front of mirror for visual feedback: shoulder flex/ext, abduction/adduction, horizontal abduction/adduction and elbow flex/ext. Saebo MAS tension set to level 5 for all exercises; adjusted with pt fatigue.      Assessment    Pt con't to make progress and requires less assistance with ADLs and mobility. She con't to demonstrate LOB to left when turning during functional transfers, and requires cues for safety and L-side attention. Pt completed AAROM exercises using saebo MAS with no c/o pain.   Strengths: Independent prior level of function,Motivated for self care and independence,Pleasant and cooperative,Supportive family,Willingly participates in therapeutic  activities  Barriers: Aspiration risk,Decreased endurance,Fatigue,Hemiparesis,Impaired activity tolerance,Impaired balance,Impaired functional cognition,Pain    Plan    ADLs, functional transfers, LUE ROM/positioning, LUE neuro re-ed, activities to improve L side attention. Monitor L wrist pain/brace.        Occupational Therapy Goals (Active)     Problem: Bathing     Dates: Start: 12/04/21       Goal: STG-Within one week, patient will bathe with min A using AE as needed.     Dates: Start: 12/04/21   Expected End: 12/16/21       Goal Note filed on 12/08/21 1345 by Delaney Price OT     Con't to require mod-max A d/t L hemiparesis, pain, fatigue.                Problem: Dressing     Dates: Start: 12/04/21       Goal: STG-Within one week, patient will dress UB with min A using alexx technique.     Dates: Start: 12/04/21   Expected End: 12/16/21       Goal Note filed on 12/08/21 1345 by Delaney Pirce OT     Con't to require mod-max A d/t L hemiparesis, pain, fatigue.             Goal: STG-Within one week, patient will dress LB with mod A using alexx technique.      Dates: Start: 12/04/21   Expected End: 12/16/21       Goal Note filed on 12/08/21 1345 by Delaney Price OT     Con't to require max A d/t L hemiparesis, pain, fatigue.                Problem: Functional Transfers     Dates: Start: 12/04/21       Goal: STG-Within one week, patient will transfer to toilet with min A using AE/DME as needed.      Dates: Start: 12/04/21   Expected End: 12/16/21       Goal Note filed on 12/08/21 1345 by Delaney Price OT     Con't to require mod-max A d/t L hemiparesis, pain, fatigue.                Problem: OT Long Term Goals     Dates: Start: 12/04/21       Goal: LTG-By discharge, patient will complete basic self care tasks at supervision to mod I level using AE as needed.      Dates: Start: 12/04/21   Expected End: 12/16/21          Goal: LTG-By discharge, patient will perform bathroom transfers at supervision level  using AE/DME as needed.      Dates: Start: 12/04/21   Expected End: 12/16/21             Problem: Toileting     Dates: Start: 12/04/21       Goal: STG-Within one week, patient will complete toileting tasks with mod A using AE as needed.      Dates: Start: 12/04/21   Expected End: 12/16/21       Goal Note filed on 12/08/21 1795 by Delaney Price, OT     Con't to require max A d/t L hemiparesis, pain, fatigue.

## 2021-12-13 NOTE — THERAPY
Physical Therapy   Daily Treatment     Patient Name: Nakita Sheppard  Age:  76 y.o., Sex:  female  Medical Record #: [id number]  Today's Date: 12/12/2021     Precautions  Precautions: (P) Fall Risk,Weight Bearing As Tolerated Left Upper Extremity,Swallow Precautions ( See Comments)  Comments: (P) L wrist brace WBAT, L neglect, slow to mobilize, Zio patch    Subjective    I have not tried the platform walker yet     Objective       12/12/21 1501   Precautions   Precautions Fall Risk;Weight Bearing As Tolerated Left Upper Extremity;Swallow Precautions ( See Comments)   Comments L wrist brace WBAT, L neglect, slow to mobilize, Zio patch   Pain   Intervention Heat Applied   Pain 0 - 10 Group   Location Neck   Location Orientation Right   Pain Rating Scale (NPRS) 3   Description Aching   Comfort Goal Comfort with Movement   Cognition    Speech/ Communication Delayed Responses   Level of Consciousness Alert   Comments L inattention- able to scan left if reminded   ABS (Agitated Behavior Scale)   Agitated Behavior Scale Performed No   Gait Functional Level of Assist    Gait Level Of Assist Minimal Assist   Assistive Device Platform Walker   Distance (Feet) 10   # of Times Distance was Traveled 2   Deviation Ataxic;Bradykinetic;Shuffled Gait   Wheelchair Functional Level of Assist   Wheelchair Assist Minimal Assist   Distance Wheelchair (Feet or Distance) 75 ft   Wheelchair Description Requires incidental assist;Limited by fatigue;Impaired coordination  (B LE propulsion)   Stairs Functional Level of Assist   Level of Assist with Stairs Unable to Participate   Transfer Functional Level of Assist   Bed, Chair, Wheelchair Transfer Minimal Assist   Bed Chair Wheelchair Transfer Description Increased time;Initial preparation for task;Set-up of equipment;Squat pivot transfer to wheelchair   Supine Lower Body Exercise   Bridges 2 sets of 10   Hip Abduction 2 sets of 10;Bilateral   Hip Adduction  2 sets of 10;Bilateral    Straight Leg Raises Front;2 sets of 10   Heel Slide 2 sets of 10   Ankle Pumps 3 sets of 10   Quadriceps Isometrics 2 sets of 10   Comments manual cues used for ROM and contraction   Neuro-Muscular Treatments   Neuro-Muscular Treatments Verbal Cuing;Tactile Cuing;Sequencing   Comments with supine exercises, sit to stand transfers, PFWW progression   Interdisciplinary Plan of Care Collaboration   IDT Collaboration with  Nursing   Patient Position at End of Therapy Seated;Self Releasing Lap Belt Applied;Call Light within Reach;Tray Table within Reach;Phone within Reach   Collaboration Comments discussed neck pain and request for additional warm pack   Strengths & Barriers   Strengths Able to follow instructions;Manages pain appropriately;Motivated for self care and independence;Pleasant and cooperative;Willingly participates in therapeutic activities   Barriers Decreased endurance;Hemiplegia;Impaired balance   PT Total Time Spent   PT Individual Total Time Spent (Mins) 60   PT Charge Group   PT Gait Training 1   PT Therapeutic Exercise 2   PT Therapeutic Activities 1     Moist warm pack applied to cervical area/upper trap during supine exercises for 20 min    Assessment    Patient able to complete squat pivot transfer WC to mat with Tyson for LE lifting up to mat. Able to complete supine exercises once guided through motion. Trial of gait with PFWW- patient able to complete short bouts but fatigued easily and drifted to R during gait. Completed gait in front of mirror for visual feedback. Patient reports she wants to try again tomorrow. Improved WC mobility with LE propulsion.  Strengths: (P) Able to follow instructions,Manages pain appropriately,Motivated for self care and independence,Pleasant and cooperative,Willingly participates in therapeutic activities  Barriers: (P) Decreased endurance,Hemiplegia,Impaired balance    Plan    Safety education, encourage participation in PT even when she reports fatigue, gait  training left platform walker, practice bed mobility particularly sit to supine, standing tolerance, therapeutic exercise    Passport items to be completed:  Get in/out of bed safely, in/out of a vehicle, safely use mobility device, walk or wheel around home/community, navigate up and down stairs, show how to get up/down from the ground, ensure home is accessible, demonstrate HEP, complete caregiver training    Physical Therapy Problems (Active)     Problem: Mobility     Dates: Start: 12/04/21       Goal: STG-Within one week, patient will ambulate household distance x10' with SPC and Mod A     Dates: Start: 12/04/21             Problem: Mobility Transfers     Dates: Start: 12/04/21       Goal: STG-Within one week, patient will perform bed mobility Min A without bedrails     Dates: Start: 12/04/21          Goal: STG-Within one week, patient will sit to stand CGA 5/5 trials with SPC     Dates: Start: 12/04/21          Goal: STG-Within one week, patient will transfer bed to chair Min A     Dates: Start: 12/04/21             Problem: PT-Long Term Goals     Dates: Start: 12/04/21       Goal: LTG-By discharge, patient will maintain balance at least 45/56 on the [place] to DC to home safely with support of spouse     Dates: Start: 12/04/21          Goal: LTG-By discharge, patient will ambulate at least 500' with SPC and SPV inside and outside surfaces     Dates: Start: 12/04/21          Goal: LTG-By discharge, patient will transfer one surface to another SPV with SPC     Dates: Start: 12/04/21          Goal: LTG-By discharge, patient will transfer in/out of a car SBA from ambulatory level with SPC     Dates: Start: 12/04/21          Goal: LTG-By discharge, patient will ascend/descend ADA ramp with SPC and SBA to enter/exit her home safely     Dates: Start: 12/04/21

## 2021-12-13 NOTE — CARE PLAN
"The patient is Stable - Low risk of patient condition declining or worsening    Shift Goals  Clinical Goals: Safety  Patient Goals: upgrade to thin liquids    Problem: Skin Integrity  Goal: Skin integrity is maintained or improved  Outcome: Progressing  Note:   Rajat Score: 16    Patient's skin remains intact and free from new or accidental injury this shift; no s/s of infection. RN wound protocol checked. Encouraged hydration and educated about the importance of nutrition to keep skin integrity. Will continue to monitor.       Problem: Fall Risk - Rehab  Goal: Patient will remain free from falls  Outcome: Progressing  Note: Erin Iriazrry Fall risk Assessment Score: 14    Moderate fall risk Interventions  - Bed and strip alarm   - Yellow sign by the door   - Yellow wrist band \"Fall risk\"  - Room near to the nurse station  - Do not leave patient unattended in the bathroom  - Fall risk education provided     "

## 2021-12-13 NOTE — CARE PLAN
"  Problem: Pain - Standard  Goal: Alleviation of pain or a reduction in pain to the patient’s comfort goal  Outcome: Progressing  Patient able to rate pain on a scale of 1-10.        Problem: Fall Risk - Rehab  Goal: Patient will remain free from falls  Outcome: Progressing  Erin Irizarry Fall risk Assessment Score: 14    Moderate fall risk Interventions  - Bed and strip alarm   - Yellow sign by the door   - Yellow wrist band \"Fall risk\"  - Room near to the nurse station  - Do not leave patient unattended in the bathroom  - Fall risk education provided                 "

## 2021-12-13 NOTE — CARE PLAN
Problem: Inadequate nutrient intake  Goal: Patient to consume greater than or equal to 50% of meals  Outcome: Progressing  Note: Patient free from s/s dehydration.  Oral and buccal mucosa pink and moist; conjunctiva moist; skin turgor good.  PO intake adequate.     Problem: Bladder / Voiding  Goal: Patient will establish and maintain regular urinary output  Outcome: Progressing  Note: Patient voiding adequate amounts of clear, yellow urine. Denies dysuria and flank pain: afebrile.      Problem: Mobility  Goal: Patient's capacity to carry out activities will improve  Outcome: Progressing  Note: Patient demonstrates good safety technique this shift.  Asks for assistance when needed and does not attempt self transfer.  Able to verbalize needs.

## 2021-12-13 NOTE — THERAPY
12/13/21 0929   Precautions   Precautions Fall Risk;Weight Bearing As Tolerated Left Upper Extremity;Swallow Precautions ( See Comments)   Comments L wrist brace WBAT, L neglect, slow to mobilize, Zio patch   Pain 0 - 10 Group   Therapist Pain Assessment 0   Cognition    Speech/ Communication Delayed Responses   Level of Consciousness Alert   Ability To Follow Commands 2 Step   Attention Impaired   Sequencing Impaired   Initiation Impaired   Gait Functional Level of Assist    Gait Level Of Assist Contact Guard Assist   Assistive Device Platform Walker   Distance (Feet) 30   # of Times Distance was Traveled 2   Deviation Ataxic;Decreased Base Of Support;Bradykinetic;Decreased Heel Strike;Decreased Toe Off   Transfer Functional Level of Assist   Bed, Chair, Wheelchair Transfer Minimal Assist   Bed Chair Wheelchair Transfer Description Adaptive equipment;Increased time;Assist with two limbs;Supervision for safety;Verbal cueing;Set-up of equipment   Sitting Lower Body Exercises   Ankle Pumps 2 sets of 15;Bilateral   Hip Flexion 2 sets of 15;Bilateral   Hip Abduction 2 sets of 15;Bilateral   Hip Adduction 2 sets of 15;Bilateral   Long Arc Quad 2 sets of 15;Bilateral   Hamstring Curl 2 sets of 15;Bilateral   Bed Mobility    Supine to Sit Contact Guard Assist   Sit to Supine Moderate Assist   Sit to Stand Contact Guard Assist   Scooting Minimal Assist   Rolling Minimum Assist to Lt.   Neuro-Muscular Treatments   Neuro-Muscular Treatments Facilitation;Postural Facilitation;Sequencing;Tactile Cuing;Verbal Cuing;Weight Shift Right   Comments Facilitated seated scooting in a wheelchair, sit to/from stand wheelchair/walker, sequencing gait with a  PFWW, sequencing stand to sit and seated scooting in the chair   Interdisciplinary Plan of Care Collaboration   Patient Position at End of Therapy Seated;Chair Alarm On;Self Releasing Lap Belt Applied;Call Light within Reach;Tray Table within Reach;Phone within Reach   PT Total  Time Spent   PT Individual Total Time Spent (Mins) 60   PT Charge Group   PT Gait Training 1   PT Therapeutic Exercise 2   PT Neuromuscular Re-Education / Balance 1   Physical Therapy   Daily Treatment     Patient Name: Nakita Sheppard  Age:  76 y.o., Sex:  female  Medical Record #: [id number]  Today's Date: 12/13/2021     Precautions  Precautions: Fall Risk,Weight Bearing As Tolerated Left Upper Extremity,Swallow Precautions ( See Comments)  Comments: L wrist brace WBAT, L neglect, slow to mobilize, Zio patch    Subjective    The patient was up in her chair after breakfast.  She agreed to PT.     Objective    The patient participated in sequencing sit to/from stand wheelchair/pfww CGA.  She utilized the pfww for gait training and tolerated 30 FT x2 CGA/Tyson.  The patient complained of feeling weak.  PT checked her blood pressure which was 127/51 with a pulse rate of 68 bpm, oxygen saturation was 95% on room air.  The patient did not want to participate in gait training any further during the session so she agreed to seated lower extremity therapeutic exercise 2 sets of 15.    Assessment    The patient was not able to articulate what she meant by feeling weak or what she thought it was related to.  Using the pfww for gait training, patient tolerated the activity and was able to manage the walker.  If the patient is discharged to home, she will need assistance for transfers, going in and out of the house and home mobility.  There is a ramp in place to access the home.  With a discharge set for 12/23, she will need a wheelchair for home and community and a platform walker for transfers and short distance gait because it is unlikely that a 20 inch wheelchair will fit through a bathroom doorway.  The patient is eligible for [employer] benefits because of her 's service as stated by her daughter.  Strengths: Able to follow instructions,Manages pain appropriately,Motivated for self care and independence,Pleasant and  cooperative,Willingly participates in therapeutic activities  Barriers: Decreased endurance,Hemiplegia,Impaired balance    Plan    Safety education, neuromuscular reeducation, bed mobility particularly sit to supine training, transfer training, gait training pfww    Passport items to be completed:  Passport items to be completed:  Get in/out of bed safely, in/out of a vehicle, safely use mobility device, walk or wheel around home/community, navigate up and down stairs, show how to get up/down from the ground, ensure home is accessible, demonstrate HEP, complete caregiver training    Physical Therapy Problems (Active)     Problem: Mobility     Dates: Start: 12/04/21       Goal: STG-Within one week, patient will ambulate household distance x10' with SPC and Mod A     Dates: Start: 12/04/21   Expected End: 12/16/21             Problem: Mobility Transfers     Dates: Start: 12/04/21       Goal: STG-Within one week, patient will perform bed mobility Min A without bedrails     Dates: Start: 12/04/21   Expected End: 12/16/21          Goal: STG-Within one week, patient will sit to stand CGA 5/5 trials with SPC     Dates: Start: 12/04/21   Expected End: 12/16/21          Goal: STG-Within one week, patient will transfer bed to chair Min A     Dates: Start: 12/04/21   Expected End: 12/16/21             Problem: PT-Long Term Goals     Dates: Start: 12/04/21       Goal: LTG-By discharge, patient will maintain balance at least 45/56 on the [place] to DC to home safely with support of spouse     Dates: Start: 12/04/21   Expected End: 12/16/21          Goal: LTG-By discharge, patient will ambulate at least 500' with SPC and SPV inside and outside surfaces     Dates: Start: 12/04/21   Expected End: 12/16/21          Goal: LTG-By discharge, patient will transfer one surface to another SPV with SPC     Dates: Start: 12/04/21   Expected End: 12/16/21          Goal: LTG-By discharge, patient will transfer in/out of a car SBA from ambulatory  level with SPC     Dates: Start: 12/04/21   Expected End: 12/16/21          Goal: LTG-By discharge, patient will ascend/descend ADA ramp with SPC and SBA to enter/exit her home safely     Dates: Start: 12/04/21   Expected End: 12/16/21

## 2021-12-14 LAB — GLUCOSE BLD-MCNC: 93 MG/DL (ref 65–99)

## 2021-12-14 PROCEDURE — A9270 NON-COVERED ITEM OR SERVICE: HCPCS | Performed by: PHYSICAL MEDICINE & REHABILITATION

## 2021-12-14 PROCEDURE — 99232 SBSQ HOSP IP/OBS MODERATE 35: CPT | Performed by: PHYSICAL MEDICINE & REHABILITATION

## 2021-12-14 PROCEDURE — 82962 GLUCOSE BLOOD TEST: CPT

## 2021-12-14 PROCEDURE — 97530 THERAPEUTIC ACTIVITIES: CPT

## 2021-12-14 PROCEDURE — 770010 HCHG ROOM/CARE - REHAB SEMI PRIVAT*

## 2021-12-14 PROCEDURE — 700102 HCHG RX REV CODE 250 W/ 637 OVERRIDE(OP): Performed by: HOSPITALIST

## 2021-12-14 PROCEDURE — 700111 HCHG RX REV CODE 636 W/ 250 OVERRIDE (IP): Performed by: PHYSICAL MEDICINE & REHABILITATION

## 2021-12-14 PROCEDURE — 92526 ORAL FUNCTION THERAPY: CPT

## 2021-12-14 PROCEDURE — 97110 THERAPEUTIC EXERCISES: CPT

## 2021-12-14 PROCEDURE — 97116 GAIT TRAINING THERAPY: CPT

## 2021-12-14 PROCEDURE — 97535 SELF CARE MNGMENT TRAINING: CPT

## 2021-12-14 PROCEDURE — 700102 HCHG RX REV CODE 250 W/ 637 OVERRIDE(OP): Performed by: PHYSICAL MEDICINE & REHABILITATION

## 2021-12-14 PROCEDURE — 97112 NEUROMUSCULAR REEDUCATION: CPT

## 2021-12-14 PROCEDURE — A9270 NON-COVERED ITEM OR SERVICE: HCPCS | Performed by: HOSPITALIST

## 2021-12-14 RX ORDER — AMOXICILLIN 250 MG
2 CAPSULE ORAL 2 TIMES DAILY
Status: DISCONTINUED | OUTPATIENT
Start: 2021-12-14 | End: 2021-12-21

## 2021-12-14 RX ORDER — BISACODYL 10 MG
10 SUPPOSITORY, RECTAL RECTAL
Status: DISCONTINUED | OUTPATIENT
Start: 2021-12-14 | End: 2021-12-21

## 2021-12-14 RX ORDER — POLYETHYLENE GLYCOL 3350 17 G/17G
1 POWDER, FOR SOLUTION ORAL DAILY
Status: DISCONTINUED | OUTPATIENT
Start: 2021-12-15 | End: 2021-12-21

## 2021-12-14 RX ADMIN — Medication 1000 UNITS: at 09:05

## 2021-12-14 RX ADMIN — CLOPIDOGREL BISULFATE 75 MG: 75 TABLET ORAL at 09:06

## 2021-12-14 RX ADMIN — LABETALOL HYDROCHLORIDE 200 MG: 100 TABLET, FILM COATED ORAL at 09:05

## 2021-12-14 RX ADMIN — HYDROCHLOROTHIAZIDE 12.5 MG: 25 TABLET ORAL at 05:28

## 2021-12-14 RX ADMIN — MELATONIN TAB 3 MG 3 MG: 3 TAB at 20:53

## 2021-12-14 RX ADMIN — CARBAMAZEPINE 200 MG: 100 TABLET, CHEWABLE ORAL at 20:53

## 2021-12-14 RX ADMIN — AMLODIPINE BESYLATE 10 MG: 5 TABLET ORAL at 05:27

## 2021-12-14 RX ADMIN — LABETALOL HYDROCHLORIDE 200 MG: 100 TABLET, FILM COATED ORAL at 20:52

## 2021-12-14 RX ADMIN — LISINOPRIL 40 MG: 20 TABLET ORAL at 09:05

## 2021-12-14 RX ADMIN — ASPIRIN 81 MG: 81 TABLET, COATED ORAL at 09:05

## 2021-12-14 RX ADMIN — ENOXAPARIN SODIUM 40 MG: 40 INJECTION SUBCUTANEOUS at 09:04

## 2021-12-14 RX ADMIN — CARBAMAZEPINE 200 MG: 100 TABLET, CHEWABLE ORAL at 09:06

## 2021-12-14 RX ADMIN — SENNOSIDES AND DOCUSATE SODIUM 2 TABLET: 50; 8.6 TABLET ORAL at 09:05

## 2021-12-14 RX ADMIN — SENNOSIDES AND DOCUSATE SODIUM 2 TABLET: 50; 8.6 TABLET ORAL at 21:00

## 2021-12-14 RX ADMIN — OMEPRAZOLE 20 MG: 20 CAPSULE, DELAYED RELEASE ORAL at 09:09

## 2021-12-14 RX ADMIN — MODAFINIL 100 MG: 100 TABLET ORAL at 09:05

## 2021-12-14 RX ADMIN — TRAZODONE HYDROCHLORIDE 50 MG: 50 TABLET ORAL at 20:53

## 2021-12-14 RX ADMIN — ATORVASTATIN CALCIUM 80 MG: 40 TABLET, FILM COATED ORAL at 09:05

## 2021-12-14 RX ADMIN — TIZANIDINE 2 MG: 4 TABLET ORAL at 20:52

## 2021-12-14 ASSESSMENT — GAIT ASSESSMENTS
DISTANCE (FEET): 30
ASSISTIVE DEVICE: PLATFORM WALKER
GAIT LEVEL OF ASSIST: CONTACT GUARD ASSIST
DISTANCE (FEET): 30
GAIT LEVEL OF ASSIST: CONTACT GUARD ASSIST
ASSISTIVE DEVICE: PLATFORM WALKER
DEVIATION: ATAXIC;DECREASED BASE OF SUPPORT;BRADYKINETIC;DECREASED HEEL STRIKE;DECREASED TOE OFF
ASSISTIVE DEVICE: PLATFORM WALKER
DISTANCE (FEET): 30
GAIT LEVEL OF ASSIST: CONTACT GUARD ASSIST
DEVIATION: ATAXIC;DECREASED BASE OF SUPPORT;BRADYKINETIC;DECREASED HEEL STRIKE;DECREASED TOE OFF

## 2021-12-14 ASSESSMENT — ACTIVITIES OF DAILY LIVING (ADL)
TOILET_TRANSFER_DESCRIPTION: ADAPTIVE EQUIPMENT;GRAB BAR;SUPERVISION FOR SAFETY;VERBAL CUEING
TOILET_TRANSFER_DESCRIPTION: GRAB BAR;INCREASED TIME;INITIAL PREPARATION FOR TASK;SET-UP OF EQUIPMENT;SUPERVISION FOR SAFETY;VERBAL CUEING

## 2021-12-14 NOTE — DISCHARGE PLANNING
CM rec'd call from patients nurse Kelle earlier this date stating patients daughter Irene has questions.  CM LM on Irene's VM to call this CM back.  CM available as needs arise.

## 2021-12-14 NOTE — THERAPY
"Speech Language Pathology  Daily Treatment     Patient Name: Nakita Sheppard  Age:  76 y.o., Sex:  female  Medical Record #: [id number]  Today's Date: 12/14/2021     Precautions  Precautions: Fall Risk,Weight Bearing As Tolerated Left Upper Extremity  Comments: L wrist brace, thickened liquids    Subjective    Pt pleasant and cooperative     Objective       12/14/21 1103   SLP Total Time Spent   SLP Individual Total Time Spent (Mins) 60   Treatment Charges   SLP Swallowing Dysfunction Treatment Swallowing Dysfunction Treatment       Assessment    Thin liquid trials completed in isolation via cup sip. Pt completed effortful swallow with second swallow with no overt s/sx of asp/pen noted on 18/20 trials. Pt required direct verbal cues for hard and fast swallows, pt with oral holding/delayed onset of swallow, pt reporting \"they told me to hold it to make my throat tight.\" Encouraged pt for hard and fast swallows as well as second swallow. Mild anterior spillage noted via left side.     Pt assessed with trials of soft and bite size textures with mildly thick liquids, no overt s/sx of asp/pen noted. Pt however with significant pocketing on left side, required direct cues to clear with use of finger sweep. Rec: advance diet, assess pts ability to self implement finger sweep to clear, ongoing trials of thin liquids with completion of follow up MBSS as appropriate.     Strengths: Pleasant and cooperative,Motivated for self care and independence,Willingly participates in therapeutic activities,Independent prior level of function,Supportive family  Barriers: Aspiration risk,Impaired insight/denial of deficits,Impaired carryover of learning,Impaired functional cognition    Plan    Advance to soft and bite size textures, cont thin liquid trials with SLP, consider repeat MBSS as appropriate.     Speech Therapy Problems (Active)     Problem: Expression STGs     Dates: Start: 12/04/21       Goal: STG-Within one week, patient " will     Dates: Start: 12/04/21   Expected End: 12/16/21       Description: Utilize clear speech strategies with mod cues in 70% of opportunities to increase overall speech intelligibility.        Goal: STG-Within one week, patient will     Dates: Start: 12/04/21   Expected End: 12/16/21       Description: Participate in a standardized language assessment to further identify expressive/receptive language needs.          Problem: Memory STGs     Dates: Start: 12/04/21       Goal: STG-Within one week, patient will     Dates: Start: 12/04/21   Expected End: 12/16/21       Description: Complete functional IADL tasks (e.g. med and financial management) with 70% acc and mod cues to ensure safe return to her PLOF.           Problem: Speech/Swallowing LTGs     Dates: Start: 12/04/21       Goal: LTG-By discharge, patient will safely swallow     Dates: Start: 12/04/21   Expected End: 12/16/21       Description: Safest/least restrictive diet with no overt s/sx of aspiration for 100% of intake to maintain adequate nutrition and hydration.        Goal: LTG-By discharge, patient will     Dates: Start: 12/04/21   Expected End: 12/16/21       Description: Complete cog-ling tasks at modified independence level to ensure safe return to PLOF.        Goal: LTG-By discharge, patient will     Dates: Start: 12/04/21   Expected End: 12/16/21       Description: Communicate complex ideas at the conversation level with 90% intelligibility to a naiive listener.           Problem: Swallowing STGs     Dates: Start: 12/04/21       Goal: STG-Within one week, patient will consume trials of thin liquids with use of effortful swallow with no overt s/sx of asp/pen noted on 80% of trials provided MIN cues     Dates: Start: 12/08/21   Expected End: 12/16/21

## 2021-12-14 NOTE — PROGRESS NOTES
"Rehab Progress Note     Date of Service: 12/13/2021  Chief Complaint: Follow-up stroke    Interval Events (Subjective)    Patient seen and examined today in her room.  She was able do some walking with physical therapy today.  She reports she is sleeping well.  She denies any pain or spasms on her left side.  Per therapy patient's  will not be able to physically assist at discharge.  For this reason we will need to refer the patient to a skilled nursing facility.    ROS: No changes to bowel, bladder, pain, mood, or sleep.         Objective:  VITAL SIGNS: /55   Pulse 71   Temp 36.4 °C (97.6 °F) (Oral)   Resp 18   Ht 1.575 m (5' 2\")   Wt 76 kg (167 lb 8.8 oz)   SpO2 97%   BMI 30.65 kg/m²   Gen: alert, no apparent distress  Neuro: notable for left hemiparesis in the arm, dysarthria, left-sided facial droop    No results found for this or any previous visit (from the past 72 hour(s)).    Current Facility-Administered Medications   Medication Frequency   • hydroCHLOROthiazide (HYDRODIURIL) tablet 12.5 mg Q DAY   • modafinil (PROVIGIL) tablet 100 mg QAM   • traZODone (DESYREL) tablet 50 mg QHS   • melatonin tablet 3 mg QHS   • amLODIPine (NORVASC) tablet 10 mg Q DAY   • butalbital/apap/caffeine -40 mg (Fioricet) per tablet 1 Tablet Q6HRS PRN   • tizanidine (ZANAFLEX) tablet 2 mg QHS   • omeprazole (PRILOSEC) capsule 20 mg QAM AC   • hydrOXYzine HCl (ATARAX) tablet 50 mg Q6HRS PRN   • Respiratory Therapy Consult Continuous RT   • Pharmacy Consult Request ...Pain Management Review 1 Each PHARMACY TO DOSE   • hydrALAZINE (APRESOLINE) tablet 10 mg Q8HRS PRN   • acetaminophen (Tylenol) tablet 650 mg Q4HRS PRN   • lactulose 20 GM/30ML solution 30 mL QDAY PRN   • docusate sodium (ENEMEEZ) enema 283 mg QDAY PRN   • sodium phosphate (Fleet) enema 133 mL QDAY PRN   • artificial tears ophthalmic solution 1 Drop PRN   • benzocaine-menthol (CEPACOL) lozenge 1 Lozenge Q2HRS PRN   • mag hydrox-al hydrox-simeth " (MAALOX PLUS ES or MYLANTA DS) suspension 20 mL Q2HRS PRN   • ondansetron (ZOFRAN ODT) dispertab 4 mg 4X/DAY PRN    Or   • ondansetron (ZOFRAN) syringe/vial injection 4 mg 4X/DAY PRN   • sodium chloride (OCEAN) 0.65 % nasal spray 2 Spray PRN   • midazolam (VERSED) 5 mg/mL (1 mL vial) PRN   • enoxaparin (LOVENOX) inj 40 mg DAILY   • senna-docusate (PERICOLACE or SENOKOT S) 8.6-50 MG per tablet 2 Tablet BID    And   • polyethylene glycol/lytes (MIRALAX) PACKET 1 Packet QDAY PRN    And   • magnesium hydroxide (MILK OF MAGNESIA) suspension 30 mL QDAY PRN    And   • bisacodyl (DULCOLAX) suppository 10 mg QDAY PRN   • aspirin EC (ECOTRIN) tablet 81 mg DAILY   • atorvastatin (LIPITOR) tablet 80 mg DAILY   • carBAMazepine (TEGRETOL) chewable tab 200 mg BID   • clopidogrel (PLAVIX) tablet 75 mg DAILY   • labetalol (NORMODYNE) tablet 200 mg Q12HRS   • lisinopril (PRINIVIL) tablet 40 mg DAILY   • vitamin D3 (cholecalciferol) tablet 1,000 Units DAILY       Orders Placed This Encounter   Procedures   • Diet Order Diet: Level 5 - Minced and Moist (Medications floated whole, one at a time, in pudding (pt does not like applesauce)); Liquid level: Level 2 - Mildly Thick; Tray Modifications (optional): No Straws     Standing Status:   Standing     Number of Occurrences:   1     Order Specific Question:   Diet:     Answer:   Level 5 - Minced and Moist [24]     Comments:   Medications floated whole, one at a time, in pudding (pt does not like applesauce)     Order Specific Question:   Liquid level     Answer:   Level 2 - Mildly Thick     Order Specific Question:   Tray Modifications (optional)     Answer:   No Straws       Assessment:  Active Hospital Problems    Diagnosis    • *Ischemic stroke (HCC)    • Polyuria    • Wrist fracture, closed, left, sequela    • Other insomnia    • Hypokalemia    • Class 1 obesity without serious comorbidity in adult    • Spasticity    • Left-sided neglect    • Other dysphagia    • Impaired mobility and  ADLs    • Hyperlipidemia LDL goal <70    • Peripheral neuropathy    • Hypertension      This patient is a 76 y.o. female admitted for acute inpatient rehabilitation with Ischemic stroke (HCC).    I led and attended the weekly conference, and agree with the IDT conference documentation and plan of care as noted below.    Date of conference: 12/8/2021    Goals and barriers: See IDT note.    Biggest barriers: Low motivation, lethargic, left wrist pain, hypophonia, moderate cognitive impairments, incontinent bowel bladder, fatigue, slow processing    Goals in next week: Improved motivation    CM/social support: Unclear how much physical support  can provide    Anticipated DC date: 12/24, or likely sooner to skilled nursing facility if her  is unable to physically assist    Home health: PT OT speech and nursing    Equip: To be determined    Follow up: PCP, [facility], Dr. Callejas, cardiology, orthopedic surgery        Medical Decision Making and Plan:    Right MCA stroke  Left hemiparesis, improved, arm more affected than leg  Nondominant  Left neglect, continues  Dysphagia, improved  Cognitive impairments  Continue full rehab program  PT/OT/SLP, 1 hr each discipline, 5 days per week     Aspirin  Plavix  Statin     Patient with Zio patch cardiac monitor, return 2 weeks from admission, 12/17    Outpatient follow-up with [facility], cardiology, Dr. Callejas, referrals made    MBS with aspiration on thins, diet downgraded per speech therapy recommendations    Started Provigil for neuro stimulation, improved initiation    Psychology consulted for evaluation, patient not depressed    Left wrist pain, improved  X-ray at acute showed possible avulsion fractures  Discussed with orthopedic surgery, outpatient follow-up  Wrist brace and weightbearing as tolerated     Hypertension  Lisinopril  Labetalol  Amlodipine, dose increased  Currently stable, hospitalist has signed off  Start  hydrochlorothiazide     Peripheral neuropathy  Continue home medication carbamazepine  Was also on Zanaflex (6 mg TID), restarted 2 mg at night due to complaints of leg spasms, will not restart home dosing due to adverse lethargy    History of migraines  Continue carbamazepine    Hypokalemia resolved  Status post supplementation    Insomnia, resolved  Restarted home medication tizanidine 2 mg  Added scheduled melatonin and trazodone     Obesity  BMI 32.6  Outpatient nutritional counseling    Vitamin D deficiency  Continue supplementation    GI prophylaxis  Omeprazole     Polyuria  Negative urinalysis    Bowel program  Bowel incontinence  Continue bowel medications  Last BM 12/11    Bladder program  Bladder incontinence  Check PVRs - 25, 15, 10  Not retaining  Bladder scan for no voids  ICP for over 400 cc  Scheduled toileting    DVT prophylaxis  Lovenox      Total time:  25 minutes.  I spent greater than 50% of the time for patient care, counseling, and coordination on this date, including patient face-to face time, unit/floor time with review of records/pertinent lab data and studies, as well as discussing diagnostic evaluation/work up, planned therapeutic interventions, and future disposition of care, as per the interval events/subjective and the assessment and plan as noted above.    I have performed a physical exam, reviewed and updated ROS, as well as the assessment and plan today 12/13/2021. In review of note from 12/10/2021 there are no new changes except as documented above.              Hawa Alva M.D.   Physical Medicine and Rehabilitation

## 2021-12-14 NOTE — CARE PLAN
"     The patient is Stable - Low risk of patient condition declining or worsening    Shift Goals  Clinical Goals: Safety  Patient Goals: Sleep well tonight, no fall event.     Problem: Fall Risk - Rehab  Goal: Patient will remain free from falls  Note: Erin Irizarry Fall risk Assessment Score: 16    High fall risk Interventions   - Alarming seatbelt  - Bed and strip alarm   - Yellow sign by the door   - Yellow wrist band \"Fall risk\"  - Room near to the nurse station  - Do not leave patient unattended in the bathroom  - Fall risk education provided       Problem: Pain - Standard  Goal: Alleviation of pain or a reduction in pain to the patient’s comfort goal  Outcome: Progressing  Note: Patient able to verbalize needs.  Denies pain or discomfort this shift and no s/s same noted.  Will continue to monitor.     Tolerated diet. OOB-BR, voiding well. Sleeping during rounds. VSS. No C/O voiced.        "

## 2021-12-14 NOTE — PROGRESS NOTES
"Rehab Progress Note     Date of Service: 12/14/2021  Chief Complaint: Follow-up stroke    Interval Events (Subjective)    Patient seen and examined today in her room.   She is resting in bed today.  She reports her  is coming in this afternoon.  She also reports he plans on hiring a care giver to assist when she gets home.  She is currently denying any pain.  She has no new complaints today.  Therapy reports an episode of dizziness today when her SBP was 116, which is the lowest it's been since she's been here at rehab.     ROS: No changes to bowel, bladder, pain, mood, or sleep.       Objective:  VITAL SIGNS: /58   Pulse 79   Temp 36.6 °C (97.8 °F) (Temporal)   Resp 17   Ht 1.575 m (5' 2\")   Wt 76 kg (167 lb 8.8 oz)   SpO2 93%   BMI 30.65 kg/m²   Gen: alert, no apparent distress  Neuro: notable for left facial droop, dysarthria, left arm plegia    Recent Results (from the past 72 hour(s))   POCT glucose device results    Collection Time: 12/14/21  7:13 AM   Result Value Ref Range    Glucose - Accu-Ck 93 65 - 99 mg/dL       Current Facility-Administered Medications   Medication Frequency   • hydroCHLOROthiazide (HYDRODIURIL) tablet 12.5 mg Q DAY   • modafinil (PROVIGIL) tablet 100 mg QAM   • traZODone (DESYREL) tablet 50 mg QHS   • melatonin tablet 3 mg QHS   • amLODIPine (NORVASC) tablet 10 mg Q DAY   • butalbital/apap/caffeine -40 mg (Fioricet) per tablet 1 Tablet Q6HRS PRN   • tizanidine (ZANAFLEX) tablet 2 mg QHS   • omeprazole (PRILOSEC) capsule 20 mg QAM AC   • hydrOXYzine HCl (ATARAX) tablet 50 mg Q6HRS PRN   • Respiratory Therapy Consult Continuous RT   • Pharmacy Consult Request ...Pain Management Review 1 Each PHARMACY TO DOSE   • hydrALAZINE (APRESOLINE) tablet 10 mg Q8HRS PRN   • acetaminophen (Tylenol) tablet 650 mg Q4HRS PRN   • lactulose 20 GM/30ML solution 30 mL QDAY PRN   • docusate sodium (ENEMEEZ) enema 283 mg QDAY PRN   • sodium phosphate (Fleet) enema 133 mL QDAY PRN "   • artificial tears ophthalmic solution 1 Drop PRN   • benzocaine-menthol (CEPACOL) lozenge 1 Lozenge Q2HRS PRN   • mag hydrox-al hydrox-simeth (MAALOX PLUS ES or MYLANTA DS) suspension 20 mL Q2HRS PRN   • ondansetron (ZOFRAN ODT) dispertab 4 mg 4X/DAY PRN    Or   • ondansetron (ZOFRAN) syringe/vial injection 4 mg 4X/DAY PRN   • sodium chloride (OCEAN) 0.65 % nasal spray 2 Spray PRN   • midazolam (VERSED) 5 mg/mL (1 mL vial) PRN   • enoxaparin (LOVENOX) inj 40 mg DAILY   • senna-docusate (PERICOLACE or SENOKOT S) 8.6-50 MG per tablet 2 Tablet BID    And   • polyethylene glycol/lytes (MIRALAX) PACKET 1 Packet QDAY PRN    And   • magnesium hydroxide (MILK OF MAGNESIA) suspension 30 mL QDAY PRN    And   • bisacodyl (DULCOLAX) suppository 10 mg QDAY PRN   • aspirin EC (ECOTRIN) tablet 81 mg DAILY   • atorvastatin (LIPITOR) tablet 80 mg DAILY   • carBAMazepine (TEGRETOL) chewable tab 200 mg BID   • clopidogrel (PLAVIX) tablet 75 mg DAILY   • labetalol (NORMODYNE) tablet 200 mg Q12HRS   • lisinopril (PRINIVIL) tablet 40 mg DAILY   • vitamin D3 (cholecalciferol) tablet 1,000 Units DAILY       Orders Placed This Encounter   Procedures   • Diet Order Diet: Level 6 - Soft and Bite Sized (Medications floated whole, one at a time, in pudding (pt does not like applesauce)); Liquid level: Level 2 - Mildly Thick; Tray Modifications (optional): No Straws     Standing Status:   Standing     Number of Occurrences:   1     Order Specific Question:   Diet:     Answer:   Level 6 - Soft and Bite Sized [23]     Comments:   Medications floated whole, one at a time, in pudding (pt does not like applesauce)     Order Specific Question:   Liquid level     Answer:   Level 2 - Mildly Thick     Order Specific Question:   Tray Modifications (optional)     Answer:   No Straws       Assessment:  Active Hospital Problems    Diagnosis    • *Ischemic stroke (HCC)    • Polyuria    • Wrist fracture, closed, left, sequela    • Other insomnia    •  Hypokalemia    • Class 1 obesity without serious comorbidity in adult    • Spasticity    • Left-sided neglect    • Other dysphagia    • Impaired mobility and ADLs    • Hyperlipidemia LDL goal <70    • Peripheral neuropathy    • Hypertension      This patient is a 76 y.o. female admitted for acute inpatient rehabilitation with Ischemic stroke (HCC).    I led and attended the weekly conference, and agree with the IDT conference documentation and plan of care as noted below.    Date of conference: 12/8/2021    Goals and barriers: See IDT note.    Biggest barriers: Low motivation, lethargic, left wrist pain, hypophonia, moderate cognitive impairments, incontinent bowel bladder, fatigue, slow processing    Goals in next week: Improved motivation    CM/social support: Unclear how much physical support  can provide    Anticipated DC date: 12/24, per patient, plan to hire caregiver to assist 24/7 and will have other family members available    Home health: PT OT speech and nursing    Equip: To be determined    Follow up: PCP, [facility], Dr. Callejas, cardiology, orthopedic surgery        Medical Decision Making and Plan:    Right MCA stroke  Left hemiparesis, improved, arm more affected than leg  Nondominant  Left neglect, continues  Dysphagia, improved  Cognitive impairments  Continue full rehab program  PT/OT/SLP, 1 hr each discipline, 5 days per week     Aspirin  Plavix  Statin     Patient with Zio patch cardiac monitor, return 2 weeks from admission, 12/17    Outpatient follow-up with [facility], cardiology, Dr. Callejas, referrals made    MBS with aspiration on thins, diet downgraded per speech therapy recommendations    Started Provigil for neuro stimulation, improved initiation    Psychology consulted for evaluation, patient not depressed    Left wrist pain, improved  X-ray at acute showed possible avulsion fractures  Discussed with orthopedic surgery, outpatient follow-up  Wrist brace and  weightbearing as tolerated     Hypertension  Lisinopril  Labetalol  Amlodipine, dose increased  Currently stable, hospitalist has signed off  Started hydrochlorothiazide 12/14  Continue to monitor     Peripheral neuropathy  Continue home medication carbamazepine  Was also on Zanaflex (6 mg TID), restarted 2 mg at night due to complaints of leg spasms, will not restart home dosing due to side effect of lethargy    History of migraines  Continue carbamazepine    Hypokalemia resolved  Status post supplementation    Insomnia, resolved  Restarted home medication tizanidine 2 mg  Added scheduled melatonin and trazodone     Obesity  BMI 32.6  Outpatient nutritional counseling    Vitamin D deficiency  Continue supplementation    GI prophylaxis  Omeprazole     Polyuria  Negative urinalysis    Bowel program  Bowel incontinence  Constipation  Increase bowel medications  Last BM 12/11    Bladder program  Bladder incontinence  Check PVRs - 25, 15, 10  Not retaining  Bladder scan for no voids  ICP for over 400 cc  Scheduled toileting    DVT prophylaxis  Lovenox      Total time:  26 minutes.  I spent greater than 50% of the time for patient care, counseling, and coordination on this date, including patient face-to face time, unit/floor time with review of records/pertinent lab data and studies, as well as discussing diagnostic evaluation/work up, planned therapeutic interventions, and future disposition of care, as per the interval events/subjective and the assessment and plan as noted above.    I have performed a physical exam, reviewed and updated ROS, as well as the assessment and plan today 12/14/2021. In review of note from 12/13/2021 there are no new changes except as documented above.                Hawa Alva M.D.   Physical Medicine and Rehabilitation

## 2021-12-14 NOTE — THERAPY
12/14/21 0959   Precautions   Precautions Fall Risk;Weight Bearing As Tolerated Left Upper Extremity   Comments L wrist brace, thickened liquids   Vitals   Pulse 79   Patient BP Position Sitting   Blood Pressure  116/58   Cognition    Speech/ Communication Delayed Responses   Level of Consciousness Alert   Ability To Follow Commands 2 Step   Attention Impaired   Sequencing Impaired   Initiation Impaired   Gait Functional Level of Assist    Gait Level Of Assist Contact Guard Assist   Assistive Device Platform Walker   Distance (Feet) 30   # of Times Distance was Traveled 1   Deviation Ataxic;Decreased Base Of Support;Bradykinetic;Decreased Heel Strike;Decreased Toe Off   Stairs Functional Level of Assist   Level of Assist with Stairs Unable to Participate   # of Stairs Climbed 0   Neuro-Muscular Treatments   Neuro-Muscular Treatments Sequencing;Tactile Cuing;Tapping;Verbal Cuing;Weight Shift Right   Comments Sequencing sit to/from stand wheelchair/pfww, sequencing gait with a platform walker   Interdisciplinary Plan of Care Collaboration   IDT Collaboration with  Nursing;Physician   Patient Position at End of Therapy Seated;Chair Alarm On;Self Releasing Lap Belt Applied;Call Light within Reach;Tray Table within Reach;Phone within Reach   Collaboration Comments The patient complained of being dizzy when standing and walking.  The patient said the dizziness continued even after she sat down   PT Total Time Spent   PT Individual Total Time Spent (Mins) 30   PT Charge Group   PT Gait Training 1   PT Therapeutic Activities 1   Physical Therapy   Daily Treatment     Patient Name: Nakita Sheppard  Age:  76 y.o., Sex:  female  Medical Record #: [id number]  Today's Date: 12/14/2021     Precautions  Precautions: Fall Risk,Weight Bearing As Tolerated Left Upper Extremity  Comments: L wrist brace, thickened liquids    Subjective    The patient agreed to PT.  During gait training the patient complained of getting dizzy.      Objective    The patient participated in sequencing sit to/from stand wheelchair/pfww and gait training with the walker.  She tolerated 30 FT x1.  However, she complained of getting dizzy when she was walking and she was unable to continue.  PT checked the blood pressure at 116/58 with a pulse rate of 79 bpm.    Assessment    116/58 is a low blood pressure for this patient.  She became symptomatic with complaints of dizziness during gait training which continued after she sat down.  It was not reasonable for additional gait training during the session.  Strengths: Able to follow instructions,Manages pain appropriately,Motivated for self care and independence,Pleasant and cooperative,Willingly participates in therapeutic activities  Barriers: Decreased endurance,Hemiplegia,Impaired balance    Plan    Safety education, bed mobility supine to/from sit and transfer training, gait training as tolerated pfww, monitor for hypotension, therapeutic exercise    Passport items to be completed:  Passport items to be completed:  Get in/out of bed safely, in/out of a vehicle, safely use mobility device, walk or wheel around home/community, navigate up and down stairs, show how to get up/down from the ground, ensure home is accessible, demonstrate HEP, complete caregiver training    Physical Therapy Problems (Active)     Problem: Mobility     Dates: Start: 12/04/21       Goal: STG-Within one week, patient will ambulate household distance x10' with SPC and Mod A     Dates: Start: 12/04/21   Expected End: 12/16/21             Problem: Mobility Transfers     Dates: Start: 12/04/21       Goal: STG-Within one week, patient will perform bed mobility Min A without bedrails     Dates: Start: 12/04/21   Expected End: 12/16/21          Goal: STG-Within one week, patient will sit to stand CGA 5/5 trials with SPC     Dates: Start: 12/04/21   Expected End: 12/16/21          Goal: STG-Within one week, patient will transfer bed to chair Min A      Dates: Start: 12/04/21   Expected End: 12/16/21             Problem: PT-Long Term Goals     Dates: Start: 12/04/21       Goal: LTG-By discharge, patient will maintain balance at least 45/56 on the [place] to DC to home safely with support of spouse     Dates: Start: 12/04/21   Expected End: 12/16/21          Goal: LTG-By discharge, patient will ambulate at least 500' with SPC and SPV inside and outside surfaces     Dates: Start: 12/04/21   Expected End: 12/16/21          Goal: LTG-By discharge, patient will transfer one surface to another SPV with SPC     Dates: Start: 12/04/21   Expected End: 12/16/21          Goal: LTG-By discharge, patient will transfer in/out of a car SBA from ambulatory level with SPC     Dates: Start: 12/04/21   Expected End: 12/16/21          Goal: LTG-By discharge, patient will ascend/descend ADA ramp with SPC and SBA to enter/exit her home safely     Dates: Start: 12/04/21   Expected End: 12/16/21

## 2021-12-14 NOTE — THERAPY
Occupational Therapy  Daily Treatment     Patient Name: Nakita Sheppard  Age:  76 y.o., Sex:  female  Medical Record #: [id number]  Today's Date: 12/14/2021     Precautions  Precautions: (P) Fall Risk,Weight Bearing As Tolerated Left Upper Extremity  Comments: (P) L wrist brace, L inattention    Safety   ADL Safety : Requires Supervision for Safety,Requires Physical Assist for Safety,Requires Cueing for Safety  Bathroom Safety: Requires Physical Assist for Safety,Requires Supervision for Safety,Requires Cuing for Safety  Comments: see functional levels below for ADL performance details.    Subjective    Pt agreeable to OT session.     Objective       12/14/21 1331   Precautions   Precautions Fall Risk;Weight Bearing As Tolerated Left Upper Extremity   Comments L wrist brace, L inattention   Functional Level of Assist   Grooming Supervision;Seated   Toileting Moderate Assist   Toileting Description Assist to pull pants up;Assist for standing balance;Increased time;Grab bar;Verbal cueing;Supervision for safety   Toilet Transfers Contact Guard Assist   Toilet Transfer Description Grab bar;Increased time;Initial preparation for task;Set-up of equipment;Supervision for safety;Verbal cueing  (w/c<>toilet stand step via GB, cues for safety)   Sitting Lower Body Exercises   Nustep Resistance Level 3  (for reciprocal patterning/endurance x15 min, 2 RB, .35 mi)   Bed Mobility    Supine to Sit Contact Guard Assist   Scooting Contact Guard Assist   Rolling Supervised   Interdisciplinary Plan of Care Collaboration   IDT Collaboration with  Nursing;Physician;Family / Caregiver   Patient Position at End of Therapy Seated;Chair Alarm On;Self Releasing Lap Belt Applied;Tray Table within Reach;Call Light within Reach;Phone within Reach   Collaboration Comments CLOF, POC   OT Total Time Spent   OT Individual Total Time Spent (Mins) 60   OT Charge Group   OT Self Care / ADL 1   OT Neuromuscular Re-education / Balance 1   OT Therapy  Activity 1   OT Therapeutic Exercise  1     [place] mobile arm support (MAS) for unweighting L UE to increase repetitions, improve motor recovery, and assist in relearning normal movement patterns. Pt completed 3X10 of each of the following exercises: shoulder flex/ext, abduction/adduction, horizontal abduction/adduction, protraction/retraction and elbow flex/ext.  [place] MAS tension set to level 6 for all exercises; adjusted with pt fatigue.    Phone call placed to pt's spouse, Natan, to discuss pt's CLOF with ADLs and transfers, and to discuss d/c plan. Spouse reports that family would like pt to d/c home on 12/24 vs. SNF, and that pt will have 24/7 support from her family and/or they can hire caregivers if needed. They have all bathroom equipment (GB, shower chair, commode) and ramp entry into home. Spouse inquired about getting pt a hospital bed vs. under-mattress bedrail if needed--will follow up with PT.     Assessment    Pt con't to make progress with ADLs, mobility and activity tolerance. She cont' to present with LUE weakness (distal>proximal) and L inattention. Pt will have 24/7 support at d/c and has all bathroom equipment.   Strengths: Independent prior level of function,Motivated for self care and independence,Pleasant and cooperative,Supportive family,Willingly participates in therapeutic activities  Barriers: Aspiration risk,Decreased endurance,Fatigue,Hemiparesis,Impaired activity tolerance,Impaired balance,Impaired functional cognition,Pain    Plan    ADLs, functional transfers, LUE ROM/positioning, LUE neuro re-ed, activities to improve L side attention. Monitor L wrist pain/brace.     Occupational Therapy Goals (Active)     Problem: Bathing     Dates: Start: 12/04/21       Goal: STG-Within one week, patient will bathe with min A using AE as needed.     Dates: Start: 12/04/21   Expected End: 12/16/21       Goal Note filed on 12/08/21 9350 by Delaney Price, OT     Con't to require mod-max A d/t L  hemiparesis, pain, fatigue.                Problem: Dressing     Dates: Start: 12/04/21       Goal: STG-Within one week, patient will dress UB with min A using alexx technique.     Dates: Start: 12/04/21   Expected End: 12/16/21       Goal Note filed on 12/08/21 1345 by Delaney Price, OT     Con't to require mod-max A d/t L hemiparesis, pain, fatigue.             Goal: STG-Within one week, patient will dress LB with mod A using alexx technique.      Dates: Start: 12/04/21   Expected End: 12/16/21       Goal Note filed on 12/08/21 1345 by Delaney Price, OT     Con't to require max A d/t L hemiparesis, pain, fatigue.                Problem: Functional Transfers     Dates: Start: 12/04/21       Goal: STG-Within one week, patient will transfer to toilet with min A using AE/DME as needed.      Dates: Start: 12/04/21   Expected End: 12/16/21       Goal Note filed on 12/08/21 1345 by Delaney Price, OT     Con't to require mod-max A d/t L hemiparesis, pain, fatigue.                Problem: OT Long Term Goals     Dates: Start: 12/04/21       Goal: LTG-By discharge, patient will complete basic self care tasks at supervision to mod I level using AE as needed.      Dates: Start: 12/04/21   Expected End: 12/16/21          Goal: LTG-By discharge, patient will perform bathroom transfers at supervision level using AE/DME as needed.      Dates: Start: 12/04/21   Expected End: 12/16/21             Problem: Toileting     Dates: Start: 12/04/21       Goal: STG-Within one week, patient will complete toileting tasks with mod A using AE as needed.      Dates: Start: 12/04/21   Expected End: 12/16/21       Goal Note filed on 12/08/21 1345 by Delaney Price, OT     Con't to require max A d/t L hemiparesis, pain, fatigue.

## 2021-12-15 ENCOUNTER — APPOINTMENT (OUTPATIENT)
Dept: RADIOLOGY | Facility: REHABILITATION | Age: 76
DRG: 057 | End: 2021-12-15
Attending: PHYSICAL MEDICINE & REHABILITATION
Payer: MEDICARE

## 2021-12-15 DIAGNOSIS — S62.102S WRIST FRACTURE, CLOSED, LEFT, SEQUELA: ICD-10-CM

## 2021-12-15 PROCEDURE — 92526 ORAL FUNCTION THERAPY: CPT

## 2021-12-15 PROCEDURE — 74018 RADEX ABDOMEN 1 VIEW: CPT

## 2021-12-15 PROCEDURE — A9270 NON-COVERED ITEM OR SERVICE: HCPCS | Performed by: HOSPITALIST

## 2021-12-15 PROCEDURE — 97530 THERAPEUTIC ACTIVITIES: CPT

## 2021-12-15 PROCEDURE — 700111 HCHG RX REV CODE 636 W/ 250 OVERRIDE (IP): Performed by: PHYSICAL MEDICINE & REHABILITATION

## 2021-12-15 PROCEDURE — 700102 HCHG RX REV CODE 250 W/ 637 OVERRIDE(OP): Performed by: HOSPITALIST

## 2021-12-15 PROCEDURE — 97535 SELF CARE MNGMENT TRAINING: CPT

## 2021-12-15 PROCEDURE — A9270 NON-COVERED ITEM OR SERVICE: HCPCS | Performed by: PHYSICAL MEDICINE & REHABILITATION

## 2021-12-15 PROCEDURE — 770010 HCHG ROOM/CARE - REHAB SEMI PRIVAT*

## 2021-12-15 PROCEDURE — 700102 HCHG RX REV CODE 250 W/ 637 OVERRIDE(OP): Performed by: PHYSICAL MEDICINE & REHABILITATION

## 2021-12-15 PROCEDURE — 99233 SBSQ HOSP IP/OBS HIGH 50: CPT | Performed by: PHYSICAL MEDICINE & REHABILITATION

## 2021-12-15 RX ADMIN — OMEPRAZOLE 20 MG: 20 CAPSULE, DELAYED RELEASE ORAL at 08:57

## 2021-12-15 RX ADMIN — MAGNESIUM HYDROXIDE 30 ML: 400 SUSPENSION ORAL at 05:18

## 2021-12-15 RX ADMIN — Medication 1000 UNITS: at 08:59

## 2021-12-15 RX ADMIN — TIZANIDINE 2 MG: 4 TABLET ORAL at 20:59

## 2021-12-15 RX ADMIN — TRAZODONE HYDROCHLORIDE 50 MG: 50 TABLET ORAL at 20:59

## 2021-12-15 RX ADMIN — SENNOSIDES AND DOCUSATE SODIUM 2 TABLET: 50; 8.6 TABLET ORAL at 20:59

## 2021-12-15 RX ADMIN — POLYETHYLENE GLYCOL 3350 1 PACKET: 17 POWDER, FOR SOLUTION ORAL at 09:08

## 2021-12-15 RX ADMIN — HYDROCHLOROTHIAZIDE 12.5 MG: 25 TABLET ORAL at 05:14

## 2021-12-15 RX ADMIN — ATORVASTATIN CALCIUM 80 MG: 40 TABLET, FILM COATED ORAL at 08:59

## 2021-12-15 RX ADMIN — ENOXAPARIN SODIUM 40 MG: 40 INJECTION SUBCUTANEOUS at 09:08

## 2021-12-15 RX ADMIN — AMLODIPINE BESYLATE 10 MG: 5 TABLET ORAL at 05:14

## 2021-12-15 RX ADMIN — MODAFINIL 100 MG: 100 TABLET ORAL at 08:58

## 2021-12-15 RX ADMIN — SENNOSIDES AND DOCUSATE SODIUM 2 TABLET: 50; 8.6 TABLET ORAL at 08:59

## 2021-12-15 RX ADMIN — CARBAMAZEPINE 200 MG: 100 TABLET, CHEWABLE ORAL at 08:58

## 2021-12-15 RX ADMIN — LABETALOL HYDROCHLORIDE 200 MG: 100 TABLET, FILM COATED ORAL at 08:57

## 2021-12-15 RX ADMIN — ASPIRIN 81 MG: 81 TABLET, COATED ORAL at 09:00

## 2021-12-15 RX ADMIN — CARBAMAZEPINE 200 MG: 100 TABLET, CHEWABLE ORAL at 21:00

## 2021-12-15 RX ADMIN — LABETALOL HYDROCHLORIDE 200 MG: 100 TABLET, FILM COATED ORAL at 21:00

## 2021-12-15 RX ADMIN — MELATONIN TAB 3 MG 3 MG: 3 TAB at 20:59

## 2021-12-15 RX ADMIN — LISINOPRIL 40 MG: 20 TABLET ORAL at 08:59

## 2021-12-15 RX ADMIN — CLOPIDOGREL BISULFATE 75 MG: 75 TABLET ORAL at 08:58

## 2021-12-15 ASSESSMENT — ACTIVITIES OF DAILY LIVING (ADL)
TOILETING_LEVEL_OF_ASSIST_DESCRIPTION: ASSIST TO PULL PANTS UP;ASSIST FOR STANDING BALANCE;GRAB BAR;INCREASED TIME;SET-UP OF EQUIPMENT;SUPERVISION FOR SAFETY;VERBAL CUEING
BED_CHAIR_WHEELCHAIR_TRANSFER_DESCRIPTION: ADAPTIVE EQUIPMENT;SET-UP OF EQUIPMENT;SUPERVISION FOR SAFETY;VERBAL CUEING
TOILET_TRANSFER_DESCRIPTION: GRAB BAR;INCREASED TIME;SET-UP OF EQUIPMENT;SUPERVISION FOR SAFETY;VERBAL CUEING

## 2021-12-15 NOTE — CARE PLAN
Problem: Mobility Transfers  Goal: STG-Within one week, patient will perform bed mobility Min A without bedrails  Outcome: Progressing     Problem: Mobility  Goal: STG-Within one week, patient will ambulate household distance x10' with SPC and Mod A  Outcome: Met     Problem: Mobility Transfers  Goal: STG-Within one week, patient will sit to stand CGA 5/5 trials with SPC  Outcome: Met  Goal: STG-Within one week, patient will transfer bed to chair Min A  Outcome: Met

## 2021-12-15 NOTE — CARE PLAN
Problem: Mobility Transfers  Goal: STG-Within one week, patient will perform bed mobility Min A without bedrails  Outcome: Met  Goal: STG-Within one week, patient will move supine to sit  Outcome: Met     Problem: Mobility  Goal: STG-Within one week, patient will  Outcome: Met

## 2021-12-15 NOTE — PROGRESS NOTES
"Rehab Progress Note     Date of Service: 12/15/2021  Chief Complaint: Follow-up stroke    Interval Events (Subjective)    Patient seen and examined in her room today.  She is about ready to go work with speech therapy she currently is denying any pain.  We got confirmation that she will have caregivers at home.  Patient had a small bowel movement this morning.  She continues to have incontinence of her bowel and bladder.  Patient has no complaints today.    ROS: No changes to bowel, bladder, pain, mood, or sleep.           Objective:  VITAL SIGNS: /56   Pulse 64   Temp 36.3 °C (97.4 °F) (Oral)   Resp 17   Ht 1.575 m (5' 2\")   Wt 76 kg (167 lb 8.8 oz)   SpO2 93%   BMI 30.65 kg/m²   Gen: alert, no apparent distress  Neuro: notable for left neglect, dysarthria, hypophonia, left hemiparesis, arm more affected than leg    Recent Results (from the past 72 hour(s))   POCT glucose device results    Collection Time: 12/14/21  7:13 AM   Result Value Ref Range    Glucose - Accu-Ck 93 65 - 99 mg/dL       Current Facility-Administered Medications   Medication Frequency   • polyethylene glycol/lytes (MIRALAX) PACKET 1 Packet DAILY    And   • senna-docusate (PERICOLACE or SENOKOT S) 8.6-50 MG per tablet 2 Tablet BID    And   • magnesium hydroxide (MILK OF MAGNESIA) suspension 30 mL QDAY PRN    And   • bisacodyl (DULCOLAX) suppository 10 mg QDAY PRN   • hydroCHLOROthiazide (HYDRODIURIL) tablet 12.5 mg Q DAY   • modafinil (PROVIGIL) tablet 100 mg QAM   • traZODone (DESYREL) tablet 50 mg QHS   • melatonin tablet 3 mg QHS   • amLODIPine (NORVASC) tablet 10 mg Q DAY   • butalbital/apap/caffeine -40 mg (Fioricet) per tablet 1 Tablet Q6HRS PRN   • tizanidine (ZANAFLEX) tablet 2 mg QHS   • omeprazole (PRILOSEC) capsule 20 mg QAM AC   • hydrOXYzine HCl (ATARAX) tablet 50 mg Q6HRS PRN   • Respiratory Therapy Consult Continuous RT   • Pharmacy Consult Request ...Pain Management Review 1 Each PHARMACY TO DOSE   • " hydrALAZINE (APRESOLINE) tablet 10 mg Q8HRS PRN   • acetaminophen (Tylenol) tablet 650 mg Q4HRS PRN   • lactulose 20 GM/30ML solution 30 mL QDAY PRN   • docusate sodium (ENEMEEZ) enema 283 mg QDAY PRN   • sodium phosphate (Fleet) enema 133 mL QDAY PRN   • artificial tears ophthalmic solution 1 Drop PRN   • benzocaine-menthol (CEPACOL) lozenge 1 Lozenge Q2HRS PRN   • mag hydrox-al hydrox-simeth (MAALOX PLUS ES or MYLANTA DS) suspension 20 mL Q2HRS PRN   • ondansetron (ZOFRAN ODT) dispertab 4 mg 4X/DAY PRN    Or   • ondansetron (ZOFRAN) syringe/vial injection 4 mg 4X/DAY PRN   • sodium chloride (OCEAN) 0.65 % nasal spray 2 Spray PRN   • midazolam (VERSED) 5 mg/mL (1 mL vial) PRN   • enoxaparin (LOVENOX) inj 40 mg DAILY   • aspirin EC (ECOTRIN) tablet 81 mg DAILY   • atorvastatin (LIPITOR) tablet 80 mg DAILY   • carBAMazepine (TEGRETOL) chewable tab 200 mg BID   • clopidogrel (PLAVIX) tablet 75 mg DAILY   • labetalol (NORMODYNE) tablet 200 mg Q12HRS   • lisinopril (PRINIVIL) tablet 40 mg DAILY   • vitamin D3 (cholecalciferol) tablet 1,000 Units DAILY       Orders Placed This Encounter   Procedures   • Diet Order Diet: Level 6 - Soft and Bite Sized (Medications floated whole, one at a time, in pudding (pt does not like applesauce)); Liquid level: Level 2 - Mildly Thick; Tray Modifications (optional): No Straws     Standing Status:   Standing     Number of Occurrences:   1     Order Specific Question:   Diet:     Answer:   Level 6 - Soft and Bite Sized [23]     Comments:   Medications floated whole, one at a time, in pudding (pt does not like applesauce)     Order Specific Question:   Liquid level     Answer:   Level 2 - Mildly Thick     Order Specific Question:   Tray Modifications (optional)     Answer:   No Straws       Radiology  [id number] [id number]   Final Result      Normal bowel gas pattern      CT-HEAD W/O   Final Result         1.  Better delineation of low attenuation in the right temporal lobe and right parietal  lobe in the right MCA distribution consistent with evolving bland ischemia.      2. Stable lacunar infarct in the left posterior basal ganglia.      3. Diffuse small vessel ischemic changes again noted.      4. No acute hemorrhage.      DX-ESOPHAGUS - [id number]    (Results Pending)       Assessment:  Active Hospital Problems    Diagnosis    • *Ischemic stroke (HCC)    • Polyuria    • Wrist fracture, closed, left, sequela    • Other insomnia    • Hypokalemia    • Class 1 obesity without serious comorbidity in adult    • Spasticity    • Left-sided neglect    • Other dysphagia    • Impaired mobility and ADLs    • Hyperlipidemia LDL goal <70    • Peripheral neuropathy    • Hypertension      This patient is a 76 y.o. female admitted for acute inpatient rehabilitation with Ischemic stroke (HCC).    I led and attended the weekly conference, and agree with the IDT conference documentation and plan of care as noted below.    Date of conference: 12/15/2021    Goals and barriers: See IDT note.    Biggest barriers: left hemiparesis, dysphagia, pockets in left cheek, dysphagia, left neglect    Goals in next week: bed mobility    CM/social support: family very supportive, will have 24/7 support    Anticipated DC date: 12/24    Home health: PT/OT/SLP/RN    Equip: MWC, platform walker     Follow up: PCP, [facility], Dr. Callejas, cardiology, orthopedic surgery        Medical Decision Making and Plan:    Right MCA stroke  Left hemiparesis, improved, arm more affected than leg  Nondominant  Left neglect, continues  Dysphagia, improved  Cognitive impairments  Continue full rehab program  PT/OT/SLP, 1 hr each discipline, 5 days per week     Aspirin  Plavix  Statin     Patient with Zio patch cardiac monitor, return 2 weeks from admission, 12/17    Outpatient follow-up with [facility], cardiology, Dr. Callejas, referrals made    MBS with aspiration on thins, diet downgraded per speech therapy recommendations, ice chips  in between meals only with supervision    Started Provigil for neuro stimulation, improved initiation    Psychology consulted for evaluation, patient not depressed    Left wrist pain, improved  X-ray at acute showed possible avulsion fractures  Discussed with orthopedic surgery, outpatient follow-up, referral made  Wrist brace and weightbearing as tolerated     Hypertension, improved  Lisinopril  Labetalol  Amlodipine, dose increased  Currently stable, hospitalist has signed off  Started hydrochlorothiazide 12/14  Continue to monitor     Peripheral neuropathy  Continue home medication carbamazepine  Was also on Zanaflex (6 mg TID), restarted 2 mg at night due to complaints of leg spasms, will not restart home dosing due to side effect of lethargy    History of migraines  Continue carbamazepine    Hypokalemia resolved  Status post supplementation    Insomnia, resolved  Restarted home medication tizanidine 2 mg  Added scheduled melatonin and trazodone     Obesity  BMI 32.6  Outpatient nutritional counseling    Vitamin D deficiency  Continue supplementation    GI prophylaxis  Omeprazole     Polyuria  Negative urinalysis    Bowel program  Bowel incontinence  Constipation  Increase bowel medications  Last BM 12/15    Bladder program  Bladder incontinence  Check PVRs - 25, 15, 10  Not retaining  Bladder scan for no voids  ICP for over 400 cc  Scheduled toileting    DVT prophylaxis  Lovenox    Total time:  40 minutes.  I spent greater than 50% of the time for patient care, counseling, and coordination on this date, including patient face-to face time, unit/floor time with review of records/pertinent lab data and studies, as well as discussing diagnostic evaluation/work up, planned therapeutic interventions, and future disposition of care, as per the interval events/subjective and the assessment and plan as noted above.                Hawa Alva M.D.   Physical Medicine and Rehabilitation

## 2021-12-15 NOTE — CARE PLAN
Problem: Inadequate nutrient intake  Goal: Patient to consume greater than or equal to 50% of meals  Outcome: Progressing    See RD progress note.

## 2021-12-15 NOTE — THERAPY
Occupational Therapy  Daily Treatment     Patient Name: Nakita Sheppard  Age:  76 y.o., Sex:  female  Medical Record #: [id number]  Today's Date: 12/15/2021     Precautions  Precautions: Fall Risk,Weight Bearing As Tolerated Left Upper Extremity  Comments: L alexx, L wrist brace PRN, L neglect    Safety   ADL Safety : Requires Supervision for Safety,Requires Physical Assist for Safety,Requires Cueing for Safety  Bathroom Safety: Requires Physical Assist for Safety,Requires Supervision for Safety,Requires Cuing for Safety  Comments: see functional levels below for ADL performance details.    Subjective    Pt agreeable to OT session.     Objective       12/15/21 0701   Precautions   Precautions Fall Risk;Weight Bearing As Tolerated Left Upper Extremity   Comments L alexx, L wrist brace PRN, L neglect   Vitals   Pulse 64   Patient BP Position Supine   Blood Pressure  145/56   Pulse Oximetry 93 %   O2 Delivery Device None - Room Air   Cognition    Level of Consciousness Alert   Functional Level of Assist   Grooming Supervision;Seated   Grooming Description Increased time;Set-up of equipment   Upper Body Dressing Minimal Assist   Upper Body Dressing Description Assit with threading arms through sleeves   Lower Body Dressing Minimal Assist   Lower Body Dressing Description Assist with threading into pant leg;Increased time;Set-up of equipment;Supervision for safety;Verbal cueing  (A to thread LLE and pull up over L hip; SBA to don shoes)   Toileting Moderate Assist   Toileting Description Assist to pull pants up;Assist for standing balance;Grab bar;Increased time;Set-up of equipment;Supervision for safety;Verbal cueing   Toilet Transfers Contact Guard Assist   Toilet Transfer Description Grab bar;Increased time;Set-up of equipment;Supervision for safety;Verbal cueing   Interdisciplinary Plan of Care Collaboration   Patient Position at End of Therapy Seated;Chair Alarm On;Self Releasing Lap Belt Applied;Other  (Comments)  (seated in t-dine for breakfast)   OT Total Time Spent   OT Individual Total Time Spent (Mins) 60   OT Charge Group   OT Self Care / ADL 2   OT Therapy Activity 2     W/c<>EOM stand step txfr toward R side using PFWW with min A, cues for safety.    Seated EOM, LUE AAROM exercises (shoulder flex/ext, abduction/adduction, int/ext rotation, elbow flex/ext, pronation/supination, wrist flex/ext, finger flex/ext, omitted thumb ROM d/t pain). Scapular mobilization exercises (protraction/retraction, upward rotation, elevation/depression). Shoulder shrugs 3x10. Muscle belly tapping and verbal cues for sequencing, completed in front of mirror for visual feedback.    Assessment    Pt making progress with ADLs and mobility, and demonstrates improved initiation. She reported pain with thumb ROM, but tolerated ROM to shoulder, elbow, wrist and fingers.   Strengths: Independent prior level of function,Motivated for self care and independence,Pleasant and cooperative,Supportive family,Willingly participates in therapeutic activities  Barriers: Aspiration risk,Decreased endurance,Fatigue,Hemiparesis,Impaired activity tolerance,Impaired balance,Impaired functional cognition,Pain    Plan    ADLs, functional transfers, LUE ROM/positioning, LUE neuro re-ed, activities to improve L side attention. Monitor L wrist pain/brace. Bed mobility.    Occupational Therapy Goals (Active)     Problem: Bathing     Dates: Start: 12/04/21       Goal: STG-Within one week, patient will bathe with min A using AE as needed.     Dates: Start: 12/04/21   Expected End: 12/16/21       Goal Note filed on 12/08/21 1345 by Delaney Price, OT     Con't to require mod-max A d/t L hemiparesis, pain, fatigue.                Problem: Dressing     Dates: Start: 12/04/21       Goal: STG-Within one week, patient will dress UB with min A using alexx technique.     Dates: Start: 12/04/21   Expected End: 12/16/21       Goal Note filed on 12/08/21 1345 by Delaney  RACHELLE Price, OT     Con't to require mod-max A d/t L hemiparesis, pain, fatigue.             Goal: STG-Within one week, patient will dress LB with mod A using alexx technique.      Dates: Start: 12/04/21   Expected End: 12/16/21       Goal Note filed on 12/08/21 1345 by Delaney Price, OT     Con't to require max A d/t L hemiparesis, pain, fatigue.                Problem: Functional Transfers     Dates: Start: 12/04/21       Goal: STG-Within one week, patient will transfer to toilet with min A using AE/DME as needed.      Dates: Start: 12/04/21   Expected End: 12/16/21       Goal Note filed on 12/08/21 1345 by Delaney Price, OT     Con't to require mod-max A d/t L hemiparesis, pain, fatigue.                Problem: OT Long Term Goals     Dates: Start: 12/04/21       Goal: LTG-By discharge, patient will complete basic self care tasks at supervision to mod I level using AE as needed.      Dates: Start: 12/04/21   Expected End: 12/16/21          Goal: LTG-By discharge, patient will perform bathroom transfers at supervision level using AE/DME as needed.      Dates: Start: 12/04/21   Expected End: 12/16/21             Problem: Toileting     Dates: Start: 12/04/21       Goal: STG-Within one week, patient will complete toileting tasks with mod A using AE as needed.      Dates: Start: 12/04/21   Expected End: 12/16/21       Goal Note filed on 12/08/21 1345 by Delaney Price, OT     Con't to require max A d/t L hemiparesis, pain, fatigue.

## 2021-12-15 NOTE — CARE PLAN
Problem: Bathing  Goal: STG-Within one week, patient will bathe with min A using AE as needed.  Outcome: Met     Problem: Dressing  Goal: STG-Within one week, patient will dress UB with min A using alexx technique.  Outcome: Met  Goal: STG-Within one week, patient will dress LB with mod A using alexx technique.   Outcome: Met     Problem: Toileting  Goal: STG-Within one week, patient will complete toileting tasks with mod A using AE as needed.   Outcome: Met     Problem: Functional Transfers  Goal: STG-Within one week, patient will transfer to toilet with min A using AE/DME as needed.   Outcome: Met

## 2021-12-15 NOTE — DIETARY
Nutrition Services: Update   Day 12 of admit.  Nakita Sheppard is a 76 y.o. female with admitting DX of Ischemic stroke.    Pt is currently on soft and bite-sized diet with mildly thick liquids. PO intake is variable ranging 0-100% of meals in last few days. Pt is mostly eating 50-75% of meals which is an improvement from previous visit. Pt is also drinking mainly 0-25% of Boost Breeze shakes, she did drink 50-75% of 1 shake. Wt 12/12: 76 kg via bed scale - down from admit weight of 81 kg, which is 5 kg (6.2%) severe weight loss in ~ 1 week, pt is on on hydrodiuril diuretic weight loss likely fluid-related. No new wounds identified. GI/ wnl, last BM: 12/11, colace scheduled on MAR. SLP following. D/c plan for 12/27.    Malnutrition Risk: No new risk identified.    Recommendations/Plan:  1.  Diet per SLP. Continue current supplements as tolerated.  2. Encourage intake of meals  3. Document intake of all meals as % taken in ADL's to provide interdisciplinary communication across all shifts.   4. Monitor weight.  5. Nutrition rep will continue to see patient for ongoing meal and snack preferences.    RD following.

## 2021-12-15 NOTE — THERAPY
12/14/21 1559   Precautions   Precautions Fall Risk;Weight Bearing As Tolerated Left Upper Extremity   Comments L wrist brace, left neglect   Pain 0 - 10 Group   Therapist Pain Assessment 0   Cognition    Speech/ Communication Delayed Responses   Level of Consciousness Alert   Ability To Follow Commands 2 Step   Attention Impaired   Sequencing Impaired   Initiation Impaired   Gait Functional Level of Assist    Gait Level Of Assist Contact Guard Assist   Assistive Device Platform Walker   Distance (Feet) 30   # of Times Distance was Traveled 2   Deviation Ataxic;Decreased Base Of Support;Bradykinetic;Decreased Toe Off;Decreased Heel Strike   Transfer Functional Level of Assist   Bed, Chair, Wheelchair Transfer Minimal Assist   Bed Chair Wheelchair Transfer Description Adaptive equipment;Increased time;Assist with one limb;Set-up of equipment;Supervision for safety;Verbal cueing  (Stand-pivot wc to bed, sit to supine min assist)   Sitting Lower Body Exercises   Ankle Pumps 2 sets of 15;Bilateral   Hip Flexion 2 sets of 15;Bilateral   Hip Abduction 2 sets of 15;Bilateral   Hip Adduction 2 sets of 15;Bilateral   Long Arc Quad 2 sets of 15;Bilateral   Hamstring Curl 2 sets of 15;Bilateral   Bed Mobility    Supine to Sit Contact Guard Assist   Sit to Supine Minimal Assist   Sit to Stand Contact Guard Assist   Scooting Contact Guard Assist   Rolling Supervised   Neuro-Muscular Treatments   Neuro-Muscular Treatments Sequencing;Tactile Cuing;Verbal Cuing;Weight Shift Right   Comments Sequencing sit to/from stand, sequencing gait with a pfww, sequencing wheelchair to bed transfer CGA and verbal cues, sequencing sit to supine and positioning in bed   PT Total Time Spent   PT Individual Total Time Spent (Mins) 60   PT Charge Group   PT Gait Training 1   PT Therapeutic Exercise 2   PT Therapeutic Activities 1   Physical Therapy   Daily Treatment     Patient Name: Nakita Sheppard  Age:  76 y.o., Sex:  female  Medical  Record #: [id number]  Today's Date: 12/14/2021     Precautions  Precautions: Fall Risk,Weight Bearing As Tolerated Left Upper Extremity  Comments: L wrist brace, left neglect    Subjective    The patient was up in her chair and she agreed to PT.  She said she was feeling better than she was this morning.     Objective    The patient participated in sequencing sit to/from stand, midline standing and gait with a pfww.  She tolerated 30 FT x2 with CGA.  The patient also participated in seated bilateral lower extremity therapeutic exercise 2 sets of 15.  At the end of the session the patient wanted to rest in bed.  The patient was positioned in the wheelchair next to the bed.  She stood up CGA using the bed rail for support.  She also stepped closer to the bed and turned to sit on the edge of the bed.  The patient required minimal assistance to lie down in order to put her right leg onto the bed.  The patient was able to roll and assist with repositioning when supine.    Assessment    The patient's transfers have significantly improved to contact-guard as she stands and pivots either to the bed or to the wheelchair.  She still requires minimal assistance to get her legs up onto the bed and minimal assistance again to reposition in bed while using the bed rails to roll.  Quality of sit to stand requires incidental CGA.  The patient is limited by fatigue when using the platform walker for gait training.  An appropriate distance for strengthening at this point would be consistently walking 50-75 FT CGA with a platform walker.  Strengths: Able to follow instructions,Manages pain appropriately,Motivated for self care and independence,Pleasant and cooperative,Willingly participates in therapeutic activities  Barriers: Decreased endurance,Hemiplegia,Impaired balance    Plan    Safety education, continue additional trials for bed mobility for rolling and sit to/from supine, gait training pfww as tolerated, therapeutic exercise for  strength and endurance, wc mobility    Passport items to be completed:  Perform bathroom transfers, complete dressing, complete feeding, get ready for the day, prepare a simple meal, participate in household tasks, adapt home for safety needs, demonstrate home exercise program, complete caregiver training     Physical Therapy Problems (Active)     Problem: Mobility     Dates: Start: 12/04/21       Goal: STG-Within one week, patient will ambulate household distance x10' with SPC and Mod A     Dates: Start: 12/04/21   Expected End: 12/16/21             Problem: Mobility Transfers     Dates: Start: 12/04/21       Goal: STG-Within one week, patient will perform bed mobility Min A without bedrails     Dates: Start: 12/04/21   Expected End: 12/16/21          Goal: STG-Within one week, patient will sit to stand CGA 5/5 trials with SPC     Dates: Start: 12/04/21   Expected End: 12/16/21          Goal: STG-Within one week, patient will transfer bed to chair Min A     Dates: Start: 12/04/21   Expected End: 12/16/21             Problem: PT-Long Term Goals     Dates: Start: 12/04/21       Goal: LTG-By discharge, patient will maintain balance at least 45/56 on the [place] to DC to home safely with support of spouse     Dates: Start: 12/04/21   Expected End: 12/16/21          Goal: LTG-By discharge, patient will ambulate at least 500' with SPC and SPV inside and outside surfaces     Dates: Start: 12/04/21   Expected End: 12/16/21          Goal: LTG-By discharge, patient will transfer one surface to another SPV with SPC     Dates: Start: 12/04/21   Expected End: 12/16/21          Goal: LTG-By discharge, patient will transfer in/out of a car SBA from ambulatory level with SPC     Dates: Start: 12/04/21   Expected End: 12/16/21          Goal: LTG-By discharge, patient will ascend/descend ADA ramp with SPC and SBA to enter/exit her home safely     Dates: Start: 12/04/21   Expected End: 12/16/21

## 2021-12-15 NOTE — THERAPY
"Speech Language Pathology  Daily Treatment     Patient Name: Nakita Sheppard  Age:  76 y.o., Sex:  female  Medical Record #: [id number]  Today's Date: 12/15/2021     Precautions  Precautions: Fall Risk,Weight Bearing As Tolerated Left Upper Extremity  Comments: L alexx, L wrist brace PRN, L neglect    Subjective    Pt seen for 2 separate ST sessions today, willing to participate in both with encouragement      Objective       12/15/21 1001   SLP Total Time Spent   SLP Individual Total Time Spent (Mins) 90   Treatment Charges   SLP Swallowing Dysfunction Treatment Swallowing Dysfunction Treatment       Assessment    [id number]:  Reviewed importance of oral care with pt and as well as steps to complete oral care after all meals.  Pt was able to take her dentures out and rinse them with spv and brushed her tongue and gums using a suction toothbrush with min cues required.  Pt encouraged to complete this oral care routine after all meals to reduce oral residue which pt verbalized agreement with.  Pt's daughter was present during the end of this session and this same routine was reviewed with her as well as how to use the suction toothbrush.  Pt trialed cup sips of thin liquids and tolerated 8/10 without s/sx of aspiration.  Pt was noted to have less anterior spillage with thin liquids as compared to last week.  Pt continues to demonstrate oral holding of thin liquids and benefits from cues to have \"tight lips\" and \"fast swallows\" during each trial.      [id number]:  Pt did not get out of bed for lunch so this session targeted consumption of boost with emphasis on fast swallows.  Pt demonstrated oral holding of liquids for approximately 5-8 seconds for each cup sip despite cues.  Coughing noted on 2 occasions with 2- Mildly Thick boost.      Strengths: Pleasant and cooperative,Motivated for self care and independence,Willingly participates in therapeutic activities,Independent prior level of function,Supportive " family  Barriers: Aspiration risk,Impaired insight/denial of deficits,Impaired carryover of learning,Impaired functional cognition    Plan    Follow-up MBSS to be completed, target speech intelligibility, trials of thin liquids, oral care       Speech Therapy Problems (Active)     Problem: Expression STGs     Dates: Start: 12/04/21       Goal: STG-Within one week, patient will     Dates: Start: 12/04/21   Expected End: 12/16/21       Description: Utilize clear speech strategies with mod cues in 70% of opportunities to increase overall speech intelligibility.     Goal Note filed on 12/15/21 0756 by Dav Knight MS,CCC-SLP     Mod-max cues required to implement clear speech strategies                Problem: Memory STGs     Dates: Start: 12/04/21       Goal: STG-Within one week, patient will     Dates: Start: 12/04/21   Expected End: 12/16/21       Description: Complete functional IADL tasks (e.g. med and financial management) with 70% acc and mod cues to ensure safe return to her PLOF.     Goal Note filed on 12/15/21 0756 by Dav Knight MS,CCC-SLP     Therapy targeting dysphagia management at this time, continue to target                Problem: Speech/Swallowing LTGs     Dates: Start: 12/04/21       Goal: LTG-By discharge, patient will safely swallow     Dates: Start: 12/04/21   Expected End: 12/16/21       Description: Safest/least restrictive diet with no overt s/sx of aspiration for 100% of intake to maintain adequate nutrition and hydration.        Goal: LTG-By discharge, patient will     Dates: Start: 12/04/21   Expected End: 12/16/21       Description: Complete cog-ling tasks at modified independence level to ensure safe return to PLOF.        Goal: LTG-By discharge, patient will     Dates: Start: 12/04/21   Expected End: 12/16/21       Description: Communicate complex ideas at the conversation level with 90% intelligibility to a naiive listener.           Problem: Swallowing STGs     Dates: Start: 12/04/21        Goal: STG-Within one week, patient will consume trials of thin liquids with use of effortful swallow with no overt s/sx of asp/pen noted on 80% of trials provided MIN cues     Dates: Start: 12/08/21   Expected End: 12/16/21       Goal Note filed on 12/15/21 0756 by Dav Knight MS,CCC-SLP     Variable toleration of thin liquids, continue to target

## 2021-12-15 NOTE — CARE PLAN
Problem: Swallowing STGs  Goal: STG-Within one week, patient will consume trials of thin liquids with use of effortful swallow with no overt s/sx of asp/pen noted on 80% of trials provided MIN cues  Outcome: Not Met  Note: Variable toleration of thin liquids, continue to target      Problem: Expression STGs  Goal: STG-Within one week, patient will  Description: Utilize clear speech strategies with mod cues in 70% of opportunities to increase overall speech intelligibility.   Outcome: Not Met  Note: Mod-max cues required to implement clear speech strategies   Goal: STG-Within one week, patient will  Description: Participate in a standardized language assessment to further identify expressive/receptive language needs.  Outcome: Discharged - Not Met  Note: Not appropriate at this time      Problem: Memory STGs  Goal: STG-Within one week, patient will  Description: Complete functional IADL tasks (e.g. med and financial management) with 70% acc and mod cues to ensure safe return to her PLOF.   Outcome: Not Met  Note: Therapy targeting dysphagia management at this time, continue to target

## 2021-12-16 ENCOUNTER — APPOINTMENT (OUTPATIENT)
Dept: PAIN MANAGEMENT | Facility: REHABILITATION | Age: 76
DRG: 057 | End: 2021-12-16
Attending: PHYSICAL MEDICINE & REHABILITATION
Payer: MEDICARE

## 2021-12-16 ENCOUNTER — APPOINTMENT (OUTPATIENT)
Dept: RADIOLOGY | Facility: REHABILITATION | Age: 76
DRG: 057 | End: 2021-12-16
Attending: PHYSICAL MEDICINE & REHABILITATION
Payer: MEDICARE

## 2021-12-16 PROCEDURE — 700102 HCHG RX REV CODE 250 W/ 637 OVERRIDE(OP): Performed by: HOSPITALIST

## 2021-12-16 PROCEDURE — 770010 HCHG ROOM/CARE - REHAB SEMI PRIVAT*

## 2021-12-16 PROCEDURE — 700111 HCHG RX REV CODE 636 W/ 250 OVERRIDE (IP): Performed by: PHYSICAL MEDICINE & REHABILITATION

## 2021-12-16 PROCEDURE — 97535 SELF CARE MNGMENT TRAINING: CPT

## 2021-12-16 PROCEDURE — 99232 SBSQ HOSP IP/OBS MODERATE 35: CPT | Performed by: PHYSICAL MEDICINE & REHABILITATION

## 2021-12-16 PROCEDURE — 92526 ORAL FUNCTION THERAPY: CPT

## 2021-12-16 PROCEDURE — 97110 THERAPEUTIC EXERCISES: CPT

## 2021-12-16 PROCEDURE — 92611 MOTION FLUOROSCOPY/SWALLOW: CPT

## 2021-12-16 PROCEDURE — A9270 NON-COVERED ITEM OR SERVICE: HCPCS | Performed by: PHYSICAL MEDICINE & REHABILITATION

## 2021-12-16 PROCEDURE — 97530 THERAPEUTIC ACTIVITIES: CPT

## 2021-12-16 PROCEDURE — 74230 X-RAY XM SWLNG FUNCJ C+: CPT

## 2021-12-16 PROCEDURE — A9270 NON-COVERED ITEM OR SERVICE: HCPCS | Performed by: HOSPITALIST

## 2021-12-16 PROCEDURE — 700102 HCHG RX REV CODE 250 W/ 637 OVERRIDE(OP): Performed by: PHYSICAL MEDICINE & REHABILITATION

## 2021-12-16 RX ORDER — TRAMADOL HYDROCHLORIDE 50 MG/1
50 TABLET ORAL EVERY 4 HOURS PRN
Status: DISCONTINUED | OUTPATIENT
Start: 2021-12-16 | End: 2021-12-22 | Stop reason: HOSPADM

## 2021-12-16 RX ORDER — LIDOCAINE 50 MG/G
1 PATCH TOPICAL DAILY
Status: DISCONTINUED | OUTPATIENT
Start: 2021-12-16 | End: 2021-12-22 | Stop reason: HOSPADM

## 2021-12-16 RX ORDER — TRAZODONE HYDROCHLORIDE 50 MG/1
100 TABLET ORAL
Status: DISCONTINUED | OUTPATIENT
Start: 2021-12-16 | End: 2021-12-22 | Stop reason: HOSPADM

## 2021-12-16 RX ORDER — LANOLIN ALCOHOL/MO/W.PET/CERES
6 CREAM (GRAM) TOPICAL
Status: DISCONTINUED | OUTPATIENT
Start: 2021-12-16 | End: 2021-12-22 | Stop reason: HOSPADM

## 2021-12-16 RX ADMIN — LABETALOL HYDROCHLORIDE 200 MG: 100 TABLET, FILM COATED ORAL at 08:43

## 2021-12-16 RX ADMIN — LISINOPRIL 40 MG: 20 TABLET ORAL at 08:44

## 2021-12-16 RX ADMIN — MODAFINIL 100 MG: 100 TABLET ORAL at 08:44

## 2021-12-16 RX ADMIN — Medication 1000 UNITS: at 08:44

## 2021-12-16 RX ADMIN — AMLODIPINE BESYLATE 10 MG: 5 TABLET ORAL at 05:11

## 2021-12-16 RX ADMIN — MELATONIN TAB 3 MG 6 MG: 3 TAB at 20:27

## 2021-12-16 RX ADMIN — ENOXAPARIN SODIUM 40 MG: 40 INJECTION SUBCUTANEOUS at 08:43

## 2021-12-16 RX ADMIN — LABETALOL HYDROCHLORIDE 200 MG: 100 TABLET, FILM COATED ORAL at 20:25

## 2021-12-16 RX ADMIN — TIZANIDINE 2 MG: 4 TABLET ORAL at 20:26

## 2021-12-16 RX ADMIN — POLYETHYLENE GLYCOL 3350 1 PACKET: 17 POWDER, FOR SOLUTION ORAL at 08:43

## 2021-12-16 RX ADMIN — SENNOSIDES AND DOCUSATE SODIUM 2 TABLET: 50; 8.6 TABLET ORAL at 08:43

## 2021-12-16 RX ADMIN — OMEPRAZOLE 20 MG: 20 CAPSULE, DELAYED RELEASE ORAL at 08:43

## 2021-12-16 RX ADMIN — ASPIRIN 81 MG: 81 TABLET, COATED ORAL at 08:44

## 2021-12-16 RX ADMIN — SENNOSIDES AND DOCUSATE SODIUM 2 TABLET: 50; 8.6 TABLET ORAL at 20:27

## 2021-12-16 RX ADMIN — ATORVASTATIN CALCIUM 80 MG: 40 TABLET, FILM COATED ORAL at 08:43

## 2021-12-16 RX ADMIN — CARBAMAZEPINE 200 MG: 100 TABLET, CHEWABLE ORAL at 20:26

## 2021-12-16 RX ADMIN — TRAZODONE HYDROCHLORIDE 100 MG: 50 TABLET ORAL at 20:25

## 2021-12-16 RX ADMIN — HYDROCHLOROTHIAZIDE 12.5 MG: 25 TABLET ORAL at 05:11

## 2021-12-16 RX ADMIN — CLOPIDOGREL BISULFATE 75 MG: 75 TABLET ORAL at 08:43

## 2021-12-16 RX ADMIN — CARBAMAZEPINE 200 MG: 100 TABLET, CHEWABLE ORAL at 08:43

## 2021-12-16 ASSESSMENT — ACTIVITIES OF DAILY LIVING (ADL): TOILET_TRANSFER_DESCRIPTION: GRAB BAR;SET-UP OF EQUIPMENT;SUPERVISION FOR SAFETY;VERBAL CUEING

## 2021-12-16 NOTE — THERAPY
Occupational Therapy  Daily Treatment     Patient Name: Nakita Sheppard  Age:  76 y.o., Sex:  female  Medical Record #: [id number]  Today's Date: 12/16/2021     Precautions  Precautions: (P) Fall Risk,Weight Bearing As Tolerated Left Upper Extremity  Comments: (P) L alexx, L wrist brace PRN, L neglect, Zio Patch    Safety   ADL Safety : Requires Supervision for Safety,Requires Physical Assist for Safety,Requires Cueing for Safety  Bathroom Safety: Requires Physical Assist for Safety,Requires Supervision for Safety,Requires Cuing for Safety  Comments: see functional levels below for ADL performance details.    Subjective    Patient sitting in w/c in room and agreeable to OT.  Stated her left wrist did not have any pain today.     Objective       12/16/21 0901   Precautions   Precautions Fall Risk;Weight Bearing As Tolerated Left Upper Extremity   Comments L alexx, L wrist brace PRN, L neglect, Zio Patch   Cognition    Speech/ Communication Delayed Responses;Mouths Words   Attention Impaired   Sequencing Impaired   Initiation Impaired   Comments L inattention   Functional Level of Assist   Grooming Moderate Assist  (assist to wash and dry left hand, able to wash/dry right )   Toileting Maximal Assist   Toileting Description Grab bar;Set-up of equipment;Supervision for safety;Verbal cueing;Assist to pull pants up;Assist to pull pants down   Toilet Transfers Minimal Assist   Toilet Transfer Description Grab bar;Set-up of equipment;Supervision for safety;Verbal cueing   Interdisciplinary Plan of Care Collaboration   Patient Position at End of Therapy Seated;Chair Alarm On;Self Releasing Lap Belt Applied;Call Light within Reach;Tray Table within Reach;Phone within Reach   OT Total Time Spent   OT Individual Total Time Spent (Mins) 60   OT Charge Group   OT Self Care / ADL 1   OT Therapy Activity 1   OT Therapeutic Exercise  2     Min/mod A SPT w/c <> mat without AD with verbal cues.      L UE AAROM 3 x 10 (for the  following exercises) with verbal cues with mirror placed anterior of patient while seated EOM:  -Shoulder shrugs  -Scapular protraction/retraction  -Shoulder forward flexion/extension  -Elbow flexion/extension  -Forearm pronation/supination  -Wrist flexion/extension    L UE placed in [place] with tension support at 5.0 for active left horizontal shoulder ab/adduction, elbow flexion/extension.  Active assisted shoulder extension against resistance of Sa[place]o and shoulder flexion with assistance of Saebo and therapist.    Assessment    Patient tolerated session fair as she had impaired overall attention, particularly to the left side.  Required assist w/ clothing management during toileting, but able to complete hygiene after voiding.    Strengths: Independent prior level of function,Motivated for self care and independence,Pleasant and cooperative,Supportive family,Willingly participates in therapeutic activities  Barriers: Aspiration risk,Decreased endurance,Fatigue,Hemiparesis,Impaired activity tolerance,Impaired balance,Impaired functional cognition,Pain    Plan    ADLs, functional transfers, LUE ROM/positioning, LUE neuro re-ed, activities to improve L side attention. Monitor L wrist pain/brace.    Occupational Therapy Goals (Active)     Problem: OT Long Term Goals     Dates: Start: 12/04/21       Goal: LTG-By discharge, patient will complete basic self care tasks at supervision to mod I level using AE as needed.      Dates: Start: 12/04/21   Expected End: 12/16/21          Goal: LTG-By discharge, patient will perform bathroom transfers at supervision level using AE/DME as needed.      Dates: Start: 12/04/21   Expected End: 12/16/21

## 2021-12-16 NOTE — CARE PLAN
"The patient is Watcher - Medium risk of patient condition declining or worsening    Shift Goals  Clinical Goals: Safety  Patient Goals: upgrade to thin liquids    Remains on Mildly thickened liquid. Oral care rendered after PO intake. No signs of aspiration noted.    VSS, room air, denies pain. OOB-BR in W/C with mod-assistance.     Progress made toward(s) clinical / shift goals:    Problem: Skin Integrity  Goal: Skin integrity is maintained or improved  Outcome: Progressing  Note: Patient's skin remains intact and free from new or accidental injury this shift.  Will continue to monitor.      Problem: Fall Risk - Rehab  Goal: Patient will remain free from falls  Note: Erin Irizarry Fall risk Assessment Score: 13      Moderate fall risk Interventions  - Bed and strip alarm   - Yellow sign by the door   - Yellow wrist band \"Fall risk\"  - Room near to the nurse station  - Do not leave patient unattended in the bathroom  - Fall risk education provided       Problem: Bladder / Voiding  Goal: Patient will establish and maintain regular urinary output  Note: Patient is continent of bladder this shift.  Will continue to monitor.      Problem: Bowel Elimination  Goal: Patient will participate in bowel management program  Note: Patient continent of bowels.  Will continue to monitor.         " adjusted adjusted/200 pounds

## 2021-12-16 NOTE — CARE PLAN
"The patient is Stable - Low risk of patient condition declining or worsening      Problem: Fall Risk - Rehab  Goal: Patient will remain free from falls  Note: Erin Irizarry Fall risk Assessment Score: 13      Moderate fall risk Interventions  - Bed and strip alarm   - Yellow sign by the door   - Yellow wrist band \"Fall risk\"  - Room near to the nurse station  - Do not leave patient unattended in the bathroom  - Fall risk education provided       Problem: Skin Integrity  Goal: Patient's skin integrity will be maintained or improve  Note: Patient's skin remains intact and free from new or accidental injury this shift.  Will continue to monitor.      "

## 2021-12-16 NOTE — PROGRESS NOTES
"Rehab Progress Note     Date of Service: 12/16/2021  Chief Complaint: Follow-up stroke    Interval Events (Subjective)    Patient seen and examined today just that she is eating her speech therapy session.  She wants to get back into bed.  She reports being very tired.  Reports she is not sleeping well at night.  She does tend to get back in bed a lot during the day.  She is going to have a repeat swallow study this afternoon.  Patient denies any pain.  She has no other new questions concerns or complaints today.    ROS: No changes to bowel, bladder, pain, or mood.        Objective:  VITAL SIGNS: /53   Pulse 71   Temp 36.7 °C (98 °F) (Oral)   Resp 17   Ht 1.575 m (5' 2\")   Wt 76 kg (167 lb 8.8 oz)   SpO2 93%   BMI 30.65 kg/m²   Gen: alert, no apparent distress  Neuro: notable for dysarthria, left facial droop, left hemiparesis  MSK: Left wrist in brace      Recent Results (from the past 72 hour(s))   POCT glucose device results    Collection Time: 12/14/21  7:13 AM   Result Value Ref Range    Glucose - Accu-Ck 93 65 - 99 mg/dL       Current Facility-Administered Medications   Medication Frequency   • polyethylene glycol/lytes (MIRALAX) PACKET 1 Packet DAILY    And   • senna-docusate (PERICOLACE or SENOKOT S) 8.6-50 MG per tablet 2 Tablet BID    And   • magnesium hydroxide (MILK OF MAGNESIA) suspension 30 mL QDAY PRN    And   • bisacodyl (DULCOLAX) suppository 10 mg QDAY PRN   • hydroCHLOROthiazide (HYDRODIURIL) tablet 12.5 mg Q DAY   • modafinil (PROVIGIL) tablet 100 mg QAM   • traZODone (DESYREL) tablet 50 mg QHS   • melatonin tablet 3 mg QHS   • amLODIPine (NORVASC) tablet 10 mg Q DAY   • butalbital/apap/caffeine -40 mg (Fioricet) per tablet 1 Tablet Q6HRS PRN   • tizanidine (ZANAFLEX) tablet 2 mg QHS   • omeprazole (PRILOSEC) capsule 20 mg QAM AC   • hydrOXYzine HCl (ATARAX) tablet 50 mg Q6HRS PRN   • Respiratory Therapy Consult Continuous RT   • Pharmacy Consult Request ...Pain Management " Review 1 Each PHARMACY TO DOSE   • hydrALAZINE (APRESOLINE) tablet 10 mg Q8HRS PRN   • acetaminophen (Tylenol) tablet 650 mg Q4HRS PRN   • lactulose 20 GM/30ML solution 30 mL QDAY PRN   • docusate sodium (ENEMEEZ) enema 283 mg QDAY PRN   • sodium phosphate (Fleet) enema 133 mL QDAY PRN   • artificial tears ophthalmic solution 1 Drop PRN   • benzocaine-menthol (CEPACOL) lozenge 1 Lozenge Q2HRS PRN   • mag hydrox-al hydrox-simeth (MAALOX PLUS ES or MYLANTA DS) suspension 20 mL Q2HRS PRN   • ondansetron (ZOFRAN ODT) dispertab 4 mg 4X/DAY PRN    Or   • ondansetron (ZOFRAN) syringe/vial injection 4 mg 4X/DAY PRN   • sodium chloride (OCEAN) 0.65 % nasal spray 2 Spray PRN   • midazolam (VERSED) 5 mg/mL (1 mL vial) PRN   • enoxaparin (LOVENOX) inj 40 mg DAILY   • aspirin EC (ECOTRIN) tablet 81 mg DAILY   • atorvastatin (LIPITOR) tablet 80 mg DAILY   • carBAMazepine (TEGRETOL) chewable tab 200 mg BID   • clopidogrel (PLAVIX) tablet 75 mg DAILY   • labetalol (NORMODYNE) tablet 200 mg Q12HRS   • lisinopril (PRINIVIL) tablet 40 mg DAILY   • vitamin D3 (cholecalciferol) tablet 1,000 Units DAILY       Orders Placed This Encounter   Procedures   • Diet Order Diet: Level 6 - Soft and Bite Sized (Medications floated whole, one at a time, in pudding (pt does not like applesauce)); Liquid level: Level 2 - Mildly Thick; Tray Modifications (optional): No Straws     Standing Status:   Standing     Number of Occurrences:   1     Order Specific Question:   Diet:     Answer:   Level 6 - Soft and Bite Sized [23]     Comments:   Medications floated whole, one at a time, in pudding (pt does not like applesauce)     Order Specific Question:   Liquid level     Answer:   Level 2 - Mildly Thick     Order Specific Question:   Tray Modifications (optional)     Answer:   No Straws       Radiology  [id number] [id number]   Final Result      Normal bowel gas pattern      CT-HEAD W/O   Final Result         1.  Better delineation of low attenuation in the  right temporal lobe and right parietal lobe in the right MCA distribution consistent with evolving bland ischemia.      2. Stable lacunar infarct in the left posterior basal ganglia.      3. Diffuse small vessel ischemic changes again noted.      4. No acute hemorrhage.      DX-ESOPHAGUS - [id number]    (Results Pending)   DX-ESOPHAGUS - [id number]    (Results Pending)       Assessment:  Active Hospital Problems    Diagnosis    • *Ischemic stroke (HCC)    • Polyuria    • Wrist fracture, closed, left, sequela    • Other insomnia    • Hypokalemia    • Class 1 obesity without serious comorbidity in adult    • Spasticity    • Left-sided neglect    • Other dysphagia    • Impaired mobility and ADLs    • Hyperlipidemia LDL goal <70    • Peripheral neuropathy    • Hypertension      This patient is a 76 y.o. female admitted for acute inpatient rehabilitation with Ischemic stroke (HCC).    I led and attended the weekly conference, and agree with the IDT conference documentation and plan of care as noted below.    Date of conference: 12/15/2021    Goals and barriers: See IDT note.    Biggest barriers: left hemiparesis, dysphagia, pockets in left cheek, dysphagia, left neglect    Goals in next week: bed mobility    CM/social support: family very supportive, will have 24/7 support    Anticipated DC date: 12/24    Home health: PT/OT/SLP/RN    Equip: MWC, platform walker     Follow up: PCP, [facility], Dr. Callejas, cardiology, orthopedic surgery        Medical Decision Making and Plan:    Right MCA stroke  Left hemiparesis, improved, arm more affected than leg  Nondominant  Left neglect, continues  Dysphagia, improved  Cognitive impairments  Continue full rehab program  PT/OT/SLP, 1 hr each discipline, 5 days per week     Aspirin  Plavix  Statin     Patient with Zio patch cardiac monitor, return 2 weeks from admission, 12/17    Outpatient follow-up with [facility], cardiology, Dr. Callejas, referrals  made    MBS with aspiration on thins, diet downgraded per speech therapy recommendations, ice chips in between meals only with supervision, repeat MBS today    Started Provigil for neuro stimulation, improved initiation    Psychology consulted for evaluation, patient not depressed    Left wrist pain, improved  X-ray at acute showed possible avulsion fractures  Discussed with orthopedic surgery, outpatient follow-up, referral made  Wrist brace and weightbearing as tolerated     Hypertension, improved  Lisinopril  Labetalol  Amlodipine, dose increased  Currently stable, hospitalist has signed off  Started hydrochlorothiazide 12/14  Continue to monitor     Peripheral neuropathy  Continue home medication carbamazepine  Was also on Zanaflex (6 mg TID), restarted 2 mg at night due to complaints of leg spasms, will not restart home dosing due to side effect of lethargy    History of migraines  Continue carbamazepine    Hypokalemia resolved  Status post supplementation    Insomnia, resolved/continues  Restarted home medication tizanidine 2 mg  Added scheduled melatonin and trazodone, increase dose of both     Obesity  BMI 32.6  Outpatient nutritional counseling    Vitamin D deficiency  Continue supplementation    GI prophylaxis  Omeprazole     Polyuria  Negative urinalysis    Bowel program  Bowel incontinence  Constipation  Increase bowel medications  Last  12/15    Bladder program  Bladder incontinence  Check PVRs - 25, 15, 10  Not retaining  Bladder scan for no voids  ICP for over 400 cc  Scheduled toileting    DVT prophylaxis  Lovenox    Total time:  27 minutes.  I spent greater than 50% of the time for patient care, counseling, and coordination on this date, including patient face-to face time, unit/floor time with review of records/pertinent lab data and studies, as well as discussing diagnostic evaluation/work up, planned therapeutic interventions, and future disposition of care, as per the interval  events/subjective and the assessment and plan as noted above.      I have performed a physical exam, reviewed and updated ROS, as well as the assessment and plan today 12/16/2021. In review of note from 12/15/2021 there are no new changes except as documented above.                Hawa Alva M.D.   Physical Medicine and Rehabilitation

## 2021-12-16 NOTE — THERAPY
"Physical Therapy   Daily Treatment     Patient Name: Nakita Sheppard  Age:  76 y.o., Sex:  female  Medical Record #: [id number]  Today's Date: 12/15/2021     Precautions  Precautions: Fall Risk,Weight Bearing As Tolerated Left Upper Extremity  Comments: L alexx, L wrist brace PRN, L neglect    Subjective    Pt was supine in bed upon arrival and agreeable to treatment.  Pt's daughter present during session.      Objective       12/15/21 1401   Precautions   Precautions Fall Risk;Weight Bearing As Tolerated Left Upper Extremity   Transfer Functional Level of Assist   Bed, Chair, Wheelchair Transfer Contact Guard Assist   Bed Chair Wheelchair Transfer Description Adaptive equipment;Set-up of equipment;Supervision for safety;Verbal cueing  (SPT using bedrail)   Bed Mobility    Supine to Sit Contact Guard Assist   Sit to Supine Contact Guard Assist  (with VCing and use of leg )   Sit to Stand Contact Guard Assist   Scooting Minimal Assist  (x1 otherwise SBA)   Rolling Supervised   Interdisciplinary Plan of Care Collaboration   IDT Collaboration with  Occupational Therapist   Patient Position at End of Therapy In Bed;Call Light within Reach;Tray Table within Reach;Phone within Reach;Family / Friend in Room   Collaboration Comments CLOF, home set up information   PT Total Time Spent   PT Individual Total Time Spent (Mins) 60   PT Charge Group   PT Therapeutic Activities 4     Gathered Isotoner compression glove to L hand to manage hand edema.  Completed transfer training and bed mobility on regular queen sized bed (25\" high) using bed and leg  x 6 reps total.  Completed discussion with pt's daughter and spouse (via video call) about home set up.  Pt's bed height is 31\" high and pt was using a step stool with rail that is 9\" high to get into/OOB.  If needed, pt could also sleep on queen sized bed in guest room which is 26\" high.      Assessment    Pt demonstrated improving transfers and bed mobility but " "required frequent VCing for safety and sequencing.  Pt would benefit from use of bed rail at home.    Strengths: Able to follow instructions,Good carryover of learning,Independent prior level of function,Making steady progress towards goals,Supportive family,Pleasant and cooperative,Willingly participates in therapeutic activities  Barriers: Hemiparesis,Impaired balance (Impaired endurance and strength, fall risk)    Plan    Trial 4\" step in // bars to determine if pt can use a step stool for transfers at home.  Safety education, continue additional trials for bed mobility for rolling and sit to/from supine, gait training pfww as tolerated, therapeutic exercise for strength and endurance, wc mobility     Passport items to be completed:  Perform bathroom transfers, complete dressing, complete feeding, get ready for the day, prepare a simple meal, participate in household tasks, adapt home for safety needs, demonstrate home exercise program, complete caregiver training     Physical Therapy Problems (Active)     Problem: PT-Long Term Goals     Dates: Start: 12/04/21       Goal: LTG-By discharge, patient will maintain balance at least 45/56 on the [place] to DC to home safely with support of spouse     Dates: Start: 12/04/21   Expected End: 12/16/21          Goal: LTG-By discharge, patient will ambulate at least 500' with SPC and SPV inside and outside surfaces     Dates: Start: 12/04/21   Expected End: 12/16/21          Goal: LTG-By discharge, patient will transfer one surface to another SPV with SPC     Dates: Start: 12/04/21   Expected End: 12/16/21          Goal: LTG-By discharge, patient will transfer in/out of a car SBA from ambulatory level with SPC     Dates: Start: 12/04/21   Expected End: 12/16/21          Goal: LTG-By discharge, patient will ascend/descend ADA ramp with SPC and SBA to enter/exit her home safely     Dates: Start: 12/04/21   Expected End: 12/16/21                "

## 2021-12-16 NOTE — THERAPY
12/16/21 1435   Precautions   Precautions Fall Risk;Weight Bearing As Tolerated Left Upper Extremity   Comments L hemiparesis, left wrist brace, L neglect, Zio patch   Pain 0 - 10 Group   Location Back   Location Orientation Lower   Therapist Pain Assessment 7;Prior to Activity   Cognition    Level of Consciousness Alert   Attention Impaired   Sequencing Impaired   Initiation Impaired   Therapy Missed   Missed Therapy (Minutes) 60   Reason For Missed Therapy Medical - Other (Please Comment)  (Back pain)   Physical Therapy   Daily Treatment     Patient Name: Nakita Sheppard  Age:  76 y.o., Sex:  female  Medical Record #: [id number]  Today's Date: 12/16/2021     Precautions  Precautions: Fall Risk,Weight Bearing As Tolerated Left Upper Extremity  Comments: L hemiparesis, left wrist brace, L neglect, Zio patch    Subjective    The patient was in bed.  She complained of back pain at 6-7 out of 10 and did not want to participate in therapy.     Objective    PT spoke with Dr. Alva who ordered stronger pain medication and pain patches for her back.    Assessment    The patient did not want to participate in PT this afternoon due to significant back pain.  She said she wants to work with PT but her back was too painful.  This was the first time that she has refused therapy.  Dr. Alva approved hold physical therapy for today.  Strengths: Able to follow instructions,Good carryover of learning,Independent prior level of function,Making steady progress towards goals,Supportive family,Pleasant and cooperative,Willingly participates in therapeutic activities  Barriers: Hemiparesis,Impaired balance (Impaired endurance and strength, fall risk)    Plan    Safety education, bed mobility training for rolling and supine to/from sit, gait training pfww as tolerated, wheelchair mobility, therapeutic exercise, left attention, right weight shift during gait    Passport items to be completed:  Passport items to be completed:  Get  in/out of bed safely, in/out of a vehicle, safely use mobility device, walk or wheel around home/community, navigate up and down stairs, show how to get up/down from the ground, ensure home is accessible, demonstrate HEP, complete caregiver training    Physical Therapy Problems (Active)     Problem: PT-Long Term Goals     Dates: Start: 12/04/21       Goal: LTG-By discharge, patient will maintain balance at least 45/56 on the [place] to DC to home safely with support of spouse     Dates: Start: 12/04/21   Expected End: 12/16/21          Goal: LTG-By discharge, patient will ambulate at least 500' with SPC and SPV inside and outside surfaces     Dates: Start: 12/04/21   Expected End: 12/16/21          Goal: LTG-By discharge, patient will transfer one surface to another SPV with SPC     Dates: Start: 12/04/21   Expected End: 12/16/21          Goal: LTG-By discharge, patient will transfer in/out of a car SBA from ambulatory level with SPC     Dates: Start: 12/04/21   Expected End: 12/16/21          Goal: LTG-By discharge, patient will ascend/descend ADA ramp with SPC and SBA to enter/exit her home safely     Dates: Start: 12/04/21   Expected End: 12/16/21

## 2021-12-16 NOTE — THERAPY
Speech Language Pathology  Video Swallow     Patient Name:  Nakita Sheppard  AGE:  76 y.o., SEX:  female  Medical Record #:  [id number]  Today's Date: 12/16/2021     Objective    Patient participated in repeat MBSS. Patient is currently on SB/06 diet with mildly thick liquids and has been participating in dysphagia treatment with SLP.     Assessment       12/16/21 1300   History / Background Information   Prior Level of Function for Eating / Swallowing SB/06 diet with mildly thick liquids   Diagnosis ischemic stroke   Dysphagia Symptoms Warranting Video Swallow Coughing with liquids   Dentition Edentulous   Procedure   Patient Seated in  w/c   Seated at (Degrees) 90   Views Completed Lateral   Consistencies / Presentation Method   Thin (0) Cup   Pureed (4) Teaspoon   Soft & Bite-Sized (6) - (Dysphagia III) Teaspoon   Regular (7)   (bite from cookie)   Barium Tablet Cup   Oral Phase   Thin (0) Impaired Labial Closure;Anterior Spillage;Oral Residue After the Swallow;Delayed Oral Transit;Oral Holding   Pureed (4) Oral Residue After the Swallow;Oral Holding   Soft & Bite-Sized (6) - (Dysphagia III) Premature Spillage Into Valleculae;Oral Residue After the Swallow   Regular (7) Premature Spillage Into Valleculae;Oral Residue After the Swallow   Pharyngeal Phase   Thin (0) Penetration Before Swallow   Impression   Oral - Pharyngeal Mild Impairment   Prognosis   Prognosis for Improvement Good   Recommendations   Diet / Liquid Recommendation Soft & Bite-Sized (6) - (Dysphagia III);Thin (0)   Medication Administration  Float Whole with Puree   Strategies / Precautions Small Bites;Small Sips;Multiple Swallows;Clear Mouth Before Next Bite;Oral Care After Meals   Interventions Dysphagia Therapy by SLP;Compensatory Safe Swallow Strategy Training;Therapeutic Dining Program for Meals   SLP Contact Information (Name / Extension) [phone]   SLP Total Time Spent   SLP Individual Total Time Spent (Mins) 30   Charge Group   SLP  Video Swallow / FEES Videofluoroscopic Evaluation     MBSS completed.  Pt presented with overall mild oral dysphagia and mild pharyngeal dysphagia characterized by mild oral holding with all liquids; impaired labial closure and anterior spillage on the left side with thin liquids; and penetration during the swallow with thin liquids; Pt was able to tolerate a barium capsule with thin liquid wash without difficulty.       Plan     Continue SB/06 diet, thin liquids, medications ok to be floated in puree or with liquid wash. Tdine.  Recommended compensatory swallowing strategies include clearing oral cavity before taking another bite/sip, double swallows, clearing left buccal cavity between bites with a lingual sweep.    SLP to follow for dysphagia.

## 2021-12-16 NOTE — THERAPY
Speech Language Pathology  Daily Treatment     Patient Name: Nakita Sheppard  Age:  76 y.o., Sex:  female  Medical Record #: [id number]  Today's Date: 12/16/2021     Precautions  Precautions: Fall Risk,Weight Bearing As Tolerated Left Upper Extremity  Comments: L alexx, L wrist brace PRN, L neglect, Zio Patch    Subjective    Pt willing to participate in this ST session, requesting to get into bed before and after this session      Objective       12/16/21 1001   SLP Total Time Spent   SLP Individual Total Time Spent (Mins) 60   Treatment Charges   SLP Swallowing Dysfunction Treatment Swallowing Dysfunction Treatment       Assessment    Isometric tongue and lip exercises completed. Tongue strength measured at an avg of 22 kPA over 3 trials. Tongue endurance measured at 11 kPA for avg of 4.5# seconds at 50% patient’s max strength.  Left lip/cheek strength measured at an avg of 4 kPA over 3 trials. Tongue endurance measured at 2 kPA for avg of 3.5 seconds at 50% patient’s max strength.  Significant difficulty noted when attempting any movement on the left side of pt's face.  Pt was also able to tolerate ice chips throughout this session without s/sx of aspiration.        Strengths: Pleasant and cooperative,Motivated for self care and independence,Willingly participates in therapeutic activities,Independent prior level of function,Supportive family  Barriers: Aspiration risk,Impaired insight/denial of deficits,Impaired carryover of learning,Impaired functional cognition    Plan    Complete MBSS, thin liquid trials, speech intelligibility, IOPI      Speech Therapy Problems (Active)     Problem: Expression STGs     Dates: Start: 12/04/21       Goal: STG-Within one week, patient will     Dates: Start: 12/04/21   Expected End: 12/16/21       Description: Utilize clear speech strategies with mod cues in 70% of opportunities to increase overall speech intelligibility.     Goal Note filed on 12/15/21 0756 by Dav Knight  MS,CCC-SLP     Mod-max cues required to implement clear speech strategies                Problem: Memory STGs     Dates: Start: 12/04/21       Goal: STG-Within one week, patient will     Dates: Start: 12/04/21   Expected End: 12/16/21       Description: Complete functional IADL tasks (e.g. med and financial management) with 70% acc and mod cues to ensure safe return to her PLOF.     Goal Note filed on 12/15/21 0756 by Dav Knight MS,CCC-SLP     Therapy targeting dysphagia management at this time, continue to target                Problem: Speech/Swallowing LTGs     Dates: Start: 12/04/21       Goal: LTG-By discharge, patient will safely swallow     Dates: Start: 12/04/21   Expected End: 12/16/21       Description: Safest/least restrictive diet with no overt s/sx of aspiration for 100% of intake to maintain adequate nutrition and hydration.        Goal: LTG-By discharge, patient will     Dates: Start: 12/04/21   Expected End: 12/16/21       Description: Complete cog-ling tasks at modified independence level to ensure safe return to PLOF.        Goal: LTG-By discharge, patient will     Dates: Start: 12/04/21   Expected End: 12/16/21       Description: Communicate complex ideas at the conversation level with 90% intelligibility to a naiive listener.           Problem: Swallowing STGs     Dates: Start: 12/04/21       Goal: STG-Within one week, patient will consume trials of thin liquids with use of effortful swallow with no overt s/sx of asp/pen noted on 80% of trials provided MIN cues     Dates: Start: 12/08/21   Expected End: 12/16/21       Goal Note filed on 12/15/21 0756 by Dav Knight MS,CCC-SLP     Variable toleration of thin liquids, continue to target

## 2021-12-17 PROCEDURE — 99231 SBSQ HOSP IP/OBS SF/LOW 25: CPT | Performed by: PHYSICAL MEDICINE & REHABILITATION

## 2021-12-17 PROCEDURE — A9270 NON-COVERED ITEM OR SERVICE: HCPCS | Performed by: HOSPITALIST

## 2021-12-17 PROCEDURE — 92526 ORAL FUNCTION THERAPY: CPT

## 2021-12-17 PROCEDURE — 700111 HCHG RX REV CODE 636 W/ 250 OVERRIDE (IP): Performed by: PHYSICAL MEDICINE & REHABILITATION

## 2021-12-17 PROCEDURE — A9270 NON-COVERED ITEM OR SERVICE: HCPCS | Performed by: PHYSICAL MEDICINE & REHABILITATION

## 2021-12-17 PROCEDURE — 97535 SELF CARE MNGMENT TRAINING: CPT

## 2021-12-17 PROCEDURE — 97112 NEUROMUSCULAR REEDUCATION: CPT

## 2021-12-17 PROCEDURE — 700101 HCHG RX REV CODE 250: Performed by: PHYSICAL MEDICINE & REHABILITATION

## 2021-12-17 PROCEDURE — 770010 HCHG ROOM/CARE - REHAB SEMI PRIVAT*

## 2021-12-17 PROCEDURE — 97032 APPL MODALITY 1+ESTIM EA 15: CPT

## 2021-12-17 PROCEDURE — 92507 TX SP LANG VOICE COMM INDIV: CPT

## 2021-12-17 PROCEDURE — 700102 HCHG RX REV CODE 250 W/ 637 OVERRIDE(OP): Performed by: HOSPITALIST

## 2021-12-17 PROCEDURE — 700102 HCHG RX REV CODE 250 W/ 637 OVERRIDE(OP): Performed by: PHYSICAL MEDICINE & REHABILITATION

## 2021-12-17 PROCEDURE — 97530 THERAPEUTIC ACTIVITIES: CPT | Mod: CQ

## 2021-12-17 RX ADMIN — CARBAMAZEPINE 200 MG: 100 TABLET, CHEWABLE ORAL at 08:46

## 2021-12-17 RX ADMIN — TRAZODONE HYDROCHLORIDE 100 MG: 50 TABLET ORAL at 21:39

## 2021-12-17 RX ADMIN — MELATONIN TAB 3 MG 6 MG: 3 TAB at 21:39

## 2021-12-17 RX ADMIN — OMEPRAZOLE 20 MG: 20 CAPSULE, DELAYED RELEASE ORAL at 08:46

## 2021-12-17 RX ADMIN — ENOXAPARIN SODIUM 40 MG: 40 INJECTION SUBCUTANEOUS at 08:46

## 2021-12-17 RX ADMIN — ATORVASTATIN CALCIUM 80 MG: 40 TABLET, FILM COATED ORAL at 08:46

## 2021-12-17 RX ADMIN — SENNOSIDES AND DOCUSATE SODIUM 2 TABLET: 50; 8.6 TABLET ORAL at 08:46

## 2021-12-17 RX ADMIN — Medication 1000 UNITS: at 08:47

## 2021-12-17 RX ADMIN — LIDOCAINE 1 PATCH: 50 PATCH TOPICAL at 08:47

## 2021-12-17 RX ADMIN — LABETALOL HYDROCHLORIDE 200 MG: 100 TABLET, FILM COATED ORAL at 21:40

## 2021-12-17 RX ADMIN — LABETALOL HYDROCHLORIDE 200 MG: 100 TABLET, FILM COATED ORAL at 08:46

## 2021-12-17 RX ADMIN — CARBAMAZEPINE 200 MG: 100 TABLET, CHEWABLE ORAL at 21:40

## 2021-12-17 RX ADMIN — ASPIRIN 81 MG: 81 TABLET, COATED ORAL at 08:47

## 2021-12-17 RX ADMIN — AMLODIPINE BESYLATE 10 MG: 5 TABLET ORAL at 06:01

## 2021-12-17 RX ADMIN — HYDROCHLOROTHIAZIDE 12.5 MG: 25 TABLET ORAL at 06:01

## 2021-12-17 RX ADMIN — MODAFINIL 100 MG: 100 TABLET ORAL at 08:47

## 2021-12-17 RX ADMIN — TIZANIDINE 2 MG: 4 TABLET ORAL at 21:38

## 2021-12-17 RX ADMIN — POLYETHYLENE GLYCOL 3350 1 PACKET: 17 POWDER, FOR SOLUTION ORAL at 08:47

## 2021-12-17 RX ADMIN — LISINOPRIL 40 MG: 20 TABLET ORAL at 08:46

## 2021-12-17 RX ADMIN — CLOPIDOGREL BISULFATE 75 MG: 75 TABLET ORAL at 08:47

## 2021-12-17 RX ADMIN — SENNOSIDES AND DOCUSATE SODIUM 2 TABLET: 50; 8.6 TABLET ORAL at 21:40

## 2021-12-17 ASSESSMENT — GAIT ASSESSMENTS
GAIT LEVEL OF ASSIST: CONTACT GUARD ASSIST
DISTANCE (FEET): 12
ASSISTIVE DEVICE: PARALLEL BARS
DEVIATION: ATAXIC;DECREASED BASE OF SUPPORT;BRADYKINETIC;DECREASED HEEL STRIKE;DECREASED TOE OFF

## 2021-12-17 ASSESSMENT — ACTIVITIES OF DAILY LIVING (ADL)
BED_CHAIR_WHEELCHAIR_TRANSFER_DESCRIPTION: INCREASED TIME;SUPERVISION FOR SAFETY;VERBAL CUEING;SET-UP OF EQUIPMENT
TOILETING_LEVEL_OF_ASSIST_DESCRIPTION: ASSIST TO PULL PANTS UP;ASSIST TO PULL PANTS DOWN;ASSIST FOR STANDING BALANCE;GRAB BAR;INCREASED TIME;SUPERVISION FOR SAFETY;VERBAL CUEING
TOILET_TRANSFER_DESCRIPTION: GRAB BAR;INCREASED TIME;INITIAL PREPARATION FOR TASK;SET-UP OF EQUIPMENT;SUPERVISION FOR SAFETY;VERBAL CUEING

## 2021-12-17 NOTE — THERAPY
Occupational Therapy  Daily Treatment     Patient Name: Nakita Sheppard  Age:  76 y.o., Sex:  female  Medical Record #: [id number]  Today's Date: 12/17/2021     Precautions  Precautions: (P) Fall Risk,Weight Bearing As Tolerated Left Upper Extremity  Comments: (P) L hemiparesis, left wrist brace, L neglect, Zio patch    Safety   ADL Safety : Requires Supervision for Safety,Requires Physical Assist for Safety,Requires Cueing for Safety  Bathroom Safety: Requires Physical Assist for Safety,Requires Supervision for Safety,Requires Cuing for Safety  Comments: see functional levels below for ADL performance details.    Subjective    Pt reports fatigue but agreeable to OT session.     Objective       12/17/21 1401   Precautions   Precautions Fall Risk;Weight Bearing As Tolerated Left Upper Extremity   Comments L hemiparesis, left wrist brace, L neglect, Zio patch   Functional Level of Assist   Grooming Stand by Assist;Seated   Toileting Maximal Assist   Toileting Description Assist to pull pants up;Assist to pull pants down;Assist for standing balance;Grab bar;Increased time;Supervision for safety;Verbal cueing   Toilet Transfers Minimal Assist   Toilet Transfer Description Grab bar;Increased time;Initial preparation for task;Set-up of equipment;Supervision for safety;Verbal cueing   Neuro-Muscular Treatments   Neuro-Muscular Treatments Facilitation;Sequencing;Verbal Cuing;Other (See Comments);Electrical Stimulation   Comments See note for tx details.    Interdisciplinary Plan of Care Collaboration   Patient Position at End of Therapy In Bed;Bed Alarm On;Call Light within Reach;Tray Table within Reach;Phone within Reach   OT Total Time Spent   OT Individual Total Time Spent (Mins) 60   OT Charge Group   OT Electrical Stimulation Attended 1   OT Self Care / ADL 1   OT Neuromuscular Re-education / Balance 2       Saebo mobile arm support (MAS) for unweighting L UE to increase repetitions, improve motor recovery, and  assist in relearning normal movement patterns. Pt completed 3X10 of each of the following exercises: shoulder flex/ext, abduction/adduction, horizontal abduction/adduction, protraction/retraction and elbow flex/ext.  Saebo MAS tension set to level 5 for all exercises; adjusted with pt fatigue.    NMES applied to L wrist extensors at 25mA x10 min. Pt tolerated well with no c/o wrist pain. Verbal cues to pair volitional movement with e-stim.      Assessment    Pt con't to report fatigue and requires increased time and encouragement for OOB activity. She tolerated NMES well with no c/o L wrist pain, and will benefit from ongoing use in upcoming sessions. Pt con't to be limited by L hemiparesis (distal>proximal), L neglect and decreased endurance.   Strengths: Independent prior level of function,Motivated for self care and independence,Pleasant and cooperative,Supportive family,Willingly participates in therapeutic activities  Barriers: Aspiration risk,Decreased endurance,Fatigue,Hemiparesis,Impaired activity tolerance,Impaired balance,Impaired functional cognition,Pain    Plan    ADLs, functional transfers, LUE ROM/positioning, LUE neuro re-ed, activities to improve L side attention. Monitor L wrist pain/brace.    Occupational Therapy Goals (Active)     Problem: OT Long Term Goals     Dates: Start: 12/04/21       Goal: LTG-By discharge, patient will complete basic self care tasks at supervision to mod I level using AE as needed.      Dates: Start: 12/04/21   Expected End: 12/16/21          Goal: LTG-By discharge, patient will perform bathroom transfers at supervision level using AE/DME as needed.      Dates: Start: 12/04/21   Expected End: 12/16/21

## 2021-12-17 NOTE — CARE PLAN
The patient is Stable - Low risk of patient condition declining or worsening    Shift Goals  Clinical Goals: Safety  Patient Goals:     Progress made toward(s) clinical / shift goals:      Problem: Knowledge Deficit - Standard  Goal: Patient and family/care givers will demonstrate understanding of plan of care, disease process/condition, diagnostic tests and medications  Outcome: Progressing     Problem: Pain - Standard  Goal: Alleviation of pain or a reduction in pain to the patient’s comfort goal  Outcome: Progressing         Patient is not progressing towards the following goals:

## 2021-12-17 NOTE — CARE PLAN
"The patient is Stable - Low risk of patient condition declining or worsening    Problem: Pain - Standard  Goal: Alleviation of pain or a reduction in pain to the patient’s comfort goal  Outcome: Progressing  Note: Pt denies pain or discomfort tonight. Sleeps well with scheduled melatonin and trazodone. Will continue to monitor.     Problem: Fall Risk - Rehab  Goal: Patient will remain free from falls  Outcome: Progressing  Note: Erin Irizarry Fall risk Assessment Score: 13      Moderate fall risk Interventions  - Bed and strip alarm   - Yellow sign by the door   - Yellow wrist band \"Fall risk\"      - Do not leave patient unattended in the bathroom  - Fall risk education provided    0300-Assisted pt with oral suctioning this am.                 "

## 2021-12-17 NOTE — THERAPY
Physical Therapy   Daily Treatment     Patient Name: Nakita Sheppard  Age:  76 y.o., Sex:  female  Medical Record #: [id number]  Today's Date: 12/17/2021     Precautions  Precautions: Fall Risk,Weight Bearing As Tolerated Left Upper Extremity  Comments: L hemiparesis, left wrist brace, L neglect, Zio patch    Subjective    Pt seated in w/c upon arrival, agreeable to session.  Pt spoke to MD regarding back pain while MD observed in the session.     Objective       12/17/21 1001   Pain 0 - 10 Group   Location Back   Location Orientation Lower   Description Aching   Comfort Goal Comfort with Movement;Perform Activity   Therapist Pain Assessment 2;During Activity   Cognition    Cognition / Consciousness X   Speech/ Communication Delayed Responses;Mouths Words;Slurred   Orientation Level Oriented x 4   Level of Consciousness Alert   Ability To Follow Commands 2 Step   Attention Impaired   Sequencing Impaired   Initiation Impaired   Gait Functional Level of Assist    Gait Level Of Assist Contact Guard Assist   Assistive Device Parallel Bars  (after step on/off 4 inches step)   Distance (Feet) 12   # of Times Distance was Traveled 2   Deviation Ataxic;Decreased Base Of Support;Bradykinetic;Decreased Heel Strike;Decreased Toe Off   Transfer Functional Level of Assist   Bed, Chair, Wheelchair Transfer Contact Guard Assist   Bed Chair Wheelchair Transfer Description Increased time;Supervision for safety;Verbal cueing;Set-up of equipment   Sitting Lower Body Exercises   Sit to Stand   (1 x 5)   Standing Lower Body Exercises   Step Up   (1 x 5)   Step Down   (1 x 5)   Comments use of 4 inches step in // w/ CGA/Tyson   Bed Mobility    Sit to Stand Contact Guard Assist   Neuro-Muscular Treatments   Neuro-Muscular Treatments Sequencing;Verbal Cuing;Postural Facilitation   Interdisciplinary Plan of Care Collaboration   IDT Collaboration with  Occupational Therapist;Physician   Patient Position at End of Therapy Seated;Chair  "Alarm On;Call Light within Reach;Tray Table within Reach   Collaboration Comments MD BRIDGETTE observed step on/off 4 inches step   PT Total Time Spent   PT Individual Total Time Spent (Mins) 30   PT Charge Group   PT Therapeutic Activities 2   Supervising Physical Therapist Na Isbell       Assessment    Trial 4\" step in // bars, pt was able to step up/down w/ Tyson/CGA but required VC for foot placement. Pt required extra time to perform tasks.    Strengths: Able to follow instructions,Good carryover of learning,Independent prior level of function,Making steady progress towards goals,Supportive family,Pleasant and cooperative,Willingly participates in therapeutic activities  Barriers: Hemiparesis,Impaired balance (Impaired endurance and strength, fall risk)    Plan    Safety education, continue additional trials for bed mobility for rolling and sit to/from supine, gait training pfww as tolerated, therapeutic exercise for strength and endurance, wc mobility     Passport items to be completed:  Perform bathroom transfers, complete dressing, complete feeding, get ready for the day, prepare a simple meal, participate in household tasks, adapt home for safety needs, demonstrate home exercise program, complete caregiver training     Physical Therapy Problems (Active)     Problem: PT-Long Term Goals     Dates: Start: 12/04/21       Goal: LTG-By discharge, patient will maintain balance at least 45/56 on the [place] to DC to home safely with support of spouse     Dates: Start: 12/04/21   Expected End: 12/16/21          Goal: LTG-By discharge, patient will ambulate at least 500' with SPC and SPV inside and outside surfaces     Dates: Start: 12/04/21   Expected End: 12/16/21          Goal: LTG-By discharge, patient will transfer one surface to another SPV with SPC     Dates: Start: 12/04/21   Expected End: 12/16/21          Goal: LTG-By discharge, patient will transfer in/out of a car SBA from ambulatory level with SPC     Dates: " Start: 12/04/21   Expected End: 12/16/21          Goal: LTG-By discharge, patient will ascend/descend ADA ramp with SPC and SBA to enter/exit her home safely     Dates: Start: 12/04/21   Expected End: 12/16/21

## 2021-12-17 NOTE — THERAPY
"Speech Language Pathology  Daily Treatment     Patient Name: Nakita Sheppard  Age:  76 y.o., Sex:  female  Medical Record #: [id number]  Today's Date: 12/17/2021     Precautions  Precautions: Fall Risk,Weight Bearing As Tolerated Left Upper Extremity  Comments: L hemiparesis, left wrist brace, L neglect, Zio patch    Subjective    Pt pleasant and cooperative, expressed being thankful for advancement to thin liquids.      Objective       12/17/21 1033   SLP Total Time Spent   SLP Individual Total Time Spent (Mins) 30   Treatment Charges   SLP Treatment - Individual Speech Language Treatment - Individual       Assessment    Pt unable to recall any speech intelligibility strategies pt stating \"tight lips.\" Re educated pt regarding loud speech, over articulate with slow speech producing to increase overall clarity of intended message. 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     Strengths: Pleasant and cooperative,Motivated for self care and independence,Willingly participates in therapeutic activities,Independent prior level of function,Supportive family  Barriers: Aspiration risk,Impaired insight/denial of deficits,Impaired carryover of learning,Impaired functional cognition    Plan    Speech intelligibility, swallow and cognitive intervention     Speech Therapy Problems (Active)     Problem: Expression STGs     Dates: Start: 12/04/21       Goal: STG-Within one week, patient will     Dates: Start: 12/04/21   Expected End: 12/16/21       Description: Utilize clear speech strategies with mod cues in 70% of opportunities to increase overall speech intelligibility.     Goal Note filed on 12/15/21 0756 by Dav Knight MS,CCC-SLP     Mod-max cues required to implement clear speech strategies                Problem: Memory STGs     Dates: Start: " 12/04/21       Goal: STG-Within one week, patient will     Dates: Start: 12/04/21   Expected End: 12/16/21       Description: Complete functional IADL tasks (e.g. med and financial management) with 70% acc and mod cues to ensure safe return to her PLOF.     Goal Note filed on 12/15/21 0756 by Dav Knight MS,CCC-SLP     Therapy targeting dysphagia management at this time, continue to target                Problem: Speech/Swallowing LTGs     Dates: Start: 12/04/21       Goal: LTG-By discharge, patient will safely swallow     Dates: Start: 12/04/21   Expected End: 12/16/21       Description: Safest/least restrictive diet with no overt s/sx of aspiration for 100% of intake to maintain adequate nutrition and hydration.        Goal: LTG-By discharge, patient will     Dates: Start: 12/04/21   Expected End: 12/16/21       Description: Complete cog-ling tasks at modified independence level to ensure safe return to PLOF.        Goal: LTG-By discharge, patient will     Dates: Start: 12/04/21   Expected End: 12/16/21       Description: Communicate complex ideas at the conversation level with 90% intelligibility to a naiive listener.           Problem: Swallowing STGs     Dates: Start: 12/04/21       Goal: STG-Within one week, patient will consume trials of thin liquids with use of effortful swallow with no overt s/sx of asp/pen noted on 80% of trials provided MIN cues     Dates: Start: 12/08/21   Expected End: 12/16/21       Goal Note filed on 12/15/21 0756 by Dav Knight MS,CCC-SLP     Variable toleration of thin liquids, continue to target

## 2021-12-17 NOTE — THERAPY
"Speech Language Pathology  Daily Treatment     Patient Name: Nakita Sheppard  Age:  76 y.o., Sex:  female  Medical Record #: [id number]  Today's Date: 12/17/2021     Precautions  Precautions: Fall Risk,Weight Bearing As Tolerated Left Upper Extremity  Comments: L hemiparesis, left wrist brace, L neglect, Zio patch    Subjective    Pt was willing to participate in this ST session      Objective       12/17/21 0801   SLP Total Time Spent   SLP Individual Total Time Spent (Mins) 60   Treatment Charges   SLP Swallowing Dysfunction Treatment Swallowing Dysfunction Treatment       Assessment    Pt seen during breakfast with 6- Soft & Bite Sized textures and 0-Thin liquids (Pt upgraded to thin liquids after her MBSS yesterday).  Pt required consistent moderate cueing to check her left cheek throughout this meal to clear oral residue.  Pt does not demonstrate the ability to identify or clear residue with her tongue so cues are required for pt to use her finger to physically clear pocketing.  Pt demonstrated coughing on her first cup sip of thin liquids, but tolerated thin liquids without difficulty for the remainder of this meal.  Min cues required for pt to \"swallow fast\" to decrease oral holding of liquids.  Oral care completed at the end of this meal to clear oral residue.      Strengths: Pleasant and cooperative,Motivated for self care and independence,Willingly participates in therapeutic activities,Independent prior level of function,Supportive family  Barriers: Aspiration risk,Impaired insight/denial of deficits,Impaired carryover of learning,Impaired functional cognition    Plan    Speech intelligibility, IOPI targeting lingual and lip strength, pt may be appropriate to try straw suck against resistance exercises       Speech Therapy Problems (Active)     Problem: Expression STGs     Dates: Start: 12/04/21       Goal: STG-Within one week, patient will     Dates: Start: 12/04/21   Expected End: 12/16/21       " Description: Utilize clear speech strategies with mod cues in 70% of opportunities to increase overall speech intelligibility.     Goal Note filed on 12/15/21 0756 by Dav Knight MS,CCC-SLP     Mod-max cues required to implement clear speech strategies                Problem: Memory STGs     Dates: Start: 12/04/21       Goal: STG-Within one week, patient will     Dates: Start: 12/04/21   Expected End: 12/16/21       Description: Complete functional IADL tasks (e.g. med and financial management) with 70% acc and mod cues to ensure safe return to her PLOF.     Goal Note filed on 12/15/21 0756 by Dav Knight MS,CCC-SLP     Therapy targeting dysphagia management at this time, continue to target                Problem: Speech/Swallowing LTGs     Dates: Start: 12/04/21       Goal: LTG-By discharge, patient will safely swallow     Dates: Start: 12/04/21   Expected End: 12/16/21       Description: Safest/least restrictive diet with no overt s/sx of aspiration for 100% of intake to maintain adequate nutrition and hydration.        Goal: LTG-By discharge, patient will     Dates: Start: 12/04/21   Expected End: 12/16/21       Description: Complete cog-ling tasks at modified independence level to ensure safe return to PLOF.        Goal: LTG-By discharge, patient will     Dates: Start: 12/04/21   Expected End: 12/16/21       Description: Communicate complex ideas at the conversation level with 90% intelligibility to a naiive listener.           Problem: Swallowing STGs     Dates: Start: 12/04/21       Goal: STG-Within one week, patient will consume trials of thin liquids with use of effortful swallow with no overt s/sx of asp/pen noted on 80% of trials provided MIN cues     Dates: Start: 12/08/21   Expected End: 12/16/21       Goal Note filed on 12/15/21 0756 by Dav Knight MS,CCC-SLP     Variable toleration of thin liquids, continue to target

## 2021-12-18 PROCEDURE — A9270 NON-COVERED ITEM OR SERVICE: HCPCS | Performed by: PHYSICAL MEDICINE & REHABILITATION

## 2021-12-18 PROCEDURE — 700101 HCHG RX REV CODE 250: Performed by: PHYSICAL MEDICINE & REHABILITATION

## 2021-12-18 PROCEDURE — A9270 NON-COVERED ITEM OR SERVICE: HCPCS | Performed by: HOSPITALIST

## 2021-12-18 PROCEDURE — 770010 HCHG ROOM/CARE - REHAB SEMI PRIVAT*

## 2021-12-18 PROCEDURE — 700102 HCHG RX REV CODE 250 W/ 637 OVERRIDE(OP): Performed by: HOSPITALIST

## 2021-12-18 PROCEDURE — 700111 HCHG RX REV CODE 636 W/ 250 OVERRIDE (IP): Performed by: PHYSICAL MEDICINE & REHABILITATION

## 2021-12-18 PROCEDURE — 700102 HCHG RX REV CODE 250 W/ 637 OVERRIDE(OP): Performed by: PHYSICAL MEDICINE & REHABILITATION

## 2021-12-18 PROCEDURE — 97130 THER IVNTJ EA ADDL 15 MIN: CPT

## 2021-12-18 PROCEDURE — 92526 ORAL FUNCTION THERAPY: CPT

## 2021-12-18 PROCEDURE — 97129 THER IVNTJ 1ST 15 MIN: CPT

## 2021-12-18 RX ADMIN — TRAZODONE HYDROCHLORIDE 100 MG: 50 TABLET ORAL at 20:49

## 2021-12-18 RX ADMIN — SENNOSIDES AND DOCUSATE SODIUM 2 TABLET: 50; 8.6 TABLET ORAL at 20:51

## 2021-12-18 RX ADMIN — CARBAMAZEPINE 200 MG: 100 TABLET, CHEWABLE ORAL at 20:51

## 2021-12-18 RX ADMIN — Medication 1000 UNITS: at 08:37

## 2021-12-18 RX ADMIN — LIDOCAINE 1 PATCH: 50 PATCH TOPICAL at 08:37

## 2021-12-18 RX ADMIN — TIZANIDINE 2 MG: 4 TABLET ORAL at 20:51

## 2021-12-18 RX ADMIN — MELATONIN TAB 3 MG 6 MG: 3 TAB at 20:51

## 2021-12-18 RX ADMIN — POLYETHYLENE GLYCOL 3350 1 PACKET: 17 POWDER, FOR SOLUTION ORAL at 08:37

## 2021-12-18 RX ADMIN — LISINOPRIL 40 MG: 20 TABLET ORAL at 08:37

## 2021-12-18 RX ADMIN — ENOXAPARIN SODIUM 40 MG: 40 INJECTION SUBCUTANEOUS at 08:37

## 2021-12-18 RX ADMIN — CARBAMAZEPINE 200 MG: 100 TABLET, CHEWABLE ORAL at 08:37

## 2021-12-18 RX ADMIN — MODAFINIL 100 MG: 100 TABLET ORAL at 08:37

## 2021-12-18 RX ADMIN — ASPIRIN 81 MG: 81 TABLET, COATED ORAL at 08:37

## 2021-12-18 RX ADMIN — SENNOSIDES AND DOCUSATE SODIUM 2 TABLET: 50; 8.6 TABLET ORAL at 08:37

## 2021-12-18 RX ADMIN — OMEPRAZOLE 20 MG: 20 CAPSULE, DELAYED RELEASE ORAL at 08:36

## 2021-12-18 RX ADMIN — LABETALOL HYDROCHLORIDE 200 MG: 100 TABLET, FILM COATED ORAL at 20:50

## 2021-12-18 RX ADMIN — LABETALOL HYDROCHLORIDE 200 MG: 100 TABLET, FILM COATED ORAL at 08:36

## 2021-12-18 RX ADMIN — HYDROCHLOROTHIAZIDE 12.5 MG: 25 TABLET ORAL at 05:25

## 2021-12-18 RX ADMIN — AMLODIPINE BESYLATE 10 MG: 5 TABLET ORAL at 05:25

## 2021-12-18 RX ADMIN — CLOPIDOGREL BISULFATE 75 MG: 75 TABLET ORAL at 08:37

## 2021-12-18 RX ADMIN — ATORVASTATIN CALCIUM 80 MG: 40 TABLET, FILM COATED ORAL at 08:36

## 2021-12-18 ASSESSMENT — PATIENT HEALTH QUESTIONNAIRE - PHQ9
1. LITTLE INTEREST OR PLEASURE IN DOING THINGS: NOT AT ALL
SUM OF ALL RESPONSES TO PHQ9 QUESTIONS 1 AND 2: 0
2. FEELING DOWN, DEPRESSED, IRRITABLE, OR HOPELESS: NOT AT ALL

## 2021-12-18 NOTE — CARE PLAN
The patient is Stable - Low risk of patient condition declining or worsening    Problem: Bladder / Voiding  Goal: Patient will establish and maintain regular urinary output  Outcome: Progressing  Note: Pt continent of bladder. Voiding adequately in the BR. She denies dysuria.     Problem: Self Care  Goal: Patient will have the ability to perform ADLs independently or with assistance  Outcome: Progressing  Note: Oral care with suctioning brush performed after meds given at bedtime. Pt tolerated well.

## 2021-12-18 NOTE — THERAPY
Speech Language Pathology  Daily Treatment     Patient Name: Nakita Sheppard  Age:  76 y.o., Sex:  female  Medical Record #: [id number]  Today's Date: 12/18/2021     Precautions  Precautions: Fall Risk,Weight Bearing As Tolerated Left Upper Extremity  Comments: L hemiparesis, left wrist brace, L neglect, Zio patch    Subjective    Pt pleasant and cooperative during tx.   present and supportive.       Objective       12/18/21 1101   Speech / Dysarthria   Articulation Training Moderate (3)   Intensity / Loudness Training Moderate (3)   Respiration Control Moderate (3)   Voice   Laryngeal Relaxation Moderate (3)   Dysphagia    Other Treatments diet tolerance; use of strategies.     Interdisciplinary Plan of Care Collaboration   IDT Collaboration with  Family / Caregiver   Collaboration Comments  present during tx.     SLP Total Time Spent   SLP Individual Total Time Spent (Mins) 60   Treatment Charges   SLP Swallowing Dysfunction Treatment Swallowing Dysfunction Treatment   SLP Cognitive Skill Development First 15 Minutes 1   SLP Cognitive Skill Development Additional 15 Minutes 1       Assessment    Maximum phonation time: 3 seconds (commensurate).  Pt completed laryngeal massage given min verbal cues.  Pt required mod-max cues to complete yawn sigh.  Speech intelligibility (single words): 70% independent.  Reading phrases: 70% intelligible given min verbal cues.  Pt recalled 2/3 speech intelligibility strategies (slow, loud, clear) independently.      Pt assessed during meal with level 6 textures and thin liquids.  Pt presented with prolonged mastication of level 6 texture and required min verbal cues to clear residue with a second swallow, and liquid wash.  Pt was assisted in cleaning dentures after the meal.  Pt cleared mild residue from dentures.      Strengths: Pleasant and cooperative,Motivated for self care and independence,Willingly participates in therapeutic activities,Independent prior  level of function,Supportive family  Barriers: Aspiration risk,Impaired insight/denial of deficits,Impaired carryover of learning,Impaired functional cognition    Plan    Target vocal relaxation, maximum phonation time, speech intelligibility, dysphagia management.          Speech Therapy Problems (Active)     Problem: Expression STGs     Dates: Start: 12/04/21       Goal: STG-Within one week, patient will     Dates: Start: 12/04/21   Expected End: 12/16/21       Description: Utilize clear speech strategies with mod cues in 70% of opportunities to increase overall speech intelligibility.     Goal Note filed on 12/15/21 0756 by Dav Knight MS,CCC-SLP     Mod-max cues required to implement clear speech strategies                Problem: Memory STGs     Dates: Start: 12/04/21       Goal: STG-Within one week, patient will     Dates: Start: 12/04/21   Expected End: 12/16/21       Description: Complete functional IADL tasks (e.g. med and financial management) with 70% acc and mod cues to ensure safe return to her PLOF.     Goal Note filed on 12/15/21 0756 by Dav Knight MS,CCC-SLP     Therapy targeting dysphagia management at this time, continue to target                Problem: Speech/Swallowing LTGs     Dates: Start: 12/04/21       Goal: LTG-By discharge, patient will safely swallow     Dates: Start: 12/04/21   Expected End: 12/16/21       Description: Safest/least restrictive diet with no overt s/sx of aspiration for 100% of intake to maintain adequate nutrition and hydration.        Goal: LTG-By discharge, patient will     Dates: Start: 12/04/21   Expected End: 12/16/21       Description: Complete cog-ling tasks at modified independence level to ensure safe return to PLOF.        Goal: LTG-By discharge, patient will     Dates: Start: 12/04/21   Expected End: 12/16/21       Description: Communicate complex ideas at the conversation level with 90% intelligibility to a naiive listener.           Problem: Swallowing  STGs     Dates: Start: 12/04/21       Goal: STG-Within one week, patient will consume trials of thin liquids with use of effortful swallow with no overt s/sx of asp/pen noted on 80% of trials provided MIN cues     Dates: Start: 12/08/21   Expected End: 12/16/21       Goal Note filed on 12/15/21 0756 by Dav Knight MS,CCC-SLP     Variable toleration of thin liquids, continue to target

## 2021-12-18 NOTE — CARE PLAN
The patient is Watcher - Medium risk of patient condition declining or worsening    Shift Goals  Clinical Goals: Safety  Patient Goals:     Progress made toward(s) clinical / shift goals:      Problem: Knowledge Deficit - Standard  Goal: Patient and family/care givers will demonstrate understanding of plan of care, disease process/condition, diagnostic tests and medications  Outcome: Progressing     Problem: Pain - Standard  Goal: Alleviation of pain or a reduction in pain to the patient’s comfort goal  Outcome: Progressing  Patient c/o back pain. Lidocaine patch applied to back. Declined any pain medication.        Patient is not progressing towards the following goals:

## 2021-12-18 NOTE — PROGRESS NOTES
"Rehab Progress Note     Date of Service: 12/17/2021  Chief Complaint: Follow-up stroke    Interval Events (Subjective)    Patient seen and examined today in the therapy gym.  She reports she did not sleep well last night because she had to urinate.  She denies any dysuria.  Cardiac monitor was removed today and returned.  Patient reports her back is bothering her.  She does have a lidocaine patch on.  She has no other concerns or complaints today.    ROS: No changes to bowel, bladder, pain, mood, or sleep.           Objective:  VITAL SIGNS: /46   Pulse 61   Temp 36.9 °C (98.4 °F) (Oral)   Resp 18   Ht 1.575 m (5' 2\")   Wt 76 kg (167 lb 8.8 oz)   SpO2 91%   BMI 30.65 kg/m²   Gen: alert, no apparent distress  Neuro: notable for left hemiparesis, dysarthria, with facial droop, hypophonia, delayed processing      No results found for this or any previous visit (from the past 72 hour(s)).    Current Facility-Administered Medications   Medication Frequency   • melatonin tablet 6 mg QHS   • traZODone (DESYREL) tablet 100 mg QHS   • traMADol (ULTRAM) 50 MG tablet 50 mg Q4HRS PRN   • lidocaine (LIDODERM) 5 % 1 Patch DAILY   • polyethylene glycol/lytes (MIRALAX) PACKET 1 Packet DAILY    And   • senna-docusate (PERICOLACE or SENOKOT S) 8.6-50 MG per tablet 2 Tablet BID    And   • magnesium hydroxide (MILK OF MAGNESIA) suspension 30 mL QDAY PRN    And   • bisacodyl (DULCOLAX) suppository 10 mg QDAY PRN   • hydroCHLOROthiazide (HYDRODIURIL) tablet 12.5 mg Q DAY   • modafinil (PROVIGIL) tablet 100 mg QAM   • amLODIPine (NORVASC) tablet 10 mg Q DAY   • butalbital/apap/caffeine -40 mg (Fioricet) per tablet 1 Tablet Q6HRS PRN   • tizanidine (ZANAFLEX) tablet 2 mg QHS   • omeprazole (PRILOSEC) capsule 20 mg QAM AC   • hydrOXYzine HCl (ATARAX) tablet 50 mg Q6HRS PRN   • Respiratory Therapy Consult Continuous RT   • Pharmacy Consult Request ...Pain Management Review 1 Each PHARMACY TO DOSE   • hydrALAZINE " (APRESOLINE) tablet 10 mg Q8HRS PRN   • acetaminophen (Tylenol) tablet 650 mg Q4HRS PRN   • lactulose 20 GM/30ML solution 30 mL QDAY PRN   • docusate sodium (ENEMEEZ) enema 283 mg QDAY PRN   • sodium phosphate (Fleet) enema 133 mL QDAY PRN   • artificial tears ophthalmic solution 1 Drop PRN   • benzocaine-menthol (CEPACOL) lozenge 1 Lozenge Q2HRS PRN   • mag hydrox-al hydrox-simeth (MAALOX PLUS ES or MYLANTA DS) suspension 20 mL Q2HRS PRN   • ondansetron (ZOFRAN ODT) dispertab 4 mg 4X/DAY PRN    Or   • ondansetron (ZOFRAN) syringe/vial injection 4 mg 4X/DAY PRN   • sodium chloride (OCEAN) 0.65 % nasal spray 2 Spray PRN   • midazolam (VERSED) 5 mg/mL (1 mL vial) PRN   • enoxaparin (LOVENOX) inj 40 mg DAILY   • aspirin EC (ECOTRIN) tablet 81 mg DAILY   • atorvastatin (LIPITOR) tablet 80 mg DAILY   • carBAMazepine (TEGRETOL) chewable tab 200 mg BID   • clopidogrel (PLAVIX) tablet 75 mg DAILY   • labetalol (NORMODYNE) tablet 200 mg Q12HRS   • lisinopril (PRINIVIL) tablet 40 mg DAILY   • vitamin D3 (cholecalciferol) tablet 1,000 Units DAILY       Orders Placed This Encounter   Procedures   • Diet Order Diet: Level 6 - Soft and Bite Sized (Medications floated whole, one at a time, in pudding (pt does not like applesauce)); Liquid level: Level 0 - Thin; Tray Modifications (optional): No Straws     Standing Status:   Standing     Number of Occurrences:   1     Order Specific Question:   Diet:     Answer:   Level 6 - Soft and Bite Sized [23]     Comments:   Medications floated whole, one at a time, in pudding (pt does not like applesauce)     Order Specific Question:   Liquid level     Answer:   Level 0 - Thin     Order Specific Question:   Tray Modifications (optional)     Answer:   No Straws       Radiology  [id number]   Final Result      Normal bowel gas pattern      CT-HEAD W/O   Final Result         1.  Better delineation of low attenuation in the right temporal lobe and right parietal lobe in the right MCA  distribution consistent with evolving bland ischemia.      2. Stable lacunar infarct in the left posterior basal ganglia.      3. Diffuse small vessel ischemic changes again noted.      4. No acute hemorrhage.      DX-ESOPHAGUS - [id number]    (Results Pending)   DX-ESOPHAGUS - [id number]    (Results Pending)       Assessment:  Active Hospital Problems    Diagnosis    • *Ischemic stroke (HCC)    • Polyuria    • Wrist fracture, closed, left, sequela    • Other insomnia    • Hypokalemia    • Class 1 obesity without serious comorbidity in adult    • Spasticity    • Left-sided neglect    • Other dysphagia    • Impaired mobility and ADLs    • Hyperlipidemia LDL goal <70    • Peripheral neuropathy    • Hypertension      This patient is a 76 y.o. female admitted for acute inpatient rehabilitation with Ischemic stroke (HCC).    I led and attended the weekly conference, and agree with the IDT conference documentation and plan of care as noted below.    Date of conference: 12/15/2021    Goals and barriers: See IDT note.    Biggest barriers: left hemiparesis, dysphagia, pockets in left cheek, dysphagia, left neglect    Goals in next week: bed mobility    CM/social support: family very supportive, will have 24/7 support    Anticipated DC date: 12/24    Home health: PT/OT/SLP/RN    Equip: MWC, platform walker     Follow up: PCP, [facility], Dr. Callejas, cardiology, orthopedic surgery        Medical Decision Making and Plan:    Right MCA stroke  Left hemiparesis, improved, arm more affected than leg  Nondominant  Left neglect, continues  Dysphagia, improved  Cognitive impairments, continue  Continue full rehab program  PT/OT/SLP, 1 hr each discipline, 5 days per week     Aspirin  Plavix  Statin     Patient with Zio patch cardiac monitor, return 2 weeks from admission, returned 12/17    Outpatient follow-up with [facility], cardiology, Dr. Callejas, referrals made    MBS with aspiration on thins, diet  downgraded per speech therapy recommendations, ice chips in between meals only with supervision, repeat MBS per speech therapy    Started Provigil for neuro stimulation, improved initiation    Psychology consulted for evaluation, patient not depressed    Left wrist pain, improved  X-ray at acute showed possible avulsion fractures  Discussed with orthopedic surgery, outpatient follow-up, referral made  Wrist brace and weightbearing as tolerated     Hypertension, improved  Lisinopril  Labetalol  Amlodipine, dose increased  Currently stable, hospitalist has signed off  Started hydrochlorothiazide 12/14  Continue to monitor     Peripheral neuropathy  Continue home medication carbamazepine  Was also on Zanaflex (6 mg TID), restarted 2 mg at night due to complaints of leg spasms, will not restart home dosing due to side effect of lethargy    History of migraines  Continue carbamazepine    Hypokalemia resolved  Status post supplementation  Recheck labs on Monday    Insomnia, resolved/continues  Restarted home medication tizanidine 2 mg  Added scheduled melatonin and trazodone, increase dose of both     Obesity  BMI 32.6  Outpatient nutritional counseling    Vitamin D deficiency  Continue supplementation    GI prophylaxis  Omeprazole     Polyuria  Negative urinalysis    Bowel program  Bowel incontinence  Constipation  Increase bowel medications  Last  12/15    Bladder program  Bladder incontinence  Check PVRs - 25, 15, 10  Not retaining  Bladder scan for no voids  ICP for over 400 cc  Scheduled toileting    DVT prophylaxis  Lovenox    Total time:  16 minutes.  I spent greater than 50% of the time for patient care, counseling, and coordination on this date, including patient face-to face time, unit/floor time with review of records/pertinent lab data and studies, as well as discussing diagnostic evaluation/work up, planned therapeutic interventions, and future disposition of care, as per the interval events/subjective and  the assessment and plan as noted above.      I have performed a physical exam, reviewed and updated ROS, as well as the assessment and plan today 12/17/2021. In review of note from 12/16/2021 there are no new changes except as documented above.              Hawa Alva M.D.   Physical Medicine and Rehabilitation

## 2021-12-19 PROCEDURE — 700111 HCHG RX REV CODE 636 W/ 250 OVERRIDE (IP): Performed by: PHYSICAL MEDICINE & REHABILITATION

## 2021-12-19 PROCEDURE — 700101 HCHG RX REV CODE 250: Performed by: PHYSICAL MEDICINE & REHABILITATION

## 2021-12-19 PROCEDURE — 700102 HCHG RX REV CODE 250 W/ 637 OVERRIDE(OP): Performed by: PHYSICAL MEDICINE & REHABILITATION

## 2021-12-19 PROCEDURE — A9270 NON-COVERED ITEM OR SERVICE: HCPCS | Performed by: HOSPITALIST

## 2021-12-19 PROCEDURE — 700102 HCHG RX REV CODE 250 W/ 637 OVERRIDE(OP): Performed by: HOSPITALIST

## 2021-12-19 PROCEDURE — 770010 HCHG ROOM/CARE - REHAB SEMI PRIVAT*

## 2021-12-19 PROCEDURE — A9270 NON-COVERED ITEM OR SERVICE: HCPCS | Performed by: PHYSICAL MEDICINE & REHABILITATION

## 2021-12-19 RX ADMIN — MODAFINIL 100 MG: 100 TABLET ORAL at 08:41

## 2021-12-19 RX ADMIN — SENNOSIDES AND DOCUSATE SODIUM 2 TABLET: 50; 8.6 TABLET ORAL at 21:28

## 2021-12-19 RX ADMIN — Medication 1000 UNITS: at 08:41

## 2021-12-19 RX ADMIN — OMEPRAZOLE 20 MG: 20 CAPSULE, DELAYED RELEASE ORAL at 08:40

## 2021-12-19 RX ADMIN — LISINOPRIL 40 MG: 20 TABLET ORAL at 08:41

## 2021-12-19 RX ADMIN — CARBAMAZEPINE 200 MG: 100 TABLET, CHEWABLE ORAL at 08:41

## 2021-12-19 RX ADMIN — TRAZODONE HYDROCHLORIDE 100 MG: 50 TABLET ORAL at 21:28

## 2021-12-19 RX ADMIN — CLOPIDOGREL BISULFATE 75 MG: 75 TABLET ORAL at 08:41

## 2021-12-19 RX ADMIN — LIDOCAINE 1 PATCH: 50 PATCH TOPICAL at 08:42

## 2021-12-19 RX ADMIN — LABETALOL HYDROCHLORIDE 200 MG: 100 TABLET, FILM COATED ORAL at 08:41

## 2021-12-19 RX ADMIN — LABETALOL HYDROCHLORIDE 200 MG: 100 TABLET, FILM COATED ORAL at 21:25

## 2021-12-19 RX ADMIN — HYDROCHLOROTHIAZIDE 12.5 MG: 25 TABLET ORAL at 05:45

## 2021-12-19 RX ADMIN — ATORVASTATIN CALCIUM 80 MG: 40 TABLET, FILM COATED ORAL at 08:40

## 2021-12-19 RX ADMIN — ASPIRIN 81 MG: 81 TABLET, COATED ORAL at 08:41

## 2021-12-19 RX ADMIN — ENOXAPARIN SODIUM 40 MG: 40 INJECTION SUBCUTANEOUS at 08:40

## 2021-12-19 RX ADMIN — AMLODIPINE BESYLATE 10 MG: 5 TABLET ORAL at 05:44

## 2021-12-19 RX ADMIN — CARBAMAZEPINE 200 MG: 100 TABLET, CHEWABLE ORAL at 21:27

## 2021-12-19 RX ADMIN — TIZANIDINE 2 MG: 4 TABLET ORAL at 21:28

## 2021-12-19 RX ADMIN — SENNOSIDES AND DOCUSATE SODIUM 2 TABLET: 50; 8.6 TABLET ORAL at 08:41

## 2021-12-19 RX ADMIN — MELATONIN TAB 3 MG 6 MG: 3 TAB at 21:27

## 2021-12-19 RX ADMIN — POLYETHYLENE GLYCOL 3350 1 PACKET: 17 POWDER, FOR SOLUTION ORAL at 08:42

## 2021-12-19 ASSESSMENT — PATIENT HEALTH QUESTIONNAIRE - PHQ9
SUM OF ALL RESPONSES TO PHQ9 QUESTIONS 1 AND 2: 0
1. LITTLE INTEREST OR PLEASURE IN DOING THINGS: NOT AT ALL
SUM OF ALL RESPONSES TO PHQ9 QUESTIONS 1 AND 2: 0
2. FEELING DOWN, DEPRESSED, IRRITABLE, OR HOPELESS: NOT AT ALL
1. LITTLE INTEREST OR PLEASURE IN DOING THINGS: NOT AT ALL
2. FEELING DOWN, DEPRESSED, IRRITABLE, OR HOPELESS: NOT AT ALL

## 2021-12-19 ASSESSMENT — PAIN DESCRIPTION - PAIN TYPE: TYPE: ACUTE PAIN

## 2021-12-19 NOTE — CARE PLAN
Problem: Pain - Standard  Goal: Alleviation of pain or a reduction in pain to the patient’s comfort goal  Flowsheets (Taken 12/18/2021 2050)  Pain Rating Scale (NPRS): 0  Note: Patient able to verbalize pain level and verbalize an acceptable level of pain.      Problem: Fall Risk - Rehab  Goal: Patient will remain free from falls  Note: Patient remains free from falls this shift. Patient was educated on using the call light to prevent falls. Patients bed is in the lowest position. The patients belongings are placed in near proximity to the patient.     The patient is Stable - Low risk of patient condition declining or worsening

## 2021-12-20 LAB
ANION GAP SERPL CALC-SCNC: 11 MMOL/L (ref 7–16)
BUN SERPL-MCNC: 22 MG/DL (ref 8–22)
CALCIUM SERPL-MCNC: 9 MG/DL (ref 8.5–10.5)
CHLORIDE SERPL-SCNC: 98 MMOL/L (ref 96–112)
CO2 SERPL-SCNC: 28 MMOL/L (ref 20–33)
CREAT SERPL-MCNC: 1.07 MG/DL (ref 0.5–1.4)
ERYTHROCYTE [DISTWIDTH] IN BLOOD BY AUTOMATED COUNT: 44.8 FL (ref 35.9–50)
GLUCOSE SERPL-MCNC: 105 MG/DL (ref 65–99)
HCT VFR BLD AUTO: 37.5 % (ref 37–47)
HGB BLD-MCNC: 12.3 G/DL (ref 12–16)
MCH RBC QN AUTO: 31.8 PG (ref 27–33)
MCHC RBC AUTO-ENTMCNC: 32.8 G/DL (ref 33.6–35)
MCV RBC AUTO: 96.9 FL (ref 81.4–97.8)
PLATELET # BLD AUTO: 301 K/UL (ref 164–446)
PMV BLD AUTO: 10.1 FL (ref 9–12.9)
POTASSIUM SERPL-SCNC: 3.5 MMOL/L (ref 3.6–5.5)
RBC # BLD AUTO: 3.87 M/UL (ref 4.2–5.4)
SODIUM SERPL-SCNC: 137 MMOL/L (ref 135–145)
WBC # BLD AUTO: 5.8 K/UL (ref 4.8–10.8)

## 2021-12-20 PROCEDURE — 700102 HCHG RX REV CODE 250 W/ 637 OVERRIDE(OP): Performed by: HOSPITALIST

## 2021-12-20 PROCEDURE — 700102 HCHG RX REV CODE 250 W/ 637 OVERRIDE(OP): Performed by: PHYSICAL MEDICINE & REHABILITATION

## 2021-12-20 PROCEDURE — 80048 BASIC METABOLIC PNL TOTAL CA: CPT

## 2021-12-20 PROCEDURE — 97129 THER IVNTJ 1ST 15 MIN: CPT

## 2021-12-20 PROCEDURE — 36415 COLL VENOUS BLD VENIPUNCTURE: CPT

## 2021-12-20 PROCEDURE — 99232 SBSQ HOSP IP/OBS MODERATE 35: CPT | Performed by: PHYSICAL MEDICINE & REHABILITATION

## 2021-12-20 PROCEDURE — 97110 THERAPEUTIC EXERCISES: CPT

## 2021-12-20 PROCEDURE — A9270 NON-COVERED ITEM OR SERVICE: HCPCS | Performed by: HOSPITALIST

## 2021-12-20 PROCEDURE — 97530 THERAPEUTIC ACTIVITIES: CPT

## 2021-12-20 PROCEDURE — A9270 NON-COVERED ITEM OR SERVICE: HCPCS | Performed by: PHYSICAL MEDICINE & REHABILITATION

## 2021-12-20 PROCEDURE — 770010 HCHG ROOM/CARE - REHAB SEMI PRIVAT*

## 2021-12-20 PROCEDURE — 700111 HCHG RX REV CODE 636 W/ 250 OVERRIDE (IP): Performed by: PHYSICAL MEDICINE & REHABILITATION

## 2021-12-20 PROCEDURE — 97535 SELF CARE MNGMENT TRAINING: CPT

## 2021-12-20 PROCEDURE — 97130 THER IVNTJ EA ADDL 15 MIN: CPT

## 2021-12-20 PROCEDURE — 700101 HCHG RX REV CODE 250: Performed by: PHYSICAL MEDICINE & REHABILITATION

## 2021-12-20 PROCEDURE — 85027 COMPLETE CBC AUTOMATED: CPT

## 2021-12-20 PROCEDURE — 97116 GAIT TRAINING THERAPY: CPT

## 2021-12-20 RX ORDER — POTASSIUM CHLORIDE 20 MEQ/1
20 TABLET, EXTENDED RELEASE ORAL ONCE
Status: COMPLETED | OUTPATIENT
Start: 2021-12-20 | End: 2021-12-20

## 2021-12-20 RX ADMIN — MODAFINIL 100 MG: 100 TABLET ORAL at 08:15

## 2021-12-20 RX ADMIN — AMLODIPINE BESYLATE 10 MG: 5 TABLET ORAL at 05:06

## 2021-12-20 RX ADMIN — TRAZODONE HYDROCHLORIDE 100 MG: 50 TABLET ORAL at 20:36

## 2021-12-20 RX ADMIN — SENNOSIDES AND DOCUSATE SODIUM 2 TABLET: 50; 8.6 TABLET ORAL at 08:15

## 2021-12-20 RX ADMIN — LIDOCAINE 1 PATCH: 50 PATCH TOPICAL at 08:26

## 2021-12-20 RX ADMIN — Medication 1000 UNITS: at 08:15

## 2021-12-20 RX ADMIN — TIZANIDINE 2 MG: 4 TABLET ORAL at 20:37

## 2021-12-20 RX ADMIN — CARBAMAZEPINE 200 MG: 100 TABLET, CHEWABLE ORAL at 20:36

## 2021-12-20 RX ADMIN — CLOPIDOGREL BISULFATE 75 MG: 75 TABLET ORAL at 08:15

## 2021-12-20 RX ADMIN — LABETALOL HYDROCHLORIDE 200 MG: 100 TABLET, FILM COATED ORAL at 08:14

## 2021-12-20 RX ADMIN — ATORVASTATIN CALCIUM 80 MG: 40 TABLET, FILM COATED ORAL at 08:15

## 2021-12-20 RX ADMIN — HYDROCHLOROTHIAZIDE 12.5 MG: 25 TABLET ORAL at 05:06

## 2021-12-20 RX ADMIN — LABETALOL HYDROCHLORIDE 200 MG: 100 TABLET, FILM COATED ORAL at 20:37

## 2021-12-20 RX ADMIN — ENOXAPARIN SODIUM 40 MG: 40 INJECTION SUBCUTANEOUS at 08:22

## 2021-12-20 RX ADMIN — OMEPRAZOLE 20 MG: 20 CAPSULE, DELAYED RELEASE ORAL at 08:14

## 2021-12-20 RX ADMIN — POTASSIUM CHLORIDE 20 MEQ: 1500 TABLET, EXTENDED RELEASE ORAL at 10:59

## 2021-12-20 RX ADMIN — CARBAMAZEPINE 200 MG: 100 TABLET, CHEWABLE ORAL at 08:14

## 2021-12-20 RX ADMIN — LISINOPRIL 40 MG: 20 TABLET ORAL at 08:14

## 2021-12-20 RX ADMIN — MELATONIN TAB 3 MG 6 MG: 3 TAB at 20:38

## 2021-12-20 RX ADMIN — ASPIRIN 81 MG: 81 TABLET, COATED ORAL at 08:14

## 2021-12-20 ASSESSMENT — GAIT ASSESSMENTS
GAIT LEVEL OF ASSIST: CONTACT GUARD ASSIST
DISTANCE (FEET): 40
ASSISTIVE DEVICE: PLATFORM WALKER
DEVIATION: ATAXIC;DECREASED BASE OF SUPPORT;BRADYKINETIC;DECREASED HEEL STRIKE;DECREASED TOE OFF

## 2021-12-20 ASSESSMENT — PAIN DESCRIPTION - PAIN TYPE: TYPE: ACUTE PAIN

## 2021-12-20 NOTE — CARE PLAN
"Pt kept on sliding down the bed, repositioned , foot of bed elevated. Safety measures reinforced, extra pillow placed on the left side and frequent rounding done re; waffle mattress in place height of the bed is the same as the height of the side rails and pt has a tendency to lean on the weak side,cont monitored for safety.   Problem: Pain - Standard  Goal: Alleviation of pain or a reduction in pain to the patient’s comfort goal  Outcome: Progressing  Note: Assessed for , denies pain, oral care  and denture care done trough suction toothbrush.     Problem: Fall Risk - Rehab  Goal: Patient will remain free from falls  Outcome: Progressing  Note: Erin Irizarry Fall risk Assessment Score: 13    Moderate fall risk Interventions  - Bed and strip alarm   - Yellow sign by the door   - Yellow wrist band \"Fall risk\"  - Room near to the nurse station  - Do not leave patient unattended in the bathroom  - Fall risk education provided     Problem: Risk for Aspiration  Goal: Patient's risk for aspiration will be absent or decrease  Outcome: Progressing  Note: Aspiration precautions observed, head of bed elevated , meds floated in pudding,well tolerated.   The patient is Stable - Low risk of patient condition declining or worsening    Shift Goals  Clinical Goals: Safety  Patient Goals: upgrade to thin liquids    Progress made toward(s) clinical / shift goals:  Pt free from fall and injury.          "

## 2021-12-20 NOTE — CARE PLAN
The patient is Stable - Low risk of patient condition declining or worsening    Shift Goals  Clinical Goals: Safety  Patient Goals:   Progress made toward(s) clinical / shift goals:    Problem: Fall Risk - Rehab  Goal: Patient will remain free from falls  Outcome: Progressing   Pt uses call light consistently and appropriately. Waits for assistance does not attempt self transfer this shift. Able to verbalize needs.

## 2021-12-20 NOTE — THERAPY
Occupational Therapy  Daily Treatment     Patient Name: Nakita Sheppard  Age:  76 y.o., Sex:  female  Medical Record #: [id number]  Today's Date: 12/20/2021     Precautions  Precautions: (P) Fall Risk,Weight Bearing As Tolerated Left Upper Extremity  Comments: (P) L hemiparesis, left wrist brace, L neglect, Zio patch    Safety   ADL Safety : Requires Supervision for Safety,Requires Physical Assist for Safety,Requires Cueing for Safety  Bathroom Safety: Requires Physical Assist for Safety,Requires Supervision for Safety,Requires Cuing for Safety  Comments: see functional levels below for ADL performance details.    Subjective    Pt agreeable to OT session.     Objective       12/20/21 0831   Precautions   Precautions Fall Risk;Weight Bearing As Tolerated Left Upper Extremity   Comments L hemiparesis, left wrist brace, L neglect, Zio patch   Cognition    Level of Consciousness Alert   Functional Level of Assist   Grooming Stand by Assist;Seated   Grooming Description Increased time;Set-up of equipment;Supervision for safety;Verbal cueing  (oral care and combing hair seated at sink)   Interdisciplinary Plan of Care Collaboration   IDT Collaboration with  Family / Caregiver   Patient Position at End of Therapy In Bed;Bed Alarm On;Call Light within Reach;Tray Table within Reach;Phone within Reach   Collaboration Comments CLOF, POC, equipment needs   OT Total Time Spent   OT Individual Total Time Spent (Mins) 60   OT Charge Group   OT Self Care / ADL 4     Threshold shower transfer training to simulate pt's home set up. Pt has a large walk in shower with GB and shower stool. Discussed pt getting a tub transfer bench for increased stability and safety with transfer. Provided handout with pricing and vendor information. Pt completed transfer with CGA-SBA and cues/demonstration for sequencing. Pt also reports that her son is installing GB near her toilet. Provided handout with GB installation guidelines/recommendations.  "    Completed bed mobility/transfer training on standard queen sized bed (25\" high) using bedrail and leg . Pt required CGA-SBA.     Assessment    Pt con't to require CGA and verbal cues for safety with transfers. She will benefit from getting a tub transfer bench and installing GB by her toilet for increased safety and to decrease caregiver burden at d/c.   Strengths: Independent prior level of function,Motivated for self care and independence,Pleasant and cooperative,Supportive family,Willingly participates in therapeutic activities  Barriers: Aspiration risk,Decreased endurance,Fatigue,Hemiparesis,Impaired activity tolerance,Impaired balance,Impaired functional cognition,Pain    Plan    ADLs, functional transfers, home safety education, family training (ongoing), E neuro re-ed.     Occupational Therapy Goals (Active)     Problem: OT Long Term Goals     Dates: Start: 12/04/21       Goal: LTG-By discharge, patient will complete basic self care tasks at supervision to mod I level using AE as needed.      Dates: Start: 12/04/21   Expected End: 12/16/21          Goal: LTG-By discharge, patient will perform bathroom transfers at supervision level using AE/DME as needed.      Dates: Start: 12/04/21   Expected End: 12/16/21                "

## 2021-12-20 NOTE — THERAPY
Speech Language Pathology  Daily Treatment     Patient Name: Nakita Sheppard  Age:  76 y.o., Sex:  female  Medical Record #: [id number]  Today's Date: 12/20/2021     Precautions  Precautions: Fall Risk,Weight Bearing As Tolerated Left Upper Extremity  Comments: L hemiparesis, left wrist brace, left neglect, Zio patch    Subjective    Pt willing to participate in this ST session, pt excited that her discharge date was moved to Wednesday this week.  Pt called her  at the end of this session and he said he could come in for training tomorrow morning between 9 and 11.       Objective       12/20/21 1401   SCCAN (Scales of Cognitive and Communicative Ability for Neurorehabilitation)   Oral Expression - Raw Score 11   Oral Expression - Scale Performance Score 58   Orientation - Raw Score 12   Orientation - Scale Performance Score 100   Memory - Raw Score 13   Memory - Scale Performance Score 68   Speech Comprehension - Raw Score 12   Speech Comprehension - Scale Performance Score 92   Reading Comprehension - Raw Score 8   Reading Comprehension - Scale Performance Score 67   Writing - Raw Score 4   Writing - Scale Performance Score 57   Attention - Raw Score 6   Attention - Scale Performance Score 38   Problem Solving - Raw Score 9   Problem Solving - Scale Performance Score 39   SCCAN Total Raw Score 64   SCCAN Degree of Severity Moderate Impairment   SLP Total Time Spent   SLP Individual Total Time Spent (Mins) 60   Treatment Charges   SLP Cognitive Skill Development First 15 Minutes 1   SLP Cognitive Skill Development Additional 15 Minutes 3       Assessment    Completed readministration of the SCCAN, pt achieved an overall score of 64/94 (initial score of 68/94) indicating overall moderate cognitive deficits.  Pt demonstrated improvements in the area of reading comprehension and demonstrated lower scores in oral expression, attention, and problem solving.  Initiated reviewing pt's current medications, but  was unable to complete due to time constraints.      Strengths: Pleasant and cooperative,Motivated for self care and independence,Willingly participates in therapeutic activities,Independent prior level of function,Supportive family  Barriers: Aspiration risk,Impaired insight/denial of deficits,Impaired carryover of learning,Impaired functional cognition    Plan    Finish reviewing pt's current medications, complete family training       Speech Therapy Problems (Active)     Problem: Expression STGs     Dates: Start: 12/04/21       Goal: STG-Within one week, patient will     Dates: Start: 12/04/21   Expected End: 12/16/21       Description: Utilize clear speech strategies with mod cues in 70% of opportunities to increase overall speech intelligibility.     Goal Note filed on 12/15/21 0756 by Dav Knight MS,CCC-SLP     Mod-max cues required to implement clear speech strategies                Problem: Memory STGs     Dates: Start: 12/04/21       Goal: STG-Within one week, patient will     Dates: Start: 12/04/21   Expected End: 12/16/21       Description: Complete functional IADL tasks (e.g. med and financial management) with 70% acc and mod cues to ensure safe return to her PLOF.     Goal Note filed on 12/15/21 0756 by Dav Knight MS,CCC-SLP     Therapy targeting dysphagia management at this time, continue to target                Problem: Speech/Swallowing LTGs     Dates: Start: 12/04/21       Goal: LTG-By discharge, patient will safely swallow     Dates: Start: 12/04/21   Expected End: 12/16/21       Description: Safest/least restrictive diet with no overt s/sx of aspiration for 100% of intake to maintain adequate nutrition and hydration.        Goal: LTG-By discharge, patient will     Dates: Start: 12/04/21   Expected End: 12/16/21       Description: Complete cog-ling tasks at modified independence level to ensure safe return to PLOF.        Goal: LTG-By discharge, patient will     Dates: Start: 12/04/21    Expected End: 12/16/21       Description: Communicate complex ideas at the conversation level with 90% intelligibility to a naiive listener.           Problem: Swallowing STGs     Dates: Start: 12/04/21       Goal: STG-Within one week, patient will consume trials of thin liquids with use of effortful swallow with no overt s/sx of asp/pen noted on 80% of trials provided MIN cues     Dates: Start: 12/08/21   Expected End: 12/16/21       Goal Note filed on 12/15/21 2665 by Dav Knight MS,CCC-SLP     Variable toleration of thin liquids, continue to target

## 2021-12-20 NOTE — CARE PLAN
Problem: Inadequate nutrient intake  Goal: Patient to consume greater than or equal to 50% of meals  Outcome: Not Met

## 2021-12-20 NOTE — THERAPY
12/20/21 1129   Precautions   Precautions Fall Risk;Weight Bearing As Tolerated Left Upper Extremity   Comments L hemiparesis, left wrist brace, left neglect, Zio patch   Pain 0 - 10 Group   Therapist Pain Assessment 0   Cognition    Level of Consciousness Alert   Attention Impaired   Sequencing Impaired   Initiation Impaired   Gait Functional Level of Assist    Gait Level Of Assist Contact Guard Assist   Assistive Device Platform Walker   Distance (Feet) 40   # of Times Distance was Traveled 1   Deviation Ataxic;Decreased Base Of Support;Bradykinetic;Decreased Heel Strike;Decreased Toe Off   Stairs Functional Level of Assist   Level of Assist with Stairs   (Ramp access to her home)   # of Stairs Climbed 0   Transfer Functional Level of Assist   Bed, Chair, Wheelchair Transfer Contact Guard Assist   Bed Chair Wheelchair Transfer Description Adaptive equipment;Increased time;Assist with one limb;Supervision for safety;Verbal cueing   Sitting Lower Body Exercises   Ankle Pumps 2 sets of 15;Bilateral   Hip Flexion 2 sets of 15;Bilateral   Hip Abduction 2 sets of 15;Bilateral   Hip Adduction 2 sets of 15;Bilateral   Long Arc Quad 2 sets of 15;Bilateral   Hamstring Curl 2 sets of 15;Bilateral   Bed Mobility    Supine to Sit Contact Guard Assist   Sit to Supine Contact Guard Assist   Sit to Stand Contact Guard Assist   Scooting Minimal Assist   Rolling Supervised   Neuro-Muscular Treatments   Neuro-Muscular Treatments Facilitation;Sequencing;Tactile Cuing;Verbal Cuing;Weight Shift Right   Interdisciplinary Plan of Care Collaboration   Patient Position at End of Therapy Seated;Chair Alarm On;Self Releasing Lap Belt Applied;Call Light within Reach;Tray Table within Reach;Phone within Reach;Family / Friend in Room   PT Total Time Spent   PT Individual Total Time Spent (Mins) 60   PT Charge Group   PT Gait Training 1   PT Therapeutic Exercise 1   PT Therapeutic Activities 2   Physical Therapy   Daily Treatment     Patient  Name: Nakita Sheppard  Age:  76 y.o., Sex:  female  Medical Record #: [id number]  Today's Date: 12/20/2021     Precautions  Precautions: Fall Risk,Weight Bearing As Tolerated Left Upper Extremity  Comments: L hemiparesis, left wrist brace, left neglect, Zio patch    Subjective    The patient was resting in bed.  She agreed to PT.     Objective    The patient sat up to the edge of the bed and required SBA for safety.  She stood and transferred with a left platform walker bed to wheelchair CGA and verbal cues.  The patient participated in transferring to a normal flat bed using the platform walker.  She required CGA for safety and verbal cues for sequencing.  The patient was able to lie down with very minimal assistance for her left lower extremity.  Otherwise, she was able to perform rolling left and right, lateral scooting supine and scooting up in bed using her lower extremities.  The patient sat up to the edge of the bed without assistance and then used the platform walker to transfer back to the wheelchair CGA.  The patient participated in gait training using the platform walker and tolerated 40 FT, CGA.  Finally, the patient participated in seated bilateral lower extremity therapeutic exercise 2 sets of 15 where she counted the repetitions and sets.    Assessment    The patient has made significant progress in being able to transfer on and off a standard bed without significant physical assistance.  She needed minimal assistance to guide her left leg onto the bed.  She was able to perform bed mobility for rolling, scooting up in bed, lateral scooting, sit to/from supine.  This was a concern for the family but her daughter witnessed the bed mobility part and was pleased that the patient requires very little physical assistance to transfer, to bed mobility and to participate in gait with a platform walker.  The home has a ramp for wheelchair access and the patient is able to walk short distances in the event  that she needs to walk from her wheelchair in and out of the bathroom.  Strengths: Able to follow instructions,Good carryover of learning,Independent prior level of function,Making steady progress towards goals,Supportive family,Pleasant and cooperative,Willingly participates in therapeutic activities  Barriers: Hemiparesis,Impaired balance (Impaired endurance and strength, fall risk)    Plan    Safety education, continue to practice bed mobility on a standard bed and use a 4 inch platform to assist getting up onto a higher mattress, wheelchair mobility for short distances and gait training, vehicle transfer    Passport items to be completed:  Passport items to be completed:  Get in/out of bed safely, in/out of a vehicle, safely use mobility device, walk or wheel around home/community, navigate up and down stairs, show how to get up/down from the ground, ensure home is accessible, demonstrate HEP, complete caregiver training    Physical Therapy Problems (Active)     Problem: PT-Long Term Goals     Dates: Start: 12/04/21       Goal: LTG-By discharge, patient will maintain balance at least 45/56 on the [place] to DC to home safely with support of spouse     Dates: Start: 12/04/21   Expected End: 12/16/21          Goal: LTG-By discharge, patient will ambulate at least 500' with SPC and SPV inside and outside surfaces     Dates: Start: 12/04/21   Expected End: 12/16/21          Goal: LTG-By discharge, patient will transfer one surface to another SPV with SPC     Dates: Start: 12/04/21   Expected End: 12/16/21          Goal: LTG-By discharge, patient will transfer in/out of a car SBA from ambulatory level with SPC     Dates: Start: 12/04/21   Expected End: 12/16/21          Goal: LTG-By discharge, patient will ascend/descend ADA ramp with SPC and SBA to enter/exit her home safely     Dates: Start: 12/04/21   Expected End: 12/16/21

## 2021-12-20 NOTE — REHAB-DIETARY IDT TEAM NOTE
Dietary   Nutrition  Dietary Problems (Active)     Problem: Inadequate nutrient intake     Goal: Patient to consume greater than or equal to 50% of meals            PO < 50% intake of meals and supplements. Her weight has been trending down since admission (HCTZ noted).  Overall, PO has been inadequate.  Patient is an extremely picky eater and the nutrition reps have been working diligently with her to get her what she wants.  However, she is still not eating it.  May consider appetite stimulant if this continues versus allowing daughter to bring in outside food from home.  RD following.     Section completed by:  Kandy Steven R.D.

## 2021-12-21 ENCOUNTER — APPOINTMENT (OUTPATIENT)
Dept: RADIOLOGY | Facility: REHABILITATION | Age: 76
DRG: 057 | End: 2021-12-21
Attending: PHYSICAL MEDICINE & REHABILITATION
Payer: MEDICARE

## 2021-12-21 PROBLEM — R35.89 POLYURIA: Status: RESOLVED | Noted: 2021-12-06 | Resolved: 2021-12-21

## 2021-12-21 PROCEDURE — 97116 GAIT TRAINING THERAPY: CPT

## 2021-12-21 PROCEDURE — 700101 HCHG RX REV CODE 250: Performed by: PHYSICAL MEDICINE & REHABILITATION

## 2021-12-21 PROCEDURE — 92526 ORAL FUNCTION THERAPY: CPT

## 2021-12-21 PROCEDURE — 97129 THER IVNTJ 1ST 15 MIN: CPT

## 2021-12-21 PROCEDURE — 700102 HCHG RX REV CODE 250 W/ 637 OVERRIDE(OP): Performed by: HOSPITALIST

## 2021-12-21 PROCEDURE — 97112 NEUROMUSCULAR REEDUCATION: CPT

## 2021-12-21 PROCEDURE — 700102 HCHG RX REV CODE 250 W/ 637 OVERRIDE(OP): Performed by: PHYSICAL MEDICINE & REHABILITATION

## 2021-12-21 PROCEDURE — A9270 NON-COVERED ITEM OR SERVICE: HCPCS | Performed by: HOSPITALIST

## 2021-12-21 PROCEDURE — 97130 THER IVNTJ EA ADDL 15 MIN: CPT

## 2021-12-21 PROCEDURE — 700111 HCHG RX REV CODE 636 W/ 250 OVERRIDE (IP): Performed by: PHYSICAL MEDICINE & REHABILITATION

## 2021-12-21 PROCEDURE — 97530 THERAPEUTIC ACTIVITIES: CPT

## 2021-12-21 PROCEDURE — A9270 NON-COVERED ITEM OR SERVICE: HCPCS | Performed by: PHYSICAL MEDICINE & REHABILITATION

## 2021-12-21 PROCEDURE — 74018 RADEX ABDOMEN 1 VIEW: CPT

## 2021-12-21 PROCEDURE — 97535 SELF CARE MNGMENT TRAINING: CPT

## 2021-12-21 PROCEDURE — 770010 HCHG ROOM/CARE - REHAB SEMI PRIVAT*

## 2021-12-21 PROCEDURE — 99232 SBSQ HOSP IP/OBS MODERATE 35: CPT | Performed by: PHYSICAL MEDICINE & REHABILITATION

## 2021-12-21 RX ORDER — POLYETHYLENE GLYCOL 3350 17 G/17G
1 POWDER, FOR SOLUTION ORAL ONCE
Status: COMPLETED | OUTPATIENT
Start: 2021-12-21 | End: 2021-12-21

## 2021-12-21 RX ORDER — ATORVASTATIN CALCIUM 80 MG/1
80 TABLET, FILM COATED ORAL DAILY
Qty: 30 TABLET | Refills: 2 | Status: SHIPPED | OUTPATIENT
Start: 2021-12-22

## 2021-12-21 RX ORDER — POLYETHYLENE GLYCOL 3350 17 G/17G
1 POWDER, FOR SOLUTION ORAL DAILY
Status: DISCONTINUED | OUTPATIENT
Start: 2021-12-22 | End: 2021-12-22 | Stop reason: HOSPADM

## 2021-12-21 RX ORDER — ASPIRIN 81 MG/1
81 TABLET ORAL DAILY
Qty: 30 TABLET | Refills: 2 | COMMUNITY
Start: 2021-12-21

## 2021-12-21 RX ORDER — AMOXICILLIN 250 MG
2 CAPSULE ORAL 2 TIMES DAILY
Qty: 120 TABLET | Refills: 2 | COMMUNITY
Start: 2021-12-21 | End: 2022-02-04

## 2021-12-21 RX ORDER — POLYETHYLENE GLYCOL 3350 17 G/17G
17 POWDER, FOR SOLUTION ORAL DAILY
Qty: 30 EACH | Refills: 2 | COMMUNITY
Start: 2021-12-22 | End: 2022-01-25

## 2021-12-21 RX ORDER — LISINOPRIL 40 MG/1
40 TABLET ORAL DAILY
Qty: 30 TABLET | Refills: 2 | Status: SHIPPED | OUTPATIENT
Start: 2021-12-22

## 2021-12-21 RX ORDER — LABETALOL 200 MG/1
200 TABLET, FILM COATED ORAL EVERY 12 HOURS
Qty: 60 TABLET | Refills: 2 | Status: SHIPPED | OUTPATIENT
Start: 2021-12-21

## 2021-12-21 RX ORDER — BISACODYL 10 MG
10 SUPPOSITORY, RECTAL RECTAL DAILY
Status: DISCONTINUED | OUTPATIENT
Start: 2021-12-21 | End: 2021-12-22 | Stop reason: HOSPADM

## 2021-12-21 RX ORDER — TIZANIDINE 2 MG/1
2 TABLET ORAL
Qty: 30 TABLET | Refills: 2 | Status: SHIPPED | OUTPATIENT
Start: 2021-12-21

## 2021-12-21 RX ORDER — AMLODIPINE BESYLATE 10 MG/1
10 TABLET ORAL DAILY
Qty: 30 TABLET | Refills: 2 | Status: SHIPPED
Start: 2021-12-22 | End: 2022-01-25

## 2021-12-21 RX ORDER — LANOLIN ALCOHOL/MO/W.PET/CERES
6 CREAM (GRAM) TOPICAL
Qty: 60 TABLET | Refills: 2 | COMMUNITY
Start: 2021-12-21 | End: 2022-01-25

## 2021-12-21 RX ORDER — AMOXICILLIN 250 MG
2 CAPSULE ORAL 2 TIMES DAILY
Qty: 120 TABLET | Refills: 2 | COMMUNITY
Start: 2021-12-21 | End: 2022-01-25

## 2021-12-21 RX ORDER — CARBAMAZEPINE 100 MG/1
200 TABLET, CHEWABLE ORAL 2 TIMES DAILY
Qty: 120 TABLET | Refills: 2 | Status: SHIPPED | OUTPATIENT
Start: 2021-12-21 | End: 2022-02-04

## 2021-12-21 RX ORDER — HYDROCHLOROTHIAZIDE 12.5 MG/1
12.5 TABLET ORAL DAILY
Qty: 30 TABLET | Refills: 2 | Status: SHIPPED | OUTPATIENT
Start: 2021-12-22 | End: 2022-01-25 | Stop reason: SDUPTHER

## 2021-12-21 RX ORDER — AMOXICILLIN 250 MG
2 CAPSULE ORAL 2 TIMES DAILY
Status: DISCONTINUED | OUTPATIENT
Start: 2021-12-21 | End: 2021-12-22 | Stop reason: HOSPADM

## 2021-12-21 RX ORDER — CLOPIDOGREL BISULFATE 75 MG/1
75 TABLET ORAL DAILY
Qty: 3 TABLET | Refills: 0 | Status: ON HOLD
Start: 2021-12-21 | End: 2022-02-08

## 2021-12-21 RX ORDER — TRAZODONE HYDROCHLORIDE 100 MG/1
100 TABLET ORAL
Qty: 30 TABLET | Refills: 2 | Status: SHIPPED | OUTPATIENT
Start: 2021-12-21

## 2021-12-21 RX ADMIN — OMEPRAZOLE 20 MG: 20 CAPSULE, DELAYED RELEASE ORAL at 08:41

## 2021-12-21 RX ADMIN — AMLODIPINE BESYLATE 10 MG: 5 TABLET ORAL at 05:58

## 2021-12-21 RX ADMIN — LISINOPRIL 40 MG: 20 TABLET ORAL at 08:41

## 2021-12-21 RX ADMIN — POLYETHYLENE GLYCOL 3350 1 PACKET: 17 POWDER, FOR SOLUTION ORAL at 17:34

## 2021-12-21 RX ADMIN — SENNOSIDES AND DOCUSATE SODIUM 2 TABLET: 50; 8.6 TABLET ORAL at 20:25

## 2021-12-21 RX ADMIN — Medication 1000 UNITS: at 08:41

## 2021-12-21 RX ADMIN — LABETALOL HYDROCHLORIDE 200 MG: 100 TABLET, FILM COATED ORAL at 20:25

## 2021-12-21 RX ADMIN — LABETALOL HYDROCHLORIDE 200 MG: 100 TABLET, FILM COATED ORAL at 08:40

## 2021-12-21 RX ADMIN — MODAFINIL 100 MG: 100 TABLET ORAL at 08:41

## 2021-12-21 RX ADMIN — SENNOSIDES AND DOCUSATE SODIUM 2 TABLET: 50; 8.6 TABLET ORAL at 08:40

## 2021-12-21 RX ADMIN — TRAZODONE HYDROCHLORIDE 100 MG: 50 TABLET ORAL at 20:25

## 2021-12-21 RX ADMIN — CLOPIDOGREL BISULFATE 75 MG: 75 TABLET ORAL at 08:41

## 2021-12-21 RX ADMIN — CARBAMAZEPINE 200 MG: 100 TABLET, CHEWABLE ORAL at 20:25

## 2021-12-21 RX ADMIN — TIZANIDINE 2 MG: 4 TABLET ORAL at 20:25

## 2021-12-21 RX ADMIN — LIDOCAINE 1 PATCH: 50 PATCH TOPICAL at 08:54

## 2021-12-21 RX ADMIN — CARBAMAZEPINE 200 MG: 100 TABLET, CHEWABLE ORAL at 08:40

## 2021-12-21 RX ADMIN — MELATONIN TAB 3 MG 6 MG: 3 TAB at 20:25

## 2021-12-21 RX ADMIN — HYDROCHLOROTHIAZIDE 12.5 MG: 25 TABLET ORAL at 05:58

## 2021-12-21 RX ADMIN — ASPIRIN 81 MG: 81 TABLET, COATED ORAL at 08:41

## 2021-12-21 RX ADMIN — ENOXAPARIN SODIUM 40 MG: 40 INJECTION SUBCUTANEOUS at 08:53

## 2021-12-21 RX ADMIN — ATORVASTATIN CALCIUM 80 MG: 40 TABLET, FILM COATED ORAL at 08:41

## 2021-12-21 ASSESSMENT — GAIT ASSESSMENTS
DEVIATION: ATAXIC;DECREASED BASE OF SUPPORT;BRADYKINETIC;DECREASED HEEL STRIKE;DECREASED TOE OFF
DISTANCE (FEET): 40
ASSISTIVE DEVICE: PLATFORM WALKER
GAIT LEVEL OF ASSIST: CONTACT GUARD ASSIST

## 2021-12-21 ASSESSMENT — ACTIVITIES OF DAILY LIVING (ADL)
TOILETING_LEVEL_OF_ASSIST: REQUIRES PHYSICAL ASSIST WITH TOILETING
TOILET_TRANSFER_DESCRIPTION: ADAPTIVE EQUIPMENT;GRAB BAR;INCREASED TIME;SUPERVISION FOR SAFETY;VERBAL CUEING
TOILET_TRANSFER_DESCRIPTION: ADAPTIVE EQUIPMENT;GRAB BAR;INCREASED TIME;SET-UP OF EQUIPMENT;SUPERVISION FOR SAFETY;VERBAL CUEING
TOILET_TRANSFER_DESCRIPTION: GRAB BAR;INCREASED TIME;INITIAL PREPARATION FOR TASK;SET-UP OF EQUIPMENT;SUPERVISION FOR SAFETY;VERBAL CUEING
TOILET_TRANSFER_LEVEL_OF_ASSIST: REQUIRES PHYSICAL ASSIST WITH TOILET TRANSFER
SHOWER_TRANSFER_LEVEL_OF_ASSIST: REQUIRES PHYSICAL ASSIST WITH SHOWER TRANSFER

## 2021-12-21 ASSESSMENT — PAIN DESCRIPTION - PAIN TYPE: TYPE: ACUTE PAIN

## 2021-12-21 NOTE — THERAPY
12/21/21 1029   Precautions   Precautions Fall Risk;Weight Bearing As Tolerated Left Upper Extremity   Comments L hemiparesis, left wrist brace, left neglect   Pain 0 - 10 Group   Therapist Pain Assessment 0   Cognition    Level of Consciousness Alert   Ability To Follow Commands 2 Step   Attention Impaired   Sequencing Impaired   Initiation Impaired   Comments Left neglect   Gait Functional Level of Assist    Gait Level Of Assist Contact Guard Assist   Assistive Device Platform Walker   Distance (Feet) 40   # of Times Distance was Traveled 1   Deviation Ataxic;Decreased Base Of Support;Bradykinetic;Decreased Heel Strike;Decreased Toe Off   Stairs Functional Level of Assist   Level of Assist with Stairs   (Ramp access to/from house)   # of Stairs Climbed 0   Transfer Functional Level of Assist   Bed, Chair, Wheelchair Transfer Contact Guard Assist   Bed Chair Wheelchair Transfer Description Adaptive equipment;Increased time;Assist with one limb;Supervision for safety;Verbal cueing   Toilet Transfers Contact Guard Assist   Toilet Transfer Description Adaptive equipment;Grab bar;Increased time;Supervision for safety;Verbal cueing   Bed Mobility    Supine to Sit Contact Guard Assist   Sit to Supine Contact Guard Assist   Sit to Stand Contact Guard Assist   Scooting Contact Guard Assist   Rolling Supervised   Neuro-Muscular Treatments   Neuro-Muscular Treatments Sequencing;Tactile Cuing;Verbal Cuing;Weight Shift Right;Weight Shift Left   Comments Sequencing sit to/from stand wheelchair or bed to/from platform walker, sequencing gait with platform walker and sequencing transfer to/from bed and sit to/from supine, sequencing supine scooting and rolling left and right on the bed   Interdisciplinary Plan of Care Collaboration   IDT Collaboration with  Family / Caregiver   Patient Position at End of Therapy In Bed;Bed Alarm On;Call Light within Reach;Tray Table within Reach;Phone within Reach;Family / Friend in Room    Collaboration Comments The patient's , Rei, came in for family training to prepare for discharge.  Areas covered were bed mobility for lateral scooting, left and right rolling, sit to/from supine using a leg , transferring using a platform walker bed to wheelchair and gait with a platform walker.  PT also educated her  regarding wheelchair management for the leg rests and putting the vehicle in and taking it out of the car how to do it safely and efficiently.  Fall recovery was discussed and transfer training with the family vehicle will be done on day of discharge due to patient fatigue today.   PT Total Time Spent   PT Individual Total Time Spent (Mins) 60   PT Charge Group   PT Gait Training 1   PT Therapeutic Activities 3   Physical Therapy   Daily Treatment     Patient Name: Nakita Sheppard  Age:  76 y.o., Sex:  female  Medical Record #: [id number]  Today's Date: 12/21/2021     Precautions  Precautions: (P) Fall Risk,Weight Bearing As Tolerated Left Upper Extremity  Comments: (P) L hemiparesis, left wrist brace, left neglect    Subjective    The patient agreed to PT.     Objective    The patient's  came in for caregiver training.  The patient her ability to stand from the wheelchair to the platform walker, walk short distances anywhere from 20-40 FT, transfer to/from a standard bed, transition sit to/from supine, lateral scooting and left and right rolling on the bed.  The patient then transferred using the platform walker from the bed back to the wheelchair CGA.  The patient declined to participate in doing a car transfer with the family vehicle because she was tired due to not sleeping last night.  Car transfer training will occur on day of discharge.    Assessment    Family training with the patient's , Rei, was successfully completed except the car transfer will be done on day of discharge.  The patient's  demonstrates understanding and competence to provide  assistance.  He also said they can hire caregivers and they have friends that are willing to assist.  Strengths: Able to follow instructions,Good carryover of learning,Independent prior level of function,Making steady progress towards goals,Supportive family,Pleasant and cooperative,Willingly participates in therapeutic activities  Barriers: Hemiparesis,Impaired balance (Impaired endurance and strength, fall risk)    Plan    Discharge to home 12/22/2021 with  as primary caregiver.    Passport items to be completed:  Passport items to be completed:  Get in/out of bed safely, in/out of a vehicle, safely use mobility device, walk or wheel around home/community, navigate up and down stairs, show how to get up/down from the ground, ensure home is accessible, demonstrate HEP, complete caregiver training    Physical Therapy Problems (Active)     Problem: PT-Long Term Goals     Dates: Start: 12/04/21       Goal: LTG-By discharge, patient will maintain balance at least 45/56 on the [place] to DC to home safely with support of spouse     Dates: Start: 12/04/21   Expected End: 12/16/21          Goal: LTG-By discharge, patient will ambulate at least 500' with SPC and SPV inside and outside surfaces     Dates: Start: 12/04/21   Expected End: 12/16/21          Goal: LTG-By discharge, patient will transfer one surface to another SPV with SPC     Dates: Start: 12/04/21   Expected End: 12/16/21          Goal: LTG-By discharge, patient will transfer in/out of a car SBA from ambulatory level with SPC     Dates: Start: 12/04/21   Expected End: 12/16/21          Goal: LTG-By discharge, patient will ascend/descend ADA ramp with SPC and SBA to enter/exit her home safely     Dates: Start: 12/04/21   Expected End: 12/16/21

## 2021-12-21 NOTE — PROGRESS NOTES
"Rehab Progress Note     Date of Service: 12/20/2021  Chief Complaint: Follow-up stroke    Interval Events (Subjective)    Patient seen and examined today in her room.  She is lying in bed and is just finished speech therapy.  Advised patient removed for discharge from Friday for Wednesday.  We will schedule family training.  Patient has no new complaints.    ROS: No changes to bowel, bladder, pain, mood, or sleep.       Objective:  VITAL SIGNS: /62   Pulse 69   Temp 36.7 °C (98.1 °F) (Temporal)   Resp 16   Ht 1.575 m (5' 2\")   Wt 76 kg (167 lb 8.8 oz)   SpO2 91%   BMI 30.65 kg/m²   Gen: alert, no apparent distress  Neuro: notable for left hemiparesis      Recent Results (from the past 72 hour(s))   CBC WITHOUT DIFFERENTIAL    Collection Time: 12/20/21  5:56 AM   Result Value Ref Range    WBC 5.8 4.8 - 10.8 K/uL    RBC 3.87 (L) 4.20 - 5.40 M/uL    Hemoglobin 12.3 12.0 - 16.0 g/dL    Hematocrit 37.5 37.0 - 47.0 %    MCV 96.9 81.4 - 97.8 fL    MCH 31.8 27.0 - 33.0 pg    MCHC 32.8 (L) 33.6 - 35.0 g/dL    RDW 44.8 35.9 - 50.0 fL    Platelet Count 301 164 - 446 K/uL    MPV 10.1 9.0 - 12.9 fL   Basic Metabolic Panel    Collection Time: 12/20/21  5:56 AM   Result Value Ref Range    Sodium 137 135 - 145 mmol/L    Potassium 3.5 (L) 3.6 - 5.5 mmol/L    Chloride 98 96 - 112 mmol/L    Co2 28 20 - 33 mmol/L    Glucose 105 (H) 65 - 99 mg/dL    Bun 22 8 - 22 mg/dL    Creatinine 1.07 0.50 - 1.40 mg/dL    Calcium 9.0 8.5 - 10.5 mg/dL    Anion Gap 11.0 7.0 - 16.0   ESTIMATED GFR    Collection Time: 12/20/21  5:56 AM   Result Value Ref Range    GFR If African American >60 >60 mL/min/1.73 m 2    GFR If Non African American 50 (A) >60 mL/min/1.73 m 2       Current Facility-Administered Medications   Medication Frequency   • melatonin tablet 6 mg QHS   • traZODone (DESYREL) tablet 100 mg QHS   • traMADol (ULTRAM) 50 MG tablet 50 mg Q4HRS PRN   • lidocaine (LIDODERM) 5 % 1 Patch DAILY   • polyethylene glycol/lytes (MIRALAX) " PACKET 1 Packet DAILY    And   • senna-docusate (PERICOLACE or SENOKOT S) 8.6-50 MG per tablet 2 Tablet BID    And   • magnesium hydroxide (MILK OF MAGNESIA) suspension 30 mL QDAY PRN    And   • bisacodyl (DULCOLAX) suppository 10 mg QDAY PRN   • hydroCHLOROthiazide (HYDRODIURIL) tablet 12.5 mg Q DAY   • modafinil (PROVIGIL) tablet 100 mg QAM   • amLODIPine (NORVASC) tablet 10 mg Q DAY   • butalbital/apap/caffeine -40 mg (Fioricet) per tablet 1 Tablet Q6HRS PRN   • tizanidine (ZANAFLEX) tablet 2 mg QHS   • omeprazole (PRILOSEC) capsule 20 mg QAM AC   • hydrOXYzine HCl (ATARAX) tablet 50 mg Q6HRS PRN   • Respiratory Therapy Consult Continuous RT   • Pharmacy Consult Request ...Pain Management Review 1 Each PHARMACY TO DOSE   • hydrALAZINE (APRESOLINE) tablet 10 mg Q8HRS PRN   • acetaminophen (Tylenol) tablet 650 mg Q4HRS PRN   • lactulose 20 GM/30ML solution 30 mL QDAY PRN   • docusate sodium (ENEMEEZ) enema 283 mg QDAY PRN   • sodium phosphate (Fleet) enema 133 mL QDAY PRN   • artificial tears ophthalmic solution 1 Drop PRN   • benzocaine-menthol (CEPACOL) lozenge 1 Lozenge Q2HRS PRN   • mag hydrox-al hydrox-simeth (MAALOX PLUS ES or MYLANTA DS) suspension 20 mL Q2HRS PRN   • ondansetron (ZOFRAN ODT) dispertab 4 mg 4X/DAY PRN    Or   • ondansetron (ZOFRAN) syringe/vial injection 4 mg 4X/DAY PRN   • sodium chloride (OCEAN) 0.65 % nasal spray 2 Spray PRN   • midazolam (VERSED) 5 mg/mL (1 mL vial) PRN   • enoxaparin (LOVENOX) inj 40 mg DAILY   • aspirin EC (ECOTRIN) tablet 81 mg DAILY   • atorvastatin (LIPITOR) tablet 80 mg DAILY   • carBAMazepine (TEGRETOL) chewable tab 200 mg BID   • clopidogrel (PLAVIX) tablet 75 mg DAILY   • labetalol (NORMODYNE) tablet 200 mg Q12HRS   • lisinopril (PRINIVIL) tablet 40 mg DAILY   • vitamin D3 (cholecalciferol) tablet 1,000 Units DAILY       Orders Placed This Encounter   Procedures   • Diet Order Diet: Level 6 - Soft and Bite Sized (Medications floated whole, one at a time,  in pudding (pt does not like applesauce)); Liquid level: Level 0 - Thin; Tray Modifications (optional): No Straws     Standing Status:   Standing     Number of Occurrences:   1     Order Specific Question:   Diet:     Answer:   Level 6 - Soft and Bite Sized [23]     Comments:   Medications floated whole, one at a time, in pudding (pt does not like applesauce)     Order Specific Question:   Liquid level     Answer:   Level 0 - Thin     Order Specific Question:   Tray Modifications (optional)     Answer:   No Straws       Radiology  [id number]   Final Result      Normal bowel gas pattern      CT-HEAD W/O   Final Result         1.  Better delineation of low attenuation in the right temporal lobe and right parietal lobe in the right MCA distribution consistent with evolving bland ischemia.      2. Stable lacunar infarct in the left posterior basal ganglia.      3. Diffuse small vessel ischemic changes again noted.      4. No acute hemorrhage.      DX-ESOPHAGUS - [id number]    (Results Pending)   DX-ESOPHAGUS - [id number]    (Results Pending)       Assessment:  Active Hospital Problems    Diagnosis    • *Ischemic stroke (HCC)    • Polyuria    • Wrist fracture, closed, left, sequela    • Other insomnia    • Hypokalemia    • Class 1 obesity without serious comorbidity in adult    • Spasticity    • Left-sided neglect    • Other dysphagia    • Impaired mobility and ADLs    • Hyperlipidemia LDL goal <70    • Peripheral neuropathy    • Hypertension      This patient is a 76 y.o. female admitted for acute inpatient rehabilitation with Ischemic stroke (HCC).    I led and attended the weekly conference, and agree with the IDT conference documentation and plan of care as noted below.    Date of conference: 12/15/2021    Goals and barriers: See IDT note.    Biggest barriers: left hemiparesis, dysphagia, pockets in left cheek, dysphagia, left neglect    Goals in next week: bed mobility    CM/social support: family very  supportive, will have 24/7 support    Anticipated DC date: 12/22, discharge moved up 2 days as patient will be ready    Home health: PT/OT/SLP/RN    Equip: MWC, platform walker     Follow up: PCP, [facility], Dr. Callejas, cardiology, orthopedic surgery        Medical Decision Making and Plan:    Right MCA stroke  Left hemiparesis, improved, arm more affected than leg  Nondominant  Left neglect, continues  Dysphagia, improved  Cognitive impairments, continue  Continue full rehab program  PT/OT/SLP, 1 hr each discipline, 5 days per week     Aspirin  Plavix  Statin     Patient with Zio patch cardiac monitor, return 2 weeks from admission, returned 12/17    Outpatient follow-up with [facility], cardiology, Dr. Callejas, referrals made    First MBS with aspiration, repeat MBS a week later with improvement, diet has been advanced    Started Provigil for neuro stimulation, improved initiation, the patient still sleeps a lot during the day    Psychology consulted for evaluation, patient not depressed    Left wrist pain, improved  X-ray at acute showed possible avulsion fractures  Discussed with orthopedic surgery, outpatient follow-up, referral made  Wrist brace and weightbearing as tolerated     Hypertension, improved  Lisinopril  Labetalol  Amlodipine, dose increased  Currently stable, hospitalist has signed off  Started hydrochlorothiazide 12/14  Continue to monitor  Patient would benefit from outpatient sleep study     Peripheral neuropathy  Continue home medication carbamazepine  Was also on Zanaflex (6 mg TID), restarted 2 mg at night due to complaints of leg spasms, will not restart home dosing due to side effect of lethargy    History of migraines  Continue carbamazepine  Minimal headaches during rehab stay    Hypokalemia resolved/occurred  Supplement  Monitor as an outpatient    Insomnia, improved  Restarted home medication tizanidine 2 mg  Added scheduled melatonin and trazodone, increased dose of  both     Obesity  BMI 32.6  Outpatient nutritional counseling    Vitamin D deficiency  Continue supplementation    GI prophylaxis  Omeprazole     Polyuria  Negative urinalysis    Bowel program  Bowel incontinence  Constipation, resolved  Increase bowel medications  Last BM 12/18    Bladder program  Bladder incontinence  Check PVRs - 25, 15, 10  Not retaining  Bladder scan for no voids  ICP for over 400 cc  Scheduled toileting    DVT prophylaxis  Lovenox    Total time:  25 minutes.  I spent greater than 50% of the time for patient care, counseling, and coordination on this date, including patient face-to face time, unit/floor time with review of records/pertinent lab data and studies, as well as discussing diagnostic evaluation/work up, planned therapeutic interventions, and future disposition of care, as per the interval events/subjective and the assessment and plan as noted above.    I have performed a physical exam, reviewed and updated ROS, as well as the assessment and plan today 12/20/2021. In review of note from 12/17/2021 there are no new changes except as documented above.              Hawa Alva M.D.   Physical Medicine and Rehabilitation

## 2021-12-21 NOTE — THERAPY
Occupational Therapy  Daily Treatment     Patient Name: Nakita Sheppard  Age:  76 y.o., Sex:  female  Medical Record #: [id number]  Today's Date: 12/21/2021     Precautions  Precautions: (P) Fall Risk,Weight Bearing As Tolerated Left Upper Extremity  Comments: (P) L hemiparesis, left wrist brace, left neglect    Safety   ADL Safety : Requires Supervision for Safety,Requires Physical Assist for Safety,Requires Cueing for Safety  Bathroom Safety: Requires Physical Assist for Safety,Requires Supervision for Safety,Requires Cuing for Safety  Comments: see functional levels below for ADL performance details.    Subjective    Pt and spouse actively participated in family training session.     Objective       12/21/21 0901   Precautions   Precautions Fall Risk;Weight Bearing As Tolerated Left Upper Extremity   Comments L hemiparesis, left wrist brace, left neglect   Cognition    Level of Consciousness Alert   Functional Level of Assist   Grooming Supervision;Seated   Grooming Description Increased time;Supervision for safety   Toileting Moderate Assist   Toileting Description Assist to pull pants up;Assist for standing balance;Increased time;Grab bar;Supervision for safety;Verbal cueing   Toilet Transfers Contact Guard Assist   Toilet Transfer Description Grab bar;Increased time;Initial preparation for task;Set-up of equipment;Supervision for safety;Verbal cueing  (w/c<>toilet SPT via GB)   Interdisciplinary Plan of Care Collaboration   IDT Collaboration with  Family / Caregiver   Patient Position at End of Therapy Seated;Chair Alarm On;Self Releasing Lap Belt Applied;Family / Friend in Room   Collaboration Comments spouse present for family training   OT Total Time Spent   OT Individual Total Time Spent (Mins) 30   OT Charge Group   OT Self Care / ADL 2     Family training completed with pt and spouse with focus on ADLs and bathroom transfers. Discussed pt's CLOF and level of assistance needed. Spouse demonstrated  ability to assist pt with toileting and toilet transfer. Educated spouse on bathroom equipment recommendations-spouse reports there are GB, HHSH and shower stool in pt's walk in shower---recommend that pt get shower bench vs use of shower stool for increased safety and stability. Also recommend that pt have commode over toilet and if possible for family to have GB installed by toilet. Provided spouse with information for pricing and vendors for shower bench and commode. Also provided handout for GB placement guidelines/recommendations. Discussed benefit of ongoing therapies in home health setting.     Assessment    Pt and spouse receptive to all education and recommendations provided during family training session. Spouse demonstrated ability to safely assist pt with toileting and toilet transfer.   Strengths: Independent prior level of function,Motivated for self care and independence,Pleasant and cooperative,Supportive family,Willingly participates in therapeutic activities  Barriers: Aspiration risk,Decreased endurance,Fatigue,Hemiparesis,Impaired activity tolerance,Impaired balance,Impaired functional cognition,Pain    Plan    ADLs, home safety education, prep for d/c tomorrow.       Occupational Therapy Goals (Active)     Problem: OT Long Term Goals     Dates: Start: 12/04/21       Goal: LTG-By discharge, patient will complete basic self care tasks at supervision to mod I level using AE as needed.      Dates: Start: 12/04/21   Expected End: 12/16/21          Goal: LTG-By discharge, patient will perform bathroom transfers at supervision level using AE/DME as needed.      Dates: Start: 12/04/21   Expected End: 12/16/21

## 2021-12-21 NOTE — CARE PLAN
Problem: OT Long Term Goals  Goal: LTG-By discharge, patient will complete basic self care tasks at supervision to mod I level using AE as needed.   Outcome: Discharged - Not Met  Note: Pt con't to require min-mod A with ADLs d/t L hemiparesis, L neglect, and impaired functional cog.   Goal: LTG-By discharge, patient will perform bathroom transfers at supervision level using AE/DME as needed.   Outcome: Discharged - Not Met  Note: Pt con't to require CGA with bathroom txfrs d/t L hemiparesis, L neglect, and impaired functional cog.

## 2021-12-21 NOTE — PROGRESS NOTES
"Rehab Progress Note     Date of Service: 12/21/2021  Chief Complaint: Follow-up stroke    Interval Events (Subjective)    Patient seen and examined today in her room.  Her  is present.   Patient to be discharged home tomorrow.  Discussed follow ups and medications with .  Patient did not sleep well last night, otherwise has no new complaints.  She hasn't moved her bowels in several days but denies any abdominal pain and per , doesn't eat much.  She continues to have low back pain and reports the lidoderm patches don't really help.She would like an outpatient referral to pain management.    ROS: No changes to bladder or mood.          Objective:  VITAL SIGNS: /62   Pulse 66   Temp 37 °C (98.6 °F) (Temporal)   Resp 16   Ht 1.575 m (5' 2\")   Wt 76 kg (167 lb 8.8 oz)   SpO2 93%   BMI 30.65 kg/m²   Gen: alert, no apparent distress  Neuro: notable for left hemiparesis      Recent Results (from the past 72 hour(s))   CBC WITHOUT DIFFERENTIAL    Collection Time: 12/20/21  5:56 AM   Result Value Ref Range    WBC 5.8 4.8 - 10.8 K/uL    RBC 3.87 (L) 4.20 - 5.40 M/uL    Hemoglobin 12.3 12.0 - 16.0 g/dL    Hematocrit 37.5 37.0 - 47.0 %    MCV 96.9 81.4 - 97.8 fL    MCH 31.8 27.0 - 33.0 pg    MCHC 32.8 (L) 33.6 - 35.0 g/dL    RDW 44.8 35.9 - 50.0 fL    Platelet Count 301 164 - 446 K/uL    MPV 10.1 9.0 - 12.9 fL   Basic Metabolic Panel    Collection Time: 12/20/21  5:56 AM   Result Value Ref Range    Sodium 137 135 - 145 mmol/L    Potassium 3.5 (L) 3.6 - 5.5 mmol/L    Chloride 98 96 - 112 mmol/L    Co2 28 20 - 33 mmol/L    Glucose 105 (H) 65 - 99 mg/dL    Bun 22 8 - 22 mg/dL    Creatinine 1.07 0.50 - 1.40 mg/dL    Calcium 9.0 8.5 - 10.5 mg/dL    Anion Gap 11.0 7.0 - 16.0   ESTIMATED GFR    Collection Time: 12/20/21  5:56 AM   Result Value Ref Range    GFR If African American >60 >60 mL/min/1.73 m 2    GFR If Non African American 50 (A) >60 mL/min/1.73 m 2       Current Facility-Administered " Medications   Medication Frequency   • melatonin tablet 6 mg QHS   • traZODone (DESYREL) tablet 100 mg QHS   • traMADol (ULTRAM) 50 MG tablet 50 mg Q4HRS PRN   • lidocaine (LIDODERM) 5 % 1 Patch DAILY   • polyethylene glycol/lytes (MIRALAX) PACKET 1 Packet DAILY    And   • senna-docusate (PERICOLACE or SENOKOT S) 8.6-50 MG per tablet 2 Tablet BID    And   • magnesium hydroxide (MILK OF MAGNESIA) suspension 30 mL QDAY PRN    And   • bisacodyl (DULCOLAX) suppository 10 mg QDAY PRN   • hydroCHLOROthiazide (HYDRODIURIL) tablet 12.5 mg Q DAY   • modafinil (PROVIGIL) tablet 100 mg QAM   • amLODIPine (NORVASC) tablet 10 mg Q DAY   • butalbital/apap/caffeine -40 mg (Fioricet) per tablet 1 Tablet Q6HRS PRN   • tizanidine (ZANAFLEX) tablet 2 mg QHS   • omeprazole (PRILOSEC) capsule 20 mg QAM AC   • hydrOXYzine HCl (ATARAX) tablet 50 mg Q6HRS PRN   • Respiratory Therapy Consult Continuous RT   • Pharmacy Consult Request ...Pain Management Review 1 Each PHARMACY TO DOSE   • hydrALAZINE (APRESOLINE) tablet 10 mg Q8HRS PRN   • acetaminophen (Tylenol) tablet 650 mg Q4HRS PRN   • lactulose 20 GM/30ML solution 30 mL QDAY PRN   • docusate sodium (ENEMEEZ) enema 283 mg QDAY PRN   • sodium phosphate (Fleet) enema 133 mL QDAY PRN   • artificial tears ophthalmic solution 1 Drop PRN   • benzocaine-menthol (CEPACOL) lozenge 1 Lozenge Q2HRS PRN   • mag hydrox-al hydrox-simeth (MAALOX PLUS ES or MYLANTA DS) suspension 20 mL Q2HRS PRN   • ondansetron (ZOFRAN ODT) dispertab 4 mg 4X/DAY PRN    Or   • ondansetron (ZOFRAN) syringe/vial injection 4 mg 4X/DAY PRN   • sodium chloride (OCEAN) 0.65 % nasal spray 2 Spray PRN   • midazolam (VERSED) 5 mg/mL (1 mL vial) PRN   • enoxaparin (LOVENOX) inj 40 mg DAILY   • aspirin EC (ECOTRIN) tablet 81 mg DAILY   • atorvastatin (LIPITOR) tablet 80 mg DAILY   • carBAMazepine (TEGRETOL) chewable tab 200 mg BID   • clopidogrel (PLAVIX) tablet 75 mg DAILY   • labetalol (NORMODYNE) tablet 200 mg Q12HRS   •  lisinopril (PRINIVIL) tablet 40 mg DAILY   • vitamin D3 (cholecalciferol) tablet 1,000 Units DAILY       Orders Placed This Encounter   Procedures   • Diet Order Diet: Level 6 - Soft and Bite Sized (Medications floated whole, one at a time, in pudding (pt does not like applesauce)); Liquid level: Level 0 - Thin; Tray Modifications (optional): No Straws     Standing Status:   Standing     Number of Occurrences:   1     Order Specific Question:   Diet:     Answer:   Level 6 - Soft and Bite Sized [23]     Comments:   Medications floated whole, one at a time, in pudding (pt does not like applesauce)     Order Specific Question:   Liquid level     Answer:   Level 0 - Thin     Order Specific Question:   Tray Modifications (optional)     Answer:   No Straws       Radiology  [id number] [id number]   Final Result      1.  Mild colonic stool which contains enteric contrast.   2.  Nonobstructive bowel gas pattern.      [id number] [id number]   Final Result      Normal bowel gas pattern      CT-HEAD W/O   Final Result         1.  Better delineation of low attenuation in the right temporal lobe and right parietal lobe in the right MCA distribution consistent with evolving bland ischemia.      2. Stable lacunar infarct in the left posterior basal ganglia.      3. Diffuse small vessel ischemic changes again noted.      4. No acute hemorrhage.      DX-ESOPHAGUS - [id number]    (Results Pending)   DX-ESOPHAGUS - [id number]    (Results Pending)       Assessment:  Active Hospital Problems    Diagnosis    • *Ischemic stroke (HCC)    • Polyuria    • Wrist fracture, closed, left, sequela    • Other insomnia    • Hypokalemia    • Class 1 obesity without serious comorbidity in adult    • Spasticity    • Left-sided neglect    • Other dysphagia    • Impaired mobility and ADLs    • Hyperlipidemia LDL goal <70    • Peripheral neuropathy    • Hypertension      This patient is a 76 y.o. female admitted for acute inpatient rehabilitation with  Ischemic stroke (HCC).    I led and attended the weekly conference, and agree with the IDT conference documentation and plan of care as noted below.    Date of conference: 12/15/2021    Goals and barriers: See IDT note.    Biggest barriers: left hemiparesis, dysphagia, pockets in left cheek, dysphagia, left neglect    Goals in next week: bed mobility    CM/social support: family very supportive, will have 24/7 support    Anticipated DC date: 12/22, discharge moved up 2 days as patient will be ready    Home health: PT/OT/SLP/RN    Equip: MWC, platform walker     Follow up: PCP, [facility], Dr. Callejas, cardiology, orthopedic surgery    Rx: not contracted with [facility], sent to [facility] in [place].        Medical Decision Making and Plan:    Right MCA stroke  Left hemiparesis, improved, arm more affected than leg  Nondominant  Left neglect, continues  Dysphagia, improved  Cognitive impairments, continue  Continue full rehab program  PT/OT/SLP, 1 hr each discipline, 5 days per week     Aspirin  Plavix  Statin     Patient with Zio patch cardiac monitor, return 2 weeks from admission, returned 12/17    Outpatient follow-up with [facility], cardiology, Dr. Callejas, referrals made    First MBS with aspiration, repeat MBS a week later with improvement, diet has been advanced    Started Provigil for neuro stimulation, improved initiation, the patient still sleeps a lot during the day, will not continue at discharge as doesn't seem to be making a large difference    Psychology consulted for evaluation, patient not depressed    Left wrist pain, improved  X-ray at acute showed possible avulsion fractures  Discussed with orthopedic surgery, outpatient follow-up, referral made  Wrist brace and weightbearing as tolerated     Hypertension, improved  Lisinopril  Labetalol  Amlodipine, dose increased  Currently stable, hospitalist has signed off  Started hydrochlorothiazide 12/14  Continue to monitor  Patient would  benefit from outpatient sleep study     Peripheral neuropathy  Continue home medication carbamazepine  Was also on Zanaflex (6 mg TID), restarted 2 mg at night due to complaints of leg spasms, will not restart home dosing due to side effect of lethargy    History of migraines  Continue carbamazepine  Minimal headaches during rehab stay    Hypokalemia resolved/occurred  Supplement  Monitor as an outpatient    Insomnia, improved  Restarted home medication tizanidine 2 mg  Added scheduled melatonin and trazodone, increased dose of both  Ok to use Excedrin-pm as needed at home     Obesity  BMI 32.6  Outpatient nutritional counseling    Vitamin D deficiency  Continue supplementation    GI prophylaxis  Omeprazole     Polyuria  Negative urinalysis    Bowel program  Bowel incontinence  Constipation, resolved/continues  Increase bowel medications  Last BM 12/18    Bladder program  Bladder incontinence  Check PVRs - 25, 15, 10  Not retaining  Bladder scan for no voids  ICP for over 400 cc  Scheduled toileting    DVT prophylaxis  Lovenox    Total time:  26 minutes.  I spent greater than 50% of the time for patient care, counseling, and coordination on this date, including patient face-to face time, unit/floor time with review of records/pertinent lab data and studies, as well as discussing diagnostic evaluation/work up, planned therapeutic interventions, and future disposition of care, as per the interval events/subjective and the assessment and plan as noted above.    I have performed a physical exam, reviewed and updated ROS, as well as the assessment and plan today 12/21/2021. In review of note from 12/20/2021 there are no new changes except as documented above.              Hawa Alva M.D.   Physical Medicine and Rehabilitation

## 2021-12-21 NOTE — THERAPY
Speech Language Pathology  Daily Treatment     Patient Name: Nakita Sheppard  Age:  76 y.o., Sex:  female  Medical Record #: [id number]  Today's Date: 12/21/2021     Precautions  Precautions: Fall Risk,Weight Bearing As Tolerated Left Upper Extremity  Comments: L hemiparesis, left wrist brace, left neglect, Zio patch    Subjective    Family training completed at bedside with pt and pt's       Objective       12/21/21 1031   Interdisciplinary Plan of Care Collaboration   IDT Collaboration with  Family / Caregiver   Patient Position at End of Therapy Seated;Family / Friend in Room   Collaboration Comments Pt's  present for family training    SLP Total Time Spent   SLP Individual Total Time Spent (Mins) 30   Treatment Charges   SLP Cognitive Skill Development First 15 Minutes 1   SLP Cognitive Skill Development Additional 15 Minutes 1       Assessment    Family training completed.  Reviewed pt's current diet, swallowing strategies/recommendations, pt's current cognitive status (moderate deficits, difficulty with attention, memory, problem solving), assistance with medications (pt's  to manage pt's medications), clear speech strategies, and stroke education.  Written information on stroke prevention and clear speech strategies was given to pt;s .  Pt very fatigued retirement through this session and requested to complete ST at another time.  Will attempt to reschedule pt for 11:30.      Strengths: Pleasant and cooperative,Motivated for self care and independence,Willingly participates in therapeutic activities,Independent prior level of function,Supportive family  Barriers: Aspiration risk,Impaired insight/denial of deficits,Impaired carryover of learning,Impaired functional cognition    Plan    Reschedule second half of session at 11:30 to target swallowing strategies.  Pt scheduled to discharge home tomorrow with the assistance of her family.        Speech Therapy Problems (Active)      Problem: Expression STGs     Dates: Start: 12/04/21       Goal: STG-Within one week, patient will     Dates: Start: 12/04/21   Expected End: 12/16/21       Description: Utilize clear speech strategies with mod cues in 70% of opportunities to increase overall speech intelligibility.     Goal Note filed on 12/15/21 0756 by Dav Knight MS,CCC-SLP     Mod-max cues required to implement clear speech strategies                Problem: Memory STGs     Dates: Start: 12/04/21       Goal: STG-Within one week, patient will     Dates: Start: 12/04/21   Expected End: 12/16/21       Description: Complete functional IADL tasks (e.g. med and financial management) with 70% acc and mod cues to ensure safe return to her PLOF.     Goal Note filed on 12/15/21 0756 by Dav Knight MS,CCC-SLP     Therapy targeting dysphagia management at this time, continue to target                Problem: Speech/Swallowing LTGs     Dates: Start: 12/04/21       Goal: LTG-By discharge, patient will safely swallow     Dates: Start: 12/04/21   Expected End: 12/16/21       Description: Safest/least restrictive diet with no overt s/sx of aspiration for 100% of intake to maintain adequate nutrition and hydration.        Goal: LTG-By discharge, patient will     Dates: Start: 12/04/21   Expected End: 12/16/21       Description: Complete cog-ling tasks at modified independence level to ensure safe return to PLOF.        Goal: LTG-By discharge, patient will     Dates: Start: 12/04/21   Expected End: 12/16/21       Description: Communicate complex ideas at the conversation level with 90% intelligibility to a naiive listener.           Problem: Swallowing STGs     Dates: Start: 12/04/21       Goal: STG-Within one week, patient will consume trials of thin liquids with use of effortful swallow with no overt s/sx of asp/pen noted on 80% of trials provided MIN cues     Dates: Start: 12/08/21   Expected End: 12/16/21       Goal Note filed on 12/15/21 0756 by Dav  MS Eugene,CCC-SLP     Variable toleration of thin liquids, continue to target

## 2021-12-21 NOTE — THERAPY
Occupational Therapy  Daily Treatment     Patient Name: Nakita Sheppard  Age:  76 y.o., Sex:  female  Medical Record #: [id number]  Today's Date: 12/21/2021     Precautions  Precautions: (P) Fall Risk,Weight Bearing As Tolerated Left Upper Extremity  Comments: (P) L hemiparesis, left wrist brace, left neglect    Safety   ADL Safety : Requires Supervision for Safety,Requires Physical Assist for Safety,Requires Cueing for Safety  Bathroom Safety: Requires Physical Assist for Safety,Requires Supervision for Safety,Requires Cuing for Safety  Comments: see functional levels below for ADL performance details.    Subjective    Pt up in w/c, agreeable to OT.      Objective       12/21/21 1401   Precautions   Precautions Fall Risk;Weight Bearing As Tolerated Left Upper Extremity   Comments L hemiparesis, left wrist brace, left neglect   Functional Level of Assist   Grooming Supervision;Seated   Grooming Description Set-up of equipment;Supervision for safety   Toileting Moderate Assist   Toileting Description Assist to pull pants up;Grab bar;Assist for standing balance;Increased time;Supervision for safety;Verbal cueing   Toilet Transfers Contact Guard Assist   Toilet Transfer Description Adaptive equipment;Grab bar;Increased time;Set-up of equipment;Supervision for safety;Verbal cueing   Interdisciplinary Plan of Care Collaboration   Patient Position at End of Therapy In Bed;Bed Alarm On;Tray Table within Reach;Call Light within Reach;Phone within Reach   OT Total Time Spent   OT Individual Total Time Spent (Mins) 30   OT Charge Group   OT Self Care / ADL 1   OT Neuromuscular Re-education / Balance 1     Saebo mobile arm support (MAS) for unweighting L UE to increase repetitions, improve motor recovery, and assist in relearning normal movement patterns. Pt completed 3X10 of each of the following exercises: shoulder flex/ext, abduction/adduction, horizontal abduction/adduction, protraction/retraction, elbow flex/ext,  wrist flex/ext. Sa[place]o MAS tension set to level 5 for all exercises; adjusted with pt fatigue.    Assessment    Pt tolerated OT session well and con't to make slow progress with LUE neuro muscular return. She will benefit from ongoing OT services in home health setting to con't to progress with ADLs, household mobility and LUE function.   Strengths: Independent prior level of function,Motivated for self care and independence,Pleasant and cooperative,Supportive family,Willingly participates in therapeutic activities  Barriers: Aspiration risk,Decreased endurance,Fatigue,Hemiparesis,Impaired activity tolerance,Impaired balance,Impaired functional cognition,Pain    Plan    D/c tomorrow with HHOT and 24/7 family support.         Occupational Therapy Goals (Active)     Problem: OT Long Term Goals     Dates: Start: 12/04/21       Goal: LTG-By discharge, patient will complete basic self care tasks at supervision to mod I level using AE as needed.      Dates: Start: 12/04/21   Expected End: 12/16/21          Goal: LTG-By discharge, patient will perform bathroom transfers at supervision level using AE/DME as needed.      Dates: Start: 12/04/21   Expected End: 12/16/21

## 2021-12-21 NOTE — THERAPY
Speech Language Pathology  Daily Treatment     Patient Name: Naktia Sheppard  Age:  76 y.o., Sex:  female  Medical Record #: [id number]  Today's Date: 12/21/2021     Precautions  Precautions: Fall Risk,Weight Bearing As Tolerated Left Upper Extremity  Comments: L hemiparesis, left wrist brace, left neglect    Subjective    Pt was willing to participate in therapy at bedside      Objective       12/21/21 1131   SLP Total Time Spent   SLP Individual Total Time Spent (Mins) 30   Treatment Charges   SLP Swallowing Dysfunction Treatment Swallowing Dysfunction Treatment       Assessment    Pt consumed 6- Soft & Bite Sized textures and 0-Thin liquids liquids during lunch with mod cues required to clear her left cheek of residue.  Min cues were also required for pt to take small bites.  No overt s/sx of aspiration noted during this meal.      Strengths: Pleasant and cooperative,Motivated for self care and independence,Willingly participates in therapeutic activities,Independent prior level of function,Supportive family  Barriers: Aspiration risk,Impaired insight/denial of deficits,Impaired carryover of learning,Impaired functional cognition    Plan    Pt scheduled to discharge home tomorrow with the assistance of her family       Speech Therapy Problems (Active)     Problem: Expression STGs     Dates: Start: 12/04/21       Goal: STG-Within one week, patient will     Dates: Start: 12/04/21   Expected End: 12/16/21       Description: Utilize clear speech strategies with mod cues in 70% of opportunities to increase overall speech intelligibility.     Goal Note filed on 12/15/21 0756 by aDv Knight MS,CCC-SLP     Mod-max cues required to implement clear speech strategies                Problem: Memory STGs     Dates: Start: 12/04/21       Goal: STG-Within one week, patient will     Dates: Start: 12/04/21   Expected End: 12/16/21       Description: Complete functional IADL tasks (e.g. med and financial management) with 70%  acc and mod cues to ensure safe return to her PLOF.     Goal Note filed on 12/15/21 0756 by Dav Knight MS,CCC-SLP     Therapy targeting dysphagia management at this time, continue to target                Problem: Speech/Swallowing LTGs     Dates: Start: 12/04/21       Goal: LTG-By discharge, patient will safely swallow     Dates: Start: 12/04/21   Expected End: 12/16/21       Description: Safest/least restrictive diet with no overt s/sx of aspiration for 100% of intake to maintain adequate nutrition and hydration.        Goal: LTG-By discharge, patient will     Dates: Start: 12/04/21   Expected End: 12/16/21       Description: Complete cog-ling tasks at modified independence level to ensure safe return to PLOF.        Goal: LTG-By discharge, patient will     Dates: Start: 12/04/21   Expected End: 12/16/21       Description: Communicate complex ideas at the conversation level with 90% intelligibility to a naiive listener.           Problem: Swallowing STGs     Dates: Start: 12/04/21       Goal: STG-Within one week, patient will consume trials of thin liquids with use of effortful swallow with no overt s/sx of asp/pen noted on 80% of trials provided MIN cues     Dates: Start: 12/08/21   Expected End: 12/16/21       Goal Note filed on 12/15/21 0756 by Dav Knight MS,CCC-SLP     Variable toleration of thin liquids, continue to target

## 2021-12-21 NOTE — CARE PLAN
Problem: Knowledge Deficit - Standard  Goal: Patient and family/care givers will demonstrate understanding of plan of care, disease process/condition, diagnostic tests and medications  Outcome: Progressing     Problem: Skin Integrity  Goal: Skin integrity is maintained or improved  Outcome: Progressing   The patient is Stable - Low risk of patient condition declining or worsening    Shift Goals  Clinical Goals: Safety  Patient Goals: sleep well    Progress made toward(s) clinical / shift goals:  Patient is resting - denies pain or discomfort    Patient is not progressing towards the following goals:

## 2021-12-22 ENCOUNTER — TELEPHONE (OUTPATIENT)
Dept: CARDIOLOGY | Facility: MEDICAL CENTER | Age: 76
End: 2021-12-22

## 2021-12-22 VITALS
TEMPERATURE: 98.1 F | SYSTOLIC BLOOD PRESSURE: 131 MMHG | RESPIRATION RATE: 18 BRPM | HEART RATE: 65 BPM | HEIGHT: 62 IN | OXYGEN SATURATION: 91 % | BODY MASS INDEX: 30.83 KG/M2 | WEIGHT: 167.55 LBS | DIASTOLIC BLOOD PRESSURE: 51 MMHG

## 2021-12-22 PROCEDURE — 700101 HCHG RX REV CODE 250: Performed by: PHYSICAL MEDICINE & REHABILITATION

## 2021-12-22 PROCEDURE — 700111 HCHG RX REV CODE 636 W/ 250 OVERRIDE (IP): Performed by: PHYSICAL MEDICINE & REHABILITATION

## 2021-12-22 PROCEDURE — A9270 NON-COVERED ITEM OR SERVICE: HCPCS | Performed by: HOSPITALIST

## 2021-12-22 PROCEDURE — A9270 NON-COVERED ITEM OR SERVICE: HCPCS | Performed by: PHYSICAL MEDICINE & REHABILITATION

## 2021-12-22 PROCEDURE — 99239 HOSP IP/OBS DSCHRG MGMT >30: CPT | Performed by: PHYSICAL MEDICINE & REHABILITATION

## 2021-12-22 PROCEDURE — 700102 HCHG RX REV CODE 250 W/ 637 OVERRIDE(OP): Performed by: PHYSICAL MEDICINE & REHABILITATION

## 2021-12-22 PROCEDURE — 700102 HCHG RX REV CODE 250 W/ 637 OVERRIDE(OP): Performed by: HOSPITALIST

## 2021-12-22 RX ADMIN — ATORVASTATIN CALCIUM 80 MG: 40 TABLET, FILM COATED ORAL at 09:10

## 2021-12-22 RX ADMIN — CARBAMAZEPINE 200 MG: 100 TABLET, CHEWABLE ORAL at 09:14

## 2021-12-22 RX ADMIN — OMEPRAZOLE 20 MG: 20 CAPSULE, DELAYED RELEASE ORAL at 09:10

## 2021-12-22 RX ADMIN — LISINOPRIL 40 MG: 20 TABLET ORAL at 09:14

## 2021-12-22 RX ADMIN — CLOPIDOGREL BISULFATE 75 MG: 75 TABLET ORAL at 09:10

## 2021-12-22 RX ADMIN — SENNOSIDES AND DOCUSATE SODIUM 2 TABLET: 50; 8.6 TABLET ORAL at 09:10

## 2021-12-22 RX ADMIN — LIDOCAINE 1 PATCH: 50 PATCH TOPICAL at 09:07

## 2021-12-22 RX ADMIN — Medication 1000 UNITS: at 09:10

## 2021-12-22 RX ADMIN — ASPIRIN 81 MG: 81 TABLET, COATED ORAL at 09:10

## 2021-12-22 RX ADMIN — MODAFINIL 100 MG: 100 TABLET ORAL at 09:12

## 2021-12-22 RX ADMIN — ENOXAPARIN SODIUM 40 MG: 40 INJECTION SUBCUTANEOUS at 09:08

## 2021-12-22 RX ADMIN — LABETALOL HYDROCHLORIDE 200 MG: 100 TABLET, FILM COATED ORAL at 09:13

## 2021-12-22 ASSESSMENT — PAIN DESCRIPTION - PAIN TYPE: TYPE: ACUTE PAIN

## 2021-12-22 NOTE — CARE PLAN
Problem: Knowledge Deficit - Standard  Goal: Patient and family/care givers will demonstrate understanding of plan of care, disease process/condition, diagnostic tests and medications  Outcome: Met     Problem: Pain - Standard  Goal: Alleviation of pain or a reduction in pain to the patient’s comfort goal  Outcome: Met     Problem: Skin Integrity  Goal: Skin integrity is maintained or improved  Outcome: Met     Problem: Fall Risk - Rehab  Goal: Patient will remain free from falls  Outcome: Met     Problem: Inadequate nutrient intake  Goal: Patient to consume greater than or equal to 50% of meals  Outcome: Met     Problem: Discharge Barriers/Planning  Goal: Patient's continuum of care needs are met  Outcome: Met     Problem: Psychosocial  Goal: Patient's level of anxiety will decrease  Outcome: Met  Goal: Patient's ability to verbalize feelings about condition will improve  Outcome: Met  Goal: Patient's ability to re-evaluate and adapt role responsibilities will improve  Outcome: Met  Goal: Patient and family will demonstrate ability to cope with life altering diagnosis and/or procedure  Outcome: Met  Goal: Spiritual and cultural needs incorporated into hospitalization  Outcome: Met     Problem: Hemodynamics  Goal: Patient's hemodynamics, fluid balance and neurologic status will be stable or improve  Outcome: Met     Problem: Respiratory  Goal: Patient will establish and maintain regular bowel function  Outcome: Met     Problem: Risk for Aspiration  Goal: Patient's risk for aspiration will be absent or decrease  Outcome: Met     Problem: Bladder / Voiding  Goal: Patient will establish and maintain regular urinary output  Outcome: Met  Goal: Patient will establish and maintain bladder regimen  Outcome: Met     Problem: Neurogenic Bladder  Goal: Patient will demonstrate self-cath technique using clean technique and care of the catheter  Outcome: Met     Problem: Bowel Elimination  Goal: Patient will participate in  bowel management program  Outcome: Met     Problem: Neurogenic Bowel  Goal: Patient will perform adequate hygiene with incontinent episodes  Outcome: Met  Goal: Patient will verbalize signs and symptoms of constipation and how to prevent/alleviate  Outcome: Met  Goal: Patient will demonstrates ability to complete digital stimulation technique  Outcome: Met     Problem: Skin Integrity  Goal: Patient's skin integrity will be maintained or improve  Outcome: Met     Problem: Nutrition  Goal: Patient's nutritional and fluid intake will be adequate or improve  Outcome: Met  Goal: Patient will display progressive weight gain toward goal have adequate food and fluid intake  Outcome: Met  Goal: Patient will demonstrate ability to administer respiratory medications  Outcome: Met     Problem: Self Care  Goal: Patient will have the ability to perform ADLs independently or with assistance  Outcome: Met     Problem: Mobility  Goal: Patient's capacity to carry out activities will improve  Outcome: Met     Problem: Infection  Goal: Patient will remain free from infection  Outcome: Met     Problem: VTE Prevention  Goal: Patient will remain free from venous thromboembolism (VTE)  Outcome: Met     Problem: Dialysis  Goal: Patient will maintain stable vital signs and fluid balance  Outcome: Met     Problem: Diabetes Management  Goal: Patient's ability to maintain appropriate glucose levels will be maintained or improve  Outcome: Met   The patient is Stable - Low risk of patient condition declining or worsening    Shift Goals  Clinical Goals: safety   Patient Goals: safety

## 2021-12-22 NOTE — DISCHARGE INSTRUCTIONS
Physical Therapy Discharge Instructions for Nakita Sheppard    12/22/2021    Weight Bearing Status - Patient Should: Bear Weight as Tolerated Right Leg,Bear Weight as Tolerated Left Leg,Bear Weight as Tolerated Right Arm,Bear Weight as Tolerated Left Arm  Level of Assist Required for Ambulation: Supervision on Flat Surfaces,Should Not Attempt Curbs at This Time,Should Not Attempt Stairs at This Time  Distance Patient May Ambulate: 40 feet or more as tolerated  Device Recommended for Ambulation: Platform Walker  Level of Assist Required to Propel Wheelchair: Intermittent Physical Assist  Level of Assist Required for Transfers: Physical Assist (Assist for balance with safety belt)  Device Recommended for Transfers: Platform Walker  Home Exercise Program: Refer to Home Exercise Program Handout for Details  Prosthesis / Orthosis Recommendation / Location: No Prosthesis  or Orthosis Recommended   Nakita, congratulations on your success and going home.  [facility] NURSING DISCHARGE INSTRUCTIONS    Blood Pressure : 142/52  Weight: 76 kg (167 lb 8.8 oz)  Nursing recommendations for Nakita Sheppard at time of discharge are as follows:  Client verbalized understanding of all discharge instructions and prescriptions.     Review all your home medications and newly ordered medications with your doctor and/or pharmacist. Follow medication instructions as directed by your doctor and/or pharmacist.    Pain Management:   Discharge Pain Medication Instructions:  Comfort Goal: Comfort with Movement,Perform Activity,Sleep Comfortably,Stay Alert  Notify your primary care provider if pain is unrelieved with these measures, if the pain is new, or increased in intensity.    Discharge Skin Characteristics: Warm,Dry  Discharge Skin Exam: Clear     Skin / Wound Care Instructions: Please contact your primary care physician for any change in skin integrity.     If You  Have Surgical Incisions / Wounds:  Monitor surgical site(s) for signs of increased swelling, redness or symptoms of drainage from the site or fever as this could indicate signs and symptoms of infection. If these symptoms are noted, notifiy your primary care provider.      Discharge Safety Instructions: Should Have ADULT SUPERVISION     Discharge Safety Concerns: History Of Falls,Balance Problems (Dizziness, Light Headedness),Unsteady Gait,Weakness  The interdisciplinary team has made recommendation that you should have adult supervision in the house due to balance problem, history of falls, weakness and unsteady gait  Anti-embolic stockings are not required to increase circulation to the lower extremities.    Discharge Diet: soft and bite sized     Discharge Liquids: thin  Discharge Bowel Function: Continent  Please contact your primary care physician for any changes in bowel habits.  Discharge Bowel Program:    Discharge Bladder Function: Continent  Discharge Urinary Devices: Brief      Nursing Discharge Plan:        Case Management Discharge Instructions:   Discharge Location:    Agency Name/Address/Phone:    Home Health:    Outpatient Services:    DME Provider/Phone:    Medical Equipment Ordered:    Prescription Faxed to:        Discharge Medication Instructions:  Below are the medications your physician expects you to take upon discharge:  Fall Prevention in the Home, Adult  Falls can cause injuries. They can happen to people of all ages. There are many things you can do to make your home safe and to help prevent falls. Ask for help when making these changes, if needed.  What actions can I take to prevent falls?  General Instructions  · Use good lighting in all rooms. Replace any light bulbs that burn out.  · Turn on the lights when you go into a dark area. Use night-lights.  · Keep items that you use often in easy-to-reach places. Lower the shelves around your home if necessary.  · Set up your furniture so you  have a clear path. Avoid moving your furniture around.  · Do not have throw rugs and other things on the floor that can make you trip.  · Avoid walking on wet floors.  · If any of your floors are uneven, fix them.  · Add color or contrast paint or tape to clearly rayo and help you see:  ? Any grab bars or handrails.  ? First and last steps of stairways.  ? Where the edge of each step is.  · If you use a stepladder:  ? Make sure that it is fully opened. Do not climb a closed stepladder.  ? Make sure that both sides of the stepladder are locked into place.  ? Ask someone to hold the stepladder for you while you use it.  · If there are any pets around you, be aware of where they are.  What can I do in the bathroom?         · Keep the floor dry. Clean up any water that spills onto the floor as soon as it happens.  · Remove soap buildup in the tub or shower regularly.  · Use non-skid mats or decals on the floor of the tub or shower.  · Attach bath mats securely with double-sided, non-slip rug tape.  · If you need to sit down in the shower, use a plastic, non-slip stool.  · Install grab bars by the toilet and in the tub and shower. Do not use towel bars as grab bars.  What can I do in the bedroom?  · Make sure that you have a light by your bed that is easy to reach.  · Do not use any sheets or blankets that are too big for your bed. They should not hang down onto the floor.  · Have a firm chair that has side arms. You can use this for support while you get dressed.  What can I do in the kitchen?  · Clean up any spills right away.  · If you need to reach something above you, use a strong step stool that has a grab bar.  · Keep electrical cords out of the way.  · Do not use floor polish or wax that makes floors slippery. If you must use wax, use non-skid floor wax.  What can I do with my stairs?  · Do not leave any items on the stairs.  · Make sure that you have a light switch at the top of the stairs and the bottom of the  stairs. If you do not have them, ask someone to add them for you.  · Make sure that there are handrails on both sides of the stairs, and use them. Fix handrails that are broken or loose. Make sure that handrails are as long as the stairways.  · Install non-slip stair treads on all stairs in your home.  · Avoid having throw rugs at the top or bottom of the stairs. If you do have throw rugs, attach them to the floor with carpet tape.  · Choose a carpet that does not hide the edge of the steps on the stairway.  · Check any carpeting to make sure that it is firmly attached to the stairs. Fix any carpet that is loose or worn.  What can I do on the outside of my home?  · Use bright outdoor lighting.  · Regularly fix the edges of walkways and driveways and fix any cracks.  · Remove anything that might make you trip as you walk through a door, such as a raised step or threshold.  · Trim any bushes or trees on the path to your home.  · Regularly check to see if handrails are loose or broken. Make sure that both sides of any steps have handrails.  · Install guardrails along the edges of any raised decks and porches.  · Clear walking paths of anything that might make someone trip, such as tools or rocks.  · Have any leaves, snow, or ice cleared regularly.  · Use sand or salt on walking paths during winter.  · Clean up any spills in your garage right away. This includes grease or oil spills.  What other actions can I take?  · Wear shoes that:  ? Have a low heel. Do not wear high heels.  ? Have rubber bottoms.  ? Are comfortable and fit you well.  ? Are closed at the toe. Do not wear open-toe sandals.  · Use tools that help you move around (mobility aids) if they are needed. These include:  ? Canes.  ? Walkers.  ? Scooters.  ? Crutches.  · Review your medicines with your doctor. Some medicines can make you feel dizzy. This can increase your chance of falling.  Ask your doctor what other things you can do to help prevent  falls.  Where to find more information  · Centers for Disease Control and Prevention, STEAD[place]: https://cdc.gov  · National [place] on Aging: https://xw8zwpe.olivia.nih.gov  Contact a doctor if:  · You are afraid of falling at home.  · You feel weak, drowsy, or dizzy at home.  · You fall at home.  Summary  · There are many simple things that you can do to make your home safe and to help prevent falls.  · Ways to make your home safe include removing tripping hazards and installing grab bars in the bathroom.  · Ask for help when making these changes in your home.  This information is not intended to replace advice given to you by your health care provider. Make sure you discuss any questions you have with your health care provider.  Document Released: 10/14/2010 Document Revised: 04/09/2020 Document Reviewed: 08/02/2018  Elsevier Patient Education © 2020 [employer]  COVID-19  COVID-19 is a respiratory infection that is caused by a virus called severe acute respiratory syndrome coronavirus 2 (SARS-CoV-2). The disease is also known as coronavirus disease or novel coronavirus. In some people, the virus may not cause any symptoms. In others, it may cause a serious infection. The infection can get worse quickly and can lead to complications, such as:  · Pneumonia, or infection of the lungs.  · Acute respiratory distress syndrome or ARDS. This is fluid build-up in the lungs.  · Acute respiratory failure. This is a condition in which there is not enough oxygen passing from the lungs to the body.  · Sepsis or septic shock. This is a serious bodily reaction to an infection.  · Blood clotting problems.  · Secondary infections due to bacteria or fungus.  The virus that causes COVID-19 is contagious. This means that it can spread from person to person through droplets from coughs and sneezes (respiratory secretions).  What are the causes?  This illness is caused by a virus. You may catch the virus by:  · Breathing in droplets  from an infected person's cough or sneeze.  · Touching something, like a table or a doorknob, that was exposed to the virus (contaminated) and then touching your mouth, nose, or eyes.  What increases the risk?  Risk for infection  You are more likely to be infected with this virus if you:  · Live in or travel to an area with a COVID-19 outbreak.  · Come in contact with a sick person who recently traveled to an area with a COVID-19 outbreak.  · Provide care for or live with a person who is infected with COVID-19.  Risk for serious illness  You are more likely to become seriously ill from the virus if you:  · Are 65 years of age or older.  · Have a long-term disease that lowers your body's ability to fight infection (immunocompromised).  · Live in a nursing home or long-term care facility.  · Have a long-term (chronic) disease such as:  ? Chronic lung disease, including chronic obstructive pulmonary disease or asthma  ? Heart disease.  ? Diabetes.  ? Chronic kidney disease.  ? Liver disease.  · Are obese.  What are the signs or symptoms?  Symptoms of this condition can range from mild to severe. Symptoms may appear any time from 2 to 14 days after being exposed to the virus. They include:  · A fever.  · A cough.  · Difficulty breathing.  · Chills.  · Muscle pains.  · A sore throat.  · Loss of taste or smell.  Some people may also have stomach problems, such as nausea, vomiting, or diarrhea.  Other people may not have any symptoms of COVID-19.  How is this diagnosed?  This condition may be diagnosed based on:  · Your signs and symptoms, especially if:  ? You live in an area with a COVID-19 outbreak.  ? You recently traveled to or from an area where the virus is common.  ? You provide care for or live with a person who was diagnosed with COVID-19.  · A physical exam.  · Lab tests, which may include:  ? A nasal swab to take a sample of fluid from your nose.  ? A throat swab to take a sample of fluid from your  throat.  ? A sample of mucus from your lungs (sputum).  ? Blood tests.  · Imaging tests, which may include, X-rays, CT scan, or ultrasound.  How is this treated?  At present, there is no medicine to treat COVID-19. Medicines that treat other diseases are being used on a trial basis to see if they are effective against COVID-19.  Your health care provider will talk with you about ways to treat your symptoms. For most people, the infection is mild and can be managed at home with rest, fluids, and over-the-counter medicines.  Treatment for a serious infection usually takes places in a hospital intensive care unit (ICU). It may include one or more of the following treatments. These treatments are given until your symptoms improve.  · Receiving fluids and medicines through an IV.  · Supplemental oxygen. Extra oxygen is given through a tube in the nose, a face mask, or a fernandez.  · Positioning you to lie on your stomach (prone position). This makes it easier for oxygen to get into the lungs.  · Continuous positive airway pressure (CPAP) or bi-level positive airway pressure (BPAP) machine. This treatment uses mild air pressure to keep the airways open. A tube that is connected to a motor delivers oxygen to the body.  · Ventilator. This treatment moves air into and out of the lungs by using a tube that is placed in your windpipe.  · Tracheostomy. This is a procedure to create a hole in the neck so that a breathing tube can be inserted.  · Extracorporeal membrane oxygenation (ECMO). This procedure gives the lungs a chance to recover by taking over the functions of the heart and lungs. It supplies oxygen to the body and removes carbon dioxide.  Follow these instructions at home:  Lifestyle  · If you are sick, stay home except to get medical care. Your health care provider will tell you how long to stay home. Call your health care provider before you go for medical care.  · Rest at home as told by your health care  provider.  · Do not use any products that contain nicotine or tobacco, such as cigarettes, e-cigarettes, and chewing tobacco. If you need help quitting, ask your health care provider.  · Return to your normal activities as told by your health care provider. Ask your health care provider what activities are safe for you.  General instructions  · Take over-the-counter and prescription medicines only as told by your health care provider.  · Drink enough fluid to keep your urine pale yellow.  · Keep all follow-up visits as told by your health care provider. This is important.  How is this prevented?    There is no vaccine to help prevent COVID-19 infection. However, there are steps you can take to protect yourself and others from this virus.  To protect yourself:   · Do not travel to areas where COVID-19 is a risk. The areas where COVID-19 is reported change often. To identify high-risk areas and travel restrictions, check the [facility] travel website: wwwnc.cdc.gov/travel/notices  · If you live in, or must travel to, an area where COVID-19 is a risk, take precautions to avoid infection.  ? Stay away from people who are sick.  ? Wash your hands often with soap and water for 20 seconds. If soap and water are not available, use an alcohol-based hand .  ? Avoid touching your mouth, face, eyes, or nose.  ? Avoid going out in public, follow guidance from your state and local health authorities.  ? If you must go out in public, wear a cloth face covering or face mask.  ? Disinfect objects and surfaces that are frequently touched every day. This may include:  § Counters and tables.  § Doorknobs and light switches.  § Sinks and faucets.  § Electronics, such as phones, remote controls, keyboards, computers, and tablets.  To protect others:  If you have symptoms of COVID-19, take steps to prevent the virus from spreading to others.  · If you think you have a COVID-19 infection, contact your health care provider right away.  Tell your health care team that you think you may have a COVID-19 infection.  · Stay home. Leave your house only to seek medical care. Do not use public transport.  · Do not travel while you are sick.  · Wash your hands often with soap and water for 20 seconds. If soap and water are not available, use alcohol-based hand .  · Stay away from other members of your household. Let healthy household members care for children and pets, if possible. If you have to care for children or pets, wash your hands often and wear a mask. If possible, stay in your own room, separate from others. Use a different bathroom.  · Make sure that all people in your household wash their hands well and often.  · Cough or sneeze into a tissue or your sleeve or elbow. Do not cough or sneeze into your hand or into the air.  · Wear a cloth face covering or face mask.  Where to find more information  · Centers for Disease Control and Prevention: www.cdc.gov/coronavirus/2019-ncov/index.html  · World Health Organization: www.who.int/health-topics/coronavirus  Contact a health care provider if:  · You live in or have traveled to an area where COVID-19 is a risk and you have symptoms of the infection.  · You have had contact with someone who has COVID-19 and you have symptoms of the infection.  Get help right away if:  · You have trouble breathing.  · You have pain or pressure in your chest.  · You have confusion.  · You have bluish lips and fingernails.  · You have difficulty waking from sleep.  · You have symptoms that get worse.  These symptoms may represent a serious problem that is an emergency. Do not wait to see if the symptoms will go away. Get medical help right away. Call your local emergency services (911 in the [place] Do not drive yourself to the hospital. Let the emergency medical personnel know if you think you have COVID-19.  Summary  · COVID-19 is a respiratory infection that is caused by a virus. It is also known as  coronavirus disease or novel coronavirus. It can cause serious infections, such as pneumonia, acute respiratory distress syndrome, acute respiratory failure, or sepsis.  · The virus that causes COVID-19 is contagious. This means that it can spread from person to person through droplets from coughs and sneezes.  · You are more likely to develop a serious illness if you are 65 years of age or older, have a weak immunity, live in a nursing home, or have chronic disease.  · There is no medicine to treat COVID-19. Your health care provider will talk with you about ways to treat your symptoms.  · Take steps to protect yourself and others from infection. Wash your hands often and disinfect objects and surfaces that are frequently touched every day. Stay away from people who are sick and wear a mask if you are sick.  This information is not intended to replace advice given to you by your health care provider. Make sure you discuss any questions you have with your health care provider.  Document Released: 01/23/2020 Document Revised: 05/14/2020 Document Reviewed: 01/23/2020  [employer] Patient Education © 2020 [employer]  Depression / Suicide Risk    As you are discharged from this [facility] facility, it is important to learn how to keep safe from harming yourself.    Recognize the warning signs:  · Abrupt changes in personality, positive or negative- including increase in energy   · Giving away possessions  · Change in eating patterns- significant weight changes-  positive or negative  · Change in sleeping patterns- unable to sleep or sleeping all the time   · Unwillingness or inability to communicate  · Depression  · Unusual sadness, discouragement and loneliness  · Talk of wanting to die  · Neglect of personal appearance   · Rebelliousness- reckless behavior  · Withdrawal from people/activities they love  · Confusion- inability to concentrate     If you or a loved one observes any of these behaviors or has concerns  about self-harm, here's what you can do:  · Talk about it- your feelings and reasons for harming yourself  · Remove any means that you might use to hurt yourself (examples: pills, rope, extension cords, firearm)  · Get professional help from the community (Mental Health, Substance Abuse, psychological counseling)  · Do not be alone:Call your Safe Contact- someone whom you trust who will be there for you.  · Call your local CRISIS HOTLINE 945-7978 or 163-831-7128  · Call your local Children's Mobile Crisis Response Team [place] (142) 810-7976 or www."[employer]  · Call the toll free National Suicide Prevention Hotlines   · National Suicide Prevention Lifeline 130-008-NQJX (1864)  · National Hope Line Network 800-SUICIDE (277-3877)    Occupational Therapy Discharge Instructions for Nakita Sheppard    12/21/2021    Level of Assist Required for Eating: Requires Supervision with Eating  Level of Assist Required for Grooming: Requires Supervision with Grooming  Level of Assist Required for Dressing: Requires Physical Assist with Dressing  Level of Assist Required for Toileting: Requires Physical Assist with Toileting  Level of Assist Required for Toilet Transfer: Requires Physical Assist with Toilet Transfer  Equipment for Toilet Transfer: Grab Bars by Toilet,Commode over Toilet  Level of Assist Required for Bathing: Requires Physical Assist with Bathing  Equipment for Bathing: Grab Bars in Tub / Shower,Hand Held Shower Head,Tub Transfer Bench  Level of Assist Required for Shower Transfer: Requires Physical Assist with Shower Transfer  Equipment for Shower Transfer: Grab Bars in Tub / Shower,Tub Transfer Bench  Level of Assist Required for Home Mgmt: Requires Physical Assist with Home Management    It was a pleasure working with you, Midge! We will miss you and wish you all the best! Yaneth Araujo!  -Delaney Price, OT

## 2021-12-22 NOTE — PROGRESS NOTES
Patient discharged to home per order.  Discharge instructions reviewed with patient and ; they verbalize understanding and signed copies placed in chart.  Patient has all belongings; signed copy of form in chart.  Patient left facility at 10:30 am via wheelchair accompanied by rehab staff and .  Have enjoyed working with this pleasant patient.

## 2021-12-22 NOTE — PROGRESS NOTES
This patient, and spouse with patient permission, received individualized medication counseling regarding the current regimen they are receiving in this facility. Potential adverse effects, monitoring parameters, and proper administration were covered in preparation for their discharge.This patient is being treated for hypertension and it is recommended that they monitor and record with a blood pressure device. The patient has been instructed to contact their primary care physician if the HR or blood pressure is abnormal. The patient asked relevant questions regarding the medications for which answers were provided. Our pharmacy hours and phone number were provided for follow up questions should they arise.  Melquiades Casarez  [facility]

## 2021-12-22 NOTE — CARE PLAN
Problem: Pain - Standard  Goal: Alleviation of pain or a reduction in pain to the patient’s comfort goal  Flowsheets (Taken 12/21/2021 2000)  Non Verbal Scale: Calm  Pain Rating Scale (NPRS): 0  Note: Patient able to verbalize pain level and verbalize an acceptable level of pain.      Problem: Fall Risk - Rehab  Goal: Patient will remain free from falls  Note: Patient remains free from falls this shift. Patient was educated on using the call light to prevent falls. Patients bed is in the lowest position. The patients belongings are placed in near proximity to the patient.     The patient is Stable - Low risk of patient condition declining or worsening

## 2021-12-22 NOTE — CARE PLAN
Problem: Speech/Swallowing LTGs  Goal: LTG-By discharge, patient will safely swallow  Description: Safest/least restrictive diet with no overt s/sx of aspiration for 100% of intake to maintain adequate nutrition and hydration.   Outcome: Met  Goal: LTG-By discharge, patient will  Description: Complete cog-ling tasks at modified independence level to ensure safe return to PLOF.   Outcome: Discharged - Not Met  Goal: LTG-By discharge, patient will  Description: Communicate complex ideas at the conversation level with 90% intelligibility to a naiive listener.   Outcome: Discharged - Not Met     Problem: Swallowing STGs  Goal: STG-Within one week, patient will consume trials of thin liquids with use of effortful swallow with no overt s/sx of asp/pen noted on 80% of trials provided MIN cues  Outcome: Met     Problem: Expression STGs  Goal: STG-Within one week, patient will  Description: Utilize clear speech strategies with mod cues in 70% of opportunities to increase overall speech intelligibility.   Outcome: Met     Problem: Memory STGs  Goal: STG-Within one week, patient will  Description: Complete functional IADL tasks (e.g. med and financial management) with 70% acc and mod cues to ensure safe return to her PLOF.   Outcome: Discharged - Not Met

## 2021-12-22 NOTE — CARE PLAN
The patient is Stable - Low risk of patient condition declining or worsening    Shift Goals  Clinical Goals: Safety  Patient Goals: sleep well    Progress made toward(s) clinical / shift goals:    Problem: Fall Risk - Rehab  Goal: Patient will remain free from falls  Outcome: Progressing   Pt uses call light consistently and appropriately. Waits for assistance does not attempt self transfer this shift. Able to verbalize needs.   Problem: Skin Integrity  Goal: Skin integrity is maintained or improved  Outcome: Progressing   Patient's skin remains intact and free from new or accidental injury this shift.  Will continue to monitor.

## 2021-12-23 NOTE — DISCHARGE SUMMARY
"Admission Date: 12/3/2021    Discharge Date: 12/22/2021    Attending Provider: Hawa Alva MD    Admission Diagnosis:   Active Hospital Problems    Diagnosis    • *Ischemic stroke (HCC)    • Wrist fracture, closed, left, sequela    • Other insomnia    • Vitamin D deficiency    • Hypokalemia    • Class 1 obesity without serious comorbidity in adult    • Spasticity    • Left-sided neglect    • Other dysphagia    • Impaired mobility and ADLs    • Hyperlipidemia LDL goal <70    • Peripheral neuropathy    • Hypertension        Discharge Diagnosis:  Active Hospital Problems    Diagnosis    • *Ischemic stroke (HCC)    • Wrist fracture, closed, left, sequela    • Other insomnia    • Vitamin D deficiency    • Hypokalemia    • Class 1 obesity without serious comorbidity in adult    • Spasticity    • Left-sided neglect    • Other dysphagia    • Impaired mobility and ADLs    • Hyperlipidemia LDL goal <70    • Peripheral neuropathy    • Hypertension        HPI per H&P:  Per Dr. Phillips consult \"The patient is a 76 y.o. right hand dominant female with a past medical history of arthritis, hypertension, history of TIAs, and peripheral neuropathy;  who presented on 11/30/2021  6:14 PM with left-sided weakness.  Per documentation, patient was evaluated at [facility] with symptoms of left-sided arm weakness on 11/19/2021.  During that hospitalization patient was given TPA at that time and reportedly her symptoms resolved.  Patient was discharged from the hospital sometime later and reportedly had done some rehab (chart review indicates that patient went to rehab however patient tells her that she did rehab at home).  However on the night that she was discharged home she began to have some mild recurrence of her left-sided weakness which fluctuated on and off for following days. Patient did not follow-up with neurology and was continued on baby aspirin.  Reportedly, patient's left-sided weakness had intermittently recurred " "for 5 days until she presented to the hospital on 11/30.  At that time symptoms seem more significant and that they did not go away so patient was brought to the emergency room.       A CT scan of the head was obtained at evaluation emergency department at [facility] which confirms a right parietal subacute ischemic infarct.  Neurology was consulted, given the patient's time course of symptoms patient was not deemed to be a candidate for interventions involving TPA.  MRI from [facility] was able to be reviewed and the outside images revealed a minimal right MCA stroke burden which comparatively was far less burden that was revealed on the CT head on admission to [facility].       Based on neurology's review of images from [facility] and comparing them to [facility] it was determined that the patient had recurrent embolic events to her right MCA territory, given that this was a recurrent embolic event to the same vascular territory and their recommendations for short-term DAPT, high intensity statin and completion of embolic work-up   Including cardiac monitoring.  Additionally, patient's hospital course has been complicated by the evidence of a positive UTI, patient has been started on ceftriaxone for treatment of her UTI.\"     Patient current reports left arm weakness.  She is right-hand dominant.  She has chronic neuropathy in her feet but denies any pain.  She moved her bowels yesterday denies any urinary incontinence.     Patient was evaluated by Rehab Medicine physician and Physical Therapy, Occupational Therapy and Speech Therapy and determined to be appropriate for acute inpatient rehab and was transferred to [facility] on 12/3/2021  3:09 PM.        Rehab Hospital Course by Problem List:    Right MCA stroke  Left hemiparesis, improved, arm more affected than leg  Nondominant  Left neglect, continues  Dysphagia, improved  Cognitive impairments, " continue     Aspirin  Plavix  Statin     Patient with Zio patch cardiac monitor, return 2 weeks from admission, returned 12/17     [facility]patient [facility] with [facility], cardiology, Dr. Callejas, referrals made     First MBS with aspiration, repeat MBS a week later with improvement, diet has been advanced     Started Provigil for neuro stimulation, improved initiation, the patient still sleeps a lot during the day, will not continue at discharge as doesn't seem to be making a large difference     Psychology consulted for evaluation, patient not depressed     Left wrist pain, improved  X-ray at acute showed possible avulsion fractures  Discussed with orthopedic surgery, outpatient follow-up, referral made  Wrist brace and weightbearing as tolerated     Hypertension, improved  Lisinopril  Labetalol  Amlodipine, dose increased  Started hydrochlorothiazide 12/14  Patient would benefit from outpatient sleep study     Peripheral neuropathy  Continue home medication carbamazepine  Was also on Zanaflex (6 mg TID), restarted 2 mg at night due to complaints of leg spasms, will not restart home dosing due to side effect of lethargy     History of migraines  Continue carbamazepine  Minimal headaches during rehab stay     Hypokalemia resolved/reoccurred  Supplemented x 2  Monitor as an outpatient     Insomnia, improved/resolved  Restarted home medication tizanidine 2 mg  Added scheduled melatonin and trazodone, increased dose of both  Ok to use Excedrin-pm as needed at home     Obesity  BMI 32.6  Outpatient nutritional counseling     Vitamin D deficiency  Continue supplementation     GI prophylaxis  Omeprazole     Polyuria  Negative urinalysis     Bowel program  Bowel incontinence  Constipation, resolved/continues  Increase bowel medications  Last BM 12/21     Bladder program  Bladder incontinence  Check PVRs - 25, 15, 10  Not retaining     DVT prophylaxis  Lovenox       Functional Status at Discharge  Eating:   Supervision  Eating Description:  Checking for pocketing of food,Increased time,Modified diet,Insert dentures,Verbal cueing,Supervision for safety  Grooming:  Supervision,Seated  Grooming Description:  Set-up of equipment,Supervision for safety  Bathing:  Minimal Assist  Bathing Description:  Grab bar,Hand held shower,Tub bench,Assit with perineal,Increased time,Set-up of equipment,Supervision for safety,Verbal cueing (cues for safety, A to wash buttocks and RUE)  Upper Body Dressing:  Minimal Assist  Upper Body Dressing Description:  Assit with threading arms through sleeves  Lower Body Dressing:  Minimal Assist  Lower Body Dressing Description:  Assist with threading into pant leg,Increased time,Set-up of equipment,Supervision for safety,Verbal cueing (A to thread LLE and pull up over L hip; SBA to don shoes)  Discharge Location : Home  Patient Discharging with Assist of: Family ;Spouse / Significant Other  Level of Supervision Required: 24 Hour Supervision  Recommended Equipment for Discharge: Platform Walker;Tub Transfer Bench;3 in 1 Commode;Grab Bars by Toilet;Grab Bars in Tub / Shower;Hand Held Shower Head;Ramp;Manual Wheelchair  Recommended Services Upon Discharge: Home Health Occupational Therapy  Long Term Goals Met: 0  Long Term Goals Not Met: 2  Reason(s) for Goals Not Met: Pt con't to require min-mod A with ADLs and CGA for bathroom transfers d/t L hemiparesis, L neglect, and impaired functional cog.  Walk:  Contact Guard Assist  Distance Walked:  40  Number of Times Distance Was Traveled:  1  Assistive Device:  Platform Walker  Gait Deviation:  Ataxic,Decreased Base Of Support,Bradykinetic,Decreased Heel Strike,Decreased Toe Off  Wheelchair:  Minimal Assist  Distance Propelled:  75   Wheelchair Description:  Assistance with steering,Extra time,Leg rest management,Supervision for safety  Stairs  (Ramp access to/from house)  Stairs Description    Discharge Location: Home  Patient Discharging with Assist  of: Spouse / Significant Other  Level of Supervision Required Upon Discharge: Twenty Four Hour Supervision  Recommended Equipment for Discharge: Platform Walker;Manual Wheelchair  Recommeded Services Upon Discharge: Home Health Physical Therapy;Outpatient Physical Therapy  Long Term Goals Met: The patient did not meet long-term goals set at the admission evaluation  Long Term Goals Not Met: Gait distance of 500 FT single-point cane, 45/56 on the [place], walk up and down ADA type of ramp single-point cane, car transfer with supervision and single-point cane  Reason(s) for Goals Not Met: Left neglect, impaired strength and endurance, fatigue, impaired cognition, impaired balance  Criteria for Termination of Services: Maximum Function Achieved for Inpatient Rehabilitation  Comprehension:  Minimal Assist  Comprehension Description:  Verbal cues  Expression:  Moderate Assist  Expression Description:  Verbal cueing  Social Interaction:  Independent  Social Interaction Description:     Problem Solving:  Moderate Assist  Problem Solving Description:  Increased time,Verbal cueing,Therapy schedule,Bed/chair alarm,Seat belt  Memory:  Moderate Assist  Memory Description:  Increased time,Seat belt,Bed/chair alarm,Verbal cueing,Therapy schedule       IHawa M.D., personally performed a complete drug regimen review and no potential clinically significant medication issues were identified.     Discharge Medication:     Medication List      START taking these medications      Instructions   amLODIPine 10 MG Tabs  Commonly known as: NORVASC  Notes to patient: Blood pressure    Take 1 Tablet by mouth every day.  Dose: 10 mg     carBAMazepine 100 MG Chew  Commonly known as: TEGRETOL  Replaces: carBAMazepine 200 MG Tabs  Notes to patient: Nerve pain   Chew 2 Tablets 2 times a day.  Dose: 200 mg     hydroCHLOROthiazide 12.5 MG tablet  Commonly known as: HYDRODIURIL  Notes to patient: Water pill, blood pressure    Take 1  Tablet by mouth every day.  Dose: 12.5 mg     melatonin 3 MG Tabs  Notes to patient: Sleep aid    Take 2 Tablets by mouth at bedtime.  Dose: 6 mg     * polyethylene glycol/lytes 17 g Pack  Commonly known as: MIRALAX  Notes to patient: Constipation    Take 1 Packet by mouth every day.  Dose: 17 g     * polyethylene glycol/lytes 17 g Pack  Commonly known as: MIRALAX   Take 1 Packet by mouth every day.  Dose: 17 g     * senna-docusate 8.6-50 MG Tabs  Commonly known as: PERICOLACE or SENOKOT S  Notes to patient: Stool softener    Take 2 Tablets by mouth 2 times a day.  Dose: 2 Tablet     * senna-docusate 8.6-50 MG Tabs  Commonly known as: PERICOLACE or SENOKOT S   Take 2 Tablets by mouth 2 times a day.  Dose: 2 Tablet     tizanidine 2 MG tablet  Commonly known as: ZANAFLEX  Replaces: tizanidine 6 MG capsule  Notes to patient: Muscle relaxer    Take 1 Tablet by mouth at bedtime.  Dose: 2 mg     traZODone 100 MG Tabs  Commonly known as: DESYREL  Notes to patient: Sleep aid    Take 1 Tablet by mouth at bedtime.  Dose: 100 mg         * This list has 4 medication(s) that are the same as other medications prescribed for you. Read the directions carefully, and ask your doctor or other care provider to review them with you.            CHANGE how you take these medications      Instructions   lisinopril 40 MG tablet  What changed: medication strength  Commonly known as: PRINIVIL  Notes to patient: Blood pressure    Take 1 Tablet by mouth every day.  Dose: 40 mg        CONTINUE taking these medications      Instructions   aspirin 81 MG EC tablet   Take 1 Tablet by mouth every day.  Dose: 81 mg     atorvastatin 80 MG tablet  Commonly known as: LIPITOR  Notes to patient: Cholesterol    Take 1 Tablet by mouth every day.  Dose: 80 mg     clopidogrel 75 MG Tabs  Commonly known as: PLAVIX  Notes to patient: Prevent blood clot   Take 1 Tablet by mouth every day.  Dose: 75 mg     labetalol 200 MG Tabs  Commonly known as: NORMODYNE  Notes  to patient: Blood pressure    Take 1 Tablet by mouth every 12 hours.  Dose: 200 mg     Tylenol PM Extra Strength  MG Tabs  Generic drug: diphenhydrAMINE-APAP (sleep)   Take 3 Tablets by mouth at bedtime.  Dose: 3 Tablet     vitamin D 1000 UNIT Tabs  Commonly known as: VITAMIND D3   Take 1 Tablet by mouth every day.  Dose: 1,000 Units        STOP taking these medications    carBAMazepine 200 MG Tabs  Commonly known as: TEGRETOL  Replaced by: carBAMazepine 100 MG Chew     carbidopa-levodopa  MG Tabs  Commonly known as: SINEMET     CoQ10 200 MG Caps     ROPINIRole 2 MG tablet  Commonly known as: REQUIP     tizanidine 6 MG capsule  Commonly known as: ZANAFLEX  Replaced by: tizanidine 2 MG tablet     trihexyphenidyl 2 MG Tabs  Commonly known as: ARTANE            Discharge Diet:  Regular    Discharge Activity:  Do not return to work or driving until cleared by a physician.         Disposition:  Patient to discharge home with family support and community resources.     Equipment:  Follow-up & Discharge Instructions:  Home health: PT/OT/SLP/RN     Equip: MWC, platform walker      Follow up: PCP, [facility], Dr. Callejas, cardiology, orthopedic surgery     Condition on Discharge:  Good    More than 35 minutes was spent on discharging this patient, including face-to-face time, prescription management, and the dictation of this note.    Hawa Alva M.D.    Date of Service: 12/22/2021

## 2021-12-23 NOTE — CARE PLAN
Problem: PT-Long Term Goals  Goal: LTG-By discharge, patient will maintain balance at least 45/56 on the [place] to DC to home safely with support of spouse  Outcome: Not Met  Goal: LTG-By discharge, patient will ambulate at least 500' with SPC and SPV inside and outside surfaces  Outcome: Not Met  Goal: LTG-By discharge, patient will transfer one surface to another SPV with SPC  Outcome: Not Met     Problem: PT-Long Term Goals  Goal: LTG-By discharge, patient will transfer in/out of a car SBA from ambulatory level with SPC  Outcome: Progressing  Goal: LTG-By discharge, patient will ascend/descend ADA ramp with SPC and SBA to enter/exit her home safely  Outcome: Progressing

## 2021-12-29 PROCEDURE — 93248 EXT ECG>7D<15D REV&INTERPJ: CPT | Performed by: INTERNAL MEDICINE

## 2022-01-06 ENCOUNTER — APPOINTMENT (OUTPATIENT)
Dept: PHYSICAL MEDICINE AND REHAB | Facility: REHABILITATION | Age: 77
End: 2022-01-06
Payer: MEDICARE

## 2022-01-25 ENCOUNTER — TELEMEDICINE (OUTPATIENT)
Dept: CARDIOLOGY | Facility: MEDICAL CENTER | Age: 77
End: 2022-01-25
Attending: PHYSICAL MEDICINE & REHABILITATION
Payer: MEDICARE

## 2022-01-25 ENCOUNTER — TELEPHONE (OUTPATIENT)
Dept: CARDIOLOGY | Facility: MEDICAL CENTER | Age: 77
End: 2022-01-25

## 2022-01-25 VITALS
HEIGHT: 62 IN | BODY MASS INDEX: 30 KG/M2 | WEIGHT: 163 LBS | OXYGEN SATURATION: 89 % | SYSTOLIC BLOOD PRESSURE: 160 MMHG | DIASTOLIC BLOOD PRESSURE: 96 MMHG | HEART RATE: 84 BPM

## 2022-01-25 DIAGNOSIS — I10 ESSENTIAL HYPERTENSION, BENIGN: ICD-10-CM

## 2022-01-25 DIAGNOSIS — E78.5 HYPERLIPIDEMIA LDL GOAL <70: ICD-10-CM

## 2022-01-25 DIAGNOSIS — I63.9 ISCHEMIC STROKE (HCC): ICD-10-CM

## 2022-01-25 DIAGNOSIS — I63.9 ACUTE CVA (CEREBROVASCULAR ACCIDENT) (HCC): ICD-10-CM

## 2022-01-25 PROCEDURE — 99205 OFFICE O/P NEW HI 60 MIN: CPT | Mod: 95 | Performed by: INTERNAL MEDICINE

## 2022-01-25 RX ORDER — LABETALOL 300 MG/1
TABLET, FILM COATED ORAL
COMMUNITY
Start: 2021-12-06 | End: 2022-01-25

## 2022-01-25 RX ORDER — LABETALOL 100 MG/1
1 TABLET, FILM COATED ORAL 2 TIMES DAILY
COMMUNITY
Start: 2022-01-24 | End: 2022-01-25

## 2022-01-25 RX ORDER — HYDROCHLOROTHIAZIDE 25 MG/1
25 TABLET ORAL DAILY
Qty: 90 TABLET | Refills: 3 | Status: ON HOLD | OUTPATIENT
Start: 2022-01-25 | End: 2022-02-08

## 2022-01-25 RX ORDER — LABETALOL 100 MG/1
TABLET, FILM COATED ORAL
COMMUNITY
Start: 2022-01-24 | End: 2022-01-25

## 2022-01-25 ASSESSMENT — ENCOUNTER SYMPTOMS
PALPITATIONS: 0
ORTHOPNEA: 0
NAUSEA: 0
DIARRHEA: 0
BACK PAIN: 0
WEIGHT GAIN: 0
FEVER: 0
DYSPNEA ON EXERTION: 0
DECREASED APPETITE: 0
DIZZINESS: 0
HEARTBURN: 0
ALTERED MENTAL STATUS: 0
CLAUDICATION: 0
BLURRED VISION: 0
SYNCOPE: 0
NEAR-SYNCOPE: 0
FLANK PAIN: 0
COUGH: 0
SHORTNESS OF BREATH: 0
IRREGULAR HEARTBEAT: 0
DEPRESSION: 0
CONSTIPATION: 0
PND: 0
VOMITING: 0
WEIGHT LOSS: 0
ABDOMINAL PAIN: 0

## 2022-01-25 ASSESSMENT — FIBROSIS 4 INDEX: FIB4 SCORE: 1.644434374852436103

## 2022-01-25 NOTE — TELEPHONE ENCOUNTER
Patient scheduled for ILR and loop insert on 2-8-22 with Dr Chang and Dr. Amado. Patient has been instructed to check in at 11:30 for 1:30 for ILR and 2:00 ISAK w/anesthesia with Dr. Amado. NPO 8 hours. Demetrius with [facility] [employer] notified. FYI to Dr. Amado.

## 2022-01-25 NOTE — PROGRESS NOTES
This evaluation was conducted via Zoom using secure and encrypted videoconferencing technology. The patient was in a private location in the state of [place].     The patient's identity was confirmed and verbal consent was obtained for this virtual visit.     Cardiology Note    Chief Complaint   Patient presents with   • Trauma     F/V Dx: cerebrovascular accident   • HTN (Controlled)       History of Present Illness: Nakita Sheppard is a 76 y.o. female who presents for initial visit.     11/19/21 [facility]. S/p tpa  11/22 [facility] repeat TIA  11/27 [facility] HTN  11/30/21 [facility] for repeat acute CVA. Found hypertensive .     Recounts initial event with headache. Developed signs of strokes later. No other cardiac complaints. Underwent zio monitor without AF. Has been on statin for 2 months she says and pending upcoming lipid repeat. Compliant with dapt and other medications and denies adverse effects. Denies toxic social habits. Mother with cva in her 70s.     Review of Systems   Constitutional: Negative for decreased appetite, fever, malaise/fatigue, weight gain and weight loss.   HENT: Negative for congestion and nosebleeds.    Eyes: Negative for blurred vision.   Cardiovascular: Negative for chest pain, claudication, dyspnea on exertion, irregular heartbeat, leg swelling, near-syncope, orthopnea, palpitations, paroxysmal nocturnal dyspnea and syncope.   Respiratory: Negative for cough and shortness of breath.    Endocrine: Negative for cold intolerance and heat intolerance.   Skin: Negative for rash.   Musculoskeletal: Negative for back pain.   Gastrointestinal: Negative for abdominal pain, constipation, diarrhea, heartburn, melena, nausea and vomiting.   Genitourinary: Negative for dysuria, flank pain and hematuria.   Neurological: Negative for dizziness.   Psychiatric/Behavioral: Negative for altered mental status and depression.         Past Medical History:   Diagnosis Date   •  Allergy    • Arthritis    • Chronic headaches 5/29/2012   • Chronic pain    • Dental disorder    • Heart burn    • History of chickenpox    • History of measles    • History of mumps    • Hypertension    • Indigestion    • Low back pain 9/22/14    8/10   • Migraine    • Peripheral neuropathy     BLE   • Rotator cuff tear    • Seasonal allergies    • TIA (transient ischemic attack)    • Tuberculosis 1979    had treatment for 2 years         Past Surgical History:   Procedure Laterality Date   • FUSION, SPINE, LUMBAR, PLIF  10/6/2014    Performed by Rishabh Bhatia M.D. at SURGERY [facility] ORS   • LUMBAR LAMINECTOMY DISKECTOMY  10/6/2014    Performed by Rishabh Bhatia M.D. at SURGERY [facility] ORS   • LUMBAR EXPLORATION  10/6/2014    Performed by Rishabh hBatia M.D. at SURGERY [facility] ORS   • OTHER  2013    back   • SPINAL CORD STIMULATOR  11/4/2011    Performed by AUBREE PALACIO at SURGERY [facility] ORS   • OPEN REDUCTION  2008    re-broke foot   • OTHER  2000    right foot   • SHOULDER SURGERY  1998   • CARPAL TUNNEL ENDOSCOPIC  1990   • ARTHROSCOPY, KNEE  1987    bilateral   • ABDOMINAL HYSTERECTOMY TOTAL  1983   • APPENDECTOMY     • CHOLECYSTECTOMY     • LAMINOTOMY  02/2002 and 9/2002         Current Outpatient Medications   Medication Sig Dispense Refill   • Potassium 99 MG Tab Take 99 mg by mouth.     • hydroCHLOROthiazide (HYDRODIURIL) 25 MG Tab Take 1 Tablet by mouth every day. 90 Tablet 3   • aspirin 81 MG EC tablet Take 1 Tablet by mouth every day. 30 Tablet 2   • clopidogrel (PLAVIX) 75 MG Tab Take 1 Tablet by mouth every day. 3 Tablet 0   • vitamin D (VITAMIND D3) 1000 UNIT Tab Take 1 Tablet by mouth every day. 30 Tablet 2   • atorvastatin (LIPITOR) 80 MG tablet Take 1 Tablet by mouth every day. 30 Tablet 2   • labetalol (NORMODYNE) 200 MG Tab Take 1 Tablet by mouth every 12 hours. 60 Tablet 2   • lisinopril (PRINIVIL) 40 MG tablet Take 1 Tablet by mouth every day. 30 Tablet 2   •  tizanidine (ZANAFLEX) 2 MG tablet Take 1 Tablet by mouth at bedtime. 30 Tablet 2   • senna-docusate (PERICOLACE OR SENOKOT S) 8.6-50 MG Tab Take 2 Tablets by mouth 2 times a day. 120 Tablet 2   • traZODone (DESYREL) 100 MG Tab Take 1 Tablet by mouth at bedtime. 30 Tablet 2   • carBAMazepine (TEGRETOL) 100 MG Chew Tab Chew 2 Tablets 2 times a day. (Patient not taking: Reported on 1/25/2022) 120 Tablet 2     No current facility-administered medications for this visit.         Allergies   Allergen Reactions   • Fentanyl Anxiety   • Morphine Itching   • Penicillins Hives and Swelling         Family History   Problem Relation Age of Onset   • Diabetes Mother    • Hypertension Mother    • Arthritis Mother    • Stroke Mother    • Heart Disease Father    • Cancer Brother    • Leukemia Brother    • Stroke Brother          Social History     Socioeconomic History   • Marital status:      Spouse name: Not on file   • Number of children: Not on file   • Years of education: Not on file   • Highest education level: Not on file   Occupational History   • Occupation: disability- customer service in past     Employer: OTHER   Tobacco Use   • Smoking status: Never Smoker   • Smokeless tobacco: Never Used   Vaping Use   • Vaping Use: Never used   Substance and Sexual Activity   • Alcohol use: No     Alcohol/week: 0.0 oz   • Drug use: No   • Sexual activity: Yes     Partners: Male     Birth control/protection: Post-Menopausal   Other Topics Concern   • Not on file   Social History Narrative   • Not on file     Social Determinants of Health     Financial Resource Strain:    • Difficulty of Paying Living Expenses: Not on file   Food Insecurity:    • Worried About Running Out of Food in the Last Year: Not on file   • Ran Out of Food in the Last Year: Not on file   Transportation Needs:    • Lack of Transportation (Medical): Not on file   • Lack of Transportation (Non-Medical): Not on file   Physical Activity:    • Days of Exercise  "per Week: Not on file   • Minutes of Exercise per Session: Not on file   Stress:    • Feeling of Stress : Not on file   Social Connections:    • Frequency of Communication with Friends and Family: Not on file   • Frequency of Social Gatherings with Friends and Family: Not on file   • Attends Sikhism Services: Not on file   • Active Member of Clubs or Organizations: Not on file   • Attends Club or Organization Meetings: Not on file   • Marital Status: Not on file   Intimate Partner Violence:    • Fear of Current or Ex-Partner: Not on file   • Emotionally Abused: Not on file   • Physically Abused: Not on file   • Sexually Abused: Not on file   Housing Stability:    • Unable to Pay for Housing in the Last Year: Not on file   • Number of Places Lived in the Last Year: Not on file   • Unstable Housing in the Last Year: Not on file         Physical Exam:  Ambulatory Vitals  /96 (BP Location: Left arm, Patient Position: Sitting, BP Cuff Size: Adult)   Pulse 84   Ht 1.575 m (5' 2\")   Wt 73.9 kg (163 lb)   SpO2 89%    BP Readings from Last 4 Encounters:   01/25/22 160/96   12/22/21 131/51   12/03/21 149/59   11/23/21 138/74     Weight/BMI:   Vitals:    01/25/22 0847   BP: 160/96   Weight: 73.9 kg (163 lb)   Height: 1.575 m (5' 2\")    Body mass index is 29.81 kg/m².  Wt Readings from Last 4 Encounters:   01/25/22 73.9 kg (163 lb)   12/12/21 76 kg (167 lb 8.8 oz)   12/01/21 82.2 kg (181 lb 3.5 oz)   11/23/21 86 kg (189 lb 9.5 oz)       Physical Exam  Physical Exam:  Constitutional: Alert, no distress, well-groomed.  Skin: No rashes in visible areas.  Eye: Round. Conjunctiva clear, lids normal. No icterus.   ENMT: Lips pink without lesions, good dentition, moist mucous membranes. Phonation normal.  Neck: No masses, no thyromegaly. Moves freely without pain.  CV: Pulse as reported by patient  Respiratory: Unlabored respiratory effort, no cough or audible wheeze  Psych: Alert and oriented x3, normal affect and mood. "     Lab Data Review:  Lab Results   Component Value Date/Time    CHOLSTRLTOT 157 12/04/2021 05:53 AM    LDL 74 12/04/2021 05:53 AM    HDL 62 12/04/2021 05:53 AM    TRIGLYCERIDE 103 12/04/2021 05:53 AM       Lab Results   Component Value Date/Time    SODIUM 137 12/20/2021 05:56 AM    POTASSIUM 3.5 (L) 12/20/2021 05:56 AM    CHLORIDE 98 12/20/2021 05:56 AM    CO2 28 12/20/2021 05:56 AM    GLUCOSE 105 (H) 12/20/2021 05:56 AM    BUN 22 12/20/2021 05:56 AM    CREATININE 1.07 12/20/2021 05:56 AM    BUNCREATRAT 16 12/13/2017 10:22 AM     CrCl cannot be calculated (Patient's most recent lab result is older than the maximum 7 days allowed.).  Lab Results   Component Value Date/Time    ALKPHOSPHAT 121 (H) 12/04/2021 05:53 AM    ASTSGOT 14 12/04/2021 05:53 AM    ALTSGPT <5 12/04/2021 05:53 AM    TBILIRUBIN 0.4 12/04/2021 05:53 AM      Lab Results   Component Value Date/Time    WBC 5.8 12/20/2021 05:56 AM     Lab Results   Component Value Date/Time    HBA1C 5.6 12/04/2021 05:53 AM     No components found for: TROP      Cardiac Imaging and Procedures Review:      TTE 11/20/21 outside study [facility]  Interpretation Summary   The Ejection Fraction estimate is 65-70%   The right ventricular systolic function is normal.   The left atrium is mildly dilated.   There is trace mitral regurgitation     MRI brain outside study [facility] 11/2021  Acute cerebrovascular infarct of the posterior right frontal lobe.   Moderate sequela of chronic small vessel ischemic changes.     CTA neck/head [facility] 11/2021  1.  No large vessel occlusion of the major arteries of the head and neck.   2.  Mild bilateral carotid bulb atherosclerosis without stenosis.       Medical Decision Making:  Problem List Items Addressed This Visit     Essential hypertension, benign    Relevant Medications    hydroCHLOROthiazide (HYDRODIURIL) 25 MG Tab    Other Relevant Orders    Basic Metabolic Panel    Referral to Pharmacotherapy Service    Hyperlipidemia  LDL goal <70    Relevant Medications    hydroCHLOROthiazide (HYDRODIURIL) 25 MG Tab    Acute CVA (cerebrovascular accident) (HCC)    Relevant Medications    hydroCHLOROthiazide (HYDRODIURIL) 25 MG Tab    Other Relevant Orders    CL-IMPLANTABLE LOOP RECORDER    EC-ISAK W/O CONT    Ischemic stroke (HCC)    Relevant Medications    hydroCHLOROthiazide (HYDRODIURIL) 25 MG Tab        CVA ischemia with recurrence, cryptogenic - check ISAK and implant loop recorder. Continue dapt and high intensity statin. BP control. Await lipid repeat.    HTN - goal 120/80. Yesterday started reducing labetalol to 100mg bid. Will now again increase to 200mg bid. Continue lisinopril. Add hctz. Pharmacy referral.     HLD - continue statin. Await lipid repeat. Goal LDL <70.     It was my pleasure to meet with Ms. Sheppard.    A total of 60 minutes of time was spent on day of encounter reviewing medical record, performing history and examination, counseling, ordering medication/test/consults and documentation.

## 2022-01-25 NOTE — PATIENT INSTRUCTIONS
"  Continue lisinopril 40 daily  Increase labetalol back to 200mg twice daily  Start hydrochlorothiazide 25mg daily      Implantable Loop Recorder Placement    An implantable loop recorder is a small electronic device that is placed under the skin of your chest. It is about the size of an AA (\"double A\") battery. The device records the electrical activity of your heart over a long period of time. Your health care provider can download these recordings to monitor your heart.  You may need an implantable loop recorder if you have periods of abnormal heart activity (arrhythmias) or unexplained fainting (syncope). The recorder can be left in place for 1 year or longer.  Tell a health care provider about:  · Any allergies you have.  · All medicines you are taking, including vitamins, herbs, eye drops, creams, and over-the-counter medicines.  · Any problems you or family members have had with anesthetic medicines.  · Any blood disorders you have.  · Any surgeries you have had.  · Any medical conditions you have.  · Whether you are pregnant or may be pregnant.  What are the risks?  Generally, this is a safe procedure. However, problems may occur, including:  · Infection.  · Bleeding.  · Allergic reactions to anesthetic medicines.  · Damage to nerves or blood vessels.  · Failure of the device to work. This could require another surgery to replace it.  What happens before the procedure?    · You may have a physical exam, blood tests, and imaging tests of your heart, such as a chest X-ray.  · Follow instructions from your health care provider about eating or drinking restrictions.  · Ask your health care provider about:  ? Changing or stopping your regular medicines. This is especially important if you are taking diabetes medicines or blood thinners.  ? Taking medicines such as aspirin and ibuprofen. These medicines can thin your blood. Do not take these medicines unless your health care provider tells you to take " them.  ? Taking over-the-counter medicines, vitamins, herbs, and supplements.  · Ask your health care provider how your surgical site will be marked or identified.  · Ask your health care provider what steps will be taken to help prevent infection. These may include:  ? Removing hair at the surgery site.  ? Washing skin with a germ-killing soap.  · Plan to have someone take you home from the hospital or clinic.  · Plan to have a responsible adult care for you for at least 24 hours after you leave the hospital or clinic. This is important.  · Do not use any products that contain nicotine or tobacco, such as cigarettes and e-cigarettes. If you need help quitting, ask your health care provider.  What happens during the procedure?  · An IV will be inserted into one of your veins.  · You may be given one or more of the following:  ? A medicine to help you relax (sedative).  ? A medicine to numb the area (local anesthetic).  · A small incision will be made on the left side of your upper chest.  · A pocket will be created under your skin.  · The device will be placed in the pocket.  · The incision will be closed with stitches (sutures) or adhesive strips.  · A bandage (dressing) will be placed over the incision.  The procedure may vary among health care providers and hospitals.  What happens after the procedure?  · Your blood pressure, heart rate, breathing rate, and blood oxygen level will be monitored until you leave the hospital or clinic.  · You may be able to go home on the day of your surgery. Before you go home:  ? Your health care provider will program your recorder.  ? You will learn how to trigger your device with a handheld activator.  ? You will learn how to send recordings to your health care provider.  ? You will get an ID card for your device, and you will be told when to use it.  · Do not drive for 24 hours if you were given a sedative during your procedure.  Summary  · An implantable loop recorder is a  "small electronic device that is placed under the skin of your chest to monitor your heart over a long period of time.  · The recorder can be left in place for 1 year or longer.  · Plan to have someone take you home from the hospital or clinic.  This information is not intended to replace advice given to you by your health care provider. Make sure you discuss any questions you have with your health care provider.  Document Released: 11/28/2016 Document Revised: 02/21/2019 Document Reviewed: 02/02/2019  Elsevier Patient Education © 2020 [employer]      Hypertension, Adult  High blood pressure (hypertension) is when the force of blood pumping through the arteries is too strong. The arteries are the blood vessels that carry blood from the heart throughout the body. Hypertension forces the heart to work harder to pump blood and may cause arteries to become narrow or stiff. Untreated or uncontrolled hypertension can cause a heart attack, heart failure, a stroke, kidney disease, and other problems.  A blood pressure reading consists of a higher number over a lower number. Ideally, your blood pressure should be below 120/80. The first (\"top\") number is called the systolic pressure. It is a measure of the pressure in your arteries as your heart beats. The second (\"bottom\") number is called the diastolic pressure. It is a measure of the pressure in your arteries as the heart relaxes.  What are the causes?  The exact cause of this condition is not known. There are some conditions that result in or are related to high blood pressure.  What increases the risk?  Some risk factors for high blood pressure are under your control. The following factors may make you more likely to develop this condition:  · Smoking.  · Having type 2 diabetes mellitus, high cholesterol, or both.  · Not getting enough exercise or physical activity.  · Being overweight.  · Having too much fat, sugar, calories, or salt (sodium) in your " diet.  · Drinking too much alcohol.  Some risk factors for high blood pressure may be difficult or impossible to change. Some of these factors include:  · Having chronic kidney disease.  · Having a family history of high blood pressure.  · Age. Risk increases with age.  · Race. You may be at higher risk if you are .  · Gender. Men are at higher risk than women before age 45. After age 65, women are at higher risk than men.  · Having obstructive sleep apnea.  · Stress.  What are the signs or symptoms?  High blood pressure may not cause symptoms. Very high blood pressure (hypertensive crisis) may cause:  · Headache.  · Anxiety.  · Shortness of breath.  · Nosebleed.  · Nausea and vomiting.  · Vision changes.  · Severe chest pain.  · Seizures.  How is this diagnosed?  This condition is diagnosed by measuring your blood pressure while you are seated, with your arm resting on a flat surface, your legs uncrossed, and your feet flat on the floor. The cuff of the blood pressure monitor will be placed directly against the skin of your upper arm at the level of your heart. It should be measured at least twice using the same arm. Certain conditions can cause a difference in blood pressure between your right and left arms.  Certain factors can cause blood pressure readings to be lower or higher than normal for a short period of time:  · When your blood pressure is higher when you are in a health care provider's office than when you are at home, this is called white coat hypertension. Most people with this condition do not need medicines.  · When your blood pressure is higher at home than when you are in a health care provider's office, this is called masked hypertension. Most people with this condition may need medicines to control blood pressure.  If you have a high blood pressure reading during one visit or you have normal blood pressure with other risk factors, you may be asked to:  · Return on a different day  to have your blood pressure checked again.  · Monitor your blood pressure at home for 1 week or longer.  If you are diagnosed with hypertension, you may have other blood or imaging tests to help your health care provider understand your overall risk for other conditions.  How is this treated?  This condition is treated by making healthy lifestyle changes, such as eating healthy foods, exercising more, and reducing your alcohol intake. Your health care provider may prescribe medicine if lifestyle changes are not enough to get your blood pressure under control, and if:  · Your systolic blood pressure is above 130.  · Your diastolic blood pressure is above 80.  Your personal target blood pressure may vary depending on your medical conditions, your age, and other factors.  Follow these instructions at home:  Eating and drinking    · Eat a diet that is high in fiber and potassium, and low in sodium, added sugar, and fat. An example eating plan is called the DASH (Dietary Approaches to Stop Hypertension) diet. To eat this way:  ? Eat plenty of fresh fruits and vegetables. Try to fill one half of your plate at each meal with fruits and vegetables.  ? Eat whole grains, such as whole-wheat pasta, brown rice, or whole-grain bread. Fill about one fourth of your plate with whole grains.  ? Eat or drink low-fat dairy products, such as skim milk or low-fat yogurt.  ? Avoid fatty cuts of meat, processed or cured meats, and poultry with skin. Fill about one fourth of your plate with lean proteins, such as fish, chicken without skin, beans, eggs, or tofu.  ? Avoid pre-made and processed foods. These tend to be higher in sodium, added sugar, and fat.  · Reduce your daily sodium intake. Most people with hypertension should eat less than 1,500 mg of sodium a day.  · Do not drink alcohol if:  ? Your health care provider tells you not to drink.  ? You are pregnant, may be pregnant, or are planning to become pregnant.  · If you drink  alcohol:  ? Limit how much you use to:  § 0-1 drink a day for women.  § 0-2 drinks a day for men.  ? Be aware of how much alcohol is in your drink. In the [place] one drink equals one 12 oz bottle of beer (355 mL), one 5 oz glass of wine (148 mL), or one 1½ oz glass of hard liquor (44 mL).  Lifestyle    · Work with your health care provider to maintain a healthy body weight or to lose weight. Ask what an ideal weight is for you.  · Get at least 30 minutes of exercise most days of the week. Activities may include walking, swimming, or biking.  · Include exercise to strengthen your muscles (resistance exercise), such as Pilates or lifting weights, as part of your weekly exercise routine. Try to do these types of exercises for 30 minutes at least 3 days a week.  · Do not use any products that contain nicotine or tobacco, such as cigarettes, e-cigarettes, and chewing tobacco. If you need help quitting, ask your health care provider.  · Monitor your blood pressure at home as told by your health care provider.  · Keep all follow-up visits as told by your health care provider. This is important.  Medicines  · Take over-the-counter and prescription medicines only as told by your health care provider. Follow directions carefully. Blood pressure medicines must be taken as prescribed.  · Do not skip doses of blood pressure medicine. Doing this puts you at risk for problems and can make the medicine less effective.  · Ask your health care provider about side effects or reactions to medicines that you should watch for.  Contact a health care provider if you:  · Think you are having a reaction to a medicine you are taking.  · Have headaches that keep coming back (recurring).  · Feel dizzy.  · Have swelling in your ankles.  · Have trouble with your vision.  Get help right away if you:  · Develop a severe headache or confusion.  · Have unusual weakness or numbness.  · Feel faint.  · Have severe pain in your chest or abdomen.  · Vomit  repeatedly.  · Have trouble breathing.  Summary  · Hypertension is when the force of blood pumping through your arteries is too strong. If this condition is not controlled, it may put you at risk for serious complications.  · Your personal target blood pressure may vary depending on your medical conditions, your age, and other factors. For most people, a normal blood pressure is less than 120/80.  · Hypertension is treated with lifestyle changes, medicines, or a combination of both. Lifestyle changes include losing weight, eating a healthy, low-sodium diet, exercising more, and limiting alcohol.  This information is not intended to replace advice given to you by your health care provider. Make sure you discuss any questions you have with your health care provider.  Document Released: 12/18/2006 Document Revised: 08/28/2019 Document Reviewed: 08/28/2019  [employer] Patient Education © 2020 [employer]    Hydrochlorothiazide, HCTZ capsules or tablets  What is this medicine?  HYDROCHLOROTHIAZIDE (maggy droe klor oh THYE a zide) is a diuretic. It increases the amount of urine passed, which causes the body to lose salt and water. This medicine is used to treat high blood pressure. It is also reduces the swelling and water retention caused by various medical conditions, such as heart, liver, or kidney disease.  This medicine may be used for other purposes; ask your health care provider or pharmacist if you have questions.  COMMON BRAND NAME(S): Esidrix, Ezide, HydroDIURIL, Microzide, Oretic, Zide  What should I tell my health care provider before I take this medicine?  They need to know if you have any of these conditions:  · diabetes  · gout  · immune system problems, like lupus  · kidney disease or kidney stones  · liver disease  · pancreatitis  · small amount of urine or difficulty passing urine  · an unusual or allergic reaction to hydrochlorothiazide, sulfa drugs, other medicines, foods, dyes, or  preservatives  · pregnant or trying to get pregnant  · breast-feeding  How should I use this medicine?  Take this medicine by mouth with a glass of water. Follow the directions on the prescription label. Take your medicine at regular intervals. Remember that you will need to pass urine frequently after taking this medicine. Do not take your doses at a time of day that will cause you problems. Do not stop taking your medicine unless your doctor tells you to.  Talk to your pediatrician regarding the use of this medicine in children. Special care may be needed.  Overdosage: If you think you have taken too much of this medicine contact a poison control center or emergency room at once.  NOTE: This medicine is only for you. Do not share this medicine with others.  What if I miss a dose?  If you miss a dose, take it as soon as you can. If it is almost time for your next dose, take only that dose. Do not take double or extra doses.  What may interact with this medicine?  · cholestyramine  · colestipol  · digoxin  · dofetilide  · lithium  · medicines for blood pressure  · medicines for diabetes  · medicines that relax muscles for surgery  · other diuretics  · steroid medicines like prednisone or cortisone  This list may not describe all possible interactions. Give your health care provider a list of all the medicines, herbs, non-prescription drugs, or dietary supplements you use. Also tell them if you smoke, drink alcohol, or use illegal drugs. Some items may interact with your medicine.  What should I watch for while using this medicine?  Visit your doctor or health care professional for regular checks on your progress. Check your blood pressure as directed. Ask your doctor or health care professional what your blood pressure should be and when you should contact him or her.  You may need to be on a special diet while taking this medicine. Ask your doctor.  Check with your doctor or health care professional if you get an  attack of severe diarrhea, nausea and vomiting, or if you sweat a lot. The loss of too much body fluid can make it dangerous for you to take this medicine.  You may get drowsy or dizzy. Do not drive, use machinery, or do anything that needs mental alertness until you know how this medicine affects you. Do not stand or sit up quickly, especially if you are an older patient. This reduces the risk of dizzy or fainting spells. Alcohol may interfere with the effect of this medicine. Avoid alcoholic drinks.  This medicine may increase blood sugar. Ask your healthcare provider if changes in diet or medicines are needed if you have diabetes.  This medicine can make you more sensitive to the sun. Keep out of the sun. If you cannot avoid being in the sun, wear protective clothing and use sunscreen. Do not use sun lamps or tanning beds/booths.  What side effects may I notice from receiving this medicine?  Side effects that you should report to your doctor or health care professional as soon as possible:  · allergic reactions such as skin rash or itching, hives, swelling of the lips, mouth, tongue, or throat  · changes in vision  · chest pain  · eye pain  · fast or irregular heartbeat  · feeling faint or lightheaded, falls  · gout attack  · muscle pain or cramps  · pain or difficulty when passing urine  · pain, tingling, numbness in the hands or feet  · redness, blistering, peeling or loosening of the skin, including inside the mouth  ·   signs and symptoms of high blood sugar such as being more thirsty or hungry or having to urinate more than normal. You may also feel very tired or have blurry vision.  · unusually weak  Side effects that usually do not require medical attention (report to your doctor or health care professional if they continue or are bothersome):  · change in sex drive or performance  · dry mouth  · headache  · stomach upset  This list may not describe all possible side effects. Call your doctor for medical  advice about side effects. You may report side effects to FDA at 8-743-PVZ-9348.  Where should I keep my medicine?  Keep out of the reach of children.  Store at room temperature between 15 and 30 degrees C (59 and 86 degrees F). Do not freeze. Protect from light and moisture. Keep container closed tightly. Throw away any unused medicine after the expiration date.  NOTE: This sheet is a summary. It may not cover all possible information. If you have questions about this medicine, talk to your doctor, pharmacist, or health care provider.  © 2020 Elsevier/[employer] (2019-10-09 09:25:06)

## 2022-01-25 NOTE — TELEPHONE ENCOUNTER
----- Message from Aneudy Leong M.D. sent at 1/25/2022  9:45 AM PST -----  Hey guys,  Just FYI trying to arrange for this patient to get a loop recorder and ISAK w anesthesia.  Thank you!

## 2022-01-26 ENCOUNTER — DOCUMENTATION (OUTPATIENT)
Dept: VASCULAR LAB | Facility: MEDICAL CENTER | Age: 77
End: 2022-01-26

## 2022-01-26 NOTE — PROGRESS NOTES
[facility]    Received HTN from Dr. Miranda    PCP: [facility]  INS: Medicare  Preferred location: [facility]    Called patient to establish care. Schedule an initial appointment on 2/14. Pharmacy team to follow-up then.     Akshat Westbrook, VenkateshD

## 2022-02-04 ENCOUNTER — PRE-ADMISSION TESTING (OUTPATIENT)
Dept: ADMISSIONS | Facility: MEDICAL CENTER | Age: 77
End: 2022-02-04
Attending: INTERNAL MEDICINE
Payer: MEDICARE

## 2022-02-04 RX ORDER — ACETAMINOPHEN/DIPHENHYDRAMINE 500MG-25MG
3 TABLET ORAL
COMMUNITY

## 2022-02-08 ENCOUNTER — ANESTHESIA (OUTPATIENT)
Dept: CARDIOLOGY | Facility: MEDICAL CENTER | Age: 77
End: 2022-02-08
Payer: MEDICARE

## 2022-02-08 ENCOUNTER — ANESTHESIA EVENT (OUTPATIENT)
Dept: CARDIOLOGY | Facility: MEDICAL CENTER | Age: 77
End: 2022-02-08
Payer: MEDICARE

## 2022-02-08 ENCOUNTER — APPOINTMENT (OUTPATIENT)
Dept: CARDIOLOGY | Facility: MEDICAL CENTER | Age: 77
End: 2022-02-08
Attending: INTERNAL MEDICINE
Payer: MEDICARE

## 2022-02-08 ENCOUNTER — HOSPITAL ENCOUNTER (OUTPATIENT)
Facility: MEDICAL CENTER | Age: 77
End: 2022-02-08
Attending: INTERNAL MEDICINE | Admitting: INTERNAL MEDICINE
Payer: MEDICARE

## 2022-02-08 VITALS
TEMPERATURE: 98.5 F | HEIGHT: 62 IN | RESPIRATION RATE: 31 BRPM | DIASTOLIC BLOOD PRESSURE: 84 MMHG | BODY MASS INDEX: 30.02 KG/M2 | WEIGHT: 163.14 LBS | SYSTOLIC BLOOD PRESSURE: 186 MMHG | OXYGEN SATURATION: 93 % | HEART RATE: 76 BPM

## 2022-02-08 DIAGNOSIS — I63.9 ACUTE CVA (CEREBROVASCULAR ACCIDENT) (HCC): ICD-10-CM

## 2022-02-08 LAB
ERYTHROCYTE [DISTWIDTH] IN BLOOD BY AUTOMATED COUNT: 52.6 FL (ref 35.9–50)
EXTERNAL QUALITY CONTROL: NORMAL
HCT VFR BLD AUTO: 40.6 % (ref 37–47)
HGB BLD-MCNC: 13.3 G/DL (ref 12–16)
LV EJECT FRACT  99904: 70
MCH RBC QN AUTO: 32.7 PG (ref 27–33)
MCHC RBC AUTO-ENTMCNC: 32.8 G/DL (ref 33.6–35)
MCV RBC AUTO: 99.8 FL (ref 81.4–97.8)
PLATELET # BLD AUTO: 339 K/UL (ref 164–446)
PMV BLD AUTO: 9.6 FL (ref 9–12.9)
POTASSIUM BLD-SCNC: 3.9 MMOL/L (ref 3.6–5.5)
RBC # BLD AUTO: 4.07 M/UL (ref 4.2–5.4)
SARS-COV+SARS-COV-2 AG RESP QL IA.RAPID: NEGATIVE
WBC # BLD AUTO: 7.8 K/UL (ref 4.8–10.8)

## 2022-02-08 PROCEDURE — 700101 HCHG RX REV CODE 250: Performed by: ANESTHESIOLOGY

## 2022-02-08 PROCEDURE — 160035 HCHG PACU - 1ST 60 MINS PHASE I

## 2022-02-08 PROCEDURE — 84132 ASSAY OF SERUM POTASSIUM: CPT

## 2022-02-08 PROCEDURE — 700111 HCHG RX REV CODE 636 W/ 250 OVERRIDE (IP): Performed by: ANESTHESIOLOGY

## 2022-02-08 PROCEDURE — 305387 CL-IMPLANTABLE LOOP RECORDER

## 2022-02-08 PROCEDURE — 700105 HCHG RX REV CODE 258: Performed by: INTERNAL MEDICINE

## 2022-02-08 PROCEDURE — 700101 HCHG RX REV CODE 250

## 2022-02-08 PROCEDURE — 93312 ECHO TRANSESOPHAGEAL: CPT | Mod: 26 | Performed by: INTERNAL MEDICINE

## 2022-02-08 PROCEDURE — 33285 INSJ SUBQ CAR RHYTHM MNTR: CPT | Performed by: INTERNAL MEDICINE

## 2022-02-08 PROCEDURE — 93312 ECHO TRANSESOPHAGEAL: CPT

## 2022-02-08 PROCEDURE — 93320 DOPPLER ECHO COMPLETE: CPT | Mod: 26 | Performed by: INTERNAL MEDICINE

## 2022-02-08 PROCEDURE — 76376 3D RENDER W/INTRP POSTPROCES: CPT | Mod: 26 | Performed by: INTERNAL MEDICINE

## 2022-02-08 PROCEDURE — 160002 HCHG RECOVERY MINUTES (STAT)

## 2022-02-08 PROCEDURE — 87426 SARSCOV CORONAVIRUS AG IA: CPT | Performed by: INTERNAL MEDICINE

## 2022-02-08 PROCEDURE — 160036 HCHG PACU - EA ADDL 30 MINS PHASE I

## 2022-02-08 PROCEDURE — 85027 COMPLETE CBC AUTOMATED: CPT

## 2022-02-08 RX ORDER — SODIUM CHLORIDE, SODIUM LACTATE, POTASSIUM CHLORIDE, CALCIUM CHLORIDE 600; 310; 30; 20 MG/100ML; MG/100ML; MG/100ML; MG/100ML
INJECTION, SOLUTION INTRAVENOUS CONTINUOUS
Status: DISCONTINUED | OUTPATIENT
Start: 2022-02-08 | End: 2022-02-08 | Stop reason: HOSPADM

## 2022-02-08 RX ORDER — LIDOCAINE HYDROCHLORIDE AND EPINEPHRINE BITARTRATE 20; .01 MG/ML; MG/ML
INJECTION, SOLUTION SUBCUTANEOUS
Status: COMPLETED
Start: 2022-02-08 | End: 2022-02-08

## 2022-02-08 RX ORDER — ONDANSETRON 2 MG/ML
4 INJECTION INTRAMUSCULAR; INTRAVENOUS
Status: DISCONTINUED | OUTPATIENT
Start: 2022-02-08 | End: 2022-02-08 | Stop reason: HOSPADM

## 2022-02-08 RX ORDER — LIDOCAINE HYDROCHLORIDE 20 MG/ML
INJECTION, SOLUTION EPIDURAL; INFILTRATION; INTRACAUDAL; PERINEURAL PRN
Status: DISCONTINUED | OUTPATIENT
Start: 2022-02-08 | End: 2022-02-08 | Stop reason: SURG

## 2022-02-08 RX ADMIN — LIDOCAINE HYDROCHLORIDE AND EPINEPHRINE: 20; 10 INJECTION, SOLUTION INFILTRATION; PERINEURAL at 13:45

## 2022-02-08 RX ADMIN — LIDOCAINE HYDROCHLORIDE 50 MG: 20 INJECTION, SOLUTION EPIDURAL; INFILTRATION; INTRACAUDAL at 14:49

## 2022-02-08 RX ADMIN — LIDOCAINE HYDROCHLORIDE 50 MG: 20 INJECTION, SOLUTION EPIDURAL; INFILTRATION; INTRACAUDAL at 14:55

## 2022-02-08 RX ADMIN — PROPOFOL 50 MG: 10 INJECTION, EMULSION INTRAVENOUS at 14:58

## 2022-02-08 RX ADMIN — PROPOFOL 50 MG: 10 INJECTION, EMULSION INTRAVENOUS at 14:53

## 2022-02-08 RX ADMIN — PROPOFOL 50 MG: 10 INJECTION, EMULSION INTRAVENOUS at 14:49

## 2022-02-08 RX ADMIN — SODIUM CHLORIDE, POTASSIUM CHLORIDE, SODIUM LACTATE AND CALCIUM CHLORIDE: 600; 310; 30; 20 INJECTION, SOLUTION INTRAVENOUS at 13:23

## 2022-02-08 ASSESSMENT — FIBROSIS 4 INDEX
FIB4 SCORE: 1.644434374852436103
FIB4 SCORE: 1.644434374852436103

## 2022-02-08 NOTE — ANESTHESIA PREPROCEDURE EVALUATION
Date/Time: 02/08/22 1400    Scheduled providers: Aneudy Leong M.D.; Alethea Vargas M.D.    Procedure: EC-ISAK W/O CONT    Diagnosis:       Acute CVA (cerebrovascular accident) (HCC) [I63.9]      CVA (cerebral vascular accident) (HCC) [I63.9]    Location: [facility] - ECHOCARDIOLOGY [facility]        77 yo w/hx CVA    Relevant Problems   NEURO   (positive) Acute CVA (cerebrovascular accident) (HCC)   (positive) History of paraplegia   (positive) Ischemic stroke (HCC)   (positive) Ischemic stroke of frontal lobe (HCC)   (positive) Nonintractable episodic headache      CARDIAC   (positive) Essential hypertension, benign      Other   (positive) Degenerative disk disease   (positive) Hyperlipidemia LDL goal <70   (positive) Peripheral neuropathy     Left sided deficits post CVA    Denies CAD, CP/SOB, DM, LUNG/LIVER/KIDNEY DZ, URI    Physical Exam    Airway   Mallampati: II  TM distance: >3 FB  Neck ROM: full       Cardiovascular   Rhythm: regular  Rate: normal  (-) murmur     Dental   (+) lower dentures, upper dentures           Pulmonary   Breath sounds clear to auscultation     Abdominal    Neurological     Comments: Cognitively intact.  Left sided deficits.             Anesthesia Plan    ASA 3   ASA physical status 3 criteria: CVA or TIA - history (> 3 months)    Plan - MAC               Induction: intravenous      Pertinent diagnostic labs and testing reviewed    Informed Consent:    Anesthetic plan and risks discussed with patient.

## 2022-02-08 NOTE — OP REPORT
PROCEDURE PERFORMED: Implantable Loop Recorder    DATE OF SERVICE: 2/8/2022    : Edi Chang MD    ASSISTANT: None    ANESTHESIA: Local    EBL: <5 cc    SPECIMENS: None    STATEMENT OF MEDICAL NECESSITY:  Cryptogenic CVA    DESCRIPTION OF PROCEDURE:  After informed written consent, the patient was brought to the cath lab pre/post procedure area. The patient was prepped and draped in the usual sterile fashion. The procedure was performed with local anesthetic. Using the supplied incision tool, a <1 cm incision was made in the skin about 2 cm leftward and lateral to the sternal border. Using the supplied insertion tool, a tunnel was made in the subcutaneous tissue at a 45 degree angle to the sternum and the device was inserted with the supplied plunger. Manual compression was used until hemostasis achieved. A single 4-0 vicryl suture was used to appose the skin. Steri strips were applied to the incision site. Sterile dressing was applied.    IMPLANTED DEVICE INFORMATION:  Model: [employer] [id number]  Serial number:  [id number]    IMPRESSIONS:  1. Successful implantable loop recorder implantation    RECOMMENDATIONS:  1. Routine follow-up and device interrogation

## 2022-02-08 NOTE — DISCHARGE INSTRUCTIONS
ACTIVITY: Rest and take it easy for the first 24 hours.  A responsible adult is recommended to remain with you during that time.  It is normal to feel sleepy.  We encourage you to not do anything that requires balance, judgment or coordination.    MILD FLU-LIKE SYMPTOMS ARE NORMAL. YOU MAY EXPERIENCE GENERALIZED MUSCLE ACHES, THROAT IRRITATION, HEADACHE AND/OR SOME NAUSEA.    FOR 24 HOURS DO NOT:  Drive, operate machinery or run household appliances.  Drink beer or alcoholic beverages.   Make important decisions or sign legal documents.    SPECIAL INSTRUCTIONS:    ENDOSCOPY HOME CARE INSTRUCTIONS    ISAK - TRANSESOPHAGEAL ECHOCARDIOGRAM  1. Don't drive or drink alcohol for 24 hours. The medication you received will make you too drowsy.  2. If you begin to vomit bloody material, or develop black or bloody stools, call your doctor as soon as possible.  3. If you have any neck, chest, abdominal pain or temp of 100 degrees, call your doctor.  4. See your doctor 1-2 weeks   6. Prescriptions: none given      You should call 911 if you develop problems with breathing or chest pain.  If any questions arise, call your doctor. If your doctor is not available, please feel free to call [phone] You can also call the [employer] HOTLINE open 24 hours/day, 7 days/week and speak to a nurse at [phone], or toll free [phone].    I acknowledge receipt and understanding of these Home Care Instructions.      Implantable Loop Recorder Placement, Care After  This sheet gives you information about how to care for yourself after your procedure. Your health care provider may also give you more specific instructions. If you have problems or questions, contact your health care provider.  What can I expect after the procedure?  After the procedure, it is common to have:  · Soreness or discomfort near the incision.  · Some swelling or bruising near the incision.  Follow these instructions at home:  Incision care    · Follow  instructions from your health care provider about how to take care of your incision. Make sure you:  ? Wash your hands with soap and water before you change your bandage (dressing). If soap and water are not available, use hand .  ? Change your dressing as told by your health care provider.  ? Keep your dressing dry.  ? Leave stitches (sutures), skin glue, or adhesive strips in place. These skin closures may need to stay in place for 2 weeks or longer. If adhesive strip edges start to loosen and curl up, you may trim the loose edges. Do not remove adhesive strips completely unless your health care provider tells you to do that.  · Check your incision area every day for signs of infection. Check for:  ? Redness, swelling, or pain.  ? Fluid or blood.  ? Warmth.  ? Pus or a bad smell.  · Do not take baths, swim, or use a hot tub until your health care provider approves. Ask your health care provider if you can take showers.  Activity    · Return to your normal activities as told by your health care provider. Ask your health care provider what activities are safe for you.  · Do not drive for 24 hours if you were given a sedative during your procedure.  General instructions  · Follow instructions from your health care provider about how to manage your implantable loop recorder and transmit the information. Learn how to activate a recording if this is necessary for your type of device.  · Do not go through a metal detection gate, and do not let someone hold a metal detector over your chest. Show your ID card.  · Do not have an MRI unless you check with your health care provider first.  · Take over-the-counter and prescription medicines only as told by your health care provider.  · Keep all follow-up visits as told by your health care provider. This is important.  Contact a health care provider if:  · You have redness, swelling, or pain around your incision.  · You have a fever.  · You have pain that is not  relieved by your pain medicine.  · You have triggered your device because of fainting (syncope) or because of a heartbeat that feels like it is racing, slow, fluttering, or skipping (palpitations).  Get help right away if you have:  · Chest pain.  · Difficulty breathing.  Summary  · After the procedure, it is common to have soreness or discomfort near the incision.  · Change your dressing as told by your health care provider.  · Follow instructions from your health care provider about how to manage your implantable loop recorder and transmit the information.  · Keep all follow-up visits as told by your health care provider. This is important.  This information is not intended to replace advice given to you by your health care provider. Make sure you discuss any questions you have with your health care provider.  Document Released: 11/28/2016 Document Revised: 02/02/2019 Document Reviewed: 02/02/2019  [employer] Patient Education © 2020 [employer]        You should CALL YOUR PHYSICIAN if you develop:  Fever greater than 101 degrees F.  Pain not relieved by medication, or persistent nausea or vomiting.  Excessive bleeding (blood soaking through dressing) or unexpected drainage from the wound.  Extreme redness or swelling around the incision site, drainage of pus or foul smelling drainage.  Inability to urinate or empty your bladder within 8 hours.  Problems with breathing or chest pain.    You should call 911 if you develop problems with breathing or chest pain.  If you are unable to contact your doctor or surgical center, you should go to the nearest emergency room or urgent care center.  Physician's telephone #: [phone]    If any questions arise, call your doctor.  If your doctor is not available, please feel free to call the Surgical Center at [phone].     A registered nurse may call you a few days after your surgery to see how you are doing after your procedure.    MEDICATIONS: Resume taking daily  medication.  Take prescribed pain medication with food.  If no medication is prescribed, you may take non-aspirin pain medication if needed.  PAIN MEDICATION CAN BE VERY CONSTIPATING.  Take a stool softener or laxative such as senokot, pericolace, or milk of magnesia if needed.    No Prescription given.     If your physician has prescribed pain medication that includes Acetaminophen (Tylenol), do not take additional Acetaminophen (Tylenol) while taking the prescribed medication.    Depression / Suicide Risk    As you are discharged from this [facility] facility, it is important to learn how to keep safe from harming yourself.    Recognize the warning signs:  · Abrupt changes in personality, positive or negative- including increase in energy   · Giving away possessions  · Change in eating patterns- significant weight changes-  positive or negative  · Change in sleeping patterns- unable to sleep or sleeping all the time   · Unwillingness or inability to communicate  · Depression  · Unusual sadness, discouragement and loneliness  · Talk of wanting to die  · Neglect of personal appearance   · Rebelliousness- reckless behavior  · Withdrawal from people/activities they love  · Confusion- inability to concentrate     If you or a loved one observes any of these behaviors or has concerns about self-harm, here's what you can do:  · Talk about it- your feelings and reasons for harming yourself  · Remove any means that you might use to hurt yourself (examples: pills, rope, extension cords, firearm)  · Get professional help from the community (Mental Health, Substance Abuse, psychological counseling)  · Do not be alone:Call your Safe Contact- someone whom you trust who will be there for you.  · Call your local CRISIS HOTLINE 211-5191 or 797-271-1047  · Call your local Children's Mobile Crisis Response Team [place] (025) 681-1850 or www.[employer]  · Call the toll free National Suicide Prevention Hotlines    · National Suicide Prevention Lifeline 673-021-XNRH (5132)  · National Hope Line Network 800-SUICIDE (444-6711)

## 2022-02-08 NOTE — PROGRESS NOTES
Patient elects to be bloodless. Stickers on chart, armband on patient, bloodless consent reviewed and signed by patient. Blood transfusion portion of surgical consent crossed out. CBC drawn and sent to lab per bloodless protocol. Cath lab notified.

## 2022-02-08 NOTE — ANESTHESIA POSTPROCEDURE EVALUATION
Patient: Nakita Sheppard    Procedure Summary     Date: 02/08/22 Room / Location: [facility] - ECHOCARDIOLOGY [facility]    Anesthesia Start: 1442 Anesthesia Stop: 1515    Procedure: EC-ISAK W/O CONT Diagnosis:       Acute CVA (cerebrovascular accident) (HCC)      CVA (cerebral vascular accident) (HCC)    Scheduled Providers: Aneudy Leong M.D.; Alethea Vargas M.D. Responsible Provider: Alethea Vargas M.D.    Anesthesia Type: MAC ASA Status: 3          Final Anesthesia Type: MAC  Last vitals  BP   159/66   Temp   98.5   Pulse   73   Resp   18   SpO2   95%       No complications documented.     Nurse Pain Score: 8 (NPRS)

## 2022-02-09 NOTE — OR NURSING
Erika BAXTER from phase 2 at bedside to do DC instructions via phone w/ daughter,    Pt AXOX4. VSS denies pain. Denies nausea. Loop re merlyn dressing CDI.  pacu nurse dresses pt w/ assist. PIV DC'd w/o difficulty. Gauze/ker;lix applied.     All belongings w/ pt including dentures ( in mouth ) , jewelry and loop recorder box.

## 2022-02-14 ENCOUNTER — NON-PROVIDER VISIT (OUTPATIENT)
Dept: CARDIOLOGY | Facility: MEDICAL CENTER | Age: 77
End: 2022-02-14
Payer: MEDICARE

## 2022-02-14 VITALS — HEART RATE: 79 BPM | SYSTOLIC BLOOD PRESSURE: 193 MMHG | DIASTOLIC BLOOD PRESSURE: 85 MMHG

## 2022-02-14 DIAGNOSIS — I63.9 ACUTE CVA (CEREBROVASCULAR ACCIDENT) (HCC): ICD-10-CM

## 2022-02-14 DIAGNOSIS — I10 ESSENTIAL HYPERTENSION, BENIGN: ICD-10-CM

## 2022-02-14 RX ORDER — AMLODIPINE BESYLATE 2.5 MG/1
2.5 TABLET ORAL DAILY
Qty: 30 TABLET | Refills: 1 | Status: SHIPPED | OUTPATIENT
Start: 2022-02-14 | End: 2022-03-11 | Stop reason: SDUPTHER

## 2022-02-14 NOTE — NON-PROVIDER
Pharmacotherapy Hypertension Visit  02/14/22     Goal Date when HTN will be controlled: 5/14/22    Type of Visit:  Initial Visit     Nakita Sheppard has been referred for evaluation and management of hypertension    HPI:   There were no vitals filed for this visit.    Age at Initial Diagnosis: Pt states she's had HTN forever, but has been on BP meds x 2 yrs      Home BP readings:   137/86, 131/88, 151/95, 138/85, 160/107, 115/85, 170/95, 180/87, 136/96, 143/66, 144/82, 134/84, 143/97, 135/85, 134/73, 177/118, 126/60, 116/62, 103/75, 108/70  Any readings above  180/110:  Some as listed above   Any symptoms of high blood pressure (TIA, Stroke, Head ache, vision changes): Occasional headaches      Current Prescription HTN Medications - including dose:    · Labetalol 100 mg BID - pt did not increase up d/t concern for hypotension (pt reports this dose has historically dropped her BP to SBP in the 60's, 94/48, 96/63)  · Lisinopril 40 mg once daily    Current side effects potentially related to antihypertensive medications (lightheaded, dizziness, leg swelling): None    Current Adherence to Blood Pressure Medications: Complete    Previously attempted BP medications:   · Amlodipine 2.5 to 10 mg once daily - Pt experienced significant hypotension (this was w/ concomitant labetalol 200 mg BID)  · HCTZ 12.5 & 25 mg - Pt experienced severe fatigue and GI upset    Any current interfering substances:   · Nothing noted    Any current lifestyle issues affecting BP:    · Pt states that she has a hard time falling and staying asleep - she sleeps from 11pm to 3am typically. Discussed sleep hygiene w/ pt. Recommended pt try scheduled APAP and cut down on diphenhydramine use given pt age.   · Recent CVA stresses pt  · Pt w/ significant back pain that most likely contributes to her HTN    In the past 6 months have pt had the following (if no, then order)  Microalbumin  TSH    Since last visit any of the below   Creatinine  increase > 0.5  Potassium > 5 or < 3.5  New edema present  Hyponatremia    Lab Results   Component Value Date/Time    SODIUM 137 12/20/2021 05:56 AM    POTASSIUM 3.5 (L) 12/20/2021 05:56 AM    CHLORIDE 98 12/20/2021 05:56 AM    CO2 28 12/20/2021 05:56 AM    GLUCOSE 105 (H) 12/20/2021 05:56 AM    BUN 22 12/20/2021 05:56 AM    CREATININE 1.07 12/20/2021 05:56 AM    BUNCREATRAT 16 12/13/2017 10:22 AM        x Medications Reconciled  CURRENT MEDICATIONS:   Current Outpatient Medications:   •  Multiple Vitamins-Minerals (ZINC PO), 30 mg, Oral, DAILY  •  Tylenol PM Extra Strength, 3 Tablet, Oral, QHS  •  aspirin, 81 mg, Oral, DAILY  •  vitamin D, 2,000 Units, Oral, DAILY  •  atorvastatin, 80 mg, Oral, DAILY  •  labetalol, 200 mg, Oral, Q12HRS  •  lisinopril, 40 mg, Oral, DAILY (Patient taking differently: 40 mg, Oral, EVERY EVENING)  •  tizanidine, 2 mg, Oral, QHS  •  traZODone, 100 mg, Oral, QHS  ALLERGIES: Bloodless, Penicillins, Fentanyl, and Morphine    SOCIAL HISTORY   Social History     Tobacco Use   Smoking Status Never Smoker   Smokeless Tobacco Never Used     Change in weight: Pt has scale, but does not weigh d/t not having batteries. She will replace the batteries and begin weighing  Exercise habits: Pt following w/ PT since her CVA. Pt follows w/ PT 2x/week.  Diet: low sodium      ASSESSMENT AND PLAN    BP is elevated    BP Goal < 120/80    Does pt have known end organ damage? No    BP Monitoring Recommendations - Home BP   Proper technique for blood pressure monitoring reviewed with patient.  Pt instructed to check BP at varying times through out the day 4 times per week.  Provided pt log for blood pressure monitoring and pt instructed to bring in at each visit for reviews    Lifestyle Recommendations From Today’s Visit:    · Continued weight reduction  · Dietary Sodium Restriction  · Increase Physical Activity - continue to follow w/ PT and increase as tolerated    Medication recommendations from today's  visit:   ARB/ACEI: Continue lisinopril 40 mg once daily  Diuretic: N/a  Calcium Channel Blocker: Re-challenge amlodipine w/ 2.5 mg once daily  BB: Continue labetalol 100 mg BID  -Importance of adherence discussed    Other recommendations:   · Pt was hypertensive today in clinic. She did take her meds this AM. Pt denies any s/sx of HTN (HA, vision changes, CP). Given her recent hx of CVA did recommend that pt go to the ED - she declines at this time.  · Pt presents w/ her daughter who states that her BP is elevated whenever she comes into clinic given the stress of coming from [place] as well as being in the office.  · Pt has tried amlodipine in the past w/ hypotension (along w/ concomitant labetalol at 200 mg BID). Pt open to re-challenging at a smaller dose. Given her GI upset w/ HCTZ, will move forward w/ CCB re-challenge.  · Advised pt that if her BP remains elevated in the office and lower at home that we will refer her to get a 24 hr ABPM conducted.  · Of note, advised pt to use scheduled APAP for pain and to taper down on her diphenhydramine use as she is currently using 75 mg daily. Advised her to use sleep hygiene, non-caffeinated tea, and/or valerian supplementation.    Studies Ordered at Todays Visit: None  Blood Work Ordered At Today’s visit: None   Follow-Up: 3 weeks (every 1-4 weeks until at goal)     Shahram Hare, PharmD, BCACP    CC:  Tracy Cartagena M.D.  No ref. provider found

## 2022-02-16 ENCOUNTER — OFFICE VISIT (OUTPATIENT)
Dept: NEUROLOGY | Facility: MEDICAL CENTER | Age: 77
End: 2022-02-16
Attending: PSYCHIATRY & NEUROLOGY
Payer: MEDICARE

## 2022-02-16 VITALS
TEMPERATURE: 97.1 F | SYSTOLIC BLOOD PRESSURE: 116 MMHG | HEART RATE: 77 BPM | RESPIRATION RATE: 14 BRPM | DIASTOLIC BLOOD PRESSURE: 72 MMHG | BODY MASS INDEX: 31.2 KG/M2 | OXYGEN SATURATION: 94 % | WEIGHT: 169.53 LBS | HEIGHT: 62 IN

## 2022-02-16 DIAGNOSIS — I63.9 ISCHEMIC STROKE OF FRONTAL LOBE (HCC): ICD-10-CM

## 2022-02-16 PROCEDURE — 99212 OFFICE O/P EST SF 10 MIN: CPT | Performed by: PSYCHIATRY & NEUROLOGY

## 2022-02-16 PROCEDURE — 99215 OFFICE O/P EST HI 40 MIN: CPT | Performed by: PSYCHIATRY & NEUROLOGY

## 2022-02-16 ASSESSMENT — FIBROSIS 4 INDEX: FIB4 SCORE: 1.46

## 2022-02-16 ASSESSMENT — PATIENT HEALTH QUESTIONNAIRE - PHQ9: CLINICAL INTERPRETATION OF PHQ2 SCORE: 0

## 2022-02-16 NOTE — PROGRESS NOTES
Chief Complaint   Patient presents with   • Follow-Up     Ischemic stroke       History of present illness:  Nakita Kinney South 76 y.o. female with HTN, DM2 presented 11/19 with acute left arm weakness s/p IV tPA.   She had a hospitalization in 12/2020 for acute bilateral lower extremity weakness and loss of sensation from the waist down.     3/18/21:  This is a 75 year old female with a Dec 2020 hospitalization for acute paraplegia and lower extremity sensory loss. This gradually recovered over the course of the next few weeks/months and she has mild residual lower extremity weakness without sensory loss on exam today. She was wondering if she may have hypokalemic periodic paralysis given that symptoms gradually improved after starting K+ supplementation however her K+ was only mildly reduced in the ED at 3.5 and this is a rare genetic disorder that I doubt she has.   I have counseled her on the possible causes of her weakness, including AIDP, transverse myelitis, or a spinal cord infarction. I have ordered MRI cervical/thoracic spine as well as a EMG/NCS to evaluate further. Since she has markedly improved, this workup is mainly important for diagnosis and prevention.      5/4/21:  The MRI of the cervical and thoracic spine ordered at the previous visit were not completed.  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 The rubbing of bed sheets against her legs is painful. She uses a scooter at home to get around.   She has history of low back surgery.      11/23/21:   On 11/19, she had a headache, was unable to speak, was brought to the hospital and a brain MRI was noted to have a posterior right frontal infarct.   She recovered completely from the stroke after being given IV tPA. When she went home she was back to normal.   Last night, her left hand went numb and she lost her speech again. She continues to feel that her left hand is numb and weak. Last night  plavix was added.     2/16/22: She developed deep bruising from plavix. She was on aspirin alone prior to her second ischemic event and was resumed on dual antiplatelet therapy during her hospitalization.     Past medical history:   Past Medical History:   Diagnosis Date   • Allergy    • Arthritis     Osteoarthritis   • Blood clotting disorder (HCC)    • Chronic headaches 5/29/2012   • Chronic pain    • Dental disorder     Upper and lower dentures   • Heart burn     GERD   • High cholesterol    • History of chickenpox    • History of measles    • History of mumps    • Hypertension    • Indigestion    • Low back pain 9/22/14    8/10   • Migraine    • Peripheral neuropathy     BLE   • Pneumonia 1992   • Renal disorder     Stage 3 Kidney failure   • Rotator cuff tear    • Seasonal allergies    • Stroke (HCC) 11/19/2021    Stroke right brain   • TIA (transient ischemic attack)    • Tuberculosis 1979    had treatment for 2 years       Past surgical history:   Past Surgical History:   Procedure Laterality Date   • FUSION, SPINE, LUMBAR, PLIF  10/6/2014    Performed by Rishabh Bhatia M.D. at SURGERY [facility]   • LUMBAR LAMINECTOMY DISKECTOMY  10/6/2014    Performed by Rishabh Bhatia M.D. at SURGERY [facility]   • LUMBAR EXPLORATION  10/6/2014    Performed by Rishabh Bhatia M.D. at [facility]   • OTHER  2013    back   • SPINAL CORD STIMULATOR  11/4/2011    Performed by AUBREE PALACIO at SURGERY [facility]   • OPEN REDUCTION  2008    re-broke foot   • OTHER  2000    right foot   • SHOULDER SURGERY  1998   • CARPAL TUNNEL ENDOSCOPIC  1990   • ARTHROSCOPY, KNEE  1987    bilateral   • ABDOMINAL HYSTERECTOMY TOTAL  1983   • APPENDECTOMY     • CHOLECYSTECTOMY     • LAMINOTOMY  02/2002 and 9/2002       Family history:   Family History   Problem Relation Age of Onset   • Diabetes Mother    • Hypertension Mother    • Arthritis Mother    • Stroke Mother    • Heart Disease Father    • Cancer  Brother    • Leukemia Brother    • Stroke Brother        Social history:   Social History     Socioeconomic History   • Marital status:      Spouse name: Not on file   • Number of children: Not on file   • Years of education: Not on file   • Highest education level: Not on file   Occupational History   • Occupation: disability- customer service in past     Employer: OTHER   Tobacco Use   • Smoking status: Never Smoker   • Smokeless tobacco: Never Used   Vaping Use   • Vaping Use: Never used   Substance and Sexual Activity   • Alcohol use: No     Alcohol/week: 0.0 oz   • Drug use: No   • Sexual activity: Yes     Partners: Male     Birth control/protection: Post-Menopausal   Other Topics Concern   • Not on file   Social History Narrative   • Not on file     Social Determinants of Health     Financial Resource Strain: Not on file   Food Insecurity: Not on file   Transportation Needs: Not on file   Physical Activity: Not on file   Stress: Not on file   Social Connections: Not on file   Intimate Partner Violence: Not on file   Housing Stability: Not on file       Current medications:   Current Outpatient Medications   Medication   • amLODIPine (NORVASC) 2.5 MG Tab   • Multiple Vitamins-Minerals (ZINC PO)   • aspirin 81 MG EC tablet   • vitamin D (VITAMIND D3) 1000 UNIT Tab   • atorvastatin (LIPITOR) 80 MG tablet   • labetalol (NORMODYNE) 200 MG Tab   • lisinopril (PRINIVIL) 40 MG tablet   • tizanidine (ZANAFLEX) 2 MG tablet   • traZODone (DESYREL) 100 MG Tab   • diphenhydrAMINE-APAP, sleep, (TYLENOL PM EXTRA STRENGTH)  MG Tab     No current facility-administered medications for this visit.       Medication Allergy:  Allergies   Allergen Reactions   • Bloodless    • Penicillins Hives and Swelling   • Fentanyl Anxiety   • Morphine Itching       Physical examination:   Vitals:    02/16/22 0835   BP: 116/72   BP Location: Left arm   Patient Position: Sitting   BP Cuff Size: Adult   Pulse: 77   Resp: 14   Temp:  "36.2 °C (97.1 °F)   TempSrc: Temporal   SpO2: 94%   Weight: 76.9 kg (169 lb 8.5 oz)   Height: 1.575 m (5' 2\")     Neurological Exam    Cranial Nerves  CN II: Right normal visual field. Left normal visual field.  CN VII:  Right: There is no facial weakness.  Left: There is central facial weakness.  +Dysarthria.    Motor    There is left arm downward drift and left arm weakness..    Sensory  Light touch is normal in upper and lower extremities.     Gait    There is an unsteady gait.  She requires assistance to ambulate safely.  Her left leg is weak..       Labs:  I reviewed the following labs personally:  LDL: 74    Imaging:   TRANSTHORACIC ECHO   Interpretation Summary   The Ejection Fraction estimate is 65-70%   The right ventricular systolic function is normal.   The left atrium is mildly dilated.   There is trace mitral regurgitation      MRI BRAIN W/O   IMPRESSION:   Acute cerebrovascular infarct of the posterior right frontal lobe.       11/22/21  CTA NECK   IMPRESSION:       1.  No large vessel occlusion of the major arteries of the head and neck.     2.  Mild bilateral carotid bulb atherosclerosis without stenosis.      CTA head:       Anterior circulation: Heavy clinoid and supraclinoid ICA atherosclerosis. The   bilateral internal carotid, middle cerebral and anterior cerebral arteries are   patent without significant stenosis or occlusion. The anterior communicating   artery is visualized. There is no aneurysm.     Posterior circulation: The distal vertebral, basilar and bilateral posterior   cerebral arteries are patent without significant stenosis or occlusion. The   posterior communicating arteries are visualized. There is no aneurysm.     Other: Moderate periventricular and deep white matter hypoattenuation related to   chronic small vessel ischemic changes.. There is no intracranial mass,   hemorrhage, extra-axial fluid collection, abnormal enhancement, hydrocephalus,   or midline shift.       EKG: " normal    Cardiac event monitor: This was a 13-day Zio patch. no A. fib or flutter.  Sinus rhythm.    Transesophageal echo:  CONCLUSIONS  Normal left ventricular size and systolic function.  Normal right ventricular size and systolic function.  Normal left atrial appendage.  No thrombus detected in the left atrial appendage.  Intact but aneurysmal <10mm interatrial septum.  Agitated saline study with valsalva negative for right to left shunt.  Mild mitral regurgitation.  Normal pericardium without effusion.    ASSESSMENT AND PLAN:  Problem List Items Addressed This Visit     Ischemic stroke of frontal lobe (HCC)          1. Ischemic stroke of frontal lobe (HCC)    76-year-old female with a right frontal ischemic stroke causing left hemiparesis.  She presented on November 19 for trouble speaking, but received IV TPA with improvement of her symptoms.  She had a repeat stroke in the same territory on 11/30/2021, with a large right frontal infarct noted on head CT.  Her stroke work-up has been negative, with normal CTA head and neck, normal transthoracic and transesophageal echocardiogram, and normal cardiac monitoring today.  She currently has a implanted loop recorder for atrial fibrillation which is in process of being read.   The stroke is an embolic stroke of unclear etiology.  I have counseled the patient that unless she has atrial fibrillation, she will remain on a single antiplatelet and her current dosage of atorvastatin 80 mg.  She currently is undergoing home health physical therapy.    FOLLOW-UP:   Return if symptoms worsen or fail to improve.    Total time spent for the day for this patient unrelated to procedure time is: 42 minutes. I spent 25 minutes in face to face time and I spent 2 minutes pre-charting and 15 minutes in post-visit documentation.      Dr. Juan Manuel Parks D.O.  [facility]

## 2022-02-23 NOTE — NON-PROVIDER
Called pt to f/u on BP w/ amlodipine re-challenge.     Since starting amlodipine, pt's highest reading was 164/99, lowest reading was 110/72, and typically in the AM her BP is 120-130's/80's.    Pt denies any noted SE or hypotension from the amlodipine at this time.    Instructed pt to increase amlodipine up to 5 mg once daily. Counseled her to revert back to 2.5 mg once daily if she notes any significant hypotension or SE.    FU: 1.5 weeks in clinic    Shahram Hare, VenkateshD, BCACP

## 2022-03-08 ENCOUNTER — DOCUMENTATION (OUTPATIENT)
Dept: VASCULAR LAB | Facility: MEDICAL CENTER | Age: 77
End: 2022-03-08
Payer: MEDICARE

## 2022-03-08 NOTE — PROGRESS NOTES
[facility]     Called patient to reschedule cancelled appointment with the pharmacist yesterday. Patient reports that she is switching care to [place] and declined further follow-up.    Akshat Westbrook, PharmD     Cc.  Dr. Aneudy Leong MD

## 2022-03-11 DIAGNOSIS — I10 ESSENTIAL HYPERTENSION, BENIGN: ICD-10-CM

## 2022-03-11 DIAGNOSIS — I63.9 ACUTE CVA (CEREBROVASCULAR ACCIDENT) (HCC): ICD-10-CM

## 2022-03-14 ENCOUNTER — NON-PROVIDER VISIT (OUTPATIENT)
Dept: CARDIOLOGY | Facility: MEDICAL CENTER | Age: 77
End: 2022-03-14
Payer: MEDICARE

## 2022-03-14 DIAGNOSIS — Z45.09 ENCOUNTER FOR LOOP RECORDER CHECK: ICD-10-CM

## 2022-03-14 DIAGNOSIS — I63.9 CRYPTOGENIC STROKE (HCC): ICD-10-CM

## 2022-03-14 PROCEDURE — 93298 REM INTERROG DEV EVAL SCRMS: CPT | Performed by: INTERNAL MEDICINE

## 2022-03-16 RX ORDER — AMLODIPINE BESYLATE 2.5 MG/1
2.5 TABLET ORAL DAILY
Qty: 90 TABLET | Refills: 3 | Status: SHIPPED | OUTPATIENT
Start: 2022-03-16

## 2023-07-26 NOTE — CARE PLAN
"The patient is Stable - Low risk of patient condition declining or worsening    Shift Goals  Clinical Goals: Safety  Patient Goals: upgrade to thin liquids    Progress made toward(s) clinical / shift goals:    Problem: Skin Integrity  Goal: Skin integrity is maintained or improved  Outcome: Progressing     Problem: Fall Risk - Rehab  Goal: Patient will remain free from falls  Outcome: Progressing  Note: Erin Irizarry Fall risk Assessment Score: 13  Moderate fall risk Interventions  - Bed and strip alarm   - Yellow sign by the door   - Yellow wrist band \"Fall risk\"  - Room near to the nurse station  - Do not leave patient unattended in the bathroom  - Fall risk education provided       Problem: Bowel Elimination  Goal: Patient will participate in bowel management program  Note: LBM 12/11, will give MOM in the morning. Encouraged adequate fluid intake.      Problem: Skin Integrity  Goal: Patient's skin integrity will be maintained or improve  Note: Shower given last night. Patient's skin remains intact and free from new or accidental injury this shift.  Will continue to monitor.        Sleeping on rounds. VSS. Med whole taken with vanilla pudding with Mildly thick liquid, tolerated well.    Patient was pocketing food, found to have food ( ground meat ) inside mouth during oral care. Educated patient regarding aspiration precautions. Will relay to day shift RN and CNA. Charge Nurse made aware.  No signs of aspiration noted.       " [Frail] : frail [5th Left ICS - MCL] : palpated at the 5th LICS in the midclavicular line [No Precordial Heave] : no precordial heave was noted [Normal Rate] : normal [Rhythm Regular] : regular [Normal S1] : normal S1 [Normal S2] : normal S2 [Click] : a ~M click was heard [I] : a grade 1 [No Pitting Edema] : no pitting edema present [2+] : left 2+ [No Abnormalities] : the abdominal aorta was not enlarged and no bruit was heard [Normal] : moves all extremities, no focal deficits, normal speech [S3] : no S3 [S4] : no S4 [Right Carotid Bruit] : no bruit heard over the right carotid [Left Carotid Bruit] : no bruit heard over the left carotid [de-identified] : Looks less fatigued [de-identified] : In w/c non weight baring except for PT per pt [de-identified] : Cries often, looks sad [de-identified] : trace edema bila much improved + venous stasis